# Patient Record
Sex: MALE | Race: WHITE | NOT HISPANIC OR LATINO | Employment: OTHER | URBAN - METROPOLITAN AREA
[De-identification: names, ages, dates, MRNs, and addresses within clinical notes are randomized per-mention and may not be internally consistent; named-entity substitution may affect disease eponyms.]

---

## 2017-01-07 DIAGNOSIS — K83.09 SCLEROSING CHOLANGITIS (H): Primary | ICD-10-CM

## 2017-01-07 DIAGNOSIS — K76.9 LIVER DISEASE: ICD-10-CM

## 2017-01-13 DIAGNOSIS — K76.9 LIVER DISEASE: ICD-10-CM

## 2017-01-13 DIAGNOSIS — K83.09 SCLEROSING CHOLANGITIS (H): ICD-10-CM

## 2017-01-13 LAB
ALBUMIN SERPL-MCNC: 2.9 G/DL (ref 3.4–5)
ALP SERPL-CCNC: 606 U/L (ref 40–150)
ALT SERPL W P-5'-P-CCNC: 82 U/L (ref 0–70)
AST SERPL W P-5'-P-CCNC: 182 U/L (ref 0–45)
BASOPHILS # BLD AUTO: 0 10E9/L (ref 0–0.2)
BASOPHILS NFR BLD AUTO: 0.5 %
BILIRUB SERPL-MCNC: 4.5 MG/DL (ref 0.2–1.3)
CREAT SERPL-MCNC: 0.81 MG/DL (ref 0.66–1.25)
DIFFERENTIAL METHOD BLD: ABNORMAL
EOSINOPHIL # BLD AUTO: 0.3 10E9/L (ref 0–0.7)
EOSINOPHIL NFR BLD AUTO: 6.9 %
ERYTHROCYTE [DISTWIDTH] IN BLOOD BY AUTOMATED COUNT: 17.6 % (ref 10–15)
GFR SERPL CREATININE-BSD FRML MDRD: NORMAL ML/MIN/1.7M2
HCT VFR BLD AUTO: 39.1 % (ref 40–53)
HGB BLD-MCNC: 13.7 G/DL (ref 13.3–17.7)
IMM GRANULOCYTES # BLD: 0 10E9/L (ref 0–0.4)
IMM GRANULOCYTES NFR BLD: 0.5 %
INR PPP: 1.15 (ref 0.86–1.14)
LYMPHOCYTES # BLD AUTO: 0.8 10E9/L (ref 0.8–5.3)
LYMPHOCYTES NFR BLD AUTO: 20.4 %
MCH RBC QN AUTO: 31.6 PG (ref 26.5–33)
MCHC RBC AUTO-ENTMCNC: 35 G/DL (ref 31.5–36.5)
MCV RBC AUTO: 90 FL (ref 78–100)
MONOCYTES # BLD AUTO: 0.3 10E9/L (ref 0–1.3)
MONOCYTES NFR BLD AUTO: 6.4 %
NEUTROPHILS # BLD AUTO: 2.6 10E9/L (ref 1.6–8.3)
NEUTROPHILS NFR BLD AUTO: 65.3 %
PLATELET # BLD AUTO: 68 10E9/L (ref 150–450)
POTASSIUM SERPL-SCNC: 4.3 MMOL/L (ref 3.4–5.3)
RBC # BLD AUTO: 4.33 10E12/L (ref 4.4–5.9)
SODIUM SERPL-SCNC: 143 MMOL/L (ref 133–144)
WBC # BLD AUTO: 3.9 10E9/L (ref 4–11)

## 2017-01-13 PROCEDURE — 84075 ASSAY ALKALINE PHOSPHATASE: CPT | Performed by: INTERNAL MEDICINE

## 2017-01-13 PROCEDURE — 84460 ALANINE AMINO (ALT) (SGPT): CPT | Performed by: INTERNAL MEDICINE

## 2017-01-13 PROCEDURE — 82565 ASSAY OF CREATININE: CPT | Performed by: INTERNAL MEDICINE

## 2017-01-13 PROCEDURE — 84295 ASSAY OF SERUM SODIUM: CPT | Performed by: INTERNAL MEDICINE

## 2017-01-13 PROCEDURE — 85610 PROTHROMBIN TIME: CPT | Performed by: INTERNAL MEDICINE

## 2017-01-13 PROCEDURE — 85025 COMPLETE CBC W/AUTO DIFF WBC: CPT | Performed by: INTERNAL MEDICINE

## 2017-01-13 PROCEDURE — 82040 ASSAY OF SERUM ALBUMIN: CPT | Performed by: INTERNAL MEDICINE

## 2017-01-13 PROCEDURE — 84450 TRANSFERASE (AST) (SGOT): CPT | Performed by: INTERNAL MEDICINE

## 2017-01-13 PROCEDURE — 84132 ASSAY OF SERUM POTASSIUM: CPT | Performed by: INTERNAL MEDICINE

## 2017-01-13 PROCEDURE — 36415 COLL VENOUS BLD VENIPUNCTURE: CPT | Performed by: INTERNAL MEDICINE

## 2017-01-13 PROCEDURE — 82247 BILIRUBIN TOTAL: CPT | Performed by: INTERNAL MEDICINE

## 2017-02-17 ENCOUNTER — MEDICAL CORRESPONDENCE (OUTPATIENT)
Dept: HEALTH INFORMATION MANAGEMENT | Facility: CLINIC | Age: 42
End: 2017-02-17

## 2017-02-27 DIAGNOSIS — K83.09 SCLEROSING CHOLANGITIS (H): ICD-10-CM

## 2017-02-27 DIAGNOSIS — K76.9 LIVER DISEASE: ICD-10-CM

## 2017-02-27 LAB
ALBUMIN SERPL-MCNC: 2.6 G/DL (ref 3.4–5)
ALP SERPL-CCNC: 551 U/L (ref 40–150)
ALT SERPL W P-5'-P-CCNC: 96 U/L (ref 0–70)
AST SERPL W P-5'-P-CCNC: 194 U/L (ref 0–45)
BASOPHILS # BLD AUTO: 0 10E9/L (ref 0–0.2)
BASOPHILS NFR BLD AUTO: 0.6 %
BILIRUB SERPL-MCNC: 8.5 MG/DL (ref 0.2–1.3)
CREAT SERPL-MCNC: 0.77 MG/DL (ref 0.66–1.25)
DIFFERENTIAL METHOD BLD: ABNORMAL
EOSINOPHIL # BLD AUTO: 0.2 10E9/L (ref 0–0.7)
EOSINOPHIL NFR BLD AUTO: 4 %
ERYTHROCYTE [DISTWIDTH] IN BLOOD BY AUTOMATED COUNT: 20.7 % (ref 10–15)
GFR SERPL CREATININE-BSD FRML MDRD: NORMAL ML/MIN/1.7M2
HCT VFR BLD AUTO: 39.3 % (ref 40–53)
HGB BLD-MCNC: 14 G/DL (ref 13.3–17.7)
IMM GRANULOCYTES # BLD: 0.1 10E9/L (ref 0–0.4)
IMM GRANULOCYTES NFR BLD: 0.9 %
INR PPP: 1.2 (ref 0.86–1.14)
LYMPHOCYTES # BLD AUTO: 1 10E9/L (ref 0.8–5.3)
LYMPHOCYTES NFR BLD AUTO: 19.7 %
MCH RBC QN AUTO: 32.1 PG (ref 26.5–33)
MCHC RBC AUTO-ENTMCNC: 35.6 G/DL (ref 31.5–36.5)
MCV RBC AUTO: 90 FL (ref 78–100)
MONOCYTES # BLD AUTO: 0.4 10E9/L (ref 0–1.3)
MONOCYTES NFR BLD AUTO: 7.6 %
NEUTROPHILS # BLD AUTO: 3.6 10E9/L (ref 1.6–8.3)
NEUTROPHILS NFR BLD AUTO: 67.2 %
PLATELET # BLD AUTO: 83 10E9/L (ref 150–450)
POTASSIUM SERPL-SCNC: 4.9 MMOL/L (ref 3.4–5.3)
RBC # BLD AUTO: 4.36 10E12/L (ref 4.4–5.9)
SODIUM SERPL-SCNC: 140 MMOL/L (ref 133–144)
WBC # BLD AUTO: 5.3 10E9/L (ref 4–11)

## 2017-02-27 PROCEDURE — 84075 ASSAY ALKALINE PHOSPHATASE: CPT | Performed by: INTERNAL MEDICINE

## 2017-02-27 PROCEDURE — 84450 TRANSFERASE (AST) (SGOT): CPT | Performed by: INTERNAL MEDICINE

## 2017-02-27 PROCEDURE — 82040 ASSAY OF SERUM ALBUMIN: CPT | Performed by: INTERNAL MEDICINE

## 2017-02-27 PROCEDURE — 85025 COMPLETE CBC W/AUTO DIFF WBC: CPT | Performed by: INTERNAL MEDICINE

## 2017-02-27 PROCEDURE — 36415 COLL VENOUS BLD VENIPUNCTURE: CPT | Performed by: INTERNAL MEDICINE

## 2017-02-27 PROCEDURE — 82565 ASSAY OF CREATININE: CPT | Performed by: INTERNAL MEDICINE

## 2017-02-27 PROCEDURE — 84132 ASSAY OF SERUM POTASSIUM: CPT | Performed by: INTERNAL MEDICINE

## 2017-02-27 PROCEDURE — 84460 ALANINE AMINO (ALT) (SGPT): CPT | Performed by: INTERNAL MEDICINE

## 2017-02-27 PROCEDURE — 82247 BILIRUBIN TOTAL: CPT | Performed by: INTERNAL MEDICINE

## 2017-02-27 PROCEDURE — 84295 ASSAY OF SERUM SODIUM: CPT | Performed by: INTERNAL MEDICINE

## 2017-02-27 PROCEDURE — 85610 PROTHROMBIN TIME: CPT | Performed by: INTERNAL MEDICINE

## 2017-04-25 DIAGNOSIS — K76.9 LIVER DISEASE: ICD-10-CM

## 2017-04-25 DIAGNOSIS — K83.09 SCLEROSING CHOLANGITIS (H): ICD-10-CM

## 2017-04-25 LAB
ALBUMIN SERPL-MCNC: 2.5 G/DL (ref 3.4–5)
ALP SERPL-CCNC: 476 U/L (ref 40–150)
ALT SERPL W P-5'-P-CCNC: 63 U/L (ref 0–70)
AST SERPL W P-5'-P-CCNC: 143 U/L (ref 0–45)
BASOPHILS # BLD AUTO: 0 10E9/L (ref 0–0.2)
BASOPHILS NFR BLD AUTO: 0.3 %
BILIRUB SERPL-MCNC: 4.1 MG/DL (ref 0.2–1.3)
CREAT SERPL-MCNC: 0.83 MG/DL (ref 0.66–1.25)
DIFFERENTIAL METHOD BLD: ABNORMAL
EOSINOPHIL # BLD AUTO: 0.2 10E9/L (ref 0–0.7)
EOSINOPHIL NFR BLD AUTO: 4.5 %
ERYTHROCYTE [DISTWIDTH] IN BLOOD BY AUTOMATED COUNT: 14.6 % (ref 10–15)
GFR SERPL CREATININE-BSD FRML MDRD: NORMAL ML/MIN/1.7M2
HCT VFR BLD AUTO: 39.1 % (ref 40–53)
HGB BLD-MCNC: 13.1 G/DL (ref 13.3–17.7)
IMM GRANULOCYTES # BLD: 0 10E9/L (ref 0–0.4)
IMM GRANULOCYTES NFR BLD: 0.9 %
INR PPP: 1.26 (ref 0.86–1.14)
LYMPHOCYTES # BLD AUTO: 0.7 10E9/L (ref 0.8–5.3)
LYMPHOCYTES NFR BLD AUTO: 21 %
MCH RBC QN AUTO: 34.5 PG (ref 26.5–33)
MCHC RBC AUTO-ENTMCNC: 33.5 G/DL (ref 31.5–36.5)
MCV RBC AUTO: 103 FL (ref 78–100)
MONOCYTES # BLD AUTO: 0.3 10E9/L (ref 0–1.3)
MONOCYTES NFR BLD AUTO: 8.1 %
NEUTROPHILS # BLD AUTO: 2.2 10E9/L (ref 1.6–8.3)
NEUTROPHILS NFR BLD AUTO: 65.2 %
PLATELET # BLD AUTO: 61 10E9/L (ref 150–450)
POTASSIUM SERPL-SCNC: 4.7 MMOL/L (ref 3.4–5.3)
RBC # BLD AUTO: 3.8 10E12/L (ref 4.4–5.9)
SODIUM SERPL-SCNC: 141 MMOL/L (ref 133–144)
WBC # BLD AUTO: 3.3 10E9/L (ref 4–11)

## 2017-04-25 PROCEDURE — 84460 ALANINE AMINO (ALT) (SGPT): CPT | Performed by: INTERNAL MEDICINE

## 2017-04-25 PROCEDURE — 84295 ASSAY OF SERUM SODIUM: CPT | Performed by: INTERNAL MEDICINE

## 2017-04-25 PROCEDURE — 82247 BILIRUBIN TOTAL: CPT | Performed by: INTERNAL MEDICINE

## 2017-04-25 PROCEDURE — 84075 ASSAY ALKALINE PHOSPHATASE: CPT | Performed by: INTERNAL MEDICINE

## 2017-04-25 PROCEDURE — 85025 COMPLETE CBC W/AUTO DIFF WBC: CPT | Performed by: INTERNAL MEDICINE

## 2017-04-25 PROCEDURE — 82565 ASSAY OF CREATININE: CPT | Performed by: INTERNAL MEDICINE

## 2017-04-25 PROCEDURE — 82040 ASSAY OF SERUM ALBUMIN: CPT | Performed by: INTERNAL MEDICINE

## 2017-04-25 PROCEDURE — 84450 TRANSFERASE (AST) (SGOT): CPT | Performed by: INTERNAL MEDICINE

## 2017-04-25 PROCEDURE — 84132 ASSAY OF SERUM POTASSIUM: CPT | Performed by: INTERNAL MEDICINE

## 2017-04-25 PROCEDURE — 85610 PROTHROMBIN TIME: CPT | Performed by: INTERNAL MEDICINE

## 2017-04-25 PROCEDURE — 36415 COLL VENOUS BLD VENIPUNCTURE: CPT | Performed by: INTERNAL MEDICINE

## 2017-05-15 ENCOUNTER — TRANSFERRED RECORDS (OUTPATIENT)
Dept: HEALTH INFORMATION MANAGEMENT | Facility: CLINIC | Age: 42
End: 2017-05-15

## 2017-06-20 DIAGNOSIS — K83.09 SCLEROSING CHOLANGITIS (H): ICD-10-CM

## 2017-06-20 DIAGNOSIS — K76.9 LIVER DISEASE: ICD-10-CM

## 2017-06-20 LAB
ALBUMIN SERPL-MCNC: 2.2 G/DL (ref 3.4–5)
ALP SERPL-CCNC: 360 U/L (ref 40–150)
ALT SERPL W P-5'-P-CCNC: 94 U/L (ref 0–70)
AST SERPL W P-5'-P-CCNC: 199 U/L (ref 0–45)
BASOPHILS # BLD AUTO: 0 10E9/L (ref 0–0.2)
BASOPHILS NFR BLD AUTO: 0.3 %
BILIRUB SERPL-MCNC: 14.7 MG/DL (ref 0.2–1.3)
CREAT SERPL-MCNC: 0.87 MG/DL (ref 0.66–1.25)
DIFFERENTIAL METHOD BLD: ABNORMAL
EOSINOPHIL # BLD AUTO: 0.2 10E9/L (ref 0–0.7)
EOSINOPHIL NFR BLD AUTO: 2.8 %
ERYTHROCYTE [DISTWIDTH] IN BLOOD BY AUTOMATED COUNT: 17.4 % (ref 10–15)
GFR SERPL CREATININE-BSD FRML MDRD: NORMAL ML/MIN/1.7M2
HCT VFR BLD AUTO: 40.4 % (ref 40–53)
HGB BLD-MCNC: 13.9 G/DL (ref 13.3–17.7)
IMM GRANULOCYTES # BLD: 0.1 10E9/L (ref 0–0.4)
IMM GRANULOCYTES NFR BLD: 1.3 %
INR PPP: 1.44 (ref 0.86–1.14)
LYMPHOCYTES # BLD AUTO: 1 10E9/L (ref 0.8–5.3)
LYMPHOCYTES NFR BLD AUTO: 15 %
MCH RBC QN AUTO: 32.7 PG (ref 26.5–33)
MCHC RBC AUTO-ENTMCNC: 34.4 G/DL (ref 31.5–36.5)
MCV RBC AUTO: 95 FL (ref 78–100)
MONOCYTES # BLD AUTO: 0.7 10E9/L (ref 0–1.3)
MONOCYTES NFR BLD AUTO: 9.4 %
NEUTROPHILS # BLD AUTO: 4.9 10E9/L (ref 1.6–8.3)
NEUTROPHILS NFR BLD AUTO: 71.2 %
PLATELET # BLD AUTO: 108 10E9/L (ref 150–450)
POTASSIUM SERPL-SCNC: 5 MMOL/L (ref 3.4–5.3)
RBC # BLD AUTO: 4.25 10E12/L (ref 4.4–5.9)
SODIUM SERPL-SCNC: 133 MMOL/L (ref 133–144)
WBC # BLD AUTO: 6.9 10E9/L (ref 4–11)

## 2017-06-20 PROCEDURE — 84295 ASSAY OF SERUM SODIUM: CPT | Performed by: INTERNAL MEDICINE

## 2017-06-20 PROCEDURE — 84132 ASSAY OF SERUM POTASSIUM: CPT | Performed by: INTERNAL MEDICINE

## 2017-06-20 PROCEDURE — 85610 PROTHROMBIN TIME: CPT | Performed by: INTERNAL MEDICINE

## 2017-06-20 PROCEDURE — 84450 TRANSFERASE (AST) (SGOT): CPT | Performed by: INTERNAL MEDICINE

## 2017-06-20 PROCEDURE — 82565 ASSAY OF CREATININE: CPT | Performed by: INTERNAL MEDICINE

## 2017-06-20 PROCEDURE — 85025 COMPLETE CBC W/AUTO DIFF WBC: CPT | Performed by: INTERNAL MEDICINE

## 2017-06-20 PROCEDURE — 84075 ASSAY ALKALINE PHOSPHATASE: CPT | Performed by: INTERNAL MEDICINE

## 2017-06-20 PROCEDURE — 84460 ALANINE AMINO (ALT) (SGPT): CPT | Performed by: INTERNAL MEDICINE

## 2017-06-20 PROCEDURE — 36415 COLL VENOUS BLD VENIPUNCTURE: CPT | Performed by: INTERNAL MEDICINE

## 2017-06-20 PROCEDURE — 82040 ASSAY OF SERUM ALBUMIN: CPT | Performed by: INTERNAL MEDICINE

## 2017-06-20 PROCEDURE — 82247 BILIRUBIN TOTAL: CPT | Performed by: INTERNAL MEDICINE

## 2017-06-26 ENCOUNTER — HOSPITAL ENCOUNTER (OUTPATIENT)
Facility: CLINIC | Age: 42
Setting detail: OBSERVATION
Discharge: HOME | End: 2017-06-27
Attending: FAMILY MEDICINE | Admitting: INTERNAL MEDICINE
Payer: MEDICARE

## 2017-06-26 DIAGNOSIS — A04.72 C. DIFFICILE ENTERITIS: Primary | ICD-10-CM

## 2017-06-26 DIAGNOSIS — K62.5 RECTAL BLEEDING: ICD-10-CM

## 2017-06-26 DIAGNOSIS — K76.9 LIVER DISEASE: ICD-10-CM

## 2017-06-26 DIAGNOSIS — K83.09 SCLEROSING CHOLANGITIS (H): ICD-10-CM

## 2017-06-26 PROBLEM — Z87.19 HISTORY OF DUODENAL ULCER: Status: ACTIVE | Noted: 2017-06-26

## 2017-06-26 PROBLEM — D62 ANEMIA DUE TO BLOOD LOSS, ACUTE: Status: ACTIVE | Noted: 2017-06-26

## 2017-06-26 PROBLEM — Z87.19 HISTORY OF ESOPHAGEAL VARICES: Status: ACTIVE | Noted: 2017-06-26

## 2017-06-26 PROBLEM — D69.6 THROMBOCYTOPENIA (H): Status: ACTIVE | Noted: 2017-06-26

## 2017-06-26 LAB
ABO + RH BLD: NORMAL
ABO + RH BLD: NORMAL
ALBUMIN SERPL-MCNC: 2.1 G/DL (ref 3.4–5)
ALBUMIN SERPL-MCNC: 2.4 G/DL (ref 3.4–5)
ALP SERPL-CCNC: 269 U/L (ref 40–150)
ALP SERPL-CCNC: 290 U/L (ref 40–150)
ALT SERPL W P-5'-P-CCNC: 56 U/L (ref 0–70)
ALT SERPL W P-5'-P-CCNC: 62 U/L (ref 0–70)
ANION GAP SERPL CALCULATED.3IONS-SCNC: 9 MMOL/L (ref 3–14)
ANISOCYTOSIS BLD QL SMEAR: ABNORMAL
AST SERPL W P-5'-P-CCNC: 121 U/L (ref 0–45)
AST SERPL W P-5'-P-CCNC: 145 U/L (ref 0–45)
BASOPHILS # BLD AUTO: 0 10E9/L (ref 0–0.2)
BASOPHILS # BLD AUTO: 0 10E9/L (ref 0–0.2)
BASOPHILS NFR BLD AUTO: 0 %
BASOPHILS NFR BLD AUTO: 0.5 %
BILIRUB SERPL-MCNC: 7.3 MG/DL (ref 0.2–1.3)
BILIRUB SERPL-MCNC: 9.6 MG/DL (ref 0.2–1.3)
BLD GP AB SCN SERPL QL: NORMAL
BLD PROD TYP BPU: NORMAL
BLD UNIT ID BPU: 0
BLOOD BANK CMNT PATIENT-IMP: NORMAL
BLOOD PRODUCT CODE: NORMAL
BPU ID: NORMAL
BUN SERPL-MCNC: 17 MG/DL (ref 7–30)
CALCIUM SERPL-MCNC: 7.5 MG/DL (ref 8.5–10.1)
CHLORIDE SERPL-SCNC: 107 MMOL/L (ref 94–109)
CO2 SERPL-SCNC: 20 MMOL/L (ref 20–32)
CREAT SERPL-MCNC: 0.76 MG/DL (ref 0.66–1.25)
CREAT SERPL-MCNC: 0.8 MG/DL (ref 0.66–1.25)
DIFFERENTIAL METHOD BLD: ABNORMAL
DIFFERENTIAL METHOD BLD: ABNORMAL
EOSINOPHIL # BLD AUTO: 0.1 10E9/L (ref 0–0.7)
EOSINOPHIL # BLD AUTO: 0.2 10E9/L (ref 0–0.7)
EOSINOPHIL NFR BLD AUTO: 1 %
EOSINOPHIL NFR BLD AUTO: 2.7 %
ERYTHROCYTE [DISTWIDTH] IN BLOOD BY AUTOMATED COUNT: 18.1 % (ref 10–15)
ERYTHROCYTE [DISTWIDTH] IN BLOOD BY AUTOMATED COUNT: 18.5 % (ref 10–15)
GFR SERPL CREATININE-BSD FRML MDRD: ABNORMAL ML/MIN/1.7M2
GFR SERPL CREATININE-BSD FRML MDRD: NORMAL ML/MIN/1.7M2
GLUCOSE SERPL-MCNC: 86 MG/DL (ref 70–99)
HCT VFR BLD AUTO: 19.3 % (ref 40–53)
HCT VFR BLD AUTO: 22.7 % (ref 40–53)
HGB BLD-MCNC: 6.4 G/DL (ref 13.3–17.7)
HGB BLD-MCNC: 7.7 G/DL (ref 13.3–17.7)
IMM GRANULOCYTES # BLD: 0.3 10E9/L (ref 0–0.4)
IMM GRANULOCYTES NFR BLD: 4.6 %
INR PPP: 1.68 (ref 0.86–1.14)
INR PPP: 1.92 (ref 0.86–1.14)
LYMPHOCYTES # BLD AUTO: 0.9 10E9/L (ref 0.8–5.3)
LYMPHOCYTES # BLD AUTO: 1.1 10E9/L (ref 0.8–5.3)
LYMPHOCYTES NFR BLD AUTO: 12 %
LYMPHOCYTES NFR BLD AUTO: 19.5 %
MCH RBC QN AUTO: 33.3 PG (ref 26.5–33)
MCH RBC QN AUTO: 33.9 PG (ref 26.5–33)
MCHC RBC AUTO-ENTMCNC: 33.2 G/DL (ref 31.5–36.5)
MCHC RBC AUTO-ENTMCNC: 33.9 G/DL (ref 31.5–36.5)
MCV RBC AUTO: 100 FL (ref 78–100)
MCV RBC AUTO: 101 FL (ref 78–100)
MONOCYTES # BLD AUTO: 0.5 10E9/L (ref 0–1.3)
MONOCYTES # BLD AUTO: 0.7 10E9/L (ref 0–1.3)
MONOCYTES NFR BLD AUTO: 12 %
MONOCYTES NFR BLD AUTO: 7 %
NEUTROPHILS # BLD AUTO: 3.3 10E9/L (ref 1.6–8.3)
NEUTROPHILS # BLD AUTO: 5.9 10E9/L (ref 1.6–8.3)
NEUTROPHILS NFR BLD AUTO: 60.7 %
NEUTROPHILS NFR BLD AUTO: 78 %
NUM BPU REQUESTED: 2
OTHER CELLS # BLD MANUAL: 0.2 10E9/L
OTHER CELLS NFR BLD MANUAL: 2 %
PLATELET # BLD AUTO: 101 10E9/L (ref 150–450)
PLATELET # BLD AUTO: 116 10E9/L (ref 150–450)
PLATELET # BLD EST: ABNORMAL 10*3/UL
POLYCHROMASIA BLD QL SMEAR: ABNORMAL
POTASSIUM SERPL-SCNC: 4.7 MMOL/L (ref 3.4–5.3)
POTASSIUM SERPL-SCNC: 5.1 MMOL/L (ref 3.4–5.3)
PROT SERPL-MCNC: 5 G/DL (ref 6.8–8.8)
RBC # BLD AUTO: 1.92 10E12/L (ref 4.4–5.9)
RBC # BLD AUTO: 2.27 10E12/L (ref 4.4–5.9)
SODIUM SERPL-SCNC: 136 MMOL/L (ref 133–144)
SODIUM SERPL-SCNC: 136 MMOL/L (ref 133–144)
SPECIMEN EXP DATE BLD: NORMAL
TARGETS BLD QL SMEAR: SLIGHT
TRANSFUSION STATUS PATIENT QL: NORMAL
TRANSFUSION STATUS PATIENT QL: NORMAL
WBC # BLD AUTO: 5.5 10E9/L (ref 4–11)
WBC # BLD AUTO: 7.5 10E9/L (ref 4–11)

## 2017-06-26 PROCEDURE — 85610 PROTHROMBIN TIME: CPT | Performed by: FAMILY MEDICINE

## 2017-06-26 PROCEDURE — 99285 EMERGENCY DEPT VISIT HI MDM: CPT | Mod: 25 | Performed by: FAMILY MEDICINE

## 2017-06-26 PROCEDURE — 99285 EMERGENCY DEPT VISIT HI MDM: CPT | Mod: 25

## 2017-06-26 PROCEDURE — 86901 BLOOD TYPING SEROLOGIC RH(D): CPT | Performed by: FAMILY MEDICINE

## 2017-06-26 PROCEDURE — P9016 RBC LEUKOCYTES REDUCED: HCPCS | Performed by: FAMILY MEDICINE

## 2017-06-26 PROCEDURE — 25000132 ZZH RX MED GY IP 250 OP 250 PS 637: Mod: GY | Performed by: FAMILY MEDICINE

## 2017-06-26 PROCEDURE — A9270 NON-COVERED ITEM OR SERVICE: HCPCS | Mod: GY | Performed by: FAMILY MEDICINE

## 2017-06-26 PROCEDURE — 85610 PROTHROMBIN TIME: CPT | Performed by: INTERNAL MEDICINE

## 2017-06-26 PROCEDURE — 36430 TRANSFUSION BLD/BLD COMPNT: CPT

## 2017-06-26 PROCEDURE — 82565 ASSAY OF CREATININE: CPT | Performed by: INTERNAL MEDICINE

## 2017-06-26 PROCEDURE — 84450 TRANSFERASE (AST) (SGOT): CPT | Performed by: INTERNAL MEDICINE

## 2017-06-26 PROCEDURE — 96360 HYDRATION IV INFUSION INIT: CPT

## 2017-06-26 PROCEDURE — 99219 ZZC INITIAL OBSERVATION CARE,LEVL II: CPT | Performed by: INTERNAL MEDICINE

## 2017-06-26 PROCEDURE — 86923 COMPATIBILITY TEST ELECTRIC: CPT | Performed by: FAMILY MEDICINE

## 2017-06-26 PROCEDURE — 82247 BILIRUBIN TOTAL: CPT | Performed by: INTERNAL MEDICINE

## 2017-06-26 PROCEDURE — 84075 ASSAY ALKALINE PHOSPHATASE: CPT | Performed by: INTERNAL MEDICINE

## 2017-06-26 PROCEDURE — 86850 RBC ANTIBODY SCREEN: CPT | Performed by: FAMILY MEDICINE

## 2017-06-26 PROCEDURE — 84295 ASSAY OF SERUM SODIUM: CPT | Performed by: INTERNAL MEDICINE

## 2017-06-26 PROCEDURE — 82040 ASSAY OF SERUM ALBUMIN: CPT | Performed by: INTERNAL MEDICINE

## 2017-06-26 PROCEDURE — 85025 COMPLETE CBC W/AUTO DIFF WBC: CPT | Performed by: INTERNAL MEDICINE

## 2017-06-26 PROCEDURE — 36415 COLL VENOUS BLD VENIPUNCTURE: CPT | Performed by: INTERNAL MEDICINE

## 2017-06-26 PROCEDURE — 85025 COMPLETE CBC W/AUTO DIFF WBC: CPT | Performed by: FAMILY MEDICINE

## 2017-06-26 PROCEDURE — 25000128 H RX IP 250 OP 636: Performed by: FAMILY MEDICINE

## 2017-06-26 PROCEDURE — 84132 ASSAY OF SERUM POTASSIUM: CPT | Performed by: INTERNAL MEDICINE

## 2017-06-26 PROCEDURE — 80053 COMPREHEN METABOLIC PANEL: CPT | Performed by: FAMILY MEDICINE

## 2017-06-26 PROCEDURE — 84460 ALANINE AMINO (ALT) (SGPT): CPT | Performed by: INTERNAL MEDICINE

## 2017-06-26 PROCEDURE — 86900 BLOOD TYPING SEROLOGIC ABO: CPT | Performed by: FAMILY MEDICINE

## 2017-06-26 RX ORDER — LIDOCAINE 40 MG/G
CREAM TOPICAL
Status: DISCONTINUED | OUTPATIENT
Start: 2017-06-26 | End: 2017-06-27

## 2017-06-26 RX ORDER — PHYTONADIONE 5 MG/1
5 TABLET ORAL ONCE
Status: COMPLETED | OUTPATIENT
Start: 2017-06-26 | End: 2017-06-26

## 2017-06-26 RX ADMIN — SODIUM CHLORIDE 250 ML: 9 INJECTION, SOLUTION INTRAVENOUS at 22:11

## 2017-06-26 RX ADMIN — PHYTONADIONE 5 MG: 5 TABLET ORAL at 22:40

## 2017-06-26 NOTE — IP AVS SNAPSHOT
[address] [facility]    [address] [facility]     [place] [address]    Phone:  [phone]                                       After Visit Summary   6/26/2017    Abhishek Downey    MRN: [id number]           After Visit Summary Signature Page     I have received my discharge instructions, and my questions have been answered. I have discussed any challenges I see with this plan with the nurse or doctor.    ..........................................................................................................................................  Patient/Patient Representative Signature      ..........................................................................................................................................  Patient Representative Print Name and Relationship to Patient    ..................................................               ................................................  Date                                            Time    ..........................................................................................................................................  Reviewed by Signature/Title    ...................................................              ..............................................  Date                                                            Time

## 2017-06-26 NOTE — IP AVS SNAPSHOT
MRN:[id number]                      After Visit Summary   6/26/2017    Abhishek Downey    MRN: [id number]           Thank you!     Thank you for choosing [facility] for your care. Our goal is always to provide you with excellent care. Hearing back from our patients is one way we can continue to improve our services. Please take a few minutes to complete the written survey that you may receive in the mail after you visit with us. Thank you!        Patient Information     Date Of Birth          1975        About your hospital stay     You were admitted on:  June 27, 2017 You last received care in the:  [address] [facility]    You were discharged on:  June 27, 2017       Who to Call     For medical emergencies, please call 911.  For non-urgent questions about your medical care, please call your primary care provider or clinic, [phone]          Attending Provider     Provider Specialty    Crystal Major MD Emergency Medicine    Ruslan Sosa MD Internal Medicine       Primary Care Provider Office Phone # Fax #    Abhishek Alexis [phone]      After Care Instructions     Activity       Your activity upon discharge: activity as tolerated            Diet       Follow this diet upon discharge: Regular                  Follow-up Appointments     Follow-up and recommended labs and tests        Follow up with GI in 3 days as scheduled  Recheck hemoglobin tomorrow                  Pending Results     Date and Time Order Name Status Description    6/27/2017 0040 Clostridium difficile toxin B PCR In process             Statement of Approval     Ordered          06/27/17 0609  I have reviewed and agree with all the recommendations and orders detailed in this document.  EFFECTIVE NOW     Approved and electronically signed by:  Dariusz Walters MD             Admission Information     Date & Time Provider Department Dept. Phone    6/26/2017 Ruslan Sosa,  MD [place] [facility] [phone]      Your Vitals Were     Blood Pressure Pulse Temperature Respirations Height Weight    124/56 98 98.3  F (36.8  C) (Oral) 16 1.829 m (6') 103.9 kg (229 lb)    Pulse Oximetry BMI (Body Mass Index)                99% 31.06 kg/m2          MyChart Information     [employer] gives you secure access to your electronic health record. If you see a primary care provider, you can also send messages to your care team and make appointments. If you have questions, please call your primary care clinic.  If you do not have a primary care provider, please call [phone] and they will assist you.        Care EveryWhere ID     This is your Care EveryWhere ID. This could be used by other organizations to access your [place] medical records  [id number]        Equal Access to Services     DAVID CURRY : Peter Woodruff, kim price, elizabet lino. So [facility] [phone].    ATENCIÓN: Si habla español, tiene a burton disposición servicios gratuitos de asistencia lingüística. Lilly al [phone].    We comply with applicable federal civil rights laws and [place] laws. We do not discriminate on the basis of race, color, national origin, age, disability sex, sexual orientation or gender identity.               Review of your medicines      CONTINUE these medicines which may have CHANGED, or have new prescriptions. If we are uncertain of the size of tablets/capsules you have at home, strength may be listed as something that might have changed.        Dose / Directions    metroNIDAZOLE 500 MG tablet   Commonly known as:  FLAGYL   This may have changed:  when to take this        Dose:  500 mg   Take 1 tablet (500 mg) by mouth 3 times daily   Refills:  0         CONTINUE these medicines which have NOT CHANGED        Dose / Directions    VANCOMYCIN HCL PO        Dose:  125 mg   Take 125 mg by mouth 4 times daily    Refills:  0            Where to get your medicines      Some of these will need a paper prescription and others can be bought over the counter. Ask your nurse if you have questions.     You don't need a prescription for these medications     metroNIDAZOLE 500 MG tablet                Protect others around you: Learn how to safely use, store and throw away your medicines at www.disposemymeds.org.             Medication List: This is a list of all your medications and when to take them. Check marks below indicate your daily home schedule. Keep this list as a reference.      Medications           Morning Afternoon Evening Bedtime As Needed    metroNIDAZOLE 500 MG tablet   Commonly known as:  FLAGYL   Take 1 tablet (500 mg) by mouth 3 times daily   Last time this was given:  500 mg on 6/27/2017  5:23 AM                                VANCOMYCIN HCL PO   Take 125 mg by mouth 4 times daily

## 2017-06-27 VITALS
SYSTOLIC BLOOD PRESSURE: 124 MMHG | OXYGEN SATURATION: 99 % | WEIGHT: 229 LBS | TEMPERATURE: 98.3 F | HEART RATE: 98 BPM | BODY MASS INDEX: 31.02 KG/M2 | HEIGHT: 72 IN | DIASTOLIC BLOOD PRESSURE: 56 MMHG | RESPIRATION RATE: 16 BRPM

## 2017-06-27 PROBLEM — A04.72 C. DIFFICILE ENTERITIS: Status: ACTIVE | Noted: 2017-06-27

## 2017-06-27 LAB
BLD PROD TYP BPU: NORMAL
BLD UNIT ID BPU: 0
BLOOD PRODUCT CODE: NORMAL
BPU ID: NORMAL
C DIFF TOX B STL QL: NORMAL
HGB BLD-MCNC: 7.8 G/DL (ref 13.3–17.7)
SPECIMEN SOURCE: NORMAL
TRANSFUSION STATUS PATIENT QL: NORMAL
TRANSFUSION STATUS PATIENT QL: NORMAL

## 2017-06-27 PROCEDURE — 85018 HEMOGLOBIN: CPT | Performed by: INTERNAL MEDICINE

## 2017-06-27 PROCEDURE — 99217 ZZC OBSERVATION CARE DISCHARGE: CPT | Performed by: INTERNAL MEDICINE

## 2017-06-27 PROCEDURE — A9270 NON-COVERED ITEM OR SERVICE: HCPCS | Mod: GY | Performed by: INTERNAL MEDICINE

## 2017-06-27 PROCEDURE — G0378 HOSPITAL OBSERVATION PER HR: HCPCS

## 2017-06-27 PROCEDURE — P9016 RBC LEUKOCYTES REDUCED: HCPCS | Performed by: FAMILY MEDICINE

## 2017-06-27 PROCEDURE — 25000132 ZZH RX MED GY IP 250 OP 250 PS 637: Mod: GY | Performed by: INTERNAL MEDICINE

## 2017-06-27 PROCEDURE — 87493 C DIFF AMPLIFIED PROBE: CPT | Performed by: INTERNAL MEDICINE

## 2017-06-27 PROCEDURE — 36415 COLL VENOUS BLD VENIPUNCTURE: CPT | Performed by: INTERNAL MEDICINE

## 2017-06-27 RX ORDER — METRONIDAZOLE 500 MG/1
500 TABLET ORAL 3 TIMES DAILY
Refills: 0
Start: 2017-06-27 | End: 2017-08-07

## 2017-06-27 RX ORDER — ONDANSETRON 2 MG/ML
4 INJECTION INTRAMUSCULAR; INTRAVENOUS EVERY 6 HOURS PRN
Status: DISCONTINUED | OUTPATIENT
Start: 2017-06-27 | End: 2017-06-27 | Stop reason: HOSPADM

## 2017-06-27 RX ORDER — ONDANSETRON 4 MG/1
4 TABLET, ORALLY DISINTEGRATING ORAL EVERY 6 HOURS PRN
Status: DISCONTINUED | OUTPATIENT
Start: 2017-06-27 | End: 2017-06-27 | Stop reason: HOSPADM

## 2017-06-27 RX ORDER — METRONIDAZOLE 500 MG/1
500 TABLET ORAL EVERY 8 HOURS SCHEDULED
Status: DISCONTINUED | OUTPATIENT
Start: 2017-06-27 | End: 2017-06-27 | Stop reason: HOSPADM

## 2017-06-27 RX ORDER — ACETAMINOPHEN 325 MG/1
650 TABLET ORAL EVERY 4 HOURS PRN
Status: DISCONTINUED | OUTPATIENT
Start: 2017-06-27 | End: 2017-06-27 | Stop reason: HOSPADM

## 2017-06-27 RX ORDER — NALOXONE HYDROCHLORIDE 0.4 MG/ML
.1-.4 INJECTION, SOLUTION INTRAMUSCULAR; INTRAVENOUS; SUBCUTANEOUS
Status: DISCONTINUED | OUTPATIENT
Start: 2017-06-27 | End: 2017-06-27 | Stop reason: HOSPADM

## 2017-06-27 RX ADMIN — METRONIDAZOLE 500 MG: 500 TABLET ORAL at 05:23

## 2017-06-27 NOTE — ED NOTES
ED Nursing criteria listed below was addressed during verbal handoff:     Abnormal vitals: Yes  Abnormal results: Yes  Med Reconciliation completed: Yes  Meds given in ED: Yes  Any Overdue Meds: N/A  Core Measures: Yes  Isolation: Yes  Special needs: Yes  Skin assessment: Yes    Observation Patient  Education provided: Yes

## 2017-06-27 NOTE — H&P
[facility]    History and Physical  Hospitalist       Date of Admission:  6/26/2017  Date of Service (when I saw the patient): 06/26/17    Assessment & Plan       Active Problems:    Anemia due to blood loss, acute    Assessment:  Felt secondary to his active c diff pouchitis.  Discharged from [place] two days ago and he had an EGD and flex sig.  The bleeding was coming from his pouch and no evidence of gastric or duodenal ulcers or bleeding from varices. Hgb earlier today was over 7 but now has dropped about one gram from earlier today in the context of recurrent bloody diarrhea.  Dr. Major discussed with the [place] liver transplant team who suggested giving blood and following his hgb and if his hgb stabilized he could be discharged home but if not he would need to return to [place].     Plan: register to observation, will give 2 units of PRBC's after discussing the risks and benefits, recheck hgb at 0500 or sooner should he have large volume of bleeding noted.      C. difficile pouchitis    Assessment: no fever or leucocytosis.  No tenderness on exam.  His colon as been removed.  He is tachycardic but likely from the anemia.    Plan: continue oral vanco and flagyl      Primary sclerosing cholangitis    Assessment: chronic and being followed by the liver transplant team at [place].  MELD score with today's lab is 21.  Has had varices, coagulopathy and ascites.  Diuretics recently stopped due to diarrhea    Plan: no acute interventions.      History of esophageal varices    Assessment: not on any beta blockers and recent EGD without upper GIB    Plan: no acute interventions      Thrombocytopenia (H)    Assessment: chronic and due to portal HTN and splenic sequestration    Plan: no acute intervention      History of duodenal ulcer    Assessment: noted past history but no longer on PPI    Plan: no intervention      H/O ulcerative colitis - s/p total colectomy    Assessment: noted past history     Plan: no intervention    DVT Prophylaxis: anticipate less than 24 hour stay  Code Status: Full Code    Disposition: Expected discharge in 1 days once hgb improving as anticipated and no large volume rectal bleeding noted.    Dariusz Walters MD    Primary Care Physician   Abhishek Alexis    Chief Complaint   Rectal bleeding    History is obtained from the patient    History of Present Illness   Abhishek Downey is a 41 year old male who presents with rectal bleeding.  Patient has had diarrhea for about 3 weeks.  He was seen at [place] 6 days ago and diagnosed with c diff pouchitis.  He was started on vanco and flagyl and his blood diarrhea stopped. He was discharged 2 days ago but on getting home had a small amount of blood. He then had 3 episodes yesterday and 3 more episodes today of bloody diarrhea.  His last episode was at about 1:30 this afternoon.  He has felt SOB with minimal activity and feels very weak. He has had mild nausea but no vomiting. He has not had fever.  He has mild crampy abdominal pain.    Past Medical History    I have reviewed this patient's medical history and updated it with pertinent information if needed.   Past Medical History:   Diagnosis Date     Cirrhosis of liver (H)     Due to PSC     DU (duodenal ulcer) 5/2015    treated nonsurgically      PSC (primary sclerosing cholangitis)      Ulcerative colitis        Past Surgical History   I have reviewed this patient's surgical history and updated it with pertinent information if needed.  Past Surgical History:   Procedure Laterality Date     COLECTOMY  12/27/07     FLEXIBLE SIGMOIDOSCOPY  10/4/10      ERCP W/W/O NONA OF SPEC-BRUSH/WASH  02/19/10      ERCP W/W/O NONA OF SPEC-BRUSH/WASH  03/05/10      UGI ENDOSCOPY W BANDING ESOPH/GASTRIC VARICES  since 2/2015    monthly at [facility]; last one in August      UPPER GI ENDOSCOPY  11/13/13    [facility]     UPPER GI ENDOSCOPY  2/16/15    [facility]       Prior to Admission  Medications   Prior to Admission Medications   Prescriptions Last Dose Informant Patient Reported? Taking?   MetroNIDazole (FLAGYL) 500 MG tablet 6/26/2017 at 1600  Yes Yes   Sig: Take 500 mg by mouth daily.   VANCOMYCIN HCL PO 6/26/2017 at 2000  Yes Yes   Sig: Take 125 mg by mouth 4 times daily      Facility-Administered Medications: None     Allergies   Allergies   Allergen Reactions     No Known Allergies        Social History   I have reviewed this patient's social history and updated it with pertinent information if needed. Abhishek Downey  reports that he has never smoked. He has never used smokeless tobacco. He reports that he does not drink alcohol or use illicit drugs.    Family History   I have reviewed this patient's family history and updated it with pertinent information if needed.   Family History   Problem Relation Age of Onset     Heart Failure Mother        Review of Systems   The 10 point Review of Systems is negative other than noted in the HPI or here. 15 pound weight loss over the past 6 weeks    Physical Exam   Temp: 98.6  F (37  C)   BP: 122/62 Pulse: 130   Resp: 16 SpO2: 100 % O2 Device: None (Room air)    Vital Signs with Ranges  Temp:  [98.6  F (37  C)] 98.6  F (37  C)  Pulse:  [130] 130  Resp:  [16] 16  BP: (108-124)/(50-82) 122/62  SpO2:  [99 %-100 %] 100 %  229 lbs 3.2 oz    Constitutional:   awake, alert, cooperative, no apparent distress, and appears stated age     Eyes:   Lids and lashes normal, pupils equal, round and reactive to light, extra ocular muscles intact, sclera clear, conjunctiva normal     ENT:   Normocephalic, without obvious abnormality, atraumatic, sinuses nontender on palpation, external ears without lesions, oral pharynx with moist mucous membranes, tonsils without erythema or exudates, gums normal and good dentition.     Neck:   Supple, symmetrical, trachea midline, no adenopathy, thyroid symmetric, not enlarged and no tenderness, skin normal     Hematologic /  Lymphatic:   no cervical lymphadenopathy and no supraclavicular lymphadenopathy     Back:   Symmetric, no curvature, spinous processes are non-tender on palpation, paraspinous muscles are non-tender on palpation, no costal vertebral tenderness     Lungs:   No increased work of breathing, good air exchange, clear to auscultation bilaterally, no crackles or wheezing     Cardiovascular:   Tachycardic but regular.  No audible murmur, rub or gallop     Abdomen:   Previous surgical scar evident,  normal bowel sounds, soft, non-distended, non-tender, no masses palpated, no hepatosplenomegally     Neurologic:   Awake, alert, oriented to name, place and time.  Cranial nerves II-XII are grossly intact.  Motor is 5 out of 5 bilaterally.    Sensory is intact.        Data   Data reviewed today:  I personally reviewed no images or EKG's today.    Recent Labs  Lab 06/26/17  2206 06/26/17  1056 06/20/17  1156   WBC 5.5 7.5 6.9   HGB 6.4* 7.7* 13.9   * 100 95   * 116* 108*   INR 1.92* 1.68* 1.44*    136 133   POTASSIUM 4.7 5.1 5.0   CHLORIDE 107  --   --    CO2 20  --   --    BUN 17  --   --    CR 0.80 0.76 0.87   ANIONGAP 9  --   --    BREN 7.5*  --   --    GLC 86  --   --    ALBUMIN 2.1* 2.4* 2.2*   PROTTOTAL 5.0*  --   --    BILITOTAL 7.3* 9.6* 14.7*   ALKPHOS 269* 290* 360*   ALT 56 62 94*   * 145* 199*       No results found for this or any previous visit (from the past 24 hour(s)).

## 2017-06-27 NOTE — ED NOTES
Pt presents with bloody stools, 3x daily for the last 3 days.  He reports that he was just discharged from [place] 3 days ago, admitted with cirrhosis and C diff.  He reports that his Hgb was 7.2 at discharge, had labs drawn in our clinic this am.

## 2017-06-27 NOTE — ED PROVIDER NOTES
[facility] ED Provider Note   CC:     Chief Complaint   Patient presents with     Rectal Bleeding     HPI:  Abhishek Downey is a 41 year old male with primary sclerosing cholangitis who presented to the emergency department with complaints of rectal bleeding. The patient states that he was recently seen at [facility] for this issue and was there from the 21st through the 24th of June. At this time he was diagnosed with C-diff, Cirrhosis, and pouchitis. He endorses that when he left the [place], the bleeding had stopped. He reports that the rectal bleeding began again three days ago and ranges from black to bright red. The patient states that he has six to seven bowel movements daily at at least half of them have present blood. He reports feeling weak and fatigued along with upper abdominal pain and back pain. He says that he often feels light-headed and like he is about to have a syncopal episode. He endorses having loss of appetite and significant weight fluctuation over the past couple of weeks. He denies vomiting, fever and chills.     Problem List:  Patient Active Problem List    Diagnosis     Mass of left upper extremity     Anemia     Upper GI bleed     Acute pancreatitis     Primary sclerosing cholangitis     Cirrhosis of liver (H)     S/P total colectomy     Esophageal varices (H)     Mild major depression (H)     CARDIOVASCULAR SCREENING; LDL GOAL LESS THAN 160     H/O ulcerative colitis       MEDS:   Previous Medications    METRONIDAZOLE (FLAGYL) 500 MG TABLET    Take 500 mg by mouth daily.    VANCOMYCIN HCL PO    Take 125 mg by mouth 4 times daily       ALLERGIES:    Allergies   Allergen Reactions     No Known Allergies        Past medical, surgical, family and social histories, triage and nursing notes were all reviewed.    Review of Systems   All other systems were reviewed and are negative    Physical Exam     Vitals were  reviewed  Patient Vitals for the past 8 hrs:   BP Temp Pulse Resp SpO2 Weight   06/26/17 2237 124/50 - - - 99 % -   06/26/17 2215 108/82 - - - 100 % -   06/26/17 2059 113/67 98.6  F (37  C) 130 16 100 % 104 kg (229 lb 3.2 oz)     GENERAL APPEARANCE: Patient is jaundiced, moderately ill  FACE: normal facies  EYES: Pupils are equal; scleral icterus  HENT: normal external exam  NECK: no adenopathy or asymmetry  RESP: normal respiratory effort; clear breath sounds bilaterally  CV: Rapid heart rate, regular rhythm; no significant murmurs, gallops or rubs  ABD: soft, protuberant no tenderness; no rebound or guarding; bowel sounds are normal  MS: no gross deformities noted; normal muscle tone.  EXT: No calf tenderness or pitting edema  SKIN: no worrisome rash  NEURO: no facial droop; no focal deficits, speech is normal        Available Lab/Imaging Results     Results for orders placed or performed during the hospital encounter of 06/26/17 (from the past 24 hour(s))   CBC with platelets differential   Result Value Ref Range    WBC 5.5 4.0 - 11.0 10e9/L    RBC Count 1.92 (L) 4.4 - 5.9 10e12/L    Hemoglobin 6.4 (LL) 13.3 - 17.7 g/dL    Hematocrit 19.3 (L) 40.0 - 53.0 %     (H) 78 - 100 fl    MCH 33.3 (H) 26.5 - 33.0 pg    MCHC 33.2 31.5 - 36.5 g/dL    RDW 18.5 (H) 10.0 - 15.0 %    Platelet Count 101 (L) 150 - 450 10e9/L    Diff Method Automated Method     % Neutrophils 60.7 %    % Lymphocytes 19.5 %    % Monocytes 12.0 %    % Eosinophils 2.7 %    % Basophils 0.5 %    % Immature Granulocytes 4.6 %    Absolute Neutrophil 3.3 1.6 - 8.3 10e9/L    Absolute Lymphocytes 1.1 0.8 - 5.3 10e9/L    Absolute Monocytes 0.7 0.0 - 1.3 10e9/L    Absolute Eosinophils 0.2 0.0 - 0.7 10e9/L    Absolute Basophils 0.0 0.0 - 0.2 10e9/L    Abs Immature Granulocytes 0.3 0 - 0.4 10e9/L   Comprehensive metabolic panel   Result Value Ref Range    Sodium 136 133 - 144 mmol/L    Potassium 4.7 3.4 - 5.3 mmol/L    Chloride 107 94 - 109 mmol/L     Carbon Dioxide 20 20 - 32 mmol/L    Anion Gap 9 3 - 14 mmol/L    Glucose 86 70 - 99 mg/dL    Urea Nitrogen 17 7 - 30 mg/dL    Creatinine 0.80 0.66 - 1.25 mg/dL    GFR Estimate >90  Non  GFR Calc   >60 mL/min/1.7m2    GFR Estimate If Black >90   GFR Calc   >60 mL/min/1.7m2    Calcium 7.5 (L) 8.5 - 10.1 mg/dL    Bilirubin Total 7.3 (H) 0.2 - 1.3 mg/dL    Albumin 2.1 (L) 3.4 - 5.0 g/dL    Protein Total 5.0 (L) 6.8 - 8.8 g/dL    Alkaline Phosphatase 269 (H) 40 - 150 U/L    ALT 56 0 - 70 U/L     (H) 0 - 45 U/L   INR   Result Value Ref Range    INR 1.92 (H) 0.86 - 1.14   ABO/Rh type and screen   Result Value Ref Range    ABO Pending     RH(D) Pending     Antibody Screen Pending     Test Valid Only At Pending     Specimen Expires Pending          Impression     Final diagnoses:   Rectal bleeding       ED Course & Medical Decision Making   Abhishek Downey is a 41 year old male with a history of ulcerative colitis, primary sclerosing cholangitis, with recent C. diff pouchitis and ileitis who presented to the emergency department with increased rectal bleeding.  Patient was hospitalized at the [facility] for 3-4 days from June 21 to the 24th.  Patient had upper and lower endoscopy, with biopsies, and eventually diagnosed with clostridium difficile infection.  He is currently on Flagyl and vancomycin.  He has been experiencing 7-8 episodes of rectal bleeding, with bright red blood today.  Patient presents to the ED with feelings of weakness, near syncope, and right sided abdominal pain.  The patient's blood work from earlier today revealed a hemoglobin of 7.7.  Patient was seen shortly after arrival, and the pressure is 113/67 with heart rate of 1:30, respiratory rate of 16, and oxygen saturation of 100%.  Temperature is 98.6.  Exam revealed protuberant abdomen with suspected ascites.  There is mild right upper quadrant tenderness.  Patient is extremely jaundiced, but this is not  atypical.  The patient's blood work reveals a hemoglobin now of 6.4, with 101 platelets, and normal differential.  Comprehensive metabolic panel reveals a normal electrolytes, glucose and kidney function.  Bilirubin is 7.3, alk phos is 269, ALT 56, AST of 121.  INR level is 1.92.  I discussed the patient with Dr. Adolfo Khan with the liver transplant service at the [facility].  Phone number [phone], and he reviewed the endoscopic findings of the recent hospitalization.  There is no evidence for varices, and he had friable tissue involving the distal ileum and pouch with no other atypical infections on biopsy.  He does have clostridium difficile pouchitis, and had rectal bleeding while in the hospital.  He was discharged from the hospital with a hemoglobin of 7.2.  He recommended that we replace saline and packed red blood cells, and see how he does overnight.  He did not think that he needed to be transferred to the [facility] at this time.  Patient preferred this plan, and I discussed the case with Dr. Walters, our hospitalist.            This document serves as a record of services personally performed by Crystal Major MD. It was created on their behalf by Tamara Delgado, a trained medical scribe. The creation of this record is based on the provider's personal observations and the statements of the patient. This document has been checked and approved by the attending provider.    This note was completed in part using Dragon voice recognition, and may contain word and grammatical errors.          Crystal Major MD  06/27/17 020

## 2017-06-27 NOTE — PROGRESS NOTES
S-(situation): Patient discharged to home via private vehichle with wife    B-(background): rectal bleeding    A-(assessment): patient had 4 stools one dark brown to black and other soft brown with black streaks. No red stools noted. Completed 2 units PRBC, tolerated well. HGB 7.8 this am.    R-(recommendations): Discharge instructions reviewed with patient. Listed belongings gathered and returned to patient. yes         Discharge Nursing Criteria:     Care Plan and Patient education resolved: N/A patient observation and goals met    New Medications- pt has been educated about purpose and side effects: Not Applicable    Vaccines  Pneumonia Vaccine verified at discharge: Yes  Influenza status verified at discharge: N/A      MISC  Prescriptions if needed, hard copies sent with patient  NA  Home and hospital aquired medications returned to patient: NA  Medication Bin checked and emptied on discharge Yes  Patient reports post-discharge pain management plan is effective: Yes

## 2017-06-27 NOTE — PROGRESS NOTES
S-(situation): Patient registered to Observation. Patient arrived to room 252 via stretcher from ED    B-(background): Admitted for rectal bleeding, history of ulcerative colitis, duodenal ulcer, cirrhosis of the liver, primary sclerosis cholangitis.     A-(assessment): Alert and oriented, ambulated to the bathroom independently, stool dark red, voided. LS clear, BS present, pain in abdomen mostly in the LLQ, 2/10 at this time, stated repositioning works best to relieve pain. 1st unit of blood infusing upon arrival, site patent. Patient in bed resting.     R-(recommendations): Orders and observation goals reviewed with patient    Nursing Observation criteria listed below was met:    Skin issues/needs documented: NO  Isolation needs addressed, if appropriate: Enteric  Fall Prevention: Education given and documented: Yes  Education Assessment documented:Yes  Education Documented (Pre-existing chronic infection such as, MRSA/VRE need education on admission): Yes  New medication patient education completed and documented (Possible Side Effects of Common Medications handout): Yes  Home medications if not able to send immediately home with family stored here: NA  Reminder note placed in discharge instructions: NA  Patient has discharge needs (If yes, please explain): No    Entered by Abeba Chambers RN

## 2017-06-27 NOTE — ED NOTES
Reports he was in the hospital and discharged home on Friday, states bleeding stopped for 24 hours before discharge from the hospital but has having bright red bleeding with diarrhea since being home. Reports pain in abd is increased and he is feeling weak when he stands up and feels like he is going to pass out.

## 2017-06-27 NOTE — PROGRESS NOTES
A&O, Afebrile, HR tachy has come down since arrival to floor, other VSS. Mild pain in abdomen, normal cramping pain, states tolerable. Up to bathroom overnight x4 stools no more bloody stools, dark brown stool last. 2 units of RPBC given overnight, Hg back at 7.8 this morning, Dr. Walters in to see patient and discharged patient. Discharge instructions completed with patient and spouse, may leave prior to 0700.

## 2017-06-27 NOTE — DISCHARGE SUMMARY
[facility]    Discharge Summary  Hospitalist    Date of Admission:  6/26/2017  Date of Discharge:  6/27/2017  Discharging Provider: Dariusz Walters MD  Date of Service (when I saw the patient): 06/27/17    Discharge Diagnoses   Active Problems:    Anemia due to blood loss, acute    C. difficile pouchitis    Primary sclerosing cholangitis    History of esophageal varices    Thrombocytopenia (H)    History of duodenal ulcer    C. difficile enteritis    H/O ulcerative colitis - s/p total colectomy       History of Present Illness   Copied from H&P:   Abhishek Downey is a 41 year old male who presents with rectal bleeding.  Patient has had diarrhea for about 3 weeks.  He was seen at [place] 6 days ago and diagnosed with c diff pouchitis.  He was started on vanco and flagyl and his blood diarrhea stopped. He was discharged 2 days ago but on getting home had a small amount of blood. He then had 3 episodes yesterday and 3 more episodes today of bloody diarrhea.  His last episode was at about 1:30 this afternoon.  He has felt SOB with minimal activity and feels very weak. He has had mild nausea but no vomiting. He has not had fever.  He has mild crampy abdominal pain.       Hospital Course   Abhishek Downey was admitted on 6/26/2017.  The following problems were addressed during his hospitalization:    Active Problems:    Anemia due to blood loss, acute    Assessment: given 2 units of PRBC's and hgb increased to 7.8.  He had no recurrent bloody stools while here and felt safe for discharge    Plan: discharge home, recheck hgb tomorrow and f/u with GI in 3 days as scheduled      C. difficile pouchitis    Assessment: no fever and only one episode of diarrhea that was non bloody while here    Plan: continue flagyl and vanco      Primary sclerosing cholangitis    Assessment: chronic    Plan: no acute interventions      History of esophageal varices    Assessment: recent EGD without  bleeding    Plan: no intervention      Thrombocytopenia (H)    Assessment: chronic and stable    Plan: routine outpatient recheck      History of duodenal ulcer    Assessment: recent EGD without ulcer    Plan: no intervention        H/O ulcerative colitis - s/p total colectomy    Assessment: noted past history    Plan: no intervention      Dariusz Walters MD    Significant Results and Procedures   No procedures performed during this admission    Pending Results   These results will be followed up by   Unresulted Labs Ordered in the Past 30 Days of this Admission     Date and Time Order Name Status Description    6/27/2017 0040 Clostridium difficile toxin B PCR In process           Code Status   Full Code       Primary Care Physician   Abhishek Alexis    Physical Exam   Temp: 98.3  F (36.8  C) Temp src: Oral BP: 124/56 Pulse: 98 Heart Rate: 111 Resp: 16 SpO2: 99 % O2 Device: None (Room air)    Vitals:    06/26/17 2059 06/27/17 0043   Weight: 104 kg (229 lb 3.2 oz) 103.9 kg (229 lb)     Vital Signs with Ranges  Temp:  [98.3  F (36.8  C)-99.5  F (37.5  C)] 98.3  F (36.8  C)  Pulse:  [] 98  Heart Rate:  [111-123] 111  Resp:  [16-18] 16  BP: (104-124)/(41-82) 124/56  SpO2:  [99 %-100 %] 99 %       Lungs:  CTA throughout  CV:  RRR without murmur  Abdomen: +BS, Soft, NT        Discharge Disposition   Discharged to home  Condition at discharge: Stable    Consultations This Hospital Stay   None    Time Spent on this Encounter   I, Dariusz Walters MD, personally saw the patient today and spent less than or equal to 30 minutes discharging this patient.    Discharge Orders     Follow-up and recommended labs and tests    Follow up with GI in 3 days as scheduled  Recheck hemoglobin tomorrow     Activity   Your activity upon discharge: activity as tolerated     Diet   Follow this diet upon discharge: Regular       Discharge Medications   Current Discharge Medication List      CONTINUE these medications which have  CHANGED    Details   metroNIDAZOLE (FLAGYL) 500 MG tablet Take 1 tablet (500 mg) by mouth 3 times daily  Refills: 0    Associated Diagnoses: C. difficile enteritis         CONTINUE these medications which have NOT CHANGED    Details   VANCOMYCIN HCL PO Take 125 mg by mouth 4 times daily           Allergies   Allergies   Allergen Reactions     No Known Allergies      Data   Most Recent 3 CBC's:  Recent Labs   Lab Test  06/27/17   0523  06/26/17 2206 06/26/17   1056  06/20/17   1156   WBC   --   5.5  7.5  6.9   HGB  7.8*  6.4*  7.7*  13.9   MCV   --   101*  100  95   PLT   --   101*  116*  108*      Most Recent 3 BMP's:  Recent Labs   Lab Test  06/26/17 2206 06/26/17   1056  06/20/17   1156  01/29/16 12/04/15   09/05/15   1002  08/19/15   0950   NA  136  136  133   < >   --    --    --   140  141   POTASSIUM  4.7  5.1  5.0   < >  4.0   --    < >  4.4  4.1   CHLORIDE  107   --    --    --    --    --    --   108  108   CO2  20   --    --    --    --    --    --   20  26   BUN  17   --    --    --    --    --    --   17  16   CR  0.80  0.76  0.87   < >  0.9  0.9   < >  0.71  0.70   ANIONGAP  9   --    --    --    --    --    --   12  7   BREN  7.5*   --    --    --    --    --    --   7.9*  8.4*   GLC  86   --    --    --   99  100*   < >  200*  118*    < > = values in this interval not displayed.     Most Recent 2 LFT's:  Recent Labs   Lab Test  06/26/17 2206 06/26/17   1056   AST  121*  145*   ALT  56  62   ALKPHOS  269*  290*   BILITOTAL  7.3*  9.6*     Most Recent INR's and Anticoagulation Dosing History:  Anticoagulation Dose History     Recent Dosing and Labs Latest Ref Rng & Units 11/10/2016 1/13/2017 2/27/2017 4/25/2017 6/20/2017 6/26/2017 6/26/2017    INR 0.86 - 1.14 1.17(H) 1.15(H) 1.20(H) 1.26(H) 1.44(H) 1.68(H) 1.92(H)        Most Recent 3 Troponin's:  Recent Labs   Lab Test  08/15/15   2124  02/19/10   1920   TROPI  <0.015  The 99th percentile for upper reference range is 0.045 ug/L.  Troponin  values in   the range of 0.045 - 0.120 ug/L may be associated with risks of adverse   clinical events.    <0.012     Most Recent Cholesterol Panel:  Recent Labs   Lab Test 12/04/15 06/13/14   CHOL  155  173   TRIG   --   133     Most Recent 6 Bacteria Isolates From Any Culture (See EPIC Reports for Culture Details):  Recent Labs   Lab Test  04/04/13   1302  06/08/12   1241   CULT  No Salmonella, Shigella, Campylobacter, E. coli O157, Aeromonas, or Plesiomonas  isolated.  No Salmonella, Shigella, Campylobacter, E. coli O157, Aeromonas, or Plesiomonas  isolated.     Most Recent TSH, T4 and A1c Labs:  Recent Labs   Lab Test  01/23/12   0806   A1C  5.7

## 2017-06-28 ENCOUNTER — TELEPHONE (OUTPATIENT)
Dept: FAMILY MEDICINE | Facility: CLINIC | Age: 42
End: 2017-06-28

## 2017-06-28 DIAGNOSIS — K76.9 LIVER DISEASE: ICD-10-CM

## 2017-06-28 DIAGNOSIS — K83.09 SCLEROSING CHOLANGITIS (H): ICD-10-CM

## 2017-06-28 LAB
ALBUMIN SERPL-MCNC: 2.5 G/DL (ref 3.4–5)
ALP SERPL-CCNC: 394 U/L (ref 40–150)
ALT SERPL W P-5'-P-CCNC: 65 U/L (ref 0–70)
AST SERPL W P-5'-P-CCNC: 152 U/L (ref 0–45)
BASOPHILS # BLD AUTO: 0 10E9/L (ref 0–0.2)
BASOPHILS NFR BLD AUTO: 0.6 %
BILIRUB SERPL-MCNC: 11.1 MG/DL (ref 0.2–1.3)
CREAT SERPL-MCNC: 0.91 MG/DL (ref 0.66–1.25)
DIFFERENTIAL METHOD BLD: ABNORMAL
EOSINOPHIL # BLD AUTO: 0.1 10E9/L (ref 0–0.7)
EOSINOPHIL NFR BLD AUTO: 2.8 %
ERYTHROCYTE [DISTWIDTH] IN BLOOD BY AUTOMATED COUNT: 18.5 % (ref 10–15)
GFR SERPL CREATININE-BSD FRML MDRD: NORMAL ML/MIN/1.7M2
HCT VFR BLD AUTO: 26.9 % (ref 40–53)
HGB BLD-MCNC: 8.9 G/DL (ref 13.3–17.7)
IMM GRANULOCYTES # BLD: 0.2 10E9/L (ref 0–0.4)
IMM GRANULOCYTES NFR BLD: 4.2 %
INR PPP: 1.5 (ref 0.86–1.14)
LYMPHOCYTES # BLD AUTO: 0.9 10E9/L (ref 0.8–5.3)
LYMPHOCYTES NFR BLD AUTO: 17.8 %
MCH RBC QN AUTO: 32.6 PG (ref 26.5–33)
MCHC RBC AUTO-ENTMCNC: 33.1 G/DL (ref 31.5–36.5)
MCV RBC AUTO: 99 FL (ref 78–100)
MONOCYTES # BLD AUTO: 0.5 10E9/L (ref 0–1.3)
MONOCYTES NFR BLD AUTO: 9.5 %
NEUTROPHILS # BLD AUTO: 3.3 10E9/L (ref 1.6–8.3)
NEUTROPHILS NFR BLD AUTO: 65.1 %
PLATELET # BLD AUTO: 106 10E9/L (ref 150–450)
POTASSIUM SERPL-SCNC: 4 MMOL/L (ref 3.4–5.3)
RBC # BLD AUTO: 2.73 10E12/L (ref 4.4–5.9)
SODIUM SERPL-SCNC: 138 MMOL/L (ref 133–144)
WBC # BLD AUTO: 5.1 10E9/L (ref 4–11)

## 2017-06-28 PROCEDURE — 84450 TRANSFERASE (AST) (SGOT): CPT | Performed by: INTERNAL MEDICINE

## 2017-06-28 PROCEDURE — 82565 ASSAY OF CREATININE: CPT | Performed by: INTERNAL MEDICINE

## 2017-06-28 PROCEDURE — 84132 ASSAY OF SERUM POTASSIUM: CPT | Performed by: INTERNAL MEDICINE

## 2017-06-28 PROCEDURE — 85610 PROTHROMBIN TIME: CPT | Performed by: INTERNAL MEDICINE

## 2017-06-28 PROCEDURE — 36415 COLL VENOUS BLD VENIPUNCTURE: CPT | Performed by: INTERNAL MEDICINE

## 2017-06-28 PROCEDURE — 84460 ALANINE AMINO (ALT) (SGPT): CPT | Performed by: INTERNAL MEDICINE

## 2017-06-28 PROCEDURE — 84075 ASSAY ALKALINE PHOSPHATASE: CPT | Performed by: INTERNAL MEDICINE

## 2017-06-28 PROCEDURE — 84295 ASSAY OF SERUM SODIUM: CPT | Performed by: INTERNAL MEDICINE

## 2017-06-28 PROCEDURE — 85025 COMPLETE CBC W/AUTO DIFF WBC: CPT | Performed by: INTERNAL MEDICINE

## 2017-06-28 PROCEDURE — 82247 BILIRUBIN TOTAL: CPT | Performed by: INTERNAL MEDICINE

## 2017-06-28 PROCEDURE — 82040 ASSAY OF SERUM ALBUMIN: CPT | Performed by: INTERNAL MEDICINE

## 2017-06-28 NOTE — TELEPHONE ENCOUNTER
Follow-up and recommended labs and tests    Follow up with GI in 3 days as scheduled  Recheck hemoglobin tomorrow      Activity   Your activity upon discharge: activity as tolerated      Diet   Follow this diet upon discharge: Regular     ED / Discharge Outreach Protocol    Patient Contact    Attempt # 1    Was call answered?  No.  Left message on voicemail with information to call me back.  Ruth Young RN

## 2017-06-28 NOTE — TELEPHONE ENCOUNTER
"Hospital/TCU/ED for chronic condition Discharge Protocol    \"Hi, my name is Connie Alexis, a registered nurse, and I am calling from [facility].  I am calling to follow up and see how things are going for you after your recent emergency visit/hospital/TCU stay.\"    Tell me how you are doing now that you are home?\" I am ok. I am a little tired.      Discharge Instructions    \"Let's review your discharge instructions.  What is/are the follow-up recommendations?  Pt. Response: I have an appt with GI.    \"Has an appointment with your primary care provider been scheduled?\"   No (schedule appointment)- refused. He states he does not need the appt.    \"When you see the provider, I would recommend that you bring your medications with you.\"    Medications    \"Tell me what changed about your medicines when you discharged?\"    Changes to chronic meds?    2 or more - Epic MTM referral needed    \"What questions do you have about your medications?\"    None     New diagnoses of heart failure, COPD, diabetes, or MI?    No              Medication reconciliation completed? Yes  Was MTM referral placed (*Make sure to put transitions as reason for referral)?   No    Call Summary    \"What questions or concerns do you have about your recent visit and your follow-up care?\"     none    \"If you have questions or things don't continue to improve, we encourage you contact us through the main clinic number (give number).  Even if the clinic is not open, triage nurses are available 24/7 to help you.     We would like you to know that our clinic has extended hours (provide information).  We also have urgent care (provide details on closest location and hours/contact info)\"      \"Thank you for your time and take care!\"             "

## 2017-06-28 NOTE — TELEPHONE ENCOUNTER
Inpatient Visit Date: 06/27/17  Diagnosis / Reason for Visit: Rectal Bleeding, C. Difficile Enteritis

## 2017-07-03 DIAGNOSIS — K83.09 SCLEROSING CHOLANGITIS (H): ICD-10-CM

## 2017-07-03 DIAGNOSIS — K76.9 LIVER DISEASE: ICD-10-CM

## 2017-07-03 LAB
ALBUMIN SERPL-MCNC: 2.7 G/DL (ref 3.4–5)
ALP SERPL-CCNC: 459 U/L (ref 40–150)
ALT SERPL W P-5'-P-CCNC: 57 U/L (ref 0–70)
AST SERPL W P-5'-P-CCNC: 136 U/L (ref 0–45)
BASOPHILS # BLD AUTO: 0 10E9/L (ref 0–0.2)
BASOPHILS NFR BLD AUTO: 0.7 %
BILIRUB SERPL-MCNC: 9.3 MG/DL (ref 0.2–1.3)
CREAT SERPL-MCNC: 0.94 MG/DL (ref 0.66–1.25)
DIFFERENTIAL METHOD BLD: ABNORMAL
EOSINOPHIL # BLD AUTO: 0.1 10E9/L (ref 0–0.7)
EOSINOPHIL NFR BLD AUTO: 2.7 %
ERYTHROCYTE [DISTWIDTH] IN BLOOD BY AUTOMATED COUNT: 18.2 % (ref 10–15)
GFR SERPL CREATININE-BSD FRML MDRD: 90 ML/MIN/1.7M2
HCT VFR BLD AUTO: 26.5 % (ref 40–53)
HGB BLD-MCNC: 8.6 G/DL (ref 13.3–17.7)
IMM GRANULOCYTES # BLD: 0 10E9/L (ref 0–0.4)
IMM GRANULOCYTES NFR BLD: 0.7 %
INR PPP: 2.01 (ref 0.86–1.14)
LYMPHOCYTES # BLD AUTO: 0.6 10E9/L (ref 0.8–5.3)
LYMPHOCYTES NFR BLD AUTO: 20.6 %
MCH RBC QN AUTO: 32.2 PG (ref 26.5–33)
MCHC RBC AUTO-ENTMCNC: 32.5 G/DL (ref 31.5–36.5)
MCV RBC AUTO: 99 FL (ref 78–100)
MONOCYTES # BLD AUTO: 0.3 10E9/L (ref 0–1.3)
MONOCYTES NFR BLD AUTO: 8.4 %
NEUTROPHILS # BLD AUTO: 2 10E9/L (ref 1.6–8.3)
NEUTROPHILS NFR BLD AUTO: 66.9 %
PLATELET # BLD AUTO: 106 10E9/L (ref 150–450)
POTASSIUM SERPL-SCNC: 4.3 MMOL/L (ref 3.4–5.3)
RBC # BLD AUTO: 2.67 10E12/L (ref 4.4–5.9)
SODIUM SERPL-SCNC: 140 MMOL/L (ref 133–144)
WBC # BLD AUTO: 3 10E9/L (ref 4–11)

## 2017-07-03 PROCEDURE — 82040 ASSAY OF SERUM ALBUMIN: CPT | Performed by: INTERNAL MEDICINE

## 2017-07-03 PROCEDURE — 84460 ALANINE AMINO (ALT) (SGPT): CPT | Performed by: INTERNAL MEDICINE

## 2017-07-03 PROCEDURE — 84450 TRANSFERASE (AST) (SGOT): CPT | Performed by: INTERNAL MEDICINE

## 2017-07-03 PROCEDURE — 82565 ASSAY OF CREATININE: CPT | Performed by: INTERNAL MEDICINE

## 2017-07-03 PROCEDURE — 84075 ASSAY ALKALINE PHOSPHATASE: CPT | Performed by: INTERNAL MEDICINE

## 2017-07-03 PROCEDURE — 84132 ASSAY OF SERUM POTASSIUM: CPT | Performed by: INTERNAL MEDICINE

## 2017-07-03 PROCEDURE — 82247 BILIRUBIN TOTAL: CPT | Performed by: INTERNAL MEDICINE

## 2017-07-03 PROCEDURE — 85025 COMPLETE CBC W/AUTO DIFF WBC: CPT | Performed by: INTERNAL MEDICINE

## 2017-07-03 PROCEDURE — 84295 ASSAY OF SERUM SODIUM: CPT | Performed by: INTERNAL MEDICINE

## 2017-07-03 PROCEDURE — 85610 PROTHROMBIN TIME: CPT | Performed by: INTERNAL MEDICINE

## 2017-07-03 PROCEDURE — 36415 COLL VENOUS BLD VENIPUNCTURE: CPT | Performed by: INTERNAL MEDICINE

## 2017-07-28 ENCOUNTER — TELEPHONE (OUTPATIENT)
Dept: FAMILY MEDICINE | Facility: CLINIC | Age: 42
End: 2017-07-28

## 2017-07-28 DIAGNOSIS — Z94.4 LIVER REPLACED BY TRANSPLANT (H): Primary | ICD-10-CM

## 2017-07-28 DIAGNOSIS — Z79.899 ON LONG TERM DRUG THERAPY: ICD-10-CM

## 2017-07-28 NOTE — TELEPHONE ENCOUNTER
Patient can be worked in on Monday 8/7 at 10:30am for hospital follow up. Thank you,    Madisyn Whitfield CMA

## 2017-07-28 NOTE — TELEPHONE ENCOUNTER
Reason for Call:  Same Day Appointment, Requested Provider:  Radha Hidalgo M.D.    PCP: Abhishek Alexis    Reason for visit: pt is scheduled 10/2 to see [facility]. [facility] would like pt seen sooner. Pt will be home Aug 3rd.    Duration of symptoms:     Have you been treated for this in the past? No    Additional comments:     Can we leave a detailed message on this number?     Phone number patient can be reached at:     Best Time:     Call taken on 7/28/2017 at 11:37 AM by Marylu Jaeger

## 2017-07-31 NOTE — TELEPHONE ENCOUNTER
See message below, patient can be worked in with [facility] on 8/7 at 10:30am. Please again contact patient to schedule. Thank you,    Madisyn Whitfield, CMA

## 2017-07-31 NOTE — TELEPHONE ENCOUNTER
Left message for patient to call back and speak with any .  Thank you,  Yun Matthews  Patient Representative

## 2017-07-31 NOTE — TELEPHONE ENCOUNTER
Pts wife Caroline calls back and message below is relayed.  Caroline states this message was to be sent to Dr Hidalgo as pt does not want to be see Dr Alexis anymore.

## 2017-08-01 NOTE — TELEPHONE ENCOUNTER
Patient scheduled for office visit with Dr. MARTINEZ on 8-7-17 @ 10:30      Thank you,   Apoorva Vivas   for [facility]

## 2017-08-04 ENCOUNTER — MEDICAL CORRESPONDENCE (OUTPATIENT)
Dept: HEALTH INFORMATION MANAGEMENT | Facility: CLINIC | Age: 42
End: 2017-08-04

## 2017-08-07 ENCOUNTER — OFFICE VISIT (OUTPATIENT)
Dept: FAMILY MEDICINE | Facility: CLINIC | Age: 42
End: 2017-08-07
Payer: COMMERCIAL

## 2017-08-07 VITALS
HEART RATE: 117 BPM | DIASTOLIC BLOOD PRESSURE: 84 MMHG | SYSTOLIC BLOOD PRESSURE: 130 MMHG | OXYGEN SATURATION: 100 % | WEIGHT: 204 LBS | TEMPERATURE: 98.1 F | BODY MASS INDEX: 27.67 KG/M2

## 2017-08-07 DIAGNOSIS — D62 ANEMIA DUE TO BLOOD LOSS, ACUTE: ICD-10-CM

## 2017-08-07 DIAGNOSIS — Z79.899 ON LONG TERM DRUG THERAPY: ICD-10-CM

## 2017-08-07 DIAGNOSIS — Z79.4 TYPE 2 DIABETES MELLITUS WITH COMPLICATION, WITH LONG-TERM CURRENT USE OF INSULIN (H): ICD-10-CM

## 2017-08-07 DIAGNOSIS — D69.6 THROMBOCYTOPENIA (H): ICD-10-CM

## 2017-08-07 DIAGNOSIS — E11.8 TYPE 2 DIABETES MELLITUS WITH COMPLICATION, WITH LONG-TERM CURRENT USE OF INSULIN (H): ICD-10-CM

## 2017-08-07 DIAGNOSIS — Z87.19 H/O ULCERATIVE COLITIS: ICD-10-CM

## 2017-08-07 DIAGNOSIS — N28.9 ACUTE KIDNEY INSUFFICIENCY: ICD-10-CM

## 2017-08-07 DIAGNOSIS — K83.01 PRIMARY SCLEROSING CHOLANGITIS (H): ICD-10-CM

## 2017-08-07 DIAGNOSIS — Z94.4 S/P LIVER TRANSPLANT (H): Primary | ICD-10-CM

## 2017-08-07 DIAGNOSIS — Z94.4 LIVER REPLACED BY TRANSPLANT (H): ICD-10-CM

## 2017-08-07 LAB
ALP SERPL-CCNC: 256 U/L (ref 40–150)
ALT SERPL W P-5'-P-CCNC: 22 U/L (ref 0–70)
AST SERPL W P-5'-P-CCNC: 8 U/L (ref 0–45)
BILIRUB SERPL-MCNC: 0.8 MG/DL (ref 0.2–1.3)
CREAT SERPL-MCNC: 1.88 MG/DL (ref 0.66–1.25)
GFR SERPL CREATININE-BSD FRML MDRD: 40 ML/MIN/1.7M2
GLUCOSE SERPL-MCNC: 110 MG/DL (ref 70–99)
HCT VFR BLD AUTO: 29 % (ref 40–53)
HGB BLD-MCNC: 8.9 G/DL (ref 13.3–17.7)
PLATELET # BLD AUTO: 178 10E9/L (ref 150–450)
POTASSIUM SERPL-SCNC: 4.7 MMOL/L (ref 3.4–5.3)
SODIUM SERPL-SCNC: 144 MMOL/L (ref 133–144)
WBC # BLD AUTO: 2.6 10E9/L (ref 4–11)

## 2017-08-07 PROCEDURE — 84075 ASSAY ALKALINE PHOSPHATASE: CPT

## 2017-08-07 PROCEDURE — 84295 ASSAY OF SERUM SODIUM: CPT

## 2017-08-07 PROCEDURE — 82947 ASSAY GLUCOSE BLOOD QUANT: CPT

## 2017-08-07 PROCEDURE — 84460 ALANINE AMINO (ALT) (SGPT): CPT

## 2017-08-07 PROCEDURE — 85027 COMPLETE CBC AUTOMATED: CPT

## 2017-08-07 PROCEDURE — 82247 BILIRUBIN TOTAL: CPT

## 2017-08-07 PROCEDURE — 82565 ASSAY OF CREATININE: CPT

## 2017-08-07 PROCEDURE — 99213 OFFICE O/P EST LOW 20 MIN: CPT | Performed by: FAMILY MEDICINE

## 2017-08-07 PROCEDURE — 84450 TRANSFERASE (AST) (SGOT): CPT

## 2017-08-07 PROCEDURE — 36415 COLL VENOUS BLD VENIPUNCTURE: CPT

## 2017-08-07 PROCEDURE — 84132 ASSAY OF SERUM POTASSIUM: CPT

## 2017-08-07 RX ORDER — TACROLIMUS 1 MG/1
CAPSULE ORAL
COMMUNITY
Start: 2017-07-14 | End: 2017-08-07

## 2017-08-07 RX ORDER — HUMAN INSULIN 100 [IU]/ML
15 INJECTION, SUSPENSION SUBCUTANEOUS EVERY MORNING
COMMUNITY
Start: 2017-08-07 | End: 2017-11-15

## 2017-08-07 RX ORDER — TRAMADOL HYDROCHLORIDE 50 MG/1
50-100 TABLET ORAL EVERY 6 HOURS PRN
COMMUNITY
Start: 2017-08-07 | End: 2018-09-22

## 2017-08-07 RX ORDER — PREDNISONE 5 MG/1
TABLET ORAL
COMMUNITY
Start: 2017-08-04 | End: 2018-02-07

## 2017-08-07 RX ORDER — PANTOPRAZOLE SODIUM 40 MG/1
TABLET, DELAYED RELEASE ORAL
COMMUNITY
Start: 2017-08-04 | End: 2017-08-07

## 2017-08-07 RX ORDER — VALGANCICLOVIR 450 MG/1
TABLET, FILM COATED ORAL
COMMUNITY
Start: 2017-07-11 | End: 2017-08-07

## 2017-08-07 RX ORDER — MYCOPHENOLATE MOFETIL 500 MG/1
1000 TABLET ORAL 2 TIMES DAILY
COMMUNITY
Start: 2017-07-11 | End: 2018-02-07

## 2017-08-07 RX ORDER — HUMAN INSULIN 100 [IU]/ML
INJECTION, SUSPENSION SUBCUTANEOUS
COMMUNITY
Start: 2017-07-14 | End: 2017-08-07

## 2017-08-07 RX ORDER — SULFAMETHOXAZOLE AND TRIMETHOPRIM 400; 80 MG/1; MG/1
1 TABLET ORAL 2 TIMES DAILY
COMMUNITY
End: 2017-08-07

## 2017-08-07 RX ORDER — SULFAMETHOXAZOLE AND TRIMETHOPRIM 400; 80 MG/1; MG/1
1 TABLET ORAL DAILY
COMMUNITY
Start: 2017-08-07 | End: 2018-02-07

## 2017-08-07 RX ORDER — TACROLIMUS 1 MG/1
3 CAPSULE ORAL 2 TIMES DAILY
COMMUNITY
Start: 2017-08-07 | End: 2022-03-17

## 2017-08-07 RX ORDER — PANTOPRAZOLE SODIUM 40 MG/1
40 TABLET, DELAYED RELEASE ORAL DAILY
COMMUNITY
Start: 2017-08-07 | End: 2018-09-22

## 2017-08-07 RX ORDER — VALGANCICLOVIR 450 MG/1
900 TABLET, FILM COATED ORAL DAILY
COMMUNITY
Start: 2017-08-07 | End: 2018-02-07

## 2017-08-07 ASSESSMENT — PAIN SCALES - GENERAL: PAINLEVEL: MILD PAIN (2)

## 2017-08-07 NOTE — MR AVS SNAPSHOT
After Visit Summary   8/7/2017    Abhishek Downey    MRN: [id number]           Patient Information     Date Of Birth          1975        Visit Information        Provider Department      8/7/2017 10:30 AM Radha Hidalgo MD [facility]        Today's Diagnoses     S/P liver transplant (H)    -  1    Primary sclerosing cholangitis        H/O ulcerative colitis - s/p total colectomy        Anemia due to blood loss, acute        Thrombocytopenia (H)        Type 2 diabetes mellitus with complication, with long-term current use of insulin (H)           Follow-ups after your visit        Your next 10 appointments already scheduled     Aug 14, 2017  8:30 AM CDT   LAB with NL LAB PMC   [facility] ([address] [address] [phone]           Patient must bring picture ID. Patient should be prepared to give a urine specimen  Please do not eat 10-12 hours before your appointment if you are coming in fasting for labs on lipids, cholesterol, or glucose (sugar). Pregnant women should follow their Care Team instructions. Water with medications is okay. Do not drink coffee or other fluids. If you have concerns about taking  your medications, please ask at office or if scheduling via Diavibe, send a message by clicking on Secure Messaging, Message Your Care Team.            Aug 21, 2017  8:30 AM CDT   LAB with NL LAB PMC   [facility] ([address] [address]   [phone]           Patient must bring picture ID. Patient should be prepared to give a urine specimen  Please do not eat 10-12 hours before your appointment if you are coming in fasting for labs on lipids, cholesterol, or glucose (sugar). Pregnant women should follow their Care Team instructions. Water with medications is okay. Do not drink coffee or other fluids. If you have  concerns about taking  your medications, please ask at office or if scheduling via Mercury Puzzle, send a message by clicking on Secure Messaging, Message Your Care Team.            Aug 28, 2017  8:30 AM CDT   LAB with NL LAB PMC   [address] [address]   [phone]           Patient must bring picture ID. Patient should be prepared to give a urine specimen  Please do not eat 10-12 hours before your appointment if you are coming in fasting for labs on lipids, cholesterol, or glucose (sugar). Pregnant women should follow their Care Team instructions. Water with medications is okay. Do not drink coffee or other fluids. If you have concerns about taking  your medications, please ask at office or if scheduling via Mercury Puzzle, send a message by clicking on Secure Messaging, Message Your Care Team.            Sep 04, 2017  8:30 AM CDT   LAB with NL LAB PMC   [facility] ([address] [address]   [phone]           Patient must bring picture ID. Patient should be prepared to give a urine specimen  Please do not eat 10-12 hours before your appointment if you are coming in fasting for labs on lipids, cholesterol, or glucose (sugar). Pregnant women should follow their Care Team instructions. Water with medications is okay. Do not drink coffee or other fluids. If you have concerns about taking  your medications, please ask at office or if scheduling via Mercury Puzzle, send a message by clicking on Secure Messaging, Message Your Care Team.            Sep 11, 2017  8:30 AM CDT   LAB with NL LAB PMC   [address] [address]   [phone]           Patient must bring picture ID. Patient should be prepared to give a urine specimen  Please do not eat 10-12 hours before your appointment if you are coming in fasting  for labs on lipids, cholesterol, or glucose (sugar). Pregnant women should follow their Care Team instructions. Water with medications is okay. Do not drink coffee or other fluids. If you have concerns about taking  your medications, please ask at office or if scheduling via Sumpto, send a message by clicking on Secure Messaging, Message Your Care Team.            Sep 18, 2017  8:30 AM CDT   LAB with NL LAB PMC   [address] [address] [phone]           Patient must bring picture ID. Patient should be prepared to give a urine specimen  Please do not eat 10-12 hours before your appointment if you are coming in fasting for labs on lipids, cholesterol, or glucose (sugar). Pregnant women should follow their Care Team instructions. Water with medications is okay. Do not drink coffee or other fluids. If you have concerns about taking  your medications, please ask at office or if scheduling via Sumpto, send a message by clicking on Secure Messaging, Message Your Care Team.            Sep 25, 2017  8:30 AM CDT   LAB with NL LAB PMC   [facility] ([address] [id number] [phone]           Patient must bring picture ID. Patient should be prepared to give a urine specimen  Please do not eat 10-12 hours before your appointment if you are coming in fasting for labs on lipids, cholesterol, or glucose (sugar). Pregnant women should follow their Care Team instructions. Water with medications is okay. Do not drink coffee or other fluids. If you have concerns about taking  your medications, please ask at office or if scheduling via Sumpto, send a message by clicking on Secure Messaging, Message Your Care Team.            Oct 02, 2017  8:30 AM CDT   LAB with NL LAB PMC   [address] [address]    [phone]           Patient must bring picture ID. Patient should be prepared to give a urine specimen  Please do not eat 10-12 hours before your appointment if you are coming in fasting for labs on lipids, cholesterol, or glucose (sugar). Pregnant women should follow their Care Team instructions. Water with medications is okay. Do not drink coffee or other fluids. If you have concerns about taking  your medications, please ask at office or if scheduling via Axigen Messaging, send a message by clicking on Secure Messaging, Message Your Care Team.            Oct 09, 2017  8:30 AM CDT   LAB with NL LAB PMC   [facility] ([address] [address] [phone]           Patient must bring picture ID. Patient should be prepared to give a urine specimen  Please do not eat 10-12 hours before your appointment if you are coming in fasting for labs on lipids, cholesterol, or glucose (sugar). Pregnant women should follow their Care Team instructions. Water with medications is okay. Do not drink coffee or other fluids. If you have concerns about taking  your medications, please ask at office or if scheduling via Axigen Messaging, send a message by clicking on Secure Messaging, Message Your Care Team.            Oct 16, 2017  8:30 AM CDT   LAB with NL LAB PMC   [facility] ([address] [address] [phone]           Patient must bring picture ID. Patient should be prepared to give a urine specimen  Please do not eat 10-12 hours before your appointment if you are coming in fasting for labs on lipids, cholesterol, or glucose (sugar). Pregnant women should follow their Care Team instructions. Water with medications is okay. Do not drink coffee or other fluids. If you have concerns about taking  your medications, please ask at office or if scheduling via Axigen Messaging, send a message by clicking on Secure  Messaging, Message Your Care Team.              Who to contact     If you have questions or need follow up information about today's clinic visit or your schedule please contact [facility] directly at [phone].  Normal or non-critical lab and imaging results will be communicated to you by MyChart, letter or phone within 4 business days after the clinic has received the results. If you do not hear from us within 7 days, please contact the clinic through MyChart or phone. If you have a critical or abnormal lab result, we will notify you by phone as soon as possible.  Submit refill requests through [facility] or call your pharmacy and they will forward the refill request to us. Please allow 3 business days for your refill to be completed.          Additional Information About Your Visit        [employer] Information     [facility] gives you secure access to your electronic health record. If you see a primary care provider, you can also send messages to your care team and make appointments. If you have questions, please call your primary care clinic.  If you do not have a primary care provider, please call [phone] and they will assist you.        Care EveryWhere ID     This is your Care EveryWhere ID. This could be used by other organizations to access your [place] medical records  [id number]        Your Vitals Were     Pulse Temperature Pulse Oximetry BMI (Body Mass Index)          117 98.1  F (36.7  C) (Temporal) 100% 27.67 kg/m2         Blood Pressure from Last 3 Encounters:   08/07/17 130/84   06/27/17 124/56   03/30/16 140/60    Weight from Last 3 Encounters:   08/07/17 204 lb (92.5 kg)   06/27/17 229 lb (103.9 kg)   04/20/16 230 lb (104.3 kg)              We Performed the Following     DEPRESSION ACTION PLAN (DAP)          Today's Medication Changes          These changes are accurate as of: 8/7/17 11:12 AM.  If you have any questions, ask your nurse or doctor.               These medicines have  changed or have updated prescriptions.        Dose/Directions    BACTRIM 400-80 MG per tablet   This may have changed:  when to take this   Used for:  S/P liver transplant (H)   Generic drug:  sulfamethoxazole-trimethoprim   Changed by:  Radha Hidalgo MD        Dose:  1 tablet   Take 1 tablet by mouth daily   Refills:  0       NovoLIN N VIAL 100 UNIT/ML injection   This may have changed:    - how much to take  - how to take this  - when to take this   Generic drug:  insulin NPH   Changed by:  Radha Hidalgo MD        Dose:  15 Units   Inject 15 Units Subcutaneous every morning   Refills:  0       pantoprazole 40 MG EC tablet   Commonly known as:  PROTONIX   This may have changed:    - how much to take  - how to take this  - when to take this   Used for:  S/P liver transplant (H)   Changed by:  Radha Hidalgo MD        Dose:  40 mg   Take 1 tablet (40 mg) by mouth daily   Refills:  0       tacrolimus 1 MG capsule   Commonly known as:  PROGRAF - GENERIC EQUIVALENT   This may have changed:    - how much to take  - how to take this  - when to take this   Used for:  S/P liver transplant (H)   Changed by:  Radha Hidalgo MD        Dose:  4 mg   Take 4 capsules (4 mg) by mouth 2 times daily   Refills:  0       valGANciclovir 450 MG tablet   Commonly known as:  VALCYTE   This may have changed:    - how much to take  - how to take this  - when to take this   Used for:  S/P liver transplant (H)   Changed by:  Radha Hidalgo MD        Dose:  900 mg   Take 2 tablets (900 mg) by mouth daily   Refills:  0                Primary Care Provider Office Phone # Fax #    Abhishek Alexis [phone]       [facility] [address] [place] [address]        Equal Access to Services     DAVID CURRY AH: Peter Woodruff, waabyda armandoqstacey, qaybta kaaltanvi floyd, elizabet brar. So  [facility] [phone].    ATENCIÓN: Si tom zelaya, tiene a burton disposición servicios gratuitos de asistencia lingüística. Lilly bourne [phone].    We comply with applicable federal civil rights laws and [place] laws. We do not discriminate on the basis of race, color, national origin, age, disability sex, sexual orientation or gender identity.            Thank you!     Thank you for choosing [facility]  for your care. Our goal is always to provide you with excellent care. Hearing back from our patients is one way we can continue to improve our services. Please take a few minutes to complete the written survey that you may receive in the mail after your visit with us. Thank you!             Your Updated Medication List - Protect others around you: Learn how to safely use, store and throw away your medicines at www.disposemymeds.org.          This list is accurate as of: 8/7/17 11:12 AM.  Always use your most recent med list.                   Brand Name Dispense Instructions for use Diagnosis    aspirin 81 MG tablet      Take by mouth daily        BACTRIM 400-80 MG per tablet   Generic drug:  sulfamethoxazole-trimethoprim      Take 1 tablet by mouth daily    S/P liver transplant (H)       mycophenolate 500 MG tablet    CELLCEPT - GENERIC EQUIVALENT     Take 1,000 mg by mouth 2 times daily        NovoLIN N VIAL 100 UNIT/ML injection   Generic drug:  insulin NPH      Inject 15 Units Subcutaneous every morning        pantoprazole 40 MG EC tablet    PROTONIX     Take 1 tablet (40 mg) by mouth daily    S/P liver transplant (H)       predniSONE 5 MG tablet    DELTASONE          tacrolimus 1 MG capsule    PROGRAF - GENERIC EQUIVALENT     Take 4 capsules (4 mg) by mouth 2 times daily    S/P liver transplant (H)       traMADol 50 MG tablet    ULTRAM     Take 1-2 tablets ( mg) by mouth every 6 hours as needed for pain    S/P liver transplant (H)       valGANciclovir 450 MG tablet    VALCYTE     Take 2  tablets (900 mg) by mouth daily    S/P liver transplant (H)

## 2017-08-07 NOTE — NURSING NOTE
Chief Complaint   Patient presents with     Establish Care     Surgical Followup     post liver transplant at [facility]       Initial /84  Pulse 117  Temp 98.1  F (36.7  C) (Temporal)  Wt 204 lb (92.5 kg)  SpO2 100%  BMI 27.67 kg/m2 Estimated body mass index is 27.67 kg/(m^2) as calculated from the following:    Height as of 6/27/17: 6' (1.829 m).    Weight as of this encounter: 204 lb (92.5 kg).  Medication Reconciliation: complete   Madisyn Whitfield, CMA

## 2017-08-07 NOTE — PROGRESS NOTES
"  SUBJECTIVE:                                                    Abhishek Downey is a 41 year old male who presents to clinic today to establish care and for the following health issues:    (Z94.4) S/P liver transplant (H)  Comment: Patient had a liver transplant at [facility] on 7/8/2017 to treat primary sclerosing cholangitis. The procedure was performed by Dr. Parks of [facility].  He was discharged from [facility] on 8/1. Since then, he reports the area of the incision has been sore and is still healing, but he is feeling well overall. Patient does have Tramadol available, but hasn't been using it. He says that [place] recommended he establish care with a PCP and follow up soon after discharge. He reports that his labs have been WNL, except for his Alkaline Phosphatase which was elevated. He had labs drawn this morning. Patient has been taking Cellcept, Prograf, Bactrim, and a Prednisone taper as instructed. He is currently on 7.5 mg of Prednisone, and is decreasing by 0.5 every 5 days. Patient is also on Valcyte for 6 months. He has been taking Tylenol with Valcyte, as it gives him headaches. [facility] approved him to take Tylenol, <2000 mg qd. Patient mentions that his appetite has been decreased lately. He is scheduled to follow up with [facility] in 4 months. He denies any hematochezia, melena, hematuria, hematemesis, hemoptysis, frequent/ easy bruising, dysuria, or other associated symptoms.     Patient last had labs collected on 7/30 prior to discharge:  HGB: 8.9  Leukocytes: 3.5  Platelets: 215  Potassium:5.4  Creatinine: 1.5  Alk Phos: 432  AST: 36  ALT: 37  Total Bilirubin 1.0    (K83.0) Primary sclerosing cholangitis  Comment: See \"(Z94.4) S/P liver transplant (H)\" comment above.     (Z87.19) H/O ulcerative colitis - s/p total colectomy  Comment: Patient has a history of ulcerative colitis s/p colon resection.    (D62) Anemia due to blood loss, acute  Comment: Patient has a history of Anemia due " to blood loss. He received transfusion following his transplant. His HGB at his last recheck was 8.9. His HGB was rechecked today.     (D69.6) Thrombocytopenia (H)  Comment: Patient's platelets were at 215 at his last labs on 7/30. CBC rechecked today.     (E11.8,  Z79.4) Type 2 diabetes mellitus with complication, with long-term current use of insulin (H)  Comment: Patient has a history of Type II Diabetes treated with Novolin 15 units qd. He says that he developed this after his liver transplant, likely due to prednisone use. Patient is hoping that when he discontinues Prednisone, he will be able to get off insulin as well. Patient's Blood Glucose was 110 today. He denies any concerns at this time.     (N28.9) Acute kidney insufficiency  Comment: Patient's Creatinine was 1.5 on 7/30. Creatinine rechecked today.     Problem list and histories reviewed & adjusted, as indicated.  Additional history: as documented    Patient Active Problem List   Diagnosis     H/O ulcerative colitis - s/p total colectomy     CARDIOVASCULAR SCREENING; LDL GOAL LESS THAN 160     Mild major depression (H)     Anemia     Upper GI bleed     Acute pancreatitis     Primary sclerosing cholangitis     Cirrhosis of liver (H)     S/P total colectomy     Esophageal varices (H)     Mass of left upper extremity     Anemia due to blood loss, acute     C. difficile pouchitis     History of esophageal varices     Thrombocytopenia (H)     History of duodenal ulcer     C. difficile enteritis     Past Surgical History:   Procedure Laterality Date     COLECTOMY  12/27/07     FLEXIBLE SIGMOIDOSCOPY  10/4/10      ERCP W/W/O NONA OF SPEC-BRUSH/WASH  02/19/10      ERCP W/W/O NONA OF SPEC-BRUSH/WASH  03/05/10      UGI ENDOSCOPY W BANDING ESOPH/GASTRIC VARICES  since 2/2015    monthly at [facility]; last one in August      UPPER GI ENDOSCOPY  11/13/13    [facility]     UPPER GI ENDOSCOPY  2/16/15    [facility]       Social History   Substance Use  Topics     Smoking status: Never Smoker     Smokeless tobacco: Never Used     Alcohol use No      Comment: 2-3x per year     Family History   Problem Relation Age of Onset     Heart Failure Mother          Current Outpatient Prescriptions   Medication Sig Dispense Refill     mycophenolate (CELLCEPT - GENERIC EQUIVALENT) 500 MG tablet Take 1,000 mg by mouth 2 times daily       predniSONE (DELTASONE) 5 MG tablet        aspirin 81 MG tablet Take by mouth daily       sulfamethoxazole-trimethoprim (BACTRIM) 400-80 MG per tablet Take 1 tablet by mouth daily       tacrolimus (PROGRAF - GENERIC EQUIVALENT) 1 MG capsule Take 4 capsules (4 mg) by mouth 2 times daily       valGANciclovir (VALCYTE) 450 MG tablet Take 2 tablets (900 mg) by mouth daily       pantoprazole (PROTONIX) 40 MG EC tablet Take 1 tablet (40 mg) by mouth daily       NOVOLIN N VIAL 100 UNIT/ML susp Inject 15 Units Subcutaneous every morning       traMADol (ULTRAM) 50 MG tablet Take 1-2 tablets ( mg) by mouth every 6 hours as needed for pain       [DISCONTINUED] valGANciclovir (VALCYTE) 450 MG tablet        [DISCONTINUED] NOVOLIN N VIAL 100 UNIT/ML susp        Allergies   Allergen Reactions     No Known Allergies          Reviewed and updated as needed this visit by clinical staffTobacco  Allergies  Meds  Med Hx  Surg Hx  Fam Hx  Soc Hx      Reviewed and updated as needed this visit by Provider         ROS:  All other ROS reviewed and are negative or non-contributory except as stated in HPI.    OBJECTIVE:     /84  Pulse 117  Temp 98.1  F (36.7  C) (Temporal)  Wt 204 lb (92.5 kg)  SpO2 100%  BMI 27.67 kg/m2  Body mass index is 27.67 kg/(m^2).   Vitals noted.  Patient alert, oriented, and in no acute distress.  Oral:  Oropharynx nl without erythema, exudate, mass or other lesions. No evidence for thrush.   Neck:  Supple without lymphadenopathy, JVD or masses.  CV:  Mild tachycardia,  Regular rhythm. Without murmur.  Respiratory:  Lungs  "clear to auscultation bilaterally.  Abdomen:  Soft, nontender, nondistended. He has a Y-shaped abdominal incision. This incision has a slight opening at the superior end of the vertical arm, and slight opening at the center where the 3 incisions join, and slight opening along the left \"Y\" arm of the incision. No active bleeding or drainage. No surrounding erythema.  No inguinal lymphadenopathy.   Extremities: No edema.   Skin: normal without rashes or lesions.  Good color and turgor.    Diagnostic Test Results:  Orders Placed This Encounter   Procedures     DEPRESSION ACTION PLAN (DAP)       ASSESSMENT:       ICD-10-CM    1. S/P liver transplant (H) Z94.4 sulfamethoxazole-trimethoprim (BACTRIM) 400-80 MG per tablet     tacrolimus (PROGRAF - GENERIC EQUIVALENT) 1 MG capsule     valGANciclovir (VALCYTE) 450 MG tablet     pantoprazole (PROTONIX) 40 MG EC tablet     traMADol (ULTRAM) 50 MG tablet   2. Primary sclerosing cholangitis K83.0    3. H/O ulcerative colitis - s/p total colectomy Z87.19    4. Anemia due to blood loss, acute D62    5. Thrombocytopenia (H) D69.6    6. Type 2 diabetes mellitus with complication, with long-term current use of insulin (H) E11.8     Z79.4    7. Acute kidney insufficiency N28.9       PLAN:     (Z94.4) S/P liver transplant (H)    Plan: Patient reports that he is feeling well. Encouraged him to continue taking his medications as instructed. He can use the Tramadol he has available prn for pain. I will attempt to get patient's records from [facility]. I will have to discuss with them regarding further treatment based on their recommendations from the transplant clinic. I will be happy to do his follow up labs, any prescribing as needed, and office visits with their help in medication adjustments as needed.     sulfamethoxazole-trimethoprim (BACTRIM) 400-80 MG per tablet,     tacrolimus (PROGRAF - GENERIC EQUIVALENT) 1 MG capsule,     valGANciclovir (VALCYTE) 450 MG tablet, " "    pantoprazole (PROTONIX) 40 MG EC tablet,     traMADol (ULTRAM) 50 MG tablet    (K83.0) Primary sclerosing cholangitis  Plan: See \"(Z94.4) S/P liver transplant (H)\" plan as above.     (Z87.19) H/O ulcerative colitis - s/p total colectomy  Plan: Will continue to monitor at future visits.     (D62) Anemia due to blood loss, acute  Plan: Patient's HGB was rechecked today and unchanged from 7/30.     (D69.6) Thrombocytopenia (H)  Plan: Patient's platelets have decreased to 178 but are still WNL.     (E11.8,  Z79.4) Type 2 diabetes mellitus with complication, with long-term current use of insulin (H)  Plan: Patient's blood glucose was 110 today. Encouraged him to continue using Novolin as instructed and monitoring his BG.     (N28.9) Acute kidney insufficiency  Plan: Patient's creatinine has increased from 1.5 to 1.88. I am concerned about this. Encouraged the patient to continue working on staying hydrated. I will contact his surgeon, Dr. Parks, to discuss this.     This document serves as a record of services personally performed by Radha Hidalgo MD. It was created on their behalf by Jennifer Ho, a trained medical scribe. The creation of this record is based on the provider's personal observations and the statements of the patient. This document has been checked and approved by the attending provider.    Radha Hidalgo MD  [facility]  "

## 2017-08-14 DIAGNOSIS — K83.09 SCLEROSING CHOLANGITIS (H): ICD-10-CM

## 2017-08-14 DIAGNOSIS — K76.9 LIVER DISEASE: ICD-10-CM

## 2017-08-14 LAB
ALBUMIN SERPL-MCNC: 4 G/DL (ref 3.4–5)
ALP SERPL-CCNC: 173 U/L (ref 40–150)
ALT SERPL W P-5'-P-CCNC: 19 U/L (ref 0–70)
ANISOCYTOSIS BLD QL SMEAR: SLIGHT
AST SERPL W P-5'-P-CCNC: 8 U/L (ref 0–45)
BASOPHILS # BLD AUTO: 0 10E9/L (ref 0–0.2)
BASOPHILS NFR BLD AUTO: 0 %
BILIRUB SERPL-MCNC: 0.7 MG/DL (ref 0.2–1.3)
CREAT SERPL-MCNC: 3.44 MG/DL (ref 0.66–1.25)
DIFFERENTIAL METHOD BLD: ABNORMAL
EOSINOPHIL # BLD AUTO: 0.1 10E9/L (ref 0–0.7)
EOSINOPHIL NFR BLD AUTO: 4 %
ERYTHROCYTE [DISTWIDTH] IN BLOOD BY AUTOMATED COUNT: 15 % (ref 10–15)
GFR SERPL CREATININE-BSD FRML MDRD: 20 ML/MIN/1.7M2
GLUCOSE SERPL-MCNC: 93 MG/DL (ref 70–99)
HCT VFR BLD AUTO: 32.3 % (ref 40–53)
HGB BLD-MCNC: 10 G/DL (ref 13.3–17.7)
INR PPP: 1 (ref 0.86–1.14)
LYMPHOCYTES # BLD AUTO: 0.5 10E9/L (ref 0.8–5.3)
LYMPHOCYTES NFR BLD AUTO: 16 %
MCH RBC QN AUTO: 27.4 PG (ref 26.5–33)
MCHC RBC AUTO-ENTMCNC: 31 G/DL (ref 31.5–36.5)
MCV RBC AUTO: 89 FL (ref 78–100)
METAMYELOCYTES # BLD: 0.1 10E9/L
METAMYELOCYTES NFR BLD MANUAL: 3 %
MONOCYTES # BLD AUTO: 0.1 10E9/L (ref 0–1.3)
MONOCYTES NFR BLD AUTO: 4 %
NEUTROPHILS # BLD AUTO: 2.5 10E9/L (ref 1.6–8.3)
NEUTROPHILS NFR BLD AUTO: 73 %
PLATELET # BLD AUTO: 184 10E9/L (ref 150–450)
PLATELET # BLD EST: NORMAL 10*3/UL
POIKILOCYTOSIS BLD QL SMEAR: SLIGHT
POLYCHROMASIA BLD QL SMEAR: ABNORMAL
POTASSIUM SERPL-SCNC: 5 MMOL/L (ref 3.4–5.3)
RBC # BLD AUTO: 3.65 10E12/L (ref 4.4–5.9)
ROULEAUX BLD QL SMEAR: ABNORMAL
SODIUM SERPL-SCNC: 139 MMOL/L (ref 133–144)
WBC # BLD AUTO: 3.4 10E9/L (ref 4–11)

## 2017-08-14 PROCEDURE — 82247 BILIRUBIN TOTAL: CPT | Performed by: INTERNAL MEDICINE

## 2017-08-14 PROCEDURE — 85025 COMPLETE CBC W/AUTO DIFF WBC: CPT | Performed by: INTERNAL MEDICINE

## 2017-08-14 PROCEDURE — 84075 ASSAY ALKALINE PHOSPHATASE: CPT | Performed by: INTERNAL MEDICINE

## 2017-08-14 PROCEDURE — 84460 ALANINE AMINO (ALT) (SGPT): CPT | Performed by: INTERNAL MEDICINE

## 2017-08-14 PROCEDURE — 36415 COLL VENOUS BLD VENIPUNCTURE: CPT | Performed by: INTERNAL MEDICINE

## 2017-08-14 PROCEDURE — 85610 PROTHROMBIN TIME: CPT | Performed by: INTERNAL MEDICINE

## 2017-08-14 PROCEDURE — 84450 TRANSFERASE (AST) (SGOT): CPT | Performed by: INTERNAL MEDICINE

## 2017-08-14 PROCEDURE — 82040 ASSAY OF SERUM ALBUMIN: CPT | Performed by: INTERNAL MEDICINE

## 2017-08-14 PROCEDURE — 82565 ASSAY OF CREATININE: CPT | Performed by: INTERNAL MEDICINE

## 2017-08-14 PROCEDURE — 84132 ASSAY OF SERUM POTASSIUM: CPT | Performed by: INTERNAL MEDICINE

## 2017-08-14 PROCEDURE — 82947 ASSAY GLUCOSE BLOOD QUANT: CPT | Performed by: INTERNAL MEDICINE

## 2017-08-14 PROCEDURE — 84295 ASSAY OF SERUM SODIUM: CPT | Performed by: INTERNAL MEDICINE

## 2017-08-14 ASSESSMENT — PATIENT HEALTH QUESTIONNAIRE - PHQ9: SUM OF ALL RESPONSES TO PHQ QUESTIONS 1-9: 3

## 2017-08-18 DIAGNOSIS — K83.09 SCLEROSING CHOLANGITIS (H): ICD-10-CM

## 2017-08-18 DIAGNOSIS — K76.9 LIVER DISEASE: ICD-10-CM

## 2017-08-18 LAB
CREAT SERPL-MCNC: 2.49 MG/DL (ref 0.66–1.25)
GFR SERPL CREATININE-BSD FRML MDRD: 29 ML/MIN/1.7M2

## 2017-08-18 PROCEDURE — 82565 ASSAY OF CREATININE: CPT | Performed by: INTERNAL MEDICINE

## 2017-08-18 PROCEDURE — 36415 COLL VENOUS BLD VENIPUNCTURE: CPT | Performed by: INTERNAL MEDICINE

## 2017-08-21 DIAGNOSIS — Z94.4 LIVER REPLACED BY TRANSPLANT (H): Primary | ICD-10-CM

## 2017-08-21 LAB
ALP SERPL-CCNC: 118 U/L (ref 40–150)
ALT SERPL W P-5'-P-CCNC: 18 U/L (ref 0–70)
ANION GAP SERPL CALCULATED.3IONS-SCNC: 11 MMOL/L (ref 3–14)
AST SERPL W P-5'-P-CCNC: 10 U/L (ref 0–45)
BILIRUB SERPL-MCNC: 0.5 MG/DL (ref 0.2–1.3)
BUN SERPL-MCNC: 39 MG/DL (ref 7–30)
CALCIUM SERPL-MCNC: 9.1 MG/DL (ref 8.5–10.1)
CHLORIDE SERPL-SCNC: 110 MMOL/L (ref 94–109)
CO2 SERPL-SCNC: 21 MMOL/L (ref 20–32)
CREAT SERPL-MCNC: 2.18 MG/DL (ref 0.66–1.25)
GFR SERPL CREATININE-BSD FRML MDRD: 34 ML/MIN/1.7M2
GLUCOSE SERPL-MCNC: 95 MG/DL (ref 70–99)
HCT VFR BLD AUTO: 30.6 % (ref 40–53)
HGB BLD-MCNC: 9.2 G/DL (ref 13.3–17.7)
PLATELET # BLD AUTO: 138 10E9/L (ref 150–450)
POTASSIUM SERPL-SCNC: 4.7 MMOL/L (ref 3.4–5.3)
SODIUM SERPL-SCNC: 142 MMOL/L (ref 133–144)
WBC # BLD AUTO: 2.4 10E9/L (ref 4–11)

## 2017-08-21 PROCEDURE — 84450 TRANSFERASE (AST) (SGOT): CPT | Performed by: INTERNAL MEDICINE

## 2017-08-21 PROCEDURE — 36415 COLL VENOUS BLD VENIPUNCTURE: CPT | Performed by: INTERNAL MEDICINE

## 2017-08-21 PROCEDURE — 82247 BILIRUBIN TOTAL: CPT | Performed by: INTERNAL MEDICINE

## 2017-08-21 PROCEDURE — 80048 BASIC METABOLIC PNL TOTAL CA: CPT | Performed by: INTERNAL MEDICINE

## 2017-08-21 PROCEDURE — 84075 ASSAY ALKALINE PHOSPHATASE: CPT | Performed by: INTERNAL MEDICINE

## 2017-08-21 PROCEDURE — 84460 ALANINE AMINO (ALT) (SGPT): CPT | Performed by: INTERNAL MEDICINE

## 2017-08-21 PROCEDURE — 85027 COMPLETE CBC AUTOMATED: CPT | Performed by: INTERNAL MEDICINE

## 2017-08-28 DIAGNOSIS — Z94.4 LIVER REPLACED BY TRANSPLANT (H): ICD-10-CM

## 2017-08-28 LAB
ALP SERPL-CCNC: 78 U/L (ref 40–150)
ALT SERPL W P-5'-P-CCNC: 18 U/L (ref 0–70)
AST SERPL W P-5'-P-CCNC: 11 U/L (ref 0–45)
BILIRUB SERPL-MCNC: 0.5 MG/DL (ref 0.2–1.3)
CREAT SERPL-MCNC: 1.35 MG/DL (ref 0.66–1.25)
GFR SERPL CREATININE-BSD FRML MDRD: 59 ML/MIN/1.7M2
GLUCOSE SERPL-MCNC: 95 MG/DL (ref 70–99)
HCT VFR BLD AUTO: 29.5 % (ref 40–53)
HGB BLD-MCNC: 8.9 G/DL (ref 13.3–17.7)
PLATELET # BLD AUTO: 104 10E9/L (ref 150–450)
POTASSIUM SERPL-SCNC: 4.7 MMOL/L (ref 3.4–5.3)
SODIUM SERPL-SCNC: 144 MMOL/L (ref 133–144)
TACROLIMUS BLD-MCNC: 5 UG/L (ref 5–15)
TME LAST DOSE: NORMAL H
WBC # BLD AUTO: 2.5 10E9/L (ref 4–11)

## 2017-08-28 PROCEDURE — 84460 ALANINE AMINO (ALT) (SGPT): CPT | Performed by: INTERNAL MEDICINE

## 2017-08-28 PROCEDURE — 82947 ASSAY GLUCOSE BLOOD QUANT: CPT | Mod: GA | Performed by: INTERNAL MEDICINE

## 2017-08-28 PROCEDURE — 84295 ASSAY OF SERUM SODIUM: CPT | Performed by: INTERNAL MEDICINE

## 2017-08-28 PROCEDURE — 84075 ASSAY ALKALINE PHOSPHATASE: CPT | Performed by: INTERNAL MEDICINE

## 2017-08-28 PROCEDURE — 82565 ASSAY OF CREATININE: CPT | Performed by: INTERNAL MEDICINE

## 2017-08-28 PROCEDURE — 84132 ASSAY OF SERUM POTASSIUM: CPT | Performed by: INTERNAL MEDICINE

## 2017-08-28 PROCEDURE — 36415 COLL VENOUS BLD VENIPUNCTURE: CPT | Performed by: INTERNAL MEDICINE

## 2017-08-28 PROCEDURE — 85027 COMPLETE CBC AUTOMATED: CPT | Performed by: INTERNAL MEDICINE

## 2017-08-28 PROCEDURE — 84450 TRANSFERASE (AST) (SGOT): CPT | Performed by: INTERNAL MEDICINE

## 2017-08-28 PROCEDURE — 80197 ASSAY OF TACROLIMUS: CPT | Performed by: NURSE PRACTITIONER

## 2017-08-28 PROCEDURE — 82247 BILIRUBIN TOTAL: CPT | Performed by: INTERNAL MEDICINE

## 2017-09-04 DIAGNOSIS — Z94.4 LIVER REPLACED BY TRANSPLANT (H): ICD-10-CM

## 2017-09-04 LAB
ALP SERPL-CCNC: 76 U/L (ref 40–150)
ALT SERPL W P-5'-P-CCNC: 21 U/L (ref 0–70)
AST SERPL W P-5'-P-CCNC: 12 U/L (ref 0–45)
BILIRUB SERPL-MCNC: 0.5 MG/DL (ref 0.2–1.3)
CREAT SERPL-MCNC: 1.35 MG/DL (ref 0.66–1.25)
GFR SERPL CREATININE-BSD FRML MDRD: 59 ML/MIN/1.7M2
GLUCOSE SERPL-MCNC: 92 MG/DL (ref 70–99)
HCT VFR BLD AUTO: 31.5 % (ref 40–53)
HGB BLD-MCNC: 9.4 G/DL (ref 13.3–17.7)
PLATELET # BLD AUTO: 118 10E9/L (ref 150–450)
POTASSIUM SERPL-SCNC: 4.5 MMOL/L (ref 3.4–5.3)
SODIUM SERPL-SCNC: 146 MMOL/L (ref 133–144)
WBC # BLD AUTO: 2.4 10E9/L (ref 4–11)

## 2017-09-04 PROCEDURE — 84132 ASSAY OF SERUM POTASSIUM: CPT | Performed by: INTERNAL MEDICINE

## 2017-09-04 PROCEDURE — 84460 ALANINE AMINO (ALT) (SGPT): CPT | Performed by: INTERNAL MEDICINE

## 2017-09-04 PROCEDURE — 84075 ASSAY ALKALINE PHOSPHATASE: CPT | Performed by: INTERNAL MEDICINE

## 2017-09-04 PROCEDURE — 82565 ASSAY OF CREATININE: CPT | Performed by: INTERNAL MEDICINE

## 2017-09-04 PROCEDURE — 82947 ASSAY GLUCOSE BLOOD QUANT: CPT | Mod: GA | Performed by: INTERNAL MEDICINE

## 2017-09-04 PROCEDURE — 36415 COLL VENOUS BLD VENIPUNCTURE: CPT | Performed by: INTERNAL MEDICINE

## 2017-09-04 PROCEDURE — 84295 ASSAY OF SERUM SODIUM: CPT | Performed by: INTERNAL MEDICINE

## 2017-09-04 PROCEDURE — 82247 BILIRUBIN TOTAL: CPT | Performed by: INTERNAL MEDICINE

## 2017-09-04 PROCEDURE — 84450 TRANSFERASE (AST) (SGOT): CPT | Performed by: INTERNAL MEDICINE

## 2017-09-04 PROCEDURE — 85027 COMPLETE CBC AUTOMATED: CPT | Performed by: INTERNAL MEDICINE

## 2017-09-11 ENCOUNTER — TELEPHONE (OUTPATIENT)
Dept: FAMILY MEDICINE | Facility: CLINIC | Age: 42
End: 2017-09-11

## 2017-09-11 DIAGNOSIS — Z94.4 LIVER REPLACED BY TRANSPLANT (H): ICD-10-CM

## 2017-09-11 LAB
ALP SERPL-CCNC: 98 U/L (ref 40–150)
ALT SERPL W P-5'-P-CCNC: 30 U/L (ref 0–70)
AST SERPL W P-5'-P-CCNC: 14 U/L (ref 0–45)
BILIRUB SERPL-MCNC: 0.5 MG/DL (ref 0.2–1.3)
CREAT SERPL-MCNC: 1.18 MG/DL (ref 0.66–1.25)
GFR SERPL CREATININE-BSD FRML MDRD: 68 ML/MIN/1.7M2
GLUCOSE SERPL-MCNC: 90 MG/DL (ref 70–99)
HCT VFR BLD AUTO: 31.3 % (ref 40–53)
HGB BLD-MCNC: 9.6 G/DL (ref 13.3–17.7)
PLATELET # BLD AUTO: 113 10E9/L (ref 150–450)
POTASSIUM SERPL-SCNC: 4.6 MMOL/L (ref 3.4–5.3)
SODIUM SERPL-SCNC: 144 MMOL/L (ref 133–144)
WBC # BLD AUTO: 2.5 10E9/L (ref 4–11)

## 2017-09-11 PROCEDURE — 84075 ASSAY ALKALINE PHOSPHATASE: CPT | Performed by: INTERNAL MEDICINE

## 2017-09-11 PROCEDURE — 85027 COMPLETE CBC AUTOMATED: CPT | Performed by: INTERNAL MEDICINE

## 2017-09-11 PROCEDURE — 82565 ASSAY OF CREATININE: CPT | Performed by: INTERNAL MEDICINE

## 2017-09-11 PROCEDURE — 82947 ASSAY GLUCOSE BLOOD QUANT: CPT | Mod: GA | Performed by: INTERNAL MEDICINE

## 2017-09-11 PROCEDURE — 84132 ASSAY OF SERUM POTASSIUM: CPT | Performed by: INTERNAL MEDICINE

## 2017-09-11 PROCEDURE — 36415 COLL VENOUS BLD VENIPUNCTURE: CPT | Performed by: INTERNAL MEDICINE

## 2017-09-11 PROCEDURE — 82247 BILIRUBIN TOTAL: CPT | Performed by: INTERNAL MEDICINE

## 2017-09-11 PROCEDURE — 84295 ASSAY OF SERUM SODIUM: CPT | Performed by: INTERNAL MEDICINE

## 2017-09-11 PROCEDURE — 84450 TRANSFERASE (AST) (SGOT): CPT | Performed by: INTERNAL MEDICINE

## 2017-09-11 PROCEDURE — 84460 ALANINE AMINO (ALT) (SGPT): CPT | Performed by: INTERNAL MEDICINE

## 2017-09-11 NOTE — TELEPHONE ENCOUNTER
Panel Management Review      Patient has the following on his problem list:     Depression / Dysthymia review  PHQ-9 SCORE 5/30/2014 3/30/2016 8/14/2017   Total Score 11 - -   Total Score - 6 3      Patient is due for:  None    Diabetes    ASA: Passed    Last A1C  Lab Results   Component Value Date    A1C 5.7 01/23/2012     A1C tested: FAILED    Last LDL:    Lab Results   Component Value Date    CHOL 155 12/04/2015     No results found for: HDL  No results found for: LDL  Lab Results   Component Value Date    TRIG 133 06/13/2014     No results found for: CHOLHDLRATIO  No results found for: NHDL    Is the patient on a Statin? NO             Is the patient on Aspirin? YES    Medications     Salicylates    aspirin 81 MG tablet          Last three blood pressure readings:  BP Readings from Last 3 Encounters:   08/07/17 130/84   06/27/17 124/56   03/30/16 140/60       Date of last diabetes office visit: 8/7/2017     Tobacco History:     History   Smoking Status     Never Smoker   Smokeless Tobacco     Never Used               Composite cancer screening  Chart review shows that this patient is due/due soon for the following None  Summary:    Patient is due/failing the following:   TSH, Microalbumin, lipid, foot and eye exam and A1C    Action needed:   Patient needs office visit for diabetes follow up and fasting labs.    Type of outreach:    None, routed to provider for review.    Questions for provider review:    Patient was just seen on 8/7/2018 and diabetes was discussed and reviewed. Patient has not had an A1c since 2012 and it was normal. Does patient need another A1c or a diabetes follow up?                                                                                                                                    Madisyn Whitfield CMA       Chart routed to Provider .

## 2017-09-18 DIAGNOSIS — Z94.4 LIVER REPLACED BY TRANSPLANT (H): ICD-10-CM

## 2017-09-18 LAB
ALP SERPL-CCNC: 87 U/L (ref 40–150)
ALT SERPL W P-5'-P-CCNC: 21 U/L (ref 0–70)
AST SERPL W P-5'-P-CCNC: 14 U/L (ref 0–45)
BILIRUB SERPL-MCNC: 0.5 MG/DL (ref 0.2–1.3)
CREAT SERPL-MCNC: 1.25 MG/DL (ref 0.66–1.25)
GFR SERPL CREATININE-BSD FRML MDRD: 64 ML/MIN/1.7M2
GLUCOSE SERPL-MCNC: 92 MG/DL (ref 70–99)
HCT VFR BLD AUTO: 34.9 % (ref 40–53)
HGB BLD-MCNC: 10.4 G/DL (ref 13.3–17.7)
PLATELET # BLD AUTO: 127 10E9/L (ref 150–450)
POTASSIUM SERPL-SCNC: 5 MMOL/L (ref 3.4–5.3)
SODIUM SERPL-SCNC: 144 MMOL/L (ref 133–144)
WBC # BLD AUTO: 2.9 10E9/L (ref 4–11)

## 2017-09-18 PROCEDURE — 84295 ASSAY OF SERUM SODIUM: CPT | Performed by: INTERNAL MEDICINE

## 2017-09-18 PROCEDURE — 82247 BILIRUBIN TOTAL: CPT | Performed by: INTERNAL MEDICINE

## 2017-09-18 PROCEDURE — 85027 COMPLETE CBC AUTOMATED: CPT | Performed by: INTERNAL MEDICINE

## 2017-09-18 PROCEDURE — 84460 ALANINE AMINO (ALT) (SGPT): CPT | Performed by: INTERNAL MEDICINE

## 2017-09-18 PROCEDURE — 84132 ASSAY OF SERUM POTASSIUM: CPT | Performed by: INTERNAL MEDICINE

## 2017-09-18 PROCEDURE — 36415 COLL VENOUS BLD VENIPUNCTURE: CPT | Performed by: INTERNAL MEDICINE

## 2017-09-18 PROCEDURE — 84075 ASSAY ALKALINE PHOSPHATASE: CPT | Performed by: INTERNAL MEDICINE

## 2017-09-18 PROCEDURE — 82565 ASSAY OF CREATININE: CPT | Performed by: INTERNAL MEDICINE

## 2017-09-18 PROCEDURE — 82947 ASSAY GLUCOSE BLOOD QUANT: CPT | Mod: GA | Performed by: INTERNAL MEDICINE

## 2017-09-18 PROCEDURE — 84450 TRANSFERASE (AST) (SGOT): CPT | Performed by: INTERNAL MEDICINE

## 2017-09-25 DIAGNOSIS — Z94.4 LIVER REPLACED BY TRANSPLANT (H): ICD-10-CM

## 2017-09-25 LAB
ALP SERPL-CCNC: 79 U/L (ref 40–150)
ALT SERPL W P-5'-P-CCNC: 18 U/L (ref 0–70)
AST SERPL W P-5'-P-CCNC: 8 U/L (ref 0–45)
BILIRUB SERPL-MCNC: 0.4 MG/DL (ref 0.2–1.3)
CREAT SERPL-MCNC: 1.1 MG/DL (ref 0.66–1.25)
GFR SERPL CREATININE-BSD FRML MDRD: 74 ML/MIN/1.7M2
GLUCOSE SERPL-MCNC: 101 MG/DL (ref 70–99)
HCT VFR BLD AUTO: 33.2 % (ref 40–53)
HGB BLD-MCNC: 10.2 G/DL (ref 13.3–17.7)
PLATELET # BLD AUTO: 108 10E9/L (ref 150–450)
POTASSIUM SERPL-SCNC: 5 MMOL/L (ref 3.4–5.3)
SODIUM SERPL-SCNC: 145 MMOL/L (ref 133–144)
WBC # BLD AUTO: 2.8 10E9/L (ref 4–11)

## 2017-09-25 PROCEDURE — 85027 COMPLETE CBC AUTOMATED: CPT | Performed by: INTERNAL MEDICINE

## 2017-09-25 PROCEDURE — 84295 ASSAY OF SERUM SODIUM: CPT | Performed by: INTERNAL MEDICINE

## 2017-09-25 PROCEDURE — 82565 ASSAY OF CREATININE: CPT | Performed by: INTERNAL MEDICINE

## 2017-09-25 PROCEDURE — 82947 ASSAY GLUCOSE BLOOD QUANT: CPT | Mod: GA | Performed by: INTERNAL MEDICINE

## 2017-09-25 PROCEDURE — 36415 COLL VENOUS BLD VENIPUNCTURE: CPT | Performed by: INTERNAL MEDICINE

## 2017-09-25 PROCEDURE — 84460 ALANINE AMINO (ALT) (SGPT): CPT | Performed by: INTERNAL MEDICINE

## 2017-09-25 PROCEDURE — 82247 BILIRUBIN TOTAL: CPT | Performed by: INTERNAL MEDICINE

## 2017-09-25 PROCEDURE — 84450 TRANSFERASE (AST) (SGOT): CPT | Performed by: INTERNAL MEDICINE

## 2017-09-25 PROCEDURE — 84075 ASSAY ALKALINE PHOSPHATASE: CPT | Performed by: INTERNAL MEDICINE

## 2017-09-25 PROCEDURE — 84132 ASSAY OF SERUM POTASSIUM: CPT | Performed by: INTERNAL MEDICINE

## 2017-10-02 DIAGNOSIS — Z94.4 LIVER REPLACED BY TRANSPLANT (H): ICD-10-CM

## 2017-10-02 LAB
ALP SERPL-CCNC: 72 U/L (ref 40–150)
ALT SERPL W P-5'-P-CCNC: 18 U/L (ref 0–70)
AST SERPL W P-5'-P-CCNC: 9 U/L (ref 0–45)
BILIRUB SERPL-MCNC: 0.5 MG/DL (ref 0.2–1.3)
CREAT SERPL-MCNC: 1.29 MG/DL (ref 0.66–1.25)
GFR SERPL CREATININE-BSD FRML MDRD: 62 ML/MIN/1.7M2
GLUCOSE SERPL-MCNC: 91 MG/DL (ref 70–99)
HCT VFR BLD AUTO: 33.1 % (ref 40–53)
HGB BLD-MCNC: 9.9 G/DL (ref 13.3–17.7)
PLATELET # BLD AUTO: 120 10E9/L (ref 150–450)
POTASSIUM SERPL-SCNC: 5 MMOL/L (ref 3.4–5.3)
SODIUM SERPL-SCNC: 145 MMOL/L (ref 133–144)
WBC # BLD AUTO: 2.8 10E9/L (ref 4–11)

## 2017-10-02 PROCEDURE — 36415 COLL VENOUS BLD VENIPUNCTURE: CPT | Performed by: INTERNAL MEDICINE

## 2017-10-02 PROCEDURE — 82565 ASSAY OF CREATININE: CPT | Performed by: INTERNAL MEDICINE

## 2017-10-02 PROCEDURE — 82247 BILIRUBIN TOTAL: CPT | Performed by: INTERNAL MEDICINE

## 2017-10-02 PROCEDURE — 85027 COMPLETE CBC AUTOMATED: CPT | Performed by: INTERNAL MEDICINE

## 2017-10-02 PROCEDURE — 82947 ASSAY GLUCOSE BLOOD QUANT: CPT | Mod: GA | Performed by: INTERNAL MEDICINE

## 2017-10-02 PROCEDURE — 84450 TRANSFERASE (AST) (SGOT): CPT | Performed by: INTERNAL MEDICINE

## 2017-10-02 PROCEDURE — 84295 ASSAY OF SERUM SODIUM: CPT | Performed by: INTERNAL MEDICINE

## 2017-10-02 PROCEDURE — 84460 ALANINE AMINO (ALT) (SGPT): CPT | Performed by: INTERNAL MEDICINE

## 2017-10-02 PROCEDURE — 84075 ASSAY ALKALINE PHOSPHATASE: CPT | Performed by: INTERNAL MEDICINE

## 2017-10-02 PROCEDURE — 84132 ASSAY OF SERUM POTASSIUM: CPT | Performed by: INTERNAL MEDICINE

## 2017-10-09 DIAGNOSIS — Z94.4 LIVER REPLACED BY TRANSPLANT (H): ICD-10-CM

## 2017-10-09 LAB
ALP SERPL-CCNC: 70 U/L (ref 40–150)
ALT SERPL W P-5'-P-CCNC: 18 U/L (ref 0–70)
AST SERPL W P-5'-P-CCNC: 11 U/L (ref 0–45)
BILIRUB SERPL-MCNC: 0.5 MG/DL (ref 0.2–1.3)
CREAT SERPL-MCNC: 1.23 MG/DL (ref 0.66–1.25)
GFR SERPL CREATININE-BSD FRML MDRD: 65 ML/MIN/1.7M2
GLUCOSE SERPL-MCNC: 98 MG/DL (ref 70–99)
HCT VFR BLD AUTO: 33 % (ref 40–53)
HGB BLD-MCNC: 9.6 G/DL (ref 13.3–17.7)
PLATELET # BLD AUTO: 98 10E9/L (ref 150–450)
POTASSIUM SERPL-SCNC: 4.7 MMOL/L (ref 3.4–5.3)
SODIUM SERPL-SCNC: 145 MMOL/L (ref 133–144)
WBC # BLD AUTO: 2.4 10E9/L (ref 4–11)

## 2017-10-09 PROCEDURE — 84295 ASSAY OF SERUM SODIUM: CPT | Performed by: INTERNAL MEDICINE

## 2017-10-09 PROCEDURE — 82247 BILIRUBIN TOTAL: CPT | Performed by: INTERNAL MEDICINE

## 2017-10-09 PROCEDURE — 84450 TRANSFERASE (AST) (SGOT): CPT | Performed by: INTERNAL MEDICINE

## 2017-10-09 PROCEDURE — 82565 ASSAY OF CREATININE: CPT | Performed by: INTERNAL MEDICINE

## 2017-10-09 PROCEDURE — 84132 ASSAY OF SERUM POTASSIUM: CPT | Performed by: INTERNAL MEDICINE

## 2017-10-09 PROCEDURE — 36415 COLL VENOUS BLD VENIPUNCTURE: CPT | Performed by: INTERNAL MEDICINE

## 2017-10-09 PROCEDURE — 84075 ASSAY ALKALINE PHOSPHATASE: CPT | Performed by: INTERNAL MEDICINE

## 2017-10-09 PROCEDURE — 84460 ALANINE AMINO (ALT) (SGPT): CPT | Performed by: INTERNAL MEDICINE

## 2017-10-09 PROCEDURE — 85027 COMPLETE CBC AUTOMATED: CPT | Performed by: INTERNAL MEDICINE

## 2017-10-09 PROCEDURE — 82947 ASSAY GLUCOSE BLOOD QUANT: CPT | Mod: GA | Performed by: INTERNAL MEDICINE

## 2017-10-16 DIAGNOSIS — Z94.4 LIVER REPLACED BY TRANSPLANT (H): ICD-10-CM

## 2017-10-16 LAB
ALP SERPL-CCNC: 72 U/L (ref 40–150)
ALT SERPL W P-5'-P-CCNC: 22 U/L (ref 0–70)
AST SERPL W P-5'-P-CCNC: 15 U/L (ref 0–45)
BILIRUB SERPL-MCNC: 0.5 MG/DL (ref 0.2–1.3)
CREAT SERPL-MCNC: 1.37 MG/DL (ref 0.66–1.25)
GFR SERPL CREATININE-BSD FRML MDRD: 58 ML/MIN/1.7M2
GLUCOSE SERPL-MCNC: 95 MG/DL (ref 70–99)
HCT VFR BLD AUTO: 34.7 % (ref 40–53)
HGB BLD-MCNC: 10 G/DL (ref 13.3–17.7)
PLATELET # BLD AUTO: 108 10E9/L (ref 150–450)
POTASSIUM SERPL-SCNC: 5.2 MMOL/L (ref 3.4–5.3)
SODIUM SERPL-SCNC: 142 MMOL/L (ref 133–144)
WBC # BLD AUTO: 2.3 10E9/L (ref 4–11)

## 2017-10-16 PROCEDURE — 84295 ASSAY OF SERUM SODIUM: CPT | Performed by: INTERNAL MEDICINE

## 2017-10-16 PROCEDURE — 84132 ASSAY OF SERUM POTASSIUM: CPT | Performed by: INTERNAL MEDICINE

## 2017-10-16 PROCEDURE — 84460 ALANINE AMINO (ALT) (SGPT): CPT | Performed by: INTERNAL MEDICINE

## 2017-10-16 PROCEDURE — 82947 ASSAY GLUCOSE BLOOD QUANT: CPT | Mod: GA | Performed by: INTERNAL MEDICINE

## 2017-10-16 PROCEDURE — 85027 COMPLETE CBC AUTOMATED: CPT | Performed by: INTERNAL MEDICINE

## 2017-10-16 PROCEDURE — 84075 ASSAY ALKALINE PHOSPHATASE: CPT | Performed by: INTERNAL MEDICINE

## 2017-10-16 PROCEDURE — 82565 ASSAY OF CREATININE: CPT | Performed by: INTERNAL MEDICINE

## 2017-10-16 PROCEDURE — 36415 COLL VENOUS BLD VENIPUNCTURE: CPT | Performed by: INTERNAL MEDICINE

## 2017-10-16 PROCEDURE — 82247 BILIRUBIN TOTAL: CPT | Performed by: INTERNAL MEDICINE

## 2017-10-16 PROCEDURE — 84450 TRANSFERASE (AST) (SGOT): CPT | Performed by: INTERNAL MEDICINE

## 2017-10-19 PROBLEM — E11.8 TYPE 2 DIABETES MELLITUS WITH COMPLICATION, WITH LONG-TERM CURRENT USE OF INSULIN (H): Status: ACTIVE | Noted: 2017-10-19

## 2017-10-19 PROBLEM — Z79.4 TYPE 2 DIABETES MELLITUS WITH COMPLICATION, WITH LONG-TERM CURRENT USE OF INSULIN (H): Status: ACTIVE | Noted: 2017-10-19

## 2017-10-19 NOTE — TELEPHONE ENCOUNTER
Please call patient and schedule a DM follow up with Dr. Hidalgo at her next avail is ok.  If patient is following someone else please put that information in the message.   Thank you   Aleshia HUTSON

## 2017-10-20 NOTE — TELEPHONE ENCOUNTER
Patient states he will call back after his Lock appt.  Thank you,  Yun Matthews  Patient Representative

## 2017-10-23 DIAGNOSIS — Z94.4 LIVER REPLACED BY TRANSPLANT (H): ICD-10-CM

## 2017-10-23 LAB
ALP SERPL-CCNC: 74 U/L (ref 40–150)
ALT SERPL W P-5'-P-CCNC: 20 U/L (ref 0–70)
AST SERPL W P-5'-P-CCNC: 10 U/L (ref 0–45)
BILIRUB SERPL-MCNC: 0.5 MG/DL (ref 0.2–1.3)
CREAT SERPL-MCNC: 1.23 MG/DL (ref 0.66–1.25)
GFR SERPL CREATININE-BSD FRML MDRD: 65 ML/MIN/1.7M2
GLUCOSE SERPL-MCNC: 99 MG/DL (ref 70–99)
HCT VFR BLD AUTO: 33.4 % (ref 40–53)
HGB BLD-MCNC: 9.8 G/DL (ref 13.3–17.7)
PLATELET # BLD AUTO: 94 10E9/L (ref 150–450)
POTASSIUM SERPL-SCNC: 4.6 MMOL/L (ref 3.4–5.3)
SODIUM SERPL-SCNC: 142 MMOL/L (ref 133–144)
WBC # BLD AUTO: 2 10E9/L (ref 4–11)

## 2017-10-23 PROCEDURE — 84450 TRANSFERASE (AST) (SGOT): CPT | Performed by: INTERNAL MEDICINE

## 2017-10-23 PROCEDURE — 84295 ASSAY OF SERUM SODIUM: CPT | Performed by: INTERNAL MEDICINE

## 2017-10-23 PROCEDURE — 84132 ASSAY OF SERUM POTASSIUM: CPT | Performed by: INTERNAL MEDICINE

## 2017-10-23 PROCEDURE — 82947 ASSAY GLUCOSE BLOOD QUANT: CPT | Mod: GA | Performed by: INTERNAL MEDICINE

## 2017-10-23 PROCEDURE — 85027 COMPLETE CBC AUTOMATED: CPT | Performed by: INTERNAL MEDICINE

## 2017-10-23 PROCEDURE — 84075 ASSAY ALKALINE PHOSPHATASE: CPT | Performed by: INTERNAL MEDICINE

## 2017-10-23 PROCEDURE — 36415 COLL VENOUS BLD VENIPUNCTURE: CPT | Performed by: INTERNAL MEDICINE

## 2017-10-23 PROCEDURE — 82247 BILIRUBIN TOTAL: CPT | Performed by: INTERNAL MEDICINE

## 2017-10-23 PROCEDURE — 84460 ALANINE AMINO (ALT) (SGPT): CPT | Performed by: INTERNAL MEDICINE

## 2017-10-23 PROCEDURE — 82565 ASSAY OF CREATININE: CPT | Performed by: INTERNAL MEDICINE

## 2017-10-30 DIAGNOSIS — Z94.4 LIVER REPLACED BY TRANSPLANT (H): ICD-10-CM

## 2017-10-30 LAB
ALP SERPL-CCNC: 74 U/L (ref 40–150)
ALT SERPL W P-5'-P-CCNC: 21 U/L (ref 0–70)
AST SERPL W P-5'-P-CCNC: 9 U/L (ref 0–45)
BILIRUB SERPL-MCNC: 0.5 MG/DL (ref 0.2–1.3)
CREAT SERPL-MCNC: 1.2 MG/DL (ref 0.66–1.25)
GFR SERPL CREATININE-BSD FRML MDRD: 67 ML/MIN/1.7M2
GLUCOSE SERPL-MCNC: 102 MG/DL (ref 70–99)
HCT VFR BLD AUTO: 32.7 % (ref 40–53)
HGB BLD-MCNC: 9.6 G/DL (ref 13.3–17.7)
PLATELET # BLD AUTO: 112 10E9/L (ref 150–450)
POTASSIUM SERPL-SCNC: 5.1 MMOL/L (ref 3.4–5.3)
SODIUM SERPL-SCNC: 142 MMOL/L (ref 133–144)
WBC # BLD AUTO: 2.2 10E9/L (ref 4–11)

## 2017-10-30 PROCEDURE — 84460 ALANINE AMINO (ALT) (SGPT): CPT | Performed by: INTERNAL MEDICINE

## 2017-10-30 PROCEDURE — 36415 COLL VENOUS BLD VENIPUNCTURE: CPT | Performed by: INTERNAL MEDICINE

## 2017-10-30 PROCEDURE — 82565 ASSAY OF CREATININE: CPT | Performed by: INTERNAL MEDICINE

## 2017-10-30 PROCEDURE — 82947 ASSAY GLUCOSE BLOOD QUANT: CPT | Mod: GA | Performed by: INTERNAL MEDICINE

## 2017-10-30 PROCEDURE — 84450 TRANSFERASE (AST) (SGOT): CPT | Performed by: INTERNAL MEDICINE

## 2017-10-30 PROCEDURE — 84075 ASSAY ALKALINE PHOSPHATASE: CPT | Performed by: INTERNAL MEDICINE

## 2017-10-30 PROCEDURE — 85027 COMPLETE CBC AUTOMATED: CPT | Performed by: INTERNAL MEDICINE

## 2017-10-30 PROCEDURE — 84295 ASSAY OF SERUM SODIUM: CPT | Performed by: INTERNAL MEDICINE

## 2017-10-30 PROCEDURE — 82247 BILIRUBIN TOTAL: CPT | Performed by: INTERNAL MEDICINE

## 2017-10-30 PROCEDURE — 84132 ASSAY OF SERUM POTASSIUM: CPT | Performed by: INTERNAL MEDICINE

## 2017-11-06 DIAGNOSIS — Z94.4 LIVER REPLACED BY TRANSPLANT (H): ICD-10-CM

## 2017-11-06 LAB
ALP SERPL-CCNC: 70 U/L (ref 40–150)
ALT SERPL W P-5'-P-CCNC: 18 U/L (ref 0–70)
AST SERPL W P-5'-P-CCNC: 8 U/L (ref 0–45)
BILIRUB SERPL-MCNC: 0.5 MG/DL (ref 0.2–1.3)
CREAT SERPL-MCNC: 1.13 MG/DL (ref 0.66–1.25)
GFR SERPL CREATININE-BSD FRML MDRD: 72 ML/MIN/1.7M2
GLUCOSE SERPL-MCNC: 106 MG/DL (ref 70–99)
HCT VFR BLD AUTO: 32.2 % (ref 40–53)
HGB BLD-MCNC: 9.3 G/DL (ref 13.3–17.7)
PLATELET # BLD AUTO: 104 10E9/L (ref 150–450)
POTASSIUM SERPL-SCNC: 4.7 MMOL/L (ref 3.4–5.3)
SODIUM SERPL-SCNC: 140 MMOL/L (ref 133–144)
WBC # BLD AUTO: 2 10E9/L (ref 4–11)

## 2017-11-06 PROCEDURE — 82947 ASSAY GLUCOSE BLOOD QUANT: CPT | Mod: GA | Performed by: INTERNAL MEDICINE

## 2017-11-06 PROCEDURE — 36415 COLL VENOUS BLD VENIPUNCTURE: CPT | Performed by: INTERNAL MEDICINE

## 2017-11-06 PROCEDURE — 85027 COMPLETE CBC AUTOMATED: CPT | Performed by: INTERNAL MEDICINE

## 2017-11-06 PROCEDURE — 84295 ASSAY OF SERUM SODIUM: CPT | Performed by: INTERNAL MEDICINE

## 2017-11-06 PROCEDURE — 82247 BILIRUBIN TOTAL: CPT | Performed by: INTERNAL MEDICINE

## 2017-11-06 PROCEDURE — 84450 TRANSFERASE (AST) (SGOT): CPT | Performed by: INTERNAL MEDICINE

## 2017-11-06 PROCEDURE — 82565 ASSAY OF CREATININE: CPT | Performed by: INTERNAL MEDICINE

## 2017-11-06 PROCEDURE — 84460 ALANINE AMINO (ALT) (SGPT): CPT | Performed by: INTERNAL MEDICINE

## 2017-11-06 PROCEDURE — 84132 ASSAY OF SERUM POTASSIUM: CPT | Performed by: INTERNAL MEDICINE

## 2017-11-06 PROCEDURE — 84075 ASSAY ALKALINE PHOSPHATASE: CPT | Performed by: INTERNAL MEDICINE

## 2017-11-15 DIAGNOSIS — Z79.4 TYPE 2 DIABETES MELLITUS WITH COMPLICATION, WITH LONG-TERM CURRENT USE OF INSULIN (H): Primary | ICD-10-CM

## 2017-11-15 DIAGNOSIS — E11.8 TYPE 2 DIABETES MELLITUS WITH COMPLICATION, WITH LONG-TERM CURRENT USE OF INSULIN (H): Primary | ICD-10-CM

## 2017-11-15 RX ORDER — HUMAN INSULIN 100 [IU]/ML
15 INJECTION, SUSPENSION SUBCUTANEOUS EVERY MORNING
Qty: 6 ML | Refills: 5 | Status: SHIPPED | OUTPATIENT
Start: 2017-11-15 | End: 2018-02-07

## 2017-12-04 DIAGNOSIS — Z94.4 LIVER REPLACED BY TRANSPLANT (H): ICD-10-CM

## 2017-12-04 LAB
ALP SERPL-CCNC: 81 U/L (ref 40–150)
ALT SERPL W P-5'-P-CCNC: 21 U/L (ref 0–70)
AST SERPL W P-5'-P-CCNC: 11 U/L (ref 0–45)
BILIRUB SERPL-MCNC: 0.5 MG/DL (ref 0.2–1.3)
CREAT SERPL-MCNC: 1.24 MG/DL (ref 0.66–1.25)
GFR SERPL CREATININE-BSD FRML MDRD: 65 ML/MIN/1.7M2
GLUCOSE SERPL-MCNC: 110 MG/DL (ref 70–99)
HCT VFR BLD AUTO: 34 % (ref 40–53)
HGB BLD-MCNC: 9.7 G/DL (ref 13.3–17.7)
PLATELET # BLD AUTO: 111 10E9/L (ref 150–450)
POTASSIUM SERPL-SCNC: 5.1 MMOL/L (ref 3.4–5.3)
SODIUM SERPL-SCNC: 140 MMOL/L (ref 133–144)
WBC # BLD AUTO: 2.4 10E9/L (ref 4–11)

## 2017-12-04 PROCEDURE — 36415 COLL VENOUS BLD VENIPUNCTURE: CPT | Performed by: INTERNAL MEDICINE

## 2017-12-04 PROCEDURE — 82947 ASSAY GLUCOSE BLOOD QUANT: CPT | Mod: GA | Performed by: INTERNAL MEDICINE

## 2017-12-04 PROCEDURE — 82247 BILIRUBIN TOTAL: CPT | Performed by: INTERNAL MEDICINE

## 2017-12-04 PROCEDURE — 85027 COMPLETE CBC AUTOMATED: CPT | Performed by: INTERNAL MEDICINE

## 2017-12-04 PROCEDURE — 82565 ASSAY OF CREATININE: CPT | Performed by: INTERNAL MEDICINE

## 2017-12-04 PROCEDURE — 84075 ASSAY ALKALINE PHOSPHATASE: CPT | Performed by: INTERNAL MEDICINE

## 2017-12-04 PROCEDURE — 84132 ASSAY OF SERUM POTASSIUM: CPT | Performed by: INTERNAL MEDICINE

## 2017-12-04 PROCEDURE — 84295 ASSAY OF SERUM SODIUM: CPT | Performed by: INTERNAL MEDICINE

## 2017-12-04 PROCEDURE — 84450 TRANSFERASE (AST) (SGOT): CPT | Performed by: INTERNAL MEDICINE

## 2017-12-04 PROCEDURE — 84460 ALANINE AMINO (ALT) (SGPT): CPT | Performed by: INTERNAL MEDICINE

## 2017-12-06 ENCOUNTER — TELEPHONE (OUTPATIENT)
Dept: FAMILY MEDICINE | Facility: CLINIC | Age: 42
End: 2017-12-06

## 2017-12-06 NOTE — TELEPHONE ENCOUNTER
Panel Management Review      Patient has the following on his problem list:     Depression / Dysthymia review    Measure:  Needs PHQ-9 score of 4 or less during index window.  Administer PHQ-9 and if score is 5 or more, send encounter to provider for next steps.    5   7 month window range: 6-11    PHQ-9 SCORE 5/30/2014 3/30/2016 8/14/2017   Total Score 11 - -   Total Score - 6 3       If PHQ-9 recheck is 5 or more, route to provider for next steps.    Patient is due for:  PHQ9    Diabetes    ASA: Passed    Last A1C  Lab Results   Component Value Date    A1C 5.7 01/23/2012     A1C tested: FAILED    Last LDL:    Lab Results   Component Value Date    CHOL 155 12/04/2015     No results found for: HDL  No results found for: LDL  Lab Results   Component Value Date    TRIG 133 06/13/2014     No results found for: CHOLHDLRATIO  No results found for: NHDL    Is the patient on a Statin? NO             Is the patient on Aspirin? YES    Medications     Salicylates    aspirin 81 MG tablet          Last three blood pressure readings:  BP Readings from Last 3 Encounters:   08/07/17 130/84   06/27/17 124/56   03/30/16 140/60       Date of last diabetes office visit: 8/7/2017     Tobacco History:     History   Smoking Status     Never Smoker   Smokeless Tobacco     Never Used               Composite cancer screening  Chart review shows that this patient is due/due soon for the following None  Summary:    Patient is due/failing the following:   A1C and LDL    Action needed:   Patient needs fasting lab only appointment    Type of outreach:    Sent [employer] message.    Questions for provider review:    None                                                                                                                                    Madisyn Whitfield CMA       Chart routed to Care Team .

## 2017-12-19 DIAGNOSIS — Z94.4 LIVER REPLACED BY TRANSPLANT (H): ICD-10-CM

## 2017-12-19 LAB
ALP SERPL-CCNC: 88 U/L (ref 40–150)
ALT SERPL W P-5'-P-CCNC: 25 U/L (ref 0–70)
AST SERPL W P-5'-P-CCNC: 13 U/L (ref 0–45)
BILIRUB SERPL-MCNC: 0.5 MG/DL (ref 0.2–1.3)
CREAT SERPL-MCNC: 1.23 MG/DL (ref 0.66–1.25)
GFR SERPL CREATININE-BSD FRML MDRD: 65 ML/MIN/1.7M2
GLUCOSE SERPL-MCNC: 109 MG/DL (ref 70–99)
HCT VFR BLD AUTO: 33.3 % (ref 40–53)
HGB BLD-MCNC: 9.6 G/DL (ref 13.3–17.7)
PLATELET # BLD AUTO: 117 10E9/L (ref 150–450)
POTASSIUM SERPL-SCNC: 4.8 MMOL/L (ref 3.4–5.3)
SODIUM SERPL-SCNC: 140 MMOL/L (ref 133–144)
WBC # BLD AUTO: 2.6 10E9/L (ref 4–11)

## 2017-12-19 PROCEDURE — 84075 ASSAY ALKALINE PHOSPHATASE: CPT | Performed by: INTERNAL MEDICINE

## 2017-12-19 PROCEDURE — 84132 ASSAY OF SERUM POTASSIUM: CPT | Performed by: INTERNAL MEDICINE

## 2017-12-19 PROCEDURE — 82565 ASSAY OF CREATININE: CPT | Performed by: INTERNAL MEDICINE

## 2017-12-19 PROCEDURE — 84295 ASSAY OF SERUM SODIUM: CPT | Performed by: INTERNAL MEDICINE

## 2017-12-19 PROCEDURE — 82947 ASSAY GLUCOSE BLOOD QUANT: CPT | Mod: GA | Performed by: INTERNAL MEDICINE

## 2017-12-19 PROCEDURE — 84460 ALANINE AMINO (ALT) (SGPT): CPT | Performed by: INTERNAL MEDICINE

## 2017-12-19 PROCEDURE — 84450 TRANSFERASE (AST) (SGOT): CPT | Performed by: INTERNAL MEDICINE

## 2017-12-19 PROCEDURE — 85027 COMPLETE CBC AUTOMATED: CPT | Performed by: INTERNAL MEDICINE

## 2017-12-19 PROCEDURE — 36415 COLL VENOUS BLD VENIPUNCTURE: CPT | Performed by: INTERNAL MEDICINE

## 2017-12-19 PROCEDURE — 82247 BILIRUBIN TOTAL: CPT | Performed by: INTERNAL MEDICINE

## 2018-01-02 DIAGNOSIS — Z94.4 LIVER REPLACED BY TRANSPLANT (H): ICD-10-CM

## 2018-01-02 LAB
ALP SERPL-CCNC: 89 U/L (ref 40–150)
ALT SERPL W P-5'-P-CCNC: 24 U/L (ref 0–70)
AST SERPL W P-5'-P-CCNC: 16 U/L (ref 0–45)
BILIRUB SERPL-MCNC: 0.5 MG/DL (ref 0.2–1.3)
CREAT SERPL-MCNC: 1.18 MG/DL (ref 0.66–1.25)
GFR SERPL CREATININE-BSD FRML MDRD: 68 ML/MIN/1.7M2
GLUCOSE SERPL-MCNC: 124 MG/DL (ref 70–99)
HCT VFR BLD AUTO: 34 % (ref 40–53)
HGB BLD-MCNC: 9.9 G/DL (ref 13.3–17.7)
PLATELET # BLD AUTO: 52 10E9/L (ref 150–450)
POTASSIUM SERPL-SCNC: 4.5 MMOL/L (ref 3.4–5.3)
SODIUM SERPL-SCNC: 141 MMOL/L (ref 133–144)
WBC # BLD AUTO: 2.5 10E9/L (ref 4–11)

## 2018-01-02 PROCEDURE — 84460 ALANINE AMINO (ALT) (SGPT): CPT | Performed by: INTERNAL MEDICINE

## 2018-01-02 PROCEDURE — 84075 ASSAY ALKALINE PHOSPHATASE: CPT | Performed by: INTERNAL MEDICINE

## 2018-01-02 PROCEDURE — 84295 ASSAY OF SERUM SODIUM: CPT | Performed by: INTERNAL MEDICINE

## 2018-01-02 PROCEDURE — 82247 BILIRUBIN TOTAL: CPT | Performed by: INTERNAL MEDICINE

## 2018-01-02 PROCEDURE — 85027 COMPLETE CBC AUTOMATED: CPT | Performed by: INTERNAL MEDICINE

## 2018-01-02 PROCEDURE — 84450 TRANSFERASE (AST) (SGOT): CPT | Performed by: INTERNAL MEDICINE

## 2018-01-02 PROCEDURE — 36415 COLL VENOUS BLD VENIPUNCTURE: CPT | Performed by: INTERNAL MEDICINE

## 2018-01-02 PROCEDURE — 82947 ASSAY GLUCOSE BLOOD QUANT: CPT | Mod: GA | Performed by: INTERNAL MEDICINE

## 2018-01-02 PROCEDURE — 82565 ASSAY OF CREATININE: CPT | Performed by: INTERNAL MEDICINE

## 2018-01-02 PROCEDURE — 84132 ASSAY OF SERUM POTASSIUM: CPT | Performed by: INTERNAL MEDICINE

## 2018-01-29 DIAGNOSIS — Z94.4 LIVER REPLACED BY TRANSPLANT (H): ICD-10-CM

## 2018-01-29 LAB
ALP SERPL-CCNC: 464 U/L (ref 40–150)
ALT SERPL W P-5'-P-CCNC: 380 U/L (ref 0–70)
AST SERPL W P-5'-P-CCNC: 208 U/L (ref 0–45)
BILIRUB SERPL-MCNC: 5.3 MG/DL (ref 0.2–1.3)
CREAT SERPL-MCNC: 1.78 MG/DL (ref 0.66–1.25)
GFR SERPL CREATININE-BSD FRML MDRD: 42 ML/MIN/1.7M2
GLUCOSE SERPL-MCNC: 142 MG/DL (ref 70–99)
HCT VFR BLD AUTO: 36.2 % (ref 40–53)
HGB BLD-MCNC: 10.8 G/DL (ref 13.3–17.7)
PLATELET # BLD AUTO: 103 10E9/L (ref 150–450)
POTASSIUM SERPL-SCNC: 4.7 MMOL/L (ref 3.4–5.3)
SODIUM SERPL-SCNC: 138 MMOL/L (ref 133–144)
WBC # BLD AUTO: 2.6 10E9/L (ref 4–11)

## 2018-01-29 PROCEDURE — 85027 COMPLETE CBC AUTOMATED: CPT | Performed by: INTERNAL MEDICINE

## 2018-01-29 PROCEDURE — 84450 TRANSFERASE (AST) (SGOT): CPT | Performed by: INTERNAL MEDICINE

## 2018-01-29 PROCEDURE — 84132 ASSAY OF SERUM POTASSIUM: CPT | Performed by: INTERNAL MEDICINE

## 2018-01-29 PROCEDURE — 82565 ASSAY OF CREATININE: CPT | Performed by: INTERNAL MEDICINE

## 2018-01-29 PROCEDURE — 82947 ASSAY GLUCOSE BLOOD QUANT: CPT | Mod: GA | Performed by: INTERNAL MEDICINE

## 2018-01-29 PROCEDURE — 84295 ASSAY OF SERUM SODIUM: CPT | Performed by: INTERNAL MEDICINE

## 2018-01-29 PROCEDURE — 82247 BILIRUBIN TOTAL: CPT | Performed by: INTERNAL MEDICINE

## 2018-01-29 PROCEDURE — 84075 ASSAY ALKALINE PHOSPHATASE: CPT | Performed by: INTERNAL MEDICINE

## 2018-01-29 PROCEDURE — 36415 COLL VENOUS BLD VENIPUNCTURE: CPT | Performed by: INTERNAL MEDICINE

## 2018-01-29 PROCEDURE — 84460 ALANINE AMINO (ALT) (SGPT): CPT | Performed by: INTERNAL MEDICINE

## 2018-01-30 ENCOUNTER — TRANSFERRED RECORDS (OUTPATIENT)
Dept: HEALTH INFORMATION MANAGEMENT | Facility: CLINIC | Age: 43
End: 2018-01-30

## 2018-02-07 ENCOUNTER — HOSPITAL ENCOUNTER (EMERGENCY)
Facility: CLINIC | Age: 43
Discharge: ANOTHER HOSPITAL | End: 2018-02-07
Attending: FAMILY MEDICINE | Admitting: FAMILY MEDICINE
Payer: MEDICARE

## 2018-02-07 ENCOUNTER — INFUSION THERAPY VISIT (OUTPATIENT)
Dept: INFUSION THERAPY | Facility: CLINIC | Age: 43
End: 2018-02-07
Attending: FAMILY MEDICINE
Payer: MEDICARE

## 2018-02-07 ENCOUNTER — HOSPITAL ENCOUNTER (EMERGENCY)
Facility: CLINIC | Age: 43
Discharge: ANOTHER HOSPITAL | End: 2018-02-08
Attending: EMERGENCY MEDICINE | Admitting: EMERGENCY MEDICINE
Payer: MEDICARE

## 2018-02-07 VITALS
SYSTOLIC BLOOD PRESSURE: 135 MMHG | RESPIRATION RATE: 17 BRPM | WEIGHT: 254 LBS | OXYGEN SATURATION: 95 % | TEMPERATURE: 97.9 F | HEART RATE: 91 BPM | BODY MASS INDEX: 34.45 KG/M2 | DIASTOLIC BLOOD PRESSURE: 90 MMHG

## 2018-02-07 VITALS
BODY MASS INDEX: 34.88 KG/M2 | HEART RATE: 81 BPM | HEIGHT: 72 IN | RESPIRATION RATE: 20 BRPM | OXYGEN SATURATION: 97 % | TEMPERATURE: 97.2 F | WEIGHT: 257.5 LBS | SYSTOLIC BLOOD PRESSURE: 149 MMHG | DIASTOLIC BLOOD PRESSURE: 74 MMHG

## 2018-02-07 DIAGNOSIS — R73.9 HYPERGLYCEMIA: ICD-10-CM

## 2018-02-07 DIAGNOSIS — K83.01 PRIMARY SCLEROSING CHOLANGITIS (H): ICD-10-CM

## 2018-02-07 DIAGNOSIS — E87.5 HYPERKALEMIA: ICD-10-CM

## 2018-02-07 DIAGNOSIS — T86.41 LIVER TRANSPLANT REJECTION (H): ICD-10-CM

## 2018-02-07 DIAGNOSIS — B25.9 CYTOMEGALOVIRUS INFECTION, UNSPECIFIED CYTOMEGALOVIRAL INFECTION TYPE (H): Primary | ICD-10-CM

## 2018-02-07 DIAGNOSIS — B25.9 CYTOMEGALOVIRUS INFECTION, UNSPECIFIED CYTOMEGALOVIRAL INFECTION TYPE (H): ICD-10-CM

## 2018-02-07 LAB
ALBUMIN SERPL-MCNC: 3 G/DL (ref 3.4–5)
ALBUMIN UR-MCNC: NEGATIVE MG/DL
ALP SERPL-CCNC: 481 U/L (ref 40–150)
ALP SERPL-CCNC: 495 U/L (ref 40–150)
ALP SERPL-CCNC: NORMAL U/L (ref 40–150)
ALT SERPL W P-5'-P-CCNC: 90 U/L (ref 0–70)
ALT SERPL W P-5'-P-CCNC: 94 U/L (ref 0–70)
ALT SERPL W P-5'-P-CCNC: NORMAL U/L (ref 0–70)
AMMONIA PLAS-SCNC: 14 UMOL/L (ref 10–50)
ANION GAP SERPL CALCULATED.3IONS-SCNC: 14 MMOL/L (ref 3–14)
ANION GAP SERPL CALCULATED.3IONS-SCNC: 6 MMOL/L (ref 3–14)
APPEARANCE UR: CLEAR
APTT PPP: 30 SEC (ref 22–37)
AST SERPL W P-5'-P-CCNC: 28 U/L (ref 0–45)
AST SERPL W P-5'-P-CCNC: 35 U/L (ref 0–45)
AST SERPL W P-5'-P-CCNC: NORMAL U/L (ref 0–45)
BASOPHILS # BLD AUTO: 0 10E9/L (ref 0–0.2)
BASOPHILS # BLD AUTO: 0 10E9/L (ref 0–0.2)
BASOPHILS NFR BLD AUTO: 0.2 %
BASOPHILS NFR BLD AUTO: 0.2 %
BILIRUB SERPL-MCNC: 3.1 MG/DL (ref 0.2–1.3)
BILIRUB SERPL-MCNC: 3.2 MG/DL (ref 0.2–1.3)
BILIRUB SERPL-MCNC: NORMAL MG/DL (ref 0.2–1.3)
BILIRUB UR QL STRIP: NEGATIVE
BUN SERPL-MCNC: 46 MG/DL (ref 7–30)
BUN SERPL-MCNC: 51 MG/DL (ref 7–30)
CALCIUM SERPL-MCNC: 8.5 MG/DL (ref 8.5–10.1)
CALCIUM SERPL-MCNC: 8.7 MG/DL (ref 8.5–10.1)
CHLORIDE SERPL-SCNC: 104 MMOL/L (ref 94–109)
CHLORIDE SERPL-SCNC: 91 MMOL/L (ref 94–109)
CO2 SERPL-SCNC: 19 MMOL/L (ref 20–32)
CO2 SERPL-SCNC: 25 MMOL/L (ref 20–32)
COLOR UR AUTO: YELLOW
CREAT SERPL-MCNC: 1.32 MG/DL (ref 0.66–1.25)
CREAT SERPL-MCNC: 1.35 MG/DL (ref 0.66–1.25)
CREAT SERPL-MCNC: 1.43 MG/DL (ref 0.66–1.25)
CREAT SERPL-MCNC: NORMAL MG/DL (ref 0.66–1.25)
DIFFERENTIAL METHOD BLD: ABNORMAL
DIFFERENTIAL METHOD BLD: ABNORMAL
EOSINOPHIL # BLD AUTO: 0 10E9/L (ref 0–0.7)
EOSINOPHIL # BLD AUTO: 0 10E9/L (ref 0–0.7)
EOSINOPHIL NFR BLD AUTO: 0 %
EOSINOPHIL NFR BLD AUTO: 0 %
ERYTHROCYTE [DISTWIDTH] IN BLOOD BY AUTOMATED COUNT: 16.9 % (ref 10–15)
ERYTHROCYTE [DISTWIDTH] IN BLOOD BY AUTOMATED COUNT: 18.2 % (ref 10–15)
GFR SERPL CREATININE-BSD FRML MDRD: 55 ML/MIN/1.7M2
GFR SERPL CREATININE-BSD FRML MDRD: 58 ML/MIN/1.7M2
GFR SERPL CREATININE-BSD FRML MDRD: 60 ML/MIN/1.7M2
GFR SERPL CREATININE-BSD FRML MDRD: NORMAL ML/MIN/1.7M2
GLUCOSE BLDC GLUCOMTR-MCNC: 470 MG/DL (ref 70–99)
GLUCOSE BLDC GLUCOMTR-MCNC: 485 MG/DL (ref 70–99)
GLUCOSE BLDC GLUCOMTR-MCNC: >600 MG/DL (ref 70–99)
GLUCOSE BLDC GLUCOMTR-MCNC: >600 MG/DL (ref 70–99)
GLUCOSE SERPL-MCNC: 1098 MG/DL (ref 70–99)
GLUCOSE SERPL-MCNC: 483 MG/DL (ref 70–99)
GLUCOSE SERPL-MCNC: 822 MG/DL (ref 70–99)
GLUCOSE SERPL-MCNC: 946 MG/DL (ref 70–99)
GLUCOSE SERPL-MCNC: NORMAL MG/DL (ref 70–99)
GLUCOSE UR STRIP-MCNC: >499 MG/DL
HBA1C MFR BLD: 7.1 % (ref 4.3–6)
HCT VFR BLD AUTO: 28.3 % (ref 40–53)
HCT VFR BLD AUTO: 30.5 % (ref 40–53)
HCT VFR BLD AUTO: 31 % (ref 40–53)
HGB BLD-MCNC: 8.3 G/DL (ref 13.3–17.7)
HGB BLD-MCNC: 8.6 G/DL (ref 13.3–17.7)
HGB BLD-MCNC: 8.9 G/DL (ref 13.3–17.7)
HGB UR QL STRIP: NEGATIVE
IMM GRANULOCYTES # BLD: 0 10E9/L (ref 0–0.4)
IMM GRANULOCYTES # BLD: 0 10E9/L (ref 0–0.4)
IMM GRANULOCYTES NFR BLD: 0.7 %
IMM GRANULOCYTES NFR BLD: 0.7 %
INR PPP: 0.79 (ref 0.86–1.14)
KETONES BLD-SCNC: 0.1 MMOL/L (ref 0–0.6)
KETONES UR STRIP-MCNC: NEGATIVE MG/DL
LACTATE BLD-SCNC: 2.2 MMOL/L (ref 0.7–2)
LACTATE SERPL-SCNC: 2.1 MMOL/L (ref 0.4–2)
LEUKOCYTE ESTERASE UR QL STRIP: NEGATIVE
LIPASE SERPL-CCNC: 231 U/L (ref 73–393)
LYMPHOCYTES # BLD AUTO: 0.8 10E9/L (ref 0.8–5.3)
LYMPHOCYTES # BLD AUTO: 1.1 10E9/L (ref 0.8–5.3)
LYMPHOCYTES NFR BLD AUTO: 19.6 %
LYMPHOCYTES NFR BLD AUTO: 23.7 %
MCH RBC QN AUTO: 20.3 PG (ref 26.5–33)
MCH RBC QN AUTO: 20.4 PG (ref 26.5–33)
MCHC RBC AUTO-ENTMCNC: 29.2 G/DL (ref 31.5–36.5)
MCHC RBC AUTO-ENTMCNC: 29.3 G/DL (ref 31.5–36.5)
MCV RBC AUTO: 69 FL (ref 78–100)
MCV RBC AUTO: 70 FL (ref 78–100)
MONOCYTES # BLD AUTO: 0.3 10E9/L (ref 0–1.3)
MONOCYTES # BLD AUTO: 0.3 10E9/L (ref 0–1.3)
MONOCYTES NFR BLD AUTO: 7.4 %
MONOCYTES NFR BLD AUTO: 7.8 %
NEUTROPHILS # BLD AUTO: 2.9 10E9/L (ref 1.6–8.3)
NEUTROPHILS # BLD AUTO: 3 10E9/L (ref 1.6–8.3)
NEUTROPHILS NFR BLD AUTO: 68 %
NEUTROPHILS NFR BLD AUTO: 71.7 %
NITRATE UR QL: NEGATIVE
NRBC # BLD AUTO: 0 10*3/UL
NRBC BLD AUTO-RTO: 0 /100
NT-PROBNP SERPL-MCNC: 305 PG/ML (ref 0–450)
PH UR STRIP: 7 PH (ref 5–7)
PLATELET # BLD AUTO: 124 10E9/L (ref 150–450)
PLATELET # BLD AUTO: 133 10E9/L (ref 150–450)
PLATELET # BLD AUTO: 136 10E9/L (ref 150–450)
POTASSIUM SERPL-SCNC: 4.7 MMOL/L (ref 3.4–5.3)
POTASSIUM SERPL-SCNC: 5.6 MMOL/L (ref 3.4–5.3)
POTASSIUM SERPL-SCNC: 6.1 MMOL/L (ref 3.4–5.3)
POTASSIUM SERPL-SCNC: NORMAL MMOL/L (ref 3.4–5.3)
PROT SERPL-MCNC: 6.9 G/DL (ref 6.8–8.8)
RBC # BLD AUTO: 4.09 10E12/L (ref 4.4–5.9)
RBC # BLD AUTO: 4.37 10E12/L (ref 4.4–5.9)
RBC #/AREA URNS AUTO: 0 /HPF (ref 0–2)
SODIUM SERPL-SCNC: 123 MMOL/L (ref 133–144)
SODIUM SERPL-SCNC: 124 MMOL/L (ref 133–144)
SODIUM SERPL-SCNC: 135 MMOL/L (ref 133–144)
SODIUM SERPL-SCNC: NORMAL MMOL/L (ref 133–144)
SOURCE: ABNORMAL
SP GR UR STRIP: 1.02 (ref 1–1.03)
UROBILINOGEN UR STRIP-MCNC: 0 MG/DL (ref 0–2)
WBC # BLD AUTO: 3.6 10E9/L (ref 4–11)
WBC # BLD AUTO: 4.1 10E9/L (ref 4–11)
WBC # BLD AUTO: 4.5 10E9/L (ref 4–11)
WBC #/AREA URNS AUTO: <1 /HPF (ref 0–2)

## 2018-02-07 PROCEDURE — 00000146 ZZHCL STATISTIC GLUCOSE BY METER IP

## 2018-02-07 PROCEDURE — 83605 ASSAY OF LACTIC ACID: CPT | Performed by: FAMILY MEDICINE

## 2018-02-07 PROCEDURE — 96361 HYDRATE IV INFUSION ADD-ON: CPT | Performed by: FAMILY MEDICINE

## 2018-02-07 PROCEDURE — 84460 ALANINE AMINO (ALT) (SGPT): CPT

## 2018-02-07 PROCEDURE — 85730 THROMBOPLASTIN TIME PARTIAL: CPT | Performed by: FAMILY MEDICINE

## 2018-02-07 PROCEDURE — 84075 ASSAY ALKALINE PHOSPHATASE: CPT | Mod: 91

## 2018-02-07 PROCEDURE — 85025 COMPLETE CBC W/AUTO DIFF WBC: CPT | Performed by: EMERGENCY MEDICINE

## 2018-02-07 PROCEDURE — 80053 COMPREHEN METABOLIC PANEL: CPT | Performed by: FAMILY MEDICINE

## 2018-02-07 PROCEDURE — 82947 ASSAY GLUCOSE BLOOD QUANT: CPT | Performed by: FAMILY MEDICINE

## 2018-02-07 PROCEDURE — 93005 ELECTROCARDIOGRAM TRACING: CPT

## 2018-02-07 PROCEDURE — 25000131 ZZH RX MED GY IP 250 OP 636 PS 637: Mod: GY | Performed by: FAMILY MEDICINE

## 2018-02-07 PROCEDURE — 93010 ELECTROCARDIOGRAM REPORT: CPT | Mod: Z6 | Performed by: FAMILY MEDICINE

## 2018-02-07 PROCEDURE — 82247 BILIRUBIN TOTAL: CPT

## 2018-02-07 PROCEDURE — 83605 ASSAY OF LACTIC ACID: CPT | Performed by: EMERGENCY MEDICINE

## 2018-02-07 PROCEDURE — 99285 EMERGENCY DEPT VISIT HI MDM: CPT | Mod: 25 | Performed by: FAMILY MEDICINE

## 2018-02-07 PROCEDURE — 82565 ASSAY OF CREATININE: CPT | Mod: 91

## 2018-02-07 PROCEDURE — 96365 THER/PROPH/DIAG IV INF INIT: CPT

## 2018-02-07 PROCEDURE — 84295 ASSAY OF SERUM SODIUM: CPT

## 2018-02-07 PROCEDURE — 81001 URINALYSIS AUTO W/SCOPE: CPT | Performed by: FAMILY MEDICINE

## 2018-02-07 PROCEDURE — 82140 ASSAY OF AMMONIA: CPT | Performed by: FAMILY MEDICINE

## 2018-02-07 PROCEDURE — 82947 ASSAY GLUCOSE BLOOD QUANT: CPT

## 2018-02-07 PROCEDURE — 25000128 H RX IP 250 OP 636: Performed by: EMERGENCY MEDICINE

## 2018-02-07 PROCEDURE — 83036 HEMOGLOBIN GLYCOSYLATED A1C: CPT | Performed by: FAMILY MEDICINE

## 2018-02-07 PROCEDURE — 93005 ELECTROCARDIOGRAM TRACING: CPT | Performed by: FAMILY MEDICINE

## 2018-02-07 PROCEDURE — 85025 COMPLETE CBC W/AUTO DIFF WBC: CPT | Performed by: FAMILY MEDICINE

## 2018-02-07 PROCEDURE — 85027 COMPLETE CBC AUTOMATED: CPT

## 2018-02-07 PROCEDURE — 85610 PROTHROMBIN TIME: CPT | Performed by: FAMILY MEDICINE

## 2018-02-07 PROCEDURE — 83880 ASSAY OF NATRIURETIC PEPTIDE: CPT | Performed by: FAMILY MEDICINE

## 2018-02-07 PROCEDURE — 82010 KETONE BODYS QUAN: CPT | Performed by: FAMILY MEDICINE

## 2018-02-07 PROCEDURE — 96365 THER/PROPH/DIAG IV INF INIT: CPT | Performed by: FAMILY MEDICINE

## 2018-02-07 PROCEDURE — 96366 THER/PROPH/DIAG IV INF ADDON: CPT | Performed by: FAMILY MEDICINE

## 2018-02-07 PROCEDURE — 36416 COLLJ CAPILLARY BLOOD SPEC: CPT | Performed by: FAMILY MEDICINE

## 2018-02-07 PROCEDURE — 85025 COMPLETE CBC W/AUTO DIFF WBC: CPT | Mod: 91

## 2018-02-07 PROCEDURE — 99285 EMERGENCY DEPT VISIT HI MDM: CPT | Mod: 25

## 2018-02-07 PROCEDURE — 84450 TRANSFERASE (AST) (SGOT): CPT | Mod: 91

## 2018-02-07 PROCEDURE — 99291 CRITICAL CARE FIRST HOUR: CPT | Mod: 25 | Performed by: FAMILY MEDICINE

## 2018-02-07 PROCEDURE — 25000128 H RX IP 250 OP 636: Performed by: FAMILY MEDICINE

## 2018-02-07 PROCEDURE — 80048 BASIC METABOLIC PNL TOTAL CA: CPT | Performed by: EMERGENCY MEDICINE

## 2018-02-07 PROCEDURE — 84132 ASSAY OF SERUM POTASSIUM: CPT

## 2018-02-07 PROCEDURE — 83690 ASSAY OF LIPASE: CPT | Performed by: FAMILY MEDICINE

## 2018-02-07 RX ORDER — DEXTROSE MONOHYDRATE 25 G/50ML
25-50 INJECTION, SOLUTION INTRAVENOUS
Status: DISCONTINUED | OUTPATIENT
Start: 2018-02-07 | End: 2018-02-07 | Stop reason: HOSPADM

## 2018-02-07 RX ORDER — SODIUM POLYSTYRENE SULFONATE 15 G/60ML
15 SUSPENSION ORAL; RECTAL ONCE
Status: DISCONTINUED | OUTPATIENT
Start: 2018-02-07 | End: 2018-02-07 | Stop reason: HOSPADM

## 2018-02-07 RX ORDER — DEXTROSE MONOHYDRATE 25 G/50ML
25 INJECTION, SOLUTION INTRAVENOUS ONCE
Status: DISCONTINUED | OUTPATIENT
Start: 2018-02-07 | End: 2018-02-08 | Stop reason: HOSPADM

## 2018-02-07 RX ORDER — DEXTROSE MONOHYDRATE 100 MG/ML
INJECTION, SOLUTION INTRAVENOUS CONTINUOUS
Status: DISCONTINUED | OUTPATIENT
Start: 2018-02-07 | End: 2018-02-08 | Stop reason: HOSPADM

## 2018-02-07 RX ORDER — SODIUM POLYSTYRENE SULFONATE 15 G/60ML
30 SUSPENSION ORAL; RECTAL ONCE
Status: DISCONTINUED | OUTPATIENT
Start: 2018-02-07 | End: 2018-02-08 | Stop reason: HOSPADM

## 2018-02-07 RX ORDER — SODIUM CHLORIDE 9 MG/ML
1000 INJECTION, SOLUTION INTRAVENOUS CONTINUOUS
Status: DISCONTINUED | OUTPATIENT
Start: 2018-02-07 | End: 2018-02-07 | Stop reason: HOSPADM

## 2018-02-07 RX ADMIN — GANCICLOVIR SODIUM 300 MG: 500 INJECTION, POWDER, LYOPHILIZED, FOR SOLUTION INTRAVENOUS at 15:07

## 2018-02-07 RX ADMIN — CALCIUM GLUCONATE 2 G: 98 INJECTION, SOLUTION INTRAVENOUS at 23:09

## 2018-02-07 RX ADMIN — SODIUM CHLORIDE 1000 ML: 9 INJECTION, SOLUTION INTRAVENOUS at 17:38

## 2018-02-07 RX ADMIN — SODIUM CHLORIDE 1000 ML: 9 INJECTION, SOLUTION INTRAVENOUS at 19:27

## 2018-02-07 RX ADMIN — SODIUM CHLORIDE 5.5 UNITS/HR: 9 INJECTION, SOLUTION INTRAVENOUS at 18:47

## 2018-02-07 ASSESSMENT — ENCOUNTER SYMPTOMS
LIGHT-HEADEDNESS: 0
SHORTNESS OF BREATH: 0

## 2018-02-07 NOTE — PROGRESS NOTES
Pt here for Ganciclovir, tolerated well.  Labs drawn prior to initiation. BG 1098 and K+ = 6.1, Na = 123, Hgb 8.6, see lab flow sheet. Labs were redrawn and verified to be accurate. Lab results called to [facility] and reviewed with RN of liver transplant clinic who was in agreement to send pt to ED. Pt escorted to ED, accompanied by wife and daughter.

## 2018-02-07 NOTE — MR AVS SNAPSHOT
After Visit Summary   2/7/2018    Abhishek Downey    MRN: [id number]           Patient Information     Date Of Birth          1975        Visit Information        Provider Department      2/7/2018 2:30 PM NL INFUSION CHAIR 2 [facility]        Today's Diagnoses     Cytomegalovirus infection, unspecified cytomegaloviral infection type (H)    -  1    Primary sclerosing cholangitis          Care Instructions    Pt to return on 2/8 for Ganciclovir and Solumedrol, pending ED visit outcome. Copies of  upcoming appointments given prior to discharge.           Follow-ups after your visit        Follow-up notes from your care team     Return in 1 day (on 2/8/2018).      Your next 10 appointments already scheduled     Feb 08, 2018  1:00 PM CST   Level 3 with NL INFUSION CHAIR 1   [facility] ([facility])    [address] [place] [id number] [phone]            Feb 09, 2018  1:00 PM CST   Level 2 with NL INFUSION CHAIR 2   [facility] ([facility])    [address] [place] [id number] [phone]              Who to contact     If you have questions or need follow up information about today's clinic visit or your schedule please contact [facility] directly at [phone].  Normal or non-critical lab and imaging results will be communicated to you by MyChart, letter or phone within 4 business days after the clinic has received the results. If you do not hear from us within 7 days, please contact the clinic through MyChart or phone. If you have a critical or abnormal lab result, we will notify you by phone as soon as possible.  Submit refill requests through [employer] or call your pharmacy and they will forward the refill request to us. Please allow 3 business days for your refill to be completed.          Additional Information About Your Visit         [employer] Information     [employer] gives you secure access to your electronic health record. If you see a primary care provider, you can also send messages to your care team and make appointments. If you have questions, please call your primary care clinic.  If you do not have a primary care provider, please call [phone] and they will assist you.        Care EveryWhere ID     This is your Care EveryWhere ID. This could be used by other organizations to access your [place] medical records  [id number]        Your Vitals Were     Pulse Temperature Respirations Height Pulse Oximetry BMI (Body Mass Index)    81 97.2  F (36.2  C) (Temporal) 20 1.829 m (6') 97% 34.92 kg/m2       Blood Pressure from Last 3 Encounters:   02/07/18 143/81   02/07/18 149/74   08/07/17 130/84    Weight from Last 3 Encounters:   02/07/18 115.2 kg (254 lb)   02/07/18 116.8 kg (257 lb 8 oz)   08/07/17 92.5 kg (204 lb)              We Performed the Following     Alkaline phosphatase     Alkaline phosphatase     ALT     ALT     AST     AST     Bilirubin  total     Bilirubin  total     Creatinine     Creatinine     Glucose     Glucose     Hematocrit     Hemoglobin     Platelet count     Potassium     Potassium     Sodium     Sodium     WBC count        Primary Care Provider Office Phone # Fax #    Abhishek Alexis [phone]       [facility] [address] [place] [address]        Equal Access to Services     DAVID CURRY : Hadii mariann ku hadasho Soomaali, waaxda luqadaha, qaybta kaalmada adeegyada, elizabet brar. So [facility] [phone].    ATENCIÓN: Si habla español, tiene a burton disposición servicios gratuitos de asistencia lingüística. Llame al [phone].    We comply with applicable federal civil rights laws and [place] laws. We do not discriminate on the basis of race, color, national origin, age, disability, sex, sexual orientation, or gender identity.            Thank  you!     Thank you for choosing [facility]  for your care. Our goal is always to provide you with excellent care. Hearing back from our patients is one way we can continue to improve our services. Please take a few minutes to complete the written survey that you may receive in the mail after your visit with us. Thank you!             Your Updated Medication List - Protect others around you: Learn how to safely use, store and throw away your medicines at www.disposemymeds.org.          This list is accurate as of 2/7/18  5:12 PM.  Always use your most recent med list.                   Brand Name Dispense Instructions for use Diagnosis    aspirin 81 MG tablet      Take by mouth daily        pantoprazole 40 MG EC tablet    PROTONIX     Take 1 tablet (40 mg) by mouth daily    S/P liver transplant (H)       tacrolimus 1 MG capsule    GENERIC EQUIVALENT     Take 4 capsules (4 mg) by mouth 2 times daily    S/P liver transplant (H)       traMADol 50 MG tablet    ULTRAM     Take 1-2 tablets ( mg) by mouth every 6 hours as needed for pain    S/P liver transplant (H)

## 2018-02-07 NOTE — PATIENT INSTRUCTIONS
Pt to return on 2/8 for Ganciclovir and Solumedrol, pending ED visit outcome. Copies of  upcoming appointments given prior to discharge.

## 2018-02-07 NOTE — ED NOTES
Pt here from clinic with hx of liver transplant x 7/2017 w/rejection concerns x yesterday and CMV.  He has not been feeling well x 1/19.  States he did not feel well after steroids last night.      He was at one time a diabetic until he had his transplant, but was given large dose of solumedrol yesterday and was found to have elevated blood sugars today and elevated Potassium/low sodium and does not feel well.  He is here with his wife.     Pt has a PICC line.

## 2018-02-08 VITALS
WEIGHT: 254 LBS | DIASTOLIC BLOOD PRESSURE: 60 MMHG | HEIGHT: 72 IN | TEMPERATURE: 97.7 F | OXYGEN SATURATION: 96 % | SYSTOLIC BLOOD PRESSURE: 127 MMHG | BODY MASS INDEX: 34.4 KG/M2 | RESPIRATION RATE: 18 BRPM

## 2018-02-08 LAB — INTERPRETATION ECG - MUSE: NORMAL

## 2018-02-08 NOTE — ED NOTES
"A&Ox4. ABC's intact. Pt was en route to [facility] from [facility] on insulin gtt for elevated K+ and elevated glucose levels.  Pt receives all transplant care at [facility].    EMS stopped at [facility] d/t \"peaked T wave and prolonged QT waves changes, though asymptomatic\".  "

## 2018-02-08 NOTE — ED PROVIDER NOTES
History     Chief Complaint   Patient presents with     Abnormal Labs     HPI  Abhishek Downey is a 42 year old male who presented after being referred from the infusion center because of abnormal labs.  Patient is currently being seen up in the infusion center because of a liver transplant initially labs and now is being treated for anti-rejection and CMV infection.  Patient is on an antiviral and then was given a steroid 2 days ago.  The steroid because the patient's blood sugar to go high, and when he was checked today he was noted to have a blood sugar over 1000.  The also notices potassium was over 6 still is not the patient down to the ER for further evaluation and stabilization.  Patient states that he has been feeling off for the past 2 weeks but in the last couple of days he felt numb and had pain all over.  He did feel somewhat lightheaded and dizzy with standing.  Patient denies any vomiting but has had some nausea.  Patient denies any different diarrhea, ever since his transplant he has had 10-15 stools daily.  Patient denies any new fevers or chills.  Denies any URI-like symptoms.  Patient denies any chest pain or palpitations.    Problem List:    Patient Active Problem List    Diagnosis Date Noted     CMV (cytomegalovirus infection) (H) 02/07/2018     Priority: Medium     Type 2 diabetes mellitus with complication, with long-term current use of insulin (H) 08/07/2017     Priority: Medium     C. difficile enteritis 06/27/2017     Priority: Medium     Anemia due to blood loss, acute 06/26/2017     Priority: Medium     C. difficile pouchitis 06/26/2017     Priority: Medium     History of esophageal varices 06/26/2017     Priority: Medium     Thrombocytopenia (H) 06/26/2017     Priority: Medium     History of duodenal ulcer 06/26/2017     Priority: Medium     Mass of left upper extremity 03/30/2016     Priority: Medium     Anemia 09/05/2015     Priority: Medium     Upper GI bleed 09/05/2015      Priority: Medium     Acute pancreatitis 09/05/2015     Priority: Medium     Primary sclerosing cholangitis 09/05/2015     Priority: Medium     Cirrhosis of liver (H) 09/05/2015     Priority: Medium     S/P total colectomy 09/05/2015     Priority: Medium     Esophageal varices (H) 09/05/2015     Priority: Medium     Mild major depression (H) 03/18/2011     Priority: Medium     CARDIOVASCULAR SCREENING; LDL GOAL LESS THAN 160 10/31/2010     Priority: Medium     H/O ulcerative colitis - s/p total colectomy 10/31/2008     Priority: Medium        Past Medical History:    Past Medical History:   Diagnosis Date     Cirrhosis of liver (H)      DU (duodenal ulcer) 5/2015     PSC (primary sclerosing cholangitis)      Ulcerative colitis        Past Surgical History:    Past Surgical History:   Procedure Laterality Date     COLECTOMY  12/27/07     FLEXIBLE SIGMOIDOSCOPY  10/4/10      ERCP W/W/O NONA OF SPEC-BRUSH/WASH  02/19/10      ERCP W/W/O NONA OF SPEC-BRUSH/WASH  03/05/10      UGI ENDOSCOPY W BANDING ESOPH/GASTRIC VARICES  since 2/2015    monthly at [facility]; last one in August      UPPER GI ENDOSCOPY  11/13/13    [facility]     UPPER GI ENDOSCOPY  2/16/15    [facility]       Family History:    Family History   Problem Relation Age of Onset     Heart Failure Mother        Social History:  Marital Status:   [2]  Social History   Substance Use Topics     Smoking status: Never Smoker     Smokeless tobacco: Never Used     Alcohol use No      Comment: 2-3x per year        Medications:      aspirin 81 MG tablet   tacrolimus (PROGRAF - GENERIC EQUIVALENT) 1 MG capsule   pantoprazole (PROTONIX) 40 MG EC tablet   traMADol (ULTRAM) 50 MG tablet         Review of Systems   All other systems reviewed and are negative.      Physical Exam   BP: 143/81  Pulse: 99  Temp: 97.9  F (36.6  C)  Resp: 18  Weight: 115.2 kg (254 lb)  SpO2: 97 %      Physical Exam   Constitutional: He appears well-developed and  well-nourished. No distress.   HENT:   Head: Normocephalic and atraumatic.   Mouth/Throat: Oropharynx is clear and moist. No oropharyngeal exudate.   Eyes: Conjunctivae are normal.   Neck: Normal range of motion. Neck supple.   Cardiovascular: Regular rhythm and normal heart sounds.  Tachycardia present.    No murmur heard.  Pulmonary/Chest: Effort normal and breath sounds normal. No respiratory distress. He has no wheezes. He has no rales.   Abdominal: Soft. Bowel sounds are normal. He exhibits no distension. There is no tenderness. There is no rebound.   Skin: He is not diaphoretic.   Nursing note and vitals reviewed.      ED Course     ED Course     Procedures               EKG Interpretation:      Interpreted by Gerber Kingsley  Time reviewed: now  Symptoms at time of EKG: None   Rhythm: normal sinus   Rate: Normal  Axis: Normal  Ectopy: none  Conduction: normal  ST Segments/ T Waves: T wave peaking Global  Q Waves: none  Comparison to prior: No old EKG available    Clinical Impression: no acute changes and hyperkalemia                Critical Care time:  was 45 minutes for this patient excluding procedures.  Lactate is greater than 2 due to hyperkalemia and hypovolemia, at this time there is no sign of severe sepsis or septic shock.       Results for orders placed or performed during the hospital encounter of 02/07/18   CBC with platelets differential   Result Value Ref Range    WBC 4.1 4.0 - 11.0 10e9/L    RBC Count 4.37 (L) 4.4 - 5.9 10e12/L    Hemoglobin 8.9 (L) 13.3 - 17.7 g/dL    Hematocrit 30.5 (L) 40.0 - 53.0 %    MCV 70 (L) 78 - 100 fl    MCH 20.4 (L) 26.5 - 33.0 pg    MCHC 29.2 (L) 31.5 - 36.5 g/dL    RDW 18.2 (H) 10.0 - 15.0 %    Platelet Count 136 (L) 150 - 450 10e9/L    Diff Method Automated Method     % Neutrophils 71.7 %    % Lymphocytes 19.6 %    % Monocytes 7.8 %    % Eosinophils 0.0 %    % Basophils 0.2 %    % Immature Granulocytes 0.7 %    Absolute Neutrophil 2.9 1.6 - 8.3 10e9/L     Absolute Lymphocytes 0.8 0.8 - 5.3 10e9/L    Absolute Monocytes 0.3 0.0 - 1.3 10e9/L    Absolute Eosinophils 0.0 0.0 - 0.7 10e9/L    Absolute Basophils 0.0 0.0 - 0.2 10e9/L    Abs Immature Granulocytes 0.0 0 - 0.4 10e9/L   INR   Result Value Ref Range    INR 0.79 (L) 0.86 - 1.14   Partial thromboplastin time   Result Value Ref Range    PTT 30 22 - 37 sec   Comprehensive metabolic panel   Result Value Ref Range    Sodium 124 (L) 133 - 144 mmol/L    Potassium 5.6 (H) 3.4 - 5.3 mmol/L    Chloride 91 (L) 94 - 109 mmol/L    Carbon Dioxide 19 (L) 20 - 32 mmol/L    Anion Gap 14 3 - 14 mmol/L    Glucose 946 (HH) 70 - 99 mg/dL    Urea Nitrogen 51 (H) 7 - 30 mg/dL    Creatinine 1.43 (H) 0.66 - 1.25 mg/dL    GFR Estimate 55 (L) >60 mL/min/1.7m2    GFR Estimate If Black 66 >60 mL/min/1.7m2    Calcium 8.7 8.5 - 10.1 mg/dL    Bilirubin Total 3.1 (H) 0.2 - 1.3 mg/dL    Albumin 3.0 (L) 3.4 - 5.0 g/dL    Protein Total 6.9 6.8 - 8.8 g/dL    Alkaline Phosphatase 481 (H) 40 - 150 U/L    ALT 90 (H) 0 - 70 U/L    AST 28 0 - 45 U/L   Lactic acid whole blood   Result Value Ref Range    Lactic Acid 2.2 (H) 0.7 - 2.0 mmol/L   Ammonia   Result Value Ref Range    Ammonia 14 10 - 50 umol/L   Nt probnp inpatient (BNP)   Result Value Ref Range    N-Terminal Pro BNP Inpatient 305 0 - 450 pg/mL   Ketone Beta-Hydroxybutyrate Quantitative   Result Value Ref Range    Ketone Quantitative 0.1 0.0 - 0.6 mmol/L   Lipase   Result Value Ref Range    Lipase 231 73 - 393 U/L   Hemoglobin A1c   Result Value Ref Range    Hemoglobin A1C 7.1 (H) 4.3 - 6.0 %   Glucose by meter   Result Value Ref Range    Glucose >600 (HH) 70 - 99 mg/dL     Medications   0.9% sodium chloride BOLUS (1,000 mLs Intravenous New Bag 2/7/18 2448)     Followed by   0.9% sodium chloride infusion (not administered)   sodium polystyrene (KAYEXALATE) suspension 15 g (15 g Oral Not Given 2/7/18 2768)   dextrose 5% and 0.45% NaCl infusion (not administered)   dextrose 50 % injection 25-50 mL  (not administered)   insulin 1 unit/1 mL in NS (NovoLIN, HumuLIN Regular) drip -ED DKA algorithm (5.5 Units/hr Intravenous New Bag 2/7/18 9898)     Labs are reviewed and patient does have an elevated glucose level and potassium level is still elevated but not as high as earlier.  Potassium level is 5.6.  I discussed the case with the transplant team at the [facility] and spoke to Dr. Ferguson, he would recommend transfer down to [place] as he thinks a lot of this is a complication from his liver transplant.  He agreed with the fluids that we have given and agrees with starting the patient on insulin drip.  He recommends starting the treatment here in the least after an hour if he remains stable then could transfer via ground.  Patient was given aggressive IV fluids because I am also with the patient could be volume contracted.  All questions were answered and at this point it is safe to transfer the patient down to the [facility].  Patient will be going by ground ambulance.    Assessments & Plan (with Medical Decision Making)  Hyperkalemia, hyperglycemia, liver transplant rejection     I have reviewed the nursing notes.    I have reviewed the findings, diagnosis, plan and need for follow up with the patient.        2/7/2018   [facility] EMERGENCY DEPARTMENT     Gerber Kingsley MD  02/07/18 0444

## 2018-02-08 NOTE — ED NOTES
Bed: ED30  Expected date: 2/7/18  Expected time: 9:52 PM  Means of arrival: Ambulance  Comments:  NF

## 2018-02-08 NOTE — ED PROVIDER NOTES
"  History     Chief Complaint:  Abnormal Labs    HPI   Abhishek Downey is a 42 year old male, with history of liver transplant amongst other past medical history as noted below and currently taking the below medications, who presents via EMS to the emergency department for evaluation of abnormal labs. EMS reports that patient has had no complications pertaining his liver transplant, up until a few weeks ago on 1/19/18. Patient began to experience flu like symptoms and assumed it was the flu, but while at infusion therapy at [facility] in [place], blood work was obtained and it was noted that he had elevated glucose and potassium levels. The patient was being transferred en route to [facility] on an insulin infusion, however patient ran out of insulin. EMS called [place] who reported that as long as his cardiac monitoring remained stable, he could be safely transferred without it. However, EMS noted the patient had \"peaked T wave and prolonged QT wave changes\" and presented to the emergency department for evaluation. Here, patient is asymptomatic and denies any shortness of breath, chest pain or lightheadedness.     Allergies:  No Known Drug Allergies      Medications:    Aspirin  Tacrolimus  Protonix  Tramadol    Past Medical History:    Cytomegalovirus infection  Type 2 diabetes  Thrombocytopenia  Major mild depression  Cirrhosis of liver  Duodenal ulcer  PSC primary sclerosing cholangitis  Ulcerative colitis    Past Surgical History:    Colectomy  Flexible sigmoidoscopy  ERCP w/w/o lizbet of spec - brush/wash x2  UGI endoscopy w banding esoph/gastric varices  Upper GI endoscopy x2    Family History:    Heart failure    Social History:  The patient was accompanied to the ED by EMS.  Smoking Status: No  Smokeless Tobacco: No  Alcohol Use: Yes   Marital Status:  [2]     Review of Systems   Respiratory: Negative for shortness of breath.    Cardiovascular: Negative for chest pain.   Neurological: " Negative for light-headedness.   All other systems reviewed and are negative.    Physical Exam   Vitals:  Patient Vitals for the past 24 hrs:   BP Temp Temp src Heart Rate Resp SpO2 Height Weight   02/08/18 0000 127/60 - - - - 96 % - -   02/07/18 2330 125/80 - - - - 97 % - -   02/07/18 2315 126/73 - - - - 98 % - -   02/07/18 2300 117/64 - - - - 98 % - -   02/07/18 2245 124/71 - - - - 98 % - -   02/07/18 2230 127/76 - - - - 98 % - -   02/07/18 2209 155/77 97.7  F (36.5  C) Oral 100 18 98 % 1.829 m (6') 115.2 kg (254 lb)      Physical Exam  General: The patient is alert, in no respiratory distress.    HENT: Mucous membranes moist.    Cardiovascular: Regular rate and rhythm. Good pulses in all four extremities. Normal capillary refill and skin turgor.     Respiratory: Lungs are clear. No nasal flaring. No retractions. No wheezing, no crackles.    Gastrointestinal: Abdomen soft. No guarding, no rebound. No palpable hernias. No abdominal tenderness.     Musculoskeletal: No gross deformity.     Skin: No rashes or petechiae. Scleral icterus and jaundice.     Neurologic: The patient is alert and oriented x3. GCS 15. No testable cranial nerve deficit. Follows commands with clear and appropriate speech. Gives appropriate answers. Good strength in all extremities. No gross neurologic deficit. Gross sensation intact. Pupils are round and reactive. No meningismus.     Lymphatic: No cervical adenopathy. No lower extremity swelling.    Psychiatric: The patient is non-tearful.   Emergency Department Course     ECG:  ECG taken at 2214, ECG read at 2217  Normal sinus rhythm  Normal ECG  Rate 100 bpm. WV interval 132. QRS duration 88. QT/QTc 358/461. P-R-T axes 65 71 54.     Laboratory:  Laboratory findings were communicated with the patient who voiced understanding of the findings.  CBC: HGB 8.3 (L),  (L) o/w WNL (WBC 4.5)  Glucose by Meter (Collected 2225): 485 (H)  Glucose by Meter (Collected 2312): 470 (H)  Lactic Acid: 2.1  (H)  BMP: Glucose 483 (H), Bun 46 (H), Creatinine 1.32 (H), GFR Estimate 60 (L) o/w WNL     Interventions:  2309 Calcium Gluconate 2 g in  mL Infusion 2 g IV     Emergency Department Course:  Nursing notes and vitals reviewed.  EKG obtained in the ED, see results above.    IV was inserted and blood was drawn for laboratory testing, results above.     10:06 PM: I performed an exam of the patient as documented above. History was obtained from EMS and patient.   11:55 PM: I spoke with Dr. Hall from [facility] regarding patient's presentation, findings, and plan of care. They report that they would still like patient to be transferred.  11:58 PM: I updated patient about plan of care and patient will be transported to [place].     I personally reviewed the laboratory results with the Patient and answered all related questions prior to transfer.    Impression & Plan      Medical Decision Making:  The patient received a liver transplant and it sounds like he's having some symptoms of rejection. Originally he thought it was influenza, but also has a CMB infection. He has some jaundice scleral icterus and had quite elevated blood sugar in the thousands as well as elevated potassium. I did view the EKGs and there was PT waves, but has since improved. The ambulance was going from the [place] [facility] where he was seen to [place], where his transplant was performed. They were diverted here because they ran out insulin. I have checked labs, in which his potassium has now been corrected. EKG was reassuring. There is only some slight prolongation of the QT, but it is not to the level that I would expect some problems. There is still some mild hyperglycemia. I did discuss the case with the transplant person at [place], who did recommend still transferring the patient there for treatment for his high blood sugar as well as any further complications and he was transferred in good condition.     Diagnosis:    ICD-10-CM    1.  Hyperglycemia R73.9    2. Liver transplant rejection (H) T86.41    3. Cytomegalovirus infection, unspecified cytomegaloviral infection type (H) B25.9         Disposition:   Transfer to [place].     Scribe Disclosure:  I, Francisca Castro, am serving as a scribe at 10:06 PM on 2/7/2018 to document services personally performed by Sam Hunter MD, based on my observations and the provider's statements to me.  2/7/2018   [facility] EMERGENCY DEPARTMENT       Sam Hunter MD  02/08/18 0120

## 2018-02-10 ENCOUNTER — HOSPITAL ENCOUNTER (EMERGENCY)
Facility: CLINIC | Age: 43
Discharge: HOME | End: 2018-02-10
Admitting: FAMILY MEDICINE
Payer: MEDICARE

## 2018-02-10 VITALS
RESPIRATION RATE: 18 BRPM | WEIGHT: 250 LBS | SYSTOLIC BLOOD PRESSURE: 132 MMHG | BODY MASS INDEX: 33.91 KG/M2 | OXYGEN SATURATION: 98 % | HEART RATE: 101 BPM | TEMPERATURE: 97.3 F | DIASTOLIC BLOOD PRESSURE: 85 MMHG

## 2018-02-10 DIAGNOSIS — K83.01 PRIMARY SCLEROSING CHOLANGITIS (H): ICD-10-CM

## 2018-02-10 DIAGNOSIS — B25.9 CYTOMEGALOVIRUS INFECTION, UNSPECIFIED CYTOMEGALOVIRAL INFECTION TYPE (H): ICD-10-CM

## 2018-02-10 PROCEDURE — 40000268 ZZH STATISTIC NO CHARGES

## 2018-02-10 PROCEDURE — 25000128 H RX IP 250 OP 636: Performed by: FAMILY MEDICINE

## 2018-02-10 PROCEDURE — 96365 THER/PROPH/DIAG IV INF INIT: CPT

## 2018-02-10 RX ADMIN — SODIUM CHLORIDE 1000 MG: 9 INJECTION, SOLUTION INTRAVENOUS at 14:10

## 2018-02-10 NOTE — ED NOTES
Pt here for IV steroids.  Looks much improved from 3 days ago.  Denies any complaints and states his blood sugars are in the 100 range.

## 2018-02-13 ENCOUNTER — INFUSION THERAPY VISIT (OUTPATIENT)
Dept: INFUSION THERAPY | Facility: CLINIC | Age: 43
End: 2018-02-13
Payer: MEDICARE

## 2018-02-13 VITALS
DIASTOLIC BLOOD PRESSURE: 99 MMHG | OXYGEN SATURATION: 100 % | SYSTOLIC BLOOD PRESSURE: 129 MMHG | TEMPERATURE: 98 F | HEART RATE: 101 BPM | RESPIRATION RATE: 18 BRPM

## 2018-02-13 DIAGNOSIS — B25.9 CYTOMEGALOVIRUS INFECTION, UNSPECIFIED CYTOMEGALOVIRAL INFECTION TYPE (H): Primary | ICD-10-CM

## 2018-02-13 DIAGNOSIS — K83.01 PRIMARY SCLEROSING CHOLANGITIS (H): ICD-10-CM

## 2018-02-13 PROCEDURE — 96365 THER/PROPH/DIAG IV INF INIT: CPT

## 2018-02-13 PROCEDURE — 96367 TX/PROPH/DG ADDL SEQ IV INF: CPT

## 2018-02-13 PROCEDURE — 25000128 H RX IP 250 OP 636: Performed by: FAMILY MEDICINE

## 2018-02-13 RX ADMIN — SODIUM CHLORIDE 1000 MG: 9 INJECTION, SOLUTION INTRAVENOUS at 16:13

## 2018-02-13 RX ADMIN — GANCICLOVIR SODIUM 435 MG: 500 INJECTION, POWDER, LYOPHILIZED, FOR SOLUTION INTRAVENOUS at 17:21

## 2018-02-13 ASSESSMENT — PAIN SCALES - GENERAL: PAINLEVEL: NO PAIN (0)

## 2018-02-13 NOTE — PROGRESS NOTES
Patient here today for Ganciclovir and Solu-medrol. PICC line dressing changed, blood return noted pre and post infusion. Patient tolerated infusions well, denies symptoms of reaction. Patient discharged to home in stable condition.

## 2018-02-13 NOTE — MR AVS SNAPSHOT
After Visit Summary   2/13/2018    Abhishek Downey    MRN: [id number]           Patient Information     Date Of Birth          1975        Visit Information        Provider Department      2/13/2018 3:30 PM NL INFUSION CHAIR 2 [facility]fusion [facility]        Today's Diagnoses     Cytomegalovirus infection, unspecified cytomegaloviral infection type (H)    -  1    Primary sclerosing cholangitis          Care Instructions    Pt to return on 02/14/18 for Gancyclovir. Copies of medication list and upcoming appointments given prior to discharge.             Follow-ups after your visit        Follow-up notes from your care team     Return in about 1 day (around 2/14/2018).      Your next 10 appointments already scheduled     Feb 14, 2018 11:30 AM CST   Level 2 with NL INFUSION CHAIR 1   [facility] Infusion Services ([facility])    [address] [place] [address] Dr [place] [address]   [phone]            Feb 15, 2018  4:00 PM CST   Level 3 with NL INFUSION CHAIR 3   [facility] Infusion Services ([facility])    [address] [place] [address]   [phone]            Feb 16, 2018 11:30 AM CST   Level 2 with NL INFUSION CHAIR 1   [facility]fusion [facility] ([facility])    [address] [place] [address] Dr Reyna [place] [address]   [phone]              Who to contact     If you have questions or need follow up information about today's clinic visit or your schedule please contact [facility] INFUSION SERVICES directly at [phone].  Normal or non-critical lab and imaging results will be communicated to you by MyChart, letter or phone within 4 business days after the clinic has received the results. If you do not hear from us within 7 days, please contact the clinic through MyChart or phone. If you have a critical or abnormal lab result, we will notify you by phone as soon as possible.  Submit refill requests  through [employer] or call your pharmacy and they will forward the refill request to us. Please allow 3 business days for your refill to be completed.          Additional Information About Your Visit        RPOhart Information     [employer] gives you secure access to your electronic health record. If you see a primary care provider, you can also send messages to your care team and make appointments. If you have questions, please call your primary care clinic.  If you do not have a primary care provider, please call [phone] and they will assist you.        Care EveryWhere ID     This is your Care EveryWhere ID. This could be used by other organizations to access your [place] medical records  [id number]        Your Vitals Were     Pulse Temperature Respirations Pulse Oximetry          101 98  F (36.7  C) (Temporal) 18 100%         Blood Pressure from Last 3 Encounters:   02/13/18 (!) 129/99   02/10/18 132/85   02/08/18 127/60    Weight from Last 3 Encounters:   02/10/18 113.4 kg (250 lb)   02/07/18 115.2 kg (254 lb)   02/07/18 115.2 kg (254 lb)              Today, you had the following     No orders found for display       Primary Care Provider Office Phone # Fax #    Radha Sheila Hidalgo [phone]       [address] [place] [address]        Equal Access to Services     [facility]: Hadii aad ku hadasho Soomaali, waaxda luqadaha, qaybta kaalmada adeegyada, waxay reginaldoin hayvinny gray . So [facility] [phone].    ATENCIÓN: Si habla español, tiene a burton disposición servicios gratuitos de asistencia lingüística. Llame al [phone].    We comply with applicable federal civil rights laws and [place] laws. We do not discriminate on the basis of race, color, national origin, age, disability, sex, sexual orientation, or gender identity.            Thank you!     Thank you for choosing [facility] SERVICES  for your care. Our goal is always to provide you  with excellent care. Hearing back from our patients is one way we can continue to improve our services. Please take a few minutes to complete the written survey that you may receive in the mail after your visit with us. Thank you!             Your Updated Medication List - Protect others around you: Learn how to safely use, store and throw away your medicines at www.disposemymeds.org.          This list is accurate as of 2/13/18  6:44 PM.  Always use your most recent med list.                   Brand Name Dispense Instructions for use Diagnosis    aspirin 81 MG tablet      Take by mouth daily        pantoprazole 40 MG EC tablet    PROTONIX     Take 1 tablet (40 mg) by mouth daily    S/P liver transplant (H)       tacrolimus 1 MG capsule    GENERIC EQUIVALENT     Take 4 capsules (4 mg) by mouth 2 times daily    S/P liver transplant (H)       traMADol 50 MG tablet    ULTRAM     Take 1-2 tablets ( mg) by mouth every 6 hours as needed for pain    S/P liver transplant (H)

## 2018-02-13 NOTE — PATIENT INSTRUCTIONS
Pt to return on 02/14/18 for Gancyclovir. Copies of medication list and upcoming appointments given prior to discharge.

## 2018-02-14 ENCOUNTER — INFUSION THERAPY VISIT (OUTPATIENT)
Dept: INFUSION THERAPY | Facility: CLINIC | Age: 43
End: 2018-02-14
Attending: FAMILY MEDICINE
Payer: MEDICARE

## 2018-02-14 VITALS
SYSTOLIC BLOOD PRESSURE: 137 MMHG | DIASTOLIC BLOOD PRESSURE: 74 MMHG | RESPIRATION RATE: 16 BRPM | OXYGEN SATURATION: 99 % | HEART RATE: 91 BPM | TEMPERATURE: 98 F

## 2018-02-14 DIAGNOSIS — B25.9 CYTOMEGALOVIRUS INFECTION, UNSPECIFIED CYTOMEGALOVIRAL INFECTION TYPE (H): Primary | ICD-10-CM

## 2018-02-14 DIAGNOSIS — K83.01 PRIMARY SCLEROSING CHOLANGITIS (H): ICD-10-CM

## 2018-02-14 DIAGNOSIS — Z94.4 LIVER REPLACED BY TRANSPLANT (H): ICD-10-CM

## 2018-02-14 LAB
ALP SERPL-CCNC: 224 U/L (ref 40–150)
ALT SERPL W P-5'-P-CCNC: 53 U/L (ref 0–70)
AST SERPL W P-5'-P-CCNC: 18 U/L (ref 0–45)
BILIRUB SERPL-MCNC: 1.7 MG/DL (ref 0.2–1.3)
CREAT SERPL-MCNC: 1.29 MG/DL (ref 0.66–1.25)
GFR SERPL CREATININE-BSD FRML MDRD: 62 ML/MIN/1.7M2
GLUCOSE SERPL-MCNC: 487 MG/DL (ref 70–99)
HCT VFR BLD AUTO: 31.4 % (ref 40–53)
HGB BLD-MCNC: 9.1 G/DL (ref 13.3–17.7)
PLATELET # BLD AUTO: 172 10E9/L (ref 150–450)
POTASSIUM SERPL-SCNC: 4.2 MMOL/L (ref 3.4–5.3)
SODIUM SERPL-SCNC: 135 MMOL/L (ref 133–144)
WBC # BLD AUTO: 3.9 10E9/L (ref 4–11)

## 2018-02-14 PROCEDURE — 85027 COMPLETE CBC AUTOMATED: CPT | Performed by: INTERNAL MEDICINE

## 2018-02-14 PROCEDURE — 82247 BILIRUBIN TOTAL: CPT | Performed by: INTERNAL MEDICINE

## 2018-02-14 PROCEDURE — 84132 ASSAY OF SERUM POTASSIUM: CPT | Performed by: INTERNAL MEDICINE

## 2018-02-14 PROCEDURE — 82565 ASSAY OF CREATININE: CPT | Performed by: INTERNAL MEDICINE

## 2018-02-14 PROCEDURE — 84295 ASSAY OF SERUM SODIUM: CPT | Performed by: INTERNAL MEDICINE

## 2018-02-14 PROCEDURE — 25000128 H RX IP 250 OP 636: Performed by: FAMILY MEDICINE

## 2018-02-14 PROCEDURE — 84460 ALANINE AMINO (ALT) (SGPT): CPT | Performed by: INTERNAL MEDICINE

## 2018-02-14 PROCEDURE — 96365 THER/PROPH/DIAG IV INF INIT: CPT

## 2018-02-14 PROCEDURE — 84450 TRANSFERASE (AST) (SGOT): CPT | Performed by: INTERNAL MEDICINE

## 2018-02-14 PROCEDURE — 84075 ASSAY ALKALINE PHOSPHATASE: CPT | Performed by: INTERNAL MEDICINE

## 2018-02-14 PROCEDURE — 82947 ASSAY GLUCOSE BLOOD QUANT: CPT | Mod: GA | Performed by: INTERNAL MEDICINE

## 2018-02-14 RX ADMIN — GANCICLOVIR SODIUM 435 MG: 500 INJECTION, POWDER, LYOPHILIZED, FOR SOLUTION INTRAVENOUS at 12:01

## 2018-02-14 NOTE — PROGRESS NOTES
Labs drawn prior to infusion.  Clarified with lab that they would indeed fax results to  in [place]. Pt discharged in stable condition.

## 2018-02-14 NOTE — PROGRESS NOTES
Patient here today for Ganciclovir/labs today. PICC patent with blood return noted. Patient tolerated infusion well. Discharged to home in stable condition.

## 2018-02-14 NOTE — MR AVS SNAPSHOT
After Visit Summary   2/14/2018    Abhishek Downey    MRN: [id number]           Patient Information     Date Of Birth          1975        Visit Information        Provider Department      2/14/2018 11:30 AM NL INFUSION CHAIR 1 [facility] Infusion Services        Today's Diagnoses     Cytomegalovirus infection, unspecified cytomegaloviral infection type (H)    -  1    Primary sclerosing cholangitis        Liver replaced by transplant (H)          Care Instructions    Pt to return on 02/15/18 for Ganciclovir/Solumedrol. Copies of medication list and upcoming appointments given prior to discharge.             Follow-ups after your visit        Follow-up notes from your care team     Return in about 1 day (around 2/15/2018).      Your next 10 appointments already scheduled     Feb 15, 2018  4:00 PM CST   Level 3 with NL INFUSION CHAIR 3   [facility] Infusion Services ([facility])    [address] [place] [id number] [phone]            Feb 16, 2018 11:30 AM CST   Level 2 with NL INFUSION CHAIR 4   [facility] Infusion Services ([facility])    [address] [place] [address] [phone]              Who to contact     If you have questions or need follow up information about today's clinic visit or your schedule please contact [facility] INFUSION SERVICES directly at [phone].  Normal or non-critical lab and imaging results will be communicated to you by MyChart, letter or phone within 4 business days after the clinic has received the results. If you do not hear from us within 7 days, please contact the clinic through MyChart or phone. If you have a critical or abnormal lab result, we will notify you by phone as soon as possible.  Submit refill requests through [employer] or call your pharmacy and they will forward the refill request to us. Please allow 3 business days for your refill to be completed.           Additional Information About Your Visit        MyChart Information     [employer] gives you secure access to your electronic health record. If you see a primary care provider, you can also send messages to your care team and make appointments. If you have questions, please call your primary care clinic.  If you do not have a primary care provider, please call [phone] and they will assist you.        Care EveryWhere ID     This is your Care EveryWhere ID. This could be used by other organizations to access your [place] medical records  [id number]        Your Vitals Were     Pulse Temperature Respirations Pulse Oximetry          91 98  F (36.7  C) (Temporal) 16 99%         Blood Pressure from Last 3 Encounters:   02/14/18 137/74   02/13/18 (!) 129/99   02/10/18 132/85    Weight from Last 3 Encounters:   02/10/18 113.4 kg (250 lb)   02/07/18 115.2 kg (254 lb)   02/07/18 115.2 kg (254 lb)              We Performed the Following     Alkaline phosphatase     ALT     AST     Bilirubin  total     Creatinine     Glucose     Hematocrit     Hemoglobin     Platelet count     Potassium     Sodium     WBC count        Primary Care Provider Office Phone # Fax #    Radha Carsonvalente Hidalgo [phone]       [address] [place] [address]        Equal Access to Services     DAVID CURRY : Hadii aad ku hadasho Soomaali, waaxda luqadaha, qaybta kaalmada adeegyada, waxay idiin hayaan adeeg khalverto laradha brar. So [facility] [phone].    ATENCIÓN: Si habla español, tiene a burton disposición servicios gratuitos de asistencia lingüística. Llame al [phone].    We comply with applicable federal civil rights laws and [place] laws. We do not discriminate on the basis of race, color, national origin, age, disability, sex, sexual orientation, or gender identity.            Thank you!     Thank you for choosing [facility] SERVICES  for your care. Our goal is always to provide you with excellent  care. Hearing back from our patients is one way we can continue to improve our services. Please take a few minutes to complete the written survey that you may receive in the mail after your visit with us. Thank you!             Your Updated Medication List - Protect others around you: Learn how to safely use, store and throw away your medicines at www.disposemymeds.org.          This list is accurate as of 2/14/18  4:13 PM.  Always use your most recent med list.                   Brand Name Dispense Instructions for use Diagnosis    aspirin 81 MG tablet      Take by mouth daily        pantoprazole 40 MG EC tablet    PROTONIX     Take 1 tablet (40 mg) by mouth daily    S/P liver transplant (H)       tacrolimus 1 MG capsule    GENERIC EQUIVALENT     Take 4 capsules (4 mg) by mouth 2 times daily    S/P liver transplant (H)       traMADol 50 MG tablet    ULTRAM     Take 1-2 tablets ( mg) by mouth every 6 hours as needed for pain    S/P liver transplant (H)

## 2018-02-14 NOTE — PATIENT INSTRUCTIONS
Pt to return on 02/15/18 for Ganciclovir/Solumedrol. Copies of medication list and upcoming appointments given prior to discharge.

## 2018-02-15 ENCOUNTER — INFUSION THERAPY VISIT (OUTPATIENT)
Dept: INFUSION THERAPY | Facility: CLINIC | Age: 43
End: 2018-02-15
Attending: FAMILY MEDICINE
Payer: MEDICARE

## 2018-02-15 VITALS
HEART RATE: 96 BPM | TEMPERATURE: 97.6 F | SYSTOLIC BLOOD PRESSURE: 146 MMHG | OXYGEN SATURATION: 100 % | RESPIRATION RATE: 16 BRPM | DIASTOLIC BLOOD PRESSURE: 82 MMHG

## 2018-02-15 DIAGNOSIS — K83.01 PRIMARY SCLEROSING CHOLANGITIS (H): ICD-10-CM

## 2018-02-15 DIAGNOSIS — B25.9 CYTOMEGALOVIRUS INFECTION, UNSPECIFIED CYTOMEGALOVIRAL INFECTION TYPE (H): Primary | ICD-10-CM

## 2018-02-15 PROCEDURE — 96365 THER/PROPH/DIAG IV INF INIT: CPT

## 2018-02-15 PROCEDURE — 96367 TX/PROPH/DG ADDL SEQ IV INF: CPT

## 2018-02-15 PROCEDURE — 25000128 H RX IP 250 OP 636: Performed by: FAMILY MEDICINE

## 2018-02-15 RX ADMIN — GANCICLOVIR SODIUM 435 MG: 500 INJECTION, POWDER, LYOPHILIZED, FOR SOLUTION INTRAVENOUS at 13:07

## 2018-02-15 RX ADMIN — SODIUM CHLORIDE 1000 MG: 9 INJECTION, SOLUTION INTRAVENOUS at 11:59

## 2018-02-15 ASSESSMENT — PAIN SCALES - GENERAL: PAINLEVEL: NO PAIN (0)

## 2018-02-15 NOTE — MR AVS SNAPSHOT
After Visit Summary   2/15/2018    Abhishek Downey    MRN: [id number]           Patient Information     Date Of Birth          1975        Visit Information        Provider Department      2/15/2018 11:30 AM NL INFUSION CHAIR 3 [facility] Infusion Services        Today's Diagnoses     Cytomegalovirus infection, unspecified cytomegaloviral infection type (H)    -  1    Primary sclerosing cholangitis          Care Instructions    Pt to return on 02/16/18 for Ganciclovir. Copies of medication list and upcoming appointments given prior to discharge.           Follow-ups after your visit        Your next 10 appointments already scheduled     Feb 16, 2018 11:30 AM CST   Level 2 with NL INFUSION CHAIR 4   [facility] Infusion Services ([facility])    [address] [place] [address] [phone]              Who to contact     If you have questions or need follow up information about today's clinic visit or your schedule please contact [facility] INFUSION SERVICES directly at [phone].  Normal or non-critical lab and imaging results will be communicated to you by [employer], letter or phone within 4 business days after the clinic has received the results. If you do not hear from us within 7 days, please contact the clinic through [employer] or phone. If you have a critical or abnormal lab result, we will notify you by phone as soon as possible.  Submit refill requests through [employer] or call your pharmacy and they will forward the refill request to us. Please allow 3 business days for your refill to be completed.          Additional Information About Your Visit        MyChart Information     [employer] gives you secure access to your electronic health record. If you see a primary care provider, you can also send messages to your care team and make appointments. If you have questions, please call your primary care clinic.  If you do not have a primary care  provider, please call [phone] and they will assist you.        Care EveryWhere ID     This is your Care EveryWhere ID. This could be used by other organizations to access your [place] medical records  [id number]        Your Vitals Were     Pulse Temperature Respirations Pulse Oximetry          96 97.6  F (36.4  C) (Temporal) 16 100%         Blood Pressure from Last 3 Encounters:   02/15/18 146/82   02/14/18 137/74   02/13/18 (!) 129/99    Weight from Last 3 Encounters:   02/10/18 113.4 kg (250 lb)   02/07/18 115.2 kg (254 lb)   02/07/18 115.2 kg (254 lb)              Today, you had the following     No orders found for display       Primary Care Provider Office Phone # Fax #    Radha Sheila Hidalgo [phone] [address] [place] [address]        Equal Access to Services     MACARIO CURRY : Hadii mariann jarviso Sodeon, waaxda luqadaha, qaybta kaalmada adeegyada, elizabet gray . So [facility] [phone].    ATENCIÓN: Si habla español, tiene a burton disposición servicios gratuitos de asistencia lingüística. Llame al [phone].    We comply with applicable federal civil rights laws and [place] laws. We do not discriminate on the basis of race, color, national origin, age, disability, sex, sexual orientation, or gender identity.            Thank you!     Thank you for choosing [facility] INFUSION SERVICES  for your care. Our goal is always to provide you with excellent care. Hearing back from our patients is one way we can continue to improve our services. Please take a few minutes to complete the written survey that you may receive in the mail after your visit with us. Thank you!             Your Updated Medication List - Protect others around you: Learn how to safely use, store and throw away your medicines at www.disposemymeds.org.          This list is accurate as of 2/15/18  3:32 PM.  Always use your most recent med list.                    Brand Name Dispense Instructions for use Diagnosis    aspirin 81 MG tablet      Take by mouth daily        pantoprazole 40 MG EC tablet    PROTONIX     Take 1 tablet (40 mg) by mouth daily    S/P liver transplant (H)       tacrolimus 1 MG capsule    GENERIC EQUIVALENT     Take 4 capsules (4 mg) by mouth 2 times daily    S/P liver transplant (H)       traMADol 50 MG tablet    ULTRAM     Take 1-2 tablets ( mg) by mouth every 6 hours as needed for pain    S/P liver transplant (H)

## 2018-02-15 NOTE — PATIENT INSTRUCTIONS
Pt to return on 02/16/18 for Ganciclovir. Copies of medication list and upcoming appointments given prior to discharge.

## 2018-02-15 NOTE — PROGRESS NOTES
Patient here today for Ganciclovir and Solu-Medrol. PICC line patent with blood return noted. Patient tolerated infusions well, no signs of reaction. Patient discharged to home in stable condition.

## 2018-02-16 ENCOUNTER — INFUSION THERAPY VISIT (OUTPATIENT)
Dept: INFUSION THERAPY | Facility: CLINIC | Age: 43
End: 2018-02-16
Attending: FAMILY MEDICINE
Payer: MEDICARE

## 2018-02-16 VITALS
RESPIRATION RATE: 16 BRPM | TEMPERATURE: 98.4 F | SYSTOLIC BLOOD PRESSURE: 139 MMHG | HEART RATE: 91 BPM | DIASTOLIC BLOOD PRESSURE: 62 MMHG | OXYGEN SATURATION: 98 %

## 2018-02-16 DIAGNOSIS — K83.01 PRIMARY SCLEROSING CHOLANGITIS (H): ICD-10-CM

## 2018-02-16 DIAGNOSIS — B25.9 CYTOMEGALOVIRUS INFECTION, UNSPECIFIED CYTOMEGALOVIRAL INFECTION TYPE (H): Primary | ICD-10-CM

## 2018-02-16 DIAGNOSIS — Z94.4 LIVER REPLACED BY TRANSPLANT (H): ICD-10-CM

## 2018-02-16 LAB
ALP SERPL-CCNC: 194 U/L (ref 40–150)
ALT SERPL W P-5'-P-CCNC: 40 U/L (ref 0–70)
AST SERPL W P-5'-P-CCNC: 10 U/L (ref 0–45)
BILIRUB SERPL-MCNC: 1.7 MG/DL (ref 0.2–1.3)
CREAT SERPL-MCNC: 1.17 MG/DL (ref 0.66–1.25)
GFR SERPL CREATININE-BSD FRML MDRD: 69 ML/MIN/1.7M2
GLUCOSE SERPL-MCNC: 329 MG/DL (ref 70–99)
HCT VFR BLD AUTO: 33.1 % (ref 40–53)
HGB BLD-MCNC: 9.6 G/DL (ref 13.3–17.7)
PLATELET # BLD AUTO: 153 10E9/L (ref 150–450)
POTASSIUM SERPL-SCNC: 4.1 MMOL/L (ref 3.4–5.3)
SODIUM SERPL-SCNC: 138 MMOL/L (ref 133–144)
WBC # BLD AUTO: 5.8 10E9/L (ref 4–11)

## 2018-02-16 PROCEDURE — 84450 TRANSFERASE (AST) (SGOT): CPT | Performed by: INTERNAL MEDICINE

## 2018-02-16 PROCEDURE — 84295 ASSAY OF SERUM SODIUM: CPT | Performed by: INTERNAL MEDICINE

## 2018-02-16 PROCEDURE — 82247 BILIRUBIN TOTAL: CPT | Performed by: INTERNAL MEDICINE

## 2018-02-16 PROCEDURE — 84075 ASSAY ALKALINE PHOSPHATASE: CPT | Performed by: INTERNAL MEDICINE

## 2018-02-16 PROCEDURE — 82947 ASSAY GLUCOSE BLOOD QUANT: CPT | Mod: GA | Performed by: INTERNAL MEDICINE

## 2018-02-16 PROCEDURE — 25000128 H RX IP 250 OP 636: Performed by: FAMILY MEDICINE

## 2018-02-16 PROCEDURE — 84132 ASSAY OF SERUM POTASSIUM: CPT | Performed by: INTERNAL MEDICINE

## 2018-02-16 PROCEDURE — 82565 ASSAY OF CREATININE: CPT | Performed by: INTERNAL MEDICINE

## 2018-02-16 PROCEDURE — 85027 COMPLETE CBC AUTOMATED: CPT | Performed by: INTERNAL MEDICINE

## 2018-02-16 PROCEDURE — 96365 THER/PROPH/DIAG IV INF INIT: CPT

## 2018-02-16 PROCEDURE — 84460 ALANINE AMINO (ALT) (SGPT): CPT | Performed by: INTERNAL MEDICINE

## 2018-02-16 RX ADMIN — GANCICLOVIR SODIUM 435 MG: 500 INJECTION, POWDER, LYOPHILIZED, FOR SOLUTION INTRAVENOUS at 11:56

## 2018-02-16 ASSESSMENT — PAIN SCALES - GENERAL: PAINLEVEL: NO PAIN (0)

## 2018-02-16 NOTE — MR AVS SNAPSHOT
After Visit Summary   2/16/2018    Abhishek Downey    MRN: [id number]           Patient Information     Date Of Birth          1975        Visit Information        Provider Department      2/16/2018 11:30 AM NL INFUSION CHAIR 4 [facility] Infusion [facility]        Today's Diagnoses     Cytomegalovirus infection, unspecified cytomegaloviral infection type (H)    -  1    Primary sclerosing cholangitis        Liver replaced by transplant (H)          Care Instructions    Pt to return on 02/17/18 and 02/18/18 for infusions in the ER. Faxed info to them regarding appts. Copies of medication list and upcoming appointments given prior to discharge.             Follow-ups after your visit        Follow-up notes from your care team     Return in about 1 day (around 2/17/2018).      Who to contact     If you have questions or need follow up information about today's clinic visit or your schedule please contact [facility] INFUSION SERVICES directly at [phone].  Normal or non-critical lab and imaging results will be communicated to you by [employer], letter or phone within 4 business days after the clinic has received the results. If you do not hear from us within 7 days, please contact the clinic through [employer] or phone. If you have a critical or abnormal lab result, we will notify you by phone as soon as possible.  Submit refill requests through [employer] or call your pharmacy and they will forward the refill request to us. Please allow 3 business days for your refill to be completed.          Additional Information About Your Visit        MyChart Information     [employer] gives you secure access to your electronic health record. If you see a primary care provider, you can also send messages to your care team and make appointments. If you have questions, please call your primary care clinic.  If you do not have a primary care provider, please call [phone] and they will assist you.         Care EveryWhere ID     This is your Care EveryWhere ID. This could be used by other organizations to access your [place] medical records  [id number]        Your Vitals Were     Pulse Temperature Respirations Pulse Oximetry          91 98.4  F (36.9  C) (Temporal) 16 98%         Blood Pressure from Last 3 Encounters:   02/16/18 139/62   02/15/18 146/82   02/14/18 137/74    Weight from Last 3 Encounters:   02/10/18 113.4 kg (250 lb)   02/07/18 115.2 kg (254 lb)   02/07/18 115.2 kg (254 lb)              We Performed the Following     Alkaline phosphatase     ALT     AST     Bilirubin  total     Creatinine     Glucose     Hematocrit     Hemoglobin     Platelet count     Potassium     Sodium     WBC count        Primary Care Provider Office Phone # Fax #    Radha Sheila Hidalgo [phone] [address] [place] [address]        Equal Access to Services     [facility]: Hadii aad ku hadasho Soomaali, waaxda luqadaha, qaybta kaalmada adeegyada, waxay idiin hayaan ademarge gray . So [facility] [phone].    ATENCIÓN: Si habla español, tiene a burton disposición servicios gratuitos de asistencia lingüística. Llame al [phone].    We comply with applicable federal civil rights laws and [place] laws. We do not discriminate on the basis of race, color, national origin, age, disability, sex, sexual orientation, or gender identity.            Thank you!     Thank you for choosing [facility] INFUSION SERVICES  for your care. Our goal is always to provide you with excellent care. Hearing back from our patients is one way we can continue to improve our services. Please take a few minutes to complete the written survey that you may receive in the mail after your visit with us. Thank you!             Your Updated Medication List - Protect others around you: Learn how to safely use, store and throw away your medicines at www.disposemymeds.org.          This list is accurate as  of 2/16/18  2:03 PM.  Always use your most recent med list.                   Brand Name Dispense Instructions for use Diagnosis    aspirin 81 MG tablet      Take by mouth daily        insulin aspart 100 UNIT/ML injection    NovoLOG PEN     Inject Subcutaneous 3 times daily (with meals)        pantoprazole 40 MG EC tablet    PROTONIX     Take 1 tablet (40 mg) by mouth daily    S/P liver transplant (H)       tacrolimus 1 MG capsule    GENERIC EQUIVALENT     Take 4 capsules (4 mg) by mouth 2 times daily    S/P liver transplant (H)       traMADol 50 MG tablet    ULTRAM     Take 1-2 tablets ( mg) by mouth every 6 hours as needed for pain    S/P liver transplant (H)

## 2018-02-16 NOTE — PATIENT INSTRUCTIONS
Pt to return on 02/17/18 and 02/18/18 for infusions in the ER. Faxed info to them regarding appts. Copies of medication list and upcoming appointments given prior to discharge.

## 2018-02-16 NOTE — PROGRESS NOTES
Patient here today for labs and Ganciclovir. Blood return noted on PICC, tolerated infusion well. Denies symptoms of reaction. Info faxed to ED for treatments 02/17 and 02/18. Patient discharged home in stable condition.

## 2018-02-17 ENCOUNTER — HOSPITAL ENCOUNTER (EMERGENCY)
Facility: CLINIC | Age: 43
Discharge: HOME | End: 2018-02-17
Admitting: FAMILY MEDICINE
Payer: MEDICARE

## 2018-02-17 VITALS
HEART RATE: 90 BPM | OXYGEN SATURATION: 100 % | TEMPERATURE: 97.8 F | BODY MASS INDEX: 32.51 KG/M2 | WEIGHT: 240 LBS | RESPIRATION RATE: 12 BRPM | SYSTOLIC BLOOD PRESSURE: 135 MMHG | DIASTOLIC BLOOD PRESSURE: 89 MMHG | HEIGHT: 72 IN

## 2018-02-17 DIAGNOSIS — K83.01 PRIMARY SCLEROSING CHOLANGITIS (H): ICD-10-CM

## 2018-02-17 DIAGNOSIS — B25.9 CYTOMEGALOVIRUS INFECTION, UNSPECIFIED CYTOMEGALOVIRAL INFECTION TYPE (H): ICD-10-CM

## 2018-02-17 PROCEDURE — 96365 THER/PROPH/DIAG IV INF INIT: CPT

## 2018-02-17 PROCEDURE — 96367 TX/PROPH/DG ADDL SEQ IV INF: CPT

## 2018-02-17 PROCEDURE — 96366 THER/PROPH/DIAG IV INF ADDON: CPT

## 2018-02-17 PROCEDURE — 25000128 H RX IP 250 OP 636: Performed by: FAMILY MEDICINE

## 2018-02-17 PROCEDURE — 40000268 ZZH STATISTIC NO CHARGES

## 2018-02-17 RX ADMIN — SODIUM CHLORIDE 1000 MG: 9 INJECTION, SOLUTION INTRAVENOUS at 12:15

## 2018-02-17 RX ADMIN — GANCICLOVIR SODIUM 435 MG: 500 INJECTION, POWDER, LYOPHILIZED, FOR SOLUTION INTRAVENOUS at 13:24

## 2018-02-18 ENCOUNTER — HOSPITAL ENCOUNTER (EMERGENCY)
Facility: CLINIC | Age: 43
Discharge: HOME | End: 2018-02-18
Admitting: EMERGENCY MEDICINE
Payer: MEDICARE

## 2018-02-18 VITALS
WEIGHT: 249.3 LBS | HEART RATE: 100 BPM | OXYGEN SATURATION: 100 % | RESPIRATION RATE: 12 BRPM | BODY MASS INDEX: 33.77 KG/M2 | SYSTOLIC BLOOD PRESSURE: 162 MMHG | DIASTOLIC BLOOD PRESSURE: 79 MMHG | TEMPERATURE: 98.4 F | HEIGHT: 72 IN

## 2018-02-18 DIAGNOSIS — K83.01 PRIMARY SCLEROSING CHOLANGITIS (H): ICD-10-CM

## 2018-02-18 DIAGNOSIS — B25.9 CYTOMEGALOVIRUS INFECTION, UNSPECIFIED CYTOMEGALOVIRAL INFECTION TYPE (H): ICD-10-CM

## 2018-02-18 PROCEDURE — 25000128 H RX IP 250 OP 636: Performed by: FAMILY MEDICINE

## 2018-02-18 PROCEDURE — 40000268 ZZH STATISTIC NO CHARGES: Performed by: EMERGENCY MEDICINE

## 2018-02-18 PROCEDURE — 96374 THER/PROPH/DIAG INJ IV PUSH: CPT | Performed by: EMERGENCY MEDICINE

## 2018-02-18 RX ADMIN — GANCICLOVIR SODIUM 435 MG: 500 INJECTION, POWDER, LYOPHILIZED, FOR SOLUTION INTRAVENOUS at 12:07

## 2018-02-20 ENCOUNTER — INFUSION THERAPY VISIT (OUTPATIENT)
Dept: INFUSION THERAPY | Facility: CLINIC | Age: 43
End: 2018-02-20
Payer: MEDICARE

## 2018-02-20 VITALS
OXYGEN SATURATION: 99 % | SYSTOLIC BLOOD PRESSURE: 134 MMHG | RESPIRATION RATE: 16 BRPM | HEART RATE: 91 BPM | TEMPERATURE: 98.4 F | DIASTOLIC BLOOD PRESSURE: 73 MMHG

## 2018-02-20 DIAGNOSIS — B25.9 CYTOMEGALOVIRUS INFECTION, UNSPECIFIED CYTOMEGALOVIRAL INFECTION TYPE (H): Primary | ICD-10-CM

## 2018-02-20 DIAGNOSIS — K83.01 PRIMARY SCLEROSING CHOLANGITIS (H): ICD-10-CM

## 2018-02-20 PROCEDURE — 25000128 H RX IP 250 OP 636: Performed by: FAMILY MEDICINE

## 2018-02-20 PROCEDURE — 96365 THER/PROPH/DIAG IV INF INIT: CPT

## 2018-02-20 RX ADMIN — GANCICLOVIR SODIUM 435 MG: 500 INJECTION, POWDER, LYOPHILIZED, FOR SOLUTION INTRAVENOUS at 15:11

## 2018-02-20 ASSESSMENT — PAIN SCALES - GENERAL: PAINLEVEL: NO PAIN (0)

## 2018-02-20 NOTE — MR AVS SNAPSHOT
After Visit Summary   2/20/2018    Abhishek Downey    MRN: [id number]           Patient Information     Date Of Birth          1975        Visit Information        Provider Department      2/20/2018 3:00 PM NL INFUSION CHAIR 1 [facility] Infusion [facility]        Today's Diagnoses     Cytomegalovirus infection, unspecified cytomegaloviral infection type (H)    -  1    Primary sclerosing cholangitis          Care Instructions    Pt to return on 2/21 for labs/ Ganciclovir. Copies of medication list and upcoming appointments given prior to discharge.           Follow-ups after your visit        Follow-up notes from your care team     Return in 1 day (on 2/21/2018).      Your next 10 appointments already scheduled     Feb 21, 2018 11:00 AM CST   Level 2 with NL INFUSION CHAIR 1   [facility] Infusion Services ([facility])    [address] [place] [address]   [phone]            Feb 22, 2018  9:30 AM CST   Level 2 with NL INFUSION CHAIR 1   [facility] ([facility])    [address] [place] [address]   [phone]            Feb 23, 2018  9:30 AM CST   Level 2 with NL INFUSION CHAIR 1   [facility] ([facility])    [address] [place] [address]   [phone]              Who to contact     If you have questions or need follow up information about today's clinic visit or your schedule please contact [facility] INFUSION SERVICES directly at [phone].  Normal or non-critical lab and imaging results will be communicated to you by MyChart, letter or phone within 4 business days after the clinic has received the results. If you do not hear from us within 7 days, please contact the clinic through MyChart or phone. If you have a critical or abnormal lab result, we will notify you by phone as soon as possible.  Submit refill requests through  [employer] or call your pharmacy and they will forward the refill request to us. Please allow 3 business days for your refill to be completed.          Additional Information About Your Visit        MyChart Information     [employer] gives you secure access to your electronic health record. If you see a primary care provider, you can also send messages to your care team and make appointments. If you have questions, please call your primary care clinic.  If you do not have a primary care provider, please call [phone] and they will assist you.        Care EveryWhere ID     This is your Care EveryWhere ID. This could be used by other organizations to access your [place] medical records  [id number]        Your Vitals Were     Pulse Temperature Respirations Pulse Oximetry          91 98.4  F (36.9  C) (Temporal) 16 99%         Blood Pressure from Last 3 Encounters:   02/20/18 134/73   02/18/18 162/79   02/17/18 135/89    Weight from Last 3 Encounters:   02/18/18 113.1 kg (249 lb 4.8 oz)   02/17/18 108.9 kg (240 lb)   02/10/18 113.4 kg (250 lb)              Today, you had the following     No orders found for display       Primary Care Provider Office Phone # Fax #    Radha Sheila Hidalgo [phone]        [address] [place] [address]        Equal Access to Services     [facility] : Hadii aad ku hadasho Soomaali, waaxda luqadaha, qaybta kaalmada adeegyada, waxmary tavarez hayvinny brar. So [facility] [phone].    ATENCIÓN: Si habla español, tiene a burton disposición servicios gratuitos de asistencia lingüística. Llame al [phone].    We comply with applicable federal civil rights laws and [place] laws. We do not discriminate on the basis of race, color, national origin, age, disability, sex, sexual orientation, or gender identity.            Thank you!     Thank you for choosing [facility] SERVICES  for your care. Our goal is always to provide you with  excellent care. Hearing back from our patients is one way we can continue to improve our services. Please take a few minutes to complete the written survey that you may receive in the mail after your visit with us. Thank you!             Your Updated Medication List - Protect others around you: Learn how to safely use, store and throw away your medicines at www.disposemymeds.org.          This list is accurate as of 2/20/18  4:25 PM.  Always use your most recent med list.                   Brand Name Dispense Instructions for use Diagnosis    aspirin 81 MG tablet      Take by mouth daily        insulin aspart 100 UNIT/ML injection    NovoLOG PEN     Inject Subcutaneous 3 times daily (with meals)        pantoprazole 40 MG EC tablet    PROTONIX     Take 1 tablet (40 mg) by mouth daily    S/P liver transplant (H)       tacrolimus 1 MG capsule    GENERIC EQUIVALENT     Take 4 capsules (4 mg) by mouth 2 times daily    S/P liver transplant (H)       traMADol 50 MG tablet    ULTRAM     Take 1-2 tablets ( mg) by mouth every 6 hours as needed for pain    S/P liver transplant (H)

## 2018-02-20 NOTE — PATIENT INSTRUCTIONS
Pt to return on 2/21 for labs/ Ganciclovir. Copies of medication list and upcoming appointments given prior to discharge.

## 2018-02-21 ENCOUNTER — INFUSION THERAPY VISIT (OUTPATIENT)
Dept: INFUSION THERAPY | Facility: CLINIC | Age: 43
End: 2018-02-21
Attending: FAMILY MEDICINE
Payer: MEDICARE

## 2018-02-21 VITALS
HEART RATE: 93 BPM | SYSTOLIC BLOOD PRESSURE: 153 MMHG | RESPIRATION RATE: 18 BRPM | TEMPERATURE: 97.8 F | DIASTOLIC BLOOD PRESSURE: 56 MMHG | OXYGEN SATURATION: 100 %

## 2018-02-21 DIAGNOSIS — K83.01 PRIMARY SCLEROSING CHOLANGITIS (H): ICD-10-CM

## 2018-02-21 DIAGNOSIS — B25.9 CYTOMEGALOVIRUS INFECTION, UNSPECIFIED CYTOMEGALOVIRAL INFECTION TYPE (H): Primary | ICD-10-CM

## 2018-02-21 PROCEDURE — 25000128 H RX IP 250 OP 636: Performed by: FAMILY MEDICINE

## 2018-02-21 PROCEDURE — 96365 THER/PROPH/DIAG IV INF INIT: CPT

## 2018-02-21 RX ADMIN — GANCICLOVIR SODIUM 435 MG: 500 INJECTION, POWDER, LYOPHILIZED, FOR SOLUTION INTRAVENOUS at 11:19

## 2018-02-21 ASSESSMENT — PAIN SCALES - GENERAL: PAINLEVEL: NO PAIN (0)

## 2018-02-21 NOTE — MR AVS SNAPSHOT
After Visit Summary   2/21/2018    Abhishek Downey    MRN: [id number]           Patient Information     Date Of Birth          1975        Visit Information        Provider Department      2/21/2018 11:00 AM NL INFUSION CHAIR 1 [facility] Infusion Services        Today's Diagnoses     Cytomegalovirus infection, unspecified cytomegaloviral infection type (H)    -  1    Primary sclerosing cholangitis          Care Instructions    Pt to return on 02/22/18 for Ganciclovir. Copies of medication list and upcoming appointments given prior to discharge.             Follow-ups after your visit        Follow-up notes from your care team     Return in 1 day (on 2/22/2018).      Your next 10 appointments already scheduled     Feb 22, 2018  9:30 AM CST   Level 2 with NL INFUSION CHAIR 1   [facility] Infusion [facility] ([facility])    [address] [place] [id number] [phone]            Feb 23, 2018  9:30 AM CST   Level 2 with NL INFUSION CHAIR 1   [facility] Infusion Services [facility])    [address] [place] [address] [phone]              Who to contact     If you have questions or need follow up information about today's clinic visit or your schedule please contact [facility] INFUSION SERVICES directly at [phone].  Normal or non-critical lab and imaging results will be communicated to you by MyChart, letter or phone within 4 business days after the clinic has received the results. If you do not hear from us within 7 days, please contact the clinic through MyChart or phone. If you have a critical or abnormal lab result, we will notify you by phone as soon as possible.  Submit refill requests through [employer] or call your pharmacy and they will forward the refill request to us. Please allow 3 business days for your refill to be completed.          Additional Information About Your Visit        MyChart  Information     [employer] gives you secure access to your electronic health record. If you see a primary care provider, you can also send messages to your care team and make appointments. If you have questions, please call your primary care clinic.  If you do not have a primary care provider, please call [phone] and they will assist you.        Care EveryWhere ID     This is your Care EveryWhere ID. This could be used by other organizations to access your [place] medical records  [id number]        Your Vitals Were     Pulse Temperature Respirations Pulse Oximetry          93 97.8  F (36.6  C) (Temporal) 18 100%         Blood Pressure from Last 3 Encounters:   02/21/18 153/56   02/20/18 134/73   02/18/18 162/79    Weight from Last 3 Encounters:   02/18/18 113.1 kg (249 lb 4.8 oz)   02/17/18 108.9 kg (240 lb)   02/10/18 113.4 kg (250 lb)              Today, you had the following     No orders found for display       Primary Care Provider Office Phone # Fax #    Radha Sheila Hidalgo [phone] [address] [place] [address]        Equal Access to Services     [facility] : Hadii aad ku hadasho Soomaali, waaxda luqadaha, qaybta kaalmada adeegyada, elizabet brar. So [facility] [phone].    ATENCIÓN: Si habla español, tiene a burton disposición servicios gratuitos de asistencia lingüística. [facility] [phone].    We comply with applicable federal civil rights laws and [place] laws. We do not discriminate on the basis of race, color, national origin, age, disability, sex, sexual orientation, or gender identity.            Thank you!     Thank you for choosing [facility] SERVICES  for your care. Our goal is always to provide you with excellent care. Hearing back from our patients is one way we can continue to improve our services. Please take a few minutes to complete the written survey that you may receive in the mail after your visit  with us. Thank you!             Your Updated Medication List - Protect others around you: Learn how to safely use, store and throw away your medicines at www.disposemymeds.org.          This list is accurate as of 2/21/18 12:33 PM.  Always use your most recent med list.                   Brand Name Dispense Instructions for use Diagnosis    aspirin 81 MG tablet      Take by mouth daily        insulin aspart 100 UNIT/ML injection    NovoLOG PEN     Inject Subcutaneous 3 times daily (with meals)        pantoprazole 40 MG EC tablet    PROTONIX     Take 1 tablet (40 mg) by mouth daily    S/P liver transplant (H)       tacrolimus 1 MG capsule    GENERIC EQUIVALENT     Take 4 capsules (4 mg) by mouth 2 times daily    S/P liver transplant (H)       traMADol 50 MG tablet    ULTRAM     Take 1-2 tablets ( mg) by mouth every 6 hours as needed for pain    S/P liver transplant (H)

## 2018-02-21 NOTE — PROGRESS NOTES
Patient her today for Ganciclovir infusion. Blood return noted pre and post infusion, PICC patent. BP elevated when patient first arrived but normalized during infusion. Patient tolerated infusion well, discharged to home in stable condition.

## 2018-02-21 NOTE — PATIENT INSTRUCTIONS
Pt to return on 02/22/18 for Ganciclovir. Copies of medication list and upcoming appointments given prior to discharge.

## 2018-02-22 ENCOUNTER — INFUSION THERAPY VISIT (OUTPATIENT)
Dept: INFUSION THERAPY | Facility: CLINIC | Age: 43
End: 2018-02-22
Attending: FAMILY MEDICINE
Payer: MEDICARE

## 2018-02-22 ENCOUNTER — HOSPITAL ENCOUNTER (EMERGENCY)
Facility: CLINIC | Age: 43
Discharge: HOME | End: 2018-02-23
Admitting: FAMILY MEDICINE
Payer: MEDICARE

## 2018-02-22 VITALS
OXYGEN SATURATION: 100 % | TEMPERATURE: 98.3 F | SYSTOLIC BLOOD PRESSURE: 154 MMHG | HEART RATE: 89 BPM | RESPIRATION RATE: 16 BRPM | DIASTOLIC BLOOD PRESSURE: 81 MMHG

## 2018-02-22 DIAGNOSIS — B25.9 CYTOMEGALOVIRUS INFECTION, UNSPECIFIED CYTOMEGALOVIRAL INFECTION TYPE (H): Primary | ICD-10-CM

## 2018-02-22 DIAGNOSIS — K83.01 PRIMARY SCLEROSING CHOLANGITIS (H): ICD-10-CM

## 2018-02-22 DIAGNOSIS — B25.9 CYTOMEGALOVIRUS INFECTION, UNSPECIFIED CYTOMEGALOVIRAL INFECTION TYPE (H): ICD-10-CM

## 2018-02-22 PROCEDURE — 96365 THER/PROPH/DIAG IV INF INIT: CPT

## 2018-02-22 PROCEDURE — 25000128 H RX IP 250 OP 636: Performed by: FAMILY MEDICINE

## 2018-02-22 PROCEDURE — 96366 THER/PROPH/DIAG IV INF ADDON: CPT

## 2018-02-22 PROCEDURE — 40000268 ZZH STATISTIC NO CHARGES

## 2018-02-22 RX ADMIN — GANCICLOVIR SODIUM 435 MG: 500 INJECTION, POWDER, LYOPHILIZED, FOR SOLUTION INTRAVENOUS at 10:11

## 2018-02-22 RX ADMIN — GANCICLOVIR SODIUM 435 MG: 500 INJECTION, POWDER, LYOPHILIZED, FOR SOLUTION INTRAVENOUS at 22:32

## 2018-02-22 ASSESSMENT — PAIN SCALES - GENERAL: PAINLEVEL: NO PAIN (0)

## 2018-02-22 NOTE — MR AVS SNAPSHOT
After Visit Summary   2/22/2018    Abhishek Downey    MRN: [id number]           Patient Information     Date Of Birth          1975        Visit Information        Provider Department      2/22/2018 9:30 AM NL INFUSION CHAIR 1 [facility] Infusion [facility]        Today's Diagnoses     Cytomegalovirus infection, unspecified cytomegaloviral infection type (H)    -  1    Primary sclerosing cholangitis          Care Instructions    Pt to return to ED tonight for Ganciclovir, every 12 hour dosing.  Pt aware of plan.           Follow-ups after your visit        Follow-up notes from your care team     Return in 1 day (on 2/23/2018).      Your next 10 appointments already scheduled     Feb 23, 2018  9:30 AM CST   Level 2 with NL INFUSION CHAIR 1   [facility] Infusion [facility] ([facility])    [address] [place] [address]   [phone]            Feb 26, 2018  8:30 AM CST   Level 2 with NL INFUSION CHAIR 2   [facility] In[facility] ([facility])    [address] [place] [address]   [phone]            Feb 27, 2018  8:30 AM CST   Level 2 with NL INFUSION CHAIR 3   [facility] ([facility])    [address] [place] [address]   [phone]            Feb 28, 2018  8:30 AM CST   Level 2 with NL INFUSION CHAIR 2   [facility] ([facility])    [address] [place] [address]   [phone]            Mar 01, 2018  8:30 AM CST   Level 2 with NL INFUSION CHAIR 2   [facility] ([facility])    [address] [place] [address]   [phone]            Mar 02, 2018  8:30 AM CST   Level 2 with NL INFUSION CHAIR 2   [facility] ([facility])    [address] [place] [address]   [phone]              Who to contact     If you  have questions or need follow up information about today's clinic visit or your schedule please contact [facility] INFUSION SERVICES directly at [phone].  Normal or non-critical lab and imaging results will be communicated to you by MyChart, letter or phone within 4 business days after the clinic has received the results. If you do not hear from us within 7 days, please contact the clinic through [employer] or phone. If you have a critical or abnormal lab result, we will notify you by phone as soon as possible.  Submit refill requests through [employer] or call your pharmacy and they will forward the refill request to us. Please allow 3 business days for your refill to be completed.          Additional Information About Your Visit        [employer] Information     [employer] gives you secure access to your electronic health record. If you see a primary care provider, you can also send messages to your care team and make appointments. If you have questions, please call your primary care clinic.  If you do not have a primary care provider, please call [phone] and they will assist you.        Care EveryWhere ID     This is your Care EveryWhere ID. This could be used by other organizations to access your [place] medical records  [id number]        Your Vitals Were     Pulse Temperature Respirations Pulse Oximetry          89 98.3  F (36.8  C) (Temporal) 16 100%         Blood Pressure from Last 3 Encounters:   02/22/18 154/81   02/21/18 153/56   02/20/18 134/73    Weight from Last 3 Encounters:   02/18/18 113.1 kg (249 lb 4.8 oz)   02/17/18 108.9 kg (240 lb)   02/10/18 113.4 kg (250 lb)              Today, you had the following     No orders found for display       Primary Care Provider Office Phone # Fax #    Radha Hidalgo [phone]       [address] [place] [address]        Equal Access to Services     DAVID CURRY : Peter Woodruff, kim price, brian  elizabet chung thaddeus gray ah. So [facility] [phone].    ATENCIÓN: Si tom zelaya, tiene a burton disposición servicios gratuitos de asistencia lingüística. Lilly al [phone].    We comply with applicable federal civil rights laws and [place] laws. We do not discriminate on the basis of race, color, national origin, age, disability, sex, sexual orientation, or gender identity.            Thank you!     Thank you for choosing [facility] INFUSION SERVICES  for your care. Our goal is always to provide you with excellent care. Hearing back from our patients is one way we can continue to improve our services. Please take a few minutes to complete the written survey that you may receive in the mail after your visit with us. Thank you!             Your Updated Medication List - Protect others around you: Learn how to safely use, store and throw away your medicines at www.disposemymeds.org.          This list is accurate as of 2/22/18  4:06 PM.  Always use your most recent med list.                   Brand Name Dispense Instructions for use Diagnosis    aspirin 81 MG tablet      Take by mouth daily        insulin aspart 100 UNIT/ML injection    NovoLOG PEN     Inject Subcutaneous 3 times daily (with meals)        pantoprazole 40 MG EC tablet    PROTONIX     Take 1 tablet (40 mg) by mouth daily    S/P liver transplant (H)       tacrolimus 1 MG capsule    GENERIC EQUIVALENT     Take 4 capsules (4 mg) by mouth 2 times daily    S/P liver transplant (H)       traMADol 50 MG tablet    ULTRAM     Take 1-2 tablets ( mg) by mouth every 6 hours as needed for pain    S/P liver transplant (H)

## 2018-02-23 ENCOUNTER — INFUSION THERAPY VISIT (OUTPATIENT)
Dept: INFUSION THERAPY | Facility: CLINIC | Age: 43
End: 2018-02-23
Attending: FAMILY MEDICINE
Payer: MEDICARE

## 2018-02-23 VITALS
HEART RATE: 88 BPM | TEMPERATURE: 98.6 F | RESPIRATION RATE: 16 BRPM | SYSTOLIC BLOOD PRESSURE: 151 MMHG | DIASTOLIC BLOOD PRESSURE: 80 MMHG

## 2018-02-23 VITALS
DIASTOLIC BLOOD PRESSURE: 74 MMHG | TEMPERATURE: 97.8 F | RESPIRATION RATE: 16 BRPM | OXYGEN SATURATION: 99 % | SYSTOLIC BLOOD PRESSURE: 142 MMHG | HEART RATE: 85 BPM

## 2018-02-23 VITALS
HEART RATE: 83 BPM | SYSTOLIC BLOOD PRESSURE: 133 MMHG | RESPIRATION RATE: 16 BRPM | DIASTOLIC BLOOD PRESSURE: 87 MMHG | OXYGEN SATURATION: 100 % | TEMPERATURE: 96.9 F

## 2018-02-23 DIAGNOSIS — K83.01 PRIMARY SCLEROSING CHOLANGITIS (H): ICD-10-CM

## 2018-02-23 DIAGNOSIS — B25.9 CYTOMEGALOVIRUS INFECTION, UNSPECIFIED CYTOMEGALOVIRAL INFECTION TYPE (H): Primary | ICD-10-CM

## 2018-02-23 PROCEDURE — 96365 THER/PROPH/DIAG IV INF INIT: CPT | Mod: XE

## 2018-02-23 PROCEDURE — 96413 CHEMO IV INFUSION 1 HR: CPT

## 2018-02-23 PROCEDURE — 25000128 H RX IP 250 OP 636: Performed by: FAMILY MEDICINE

## 2018-02-23 PROCEDURE — 96365 THER/PROPH/DIAG IV INF INIT: CPT

## 2018-02-23 RX ADMIN — GANCICLOVIR SODIUM 435 MG: 500 INJECTION, POWDER, LYOPHILIZED, FOR SOLUTION INTRAVENOUS at 10:34

## 2018-02-23 RX ADMIN — GANCICLOVIR SODIUM 435 MG: 500 INJECTION, POWDER, LYOPHILIZED, FOR SOLUTION INTRAVENOUS at 18:09

## 2018-02-23 ASSESSMENT — PAIN SCALES - GENERAL: PAINLEVEL: NO PAIN (0)

## 2018-02-23 NOTE — MR AVS SNAPSHOT
After Visit Summary   2/23/2018    Abhishek Downey    MRN: [id number]           Patient Information     Date Of Birth          1975        Visit Information        Provider Department      2/23/2018 9:30 AM NL INFUSION CHAIR 1 [facility]        Today's Diagnoses     Cytomegalovirus infection, unspecified cytomegaloviral infection type (H)    -  1    Primary sclerosing cholangitis          Care Instructions    Pt to return on 3/23 @ 6 pm for  Every 12 hour Ganciclovir.           Follow-ups after your visit        Your next 10 appointments already scheduled     Feb 26, 2018  8:30 AM CST   Level 2 with NL INFUSION CHAIR 2   [facility] ([facility])    [address] [place] [address]   [phone]            Feb 27, 2018  8:30 AM CST   Level 2 with NL INFUSION CHAIR 3   [facility] ([facility])    [address] [place] [address] Dr [place] [address]   [phone]            Feb 28, 2018  8:30 AM CST   Level 2 with NL INFUSION CHAIR 2   [facility] ([facility])    [address] [place] [address] Dr [place] [address]   [phone]            Mar 01, 2018  8:30 AM CST   Level 2 with NL INFUSION CHAIR 2   [facility] ([facility])    [address] [place] [address]   [phone]            Mar 02, 2018  8:30 AM CST   Level 2 with NL INFUSION CHAIR 2   [facility] ([facility])    [address] [place] [address]   [phone]              Who to contact     If you have questions or need follow up information about today's clinic visit or your schedule please contact [facility] directly at [phone].  Normal or non-critical lab and imaging results will be communicated to you by MyChart, letter or phone within 4 business days after  the clinic has received the results. If you do not hear from us within 7 days, please contact the clinic through [employer] or phone. If you have a critical or abnormal lab result, we will notify you by phone as soon as possible.  Submit refill requests through [employer] or call your pharmacy and they will forward the refill request to us. Please allow 3 business days for your refill to be completed.          Additional Information About Your Visit        Brains[employer] pirate Information     [employer] gives you secure access to your electronic health record. If you see a primary care provider, you can also send messages to your care team and make appointments. If you have questions, please call your primary care clinic.  If you do not have a primary care provider, please call [phone] and they will assist you.        Care EveryWhere ID     This is your Care EveryWhere ID. This could be used by other organizations to access your [place] medical records  [id number]        Your Vitals Were     Pulse Temperature Respirations Pulse Oximetry          83 96.9  F (36.1  C) (Temporal) 16 100%         Blood Pressure from Last 3 Encounters:   02/23/18 133/87   02/23/18 151/80   02/22/18 154/81    Weight from Last 3 Encounters:   02/18/18 113.1 kg (249 lb 4.8 oz)   02/17/18 108.9 kg (240 lb)   02/10/18 113.4 kg (250 lb)              Today, you had the following     No orders found for display       Primary Care Provider Office Phone # Fax #    Radha Sheila Hidalgo [phone]       [address] [place] [address]        Equal Access to Services     MACARIO CURRY : Hadii aad ku hadasho Soomaali, waaxda luqadaha, qaybta kaalmada everett, elizabet brar. So [facility] [phone].    ATENCIÓN: Si habla español, tiene a burton disposición servicios gratuitos de asistencia lingüística. Llame al [phone].    We comply with applicable federal civil rights laws and [place] laws. We do not  discriminate on the basis of race, color, national origin, age, disability, sex, sexual orientation, or gender identity.            Thank you!     Thank you for choosing [facility]  for your care. Our goal is always to provide you with excellent care. Hearing back from our patients is one way we can continue to improve our services. Please take a few minutes to complete the written survey that you may receive in the mail after your visit with us. Thank you!             Your Updated Medication List - Protect others around you: Learn how to safely use, store and throw away your medicines at www.disposemymeds.org.          This list is accurate as of 2/23/18  3:10 PM.  Always use your most recent med list.                   Brand Name Dispense Instructions for use Diagnosis    aspirin 81 MG tablet      Take by mouth daily        insulin aspart 100 UNIT/ML injection    NovoLOG PEN     Inject Subcutaneous 3 times daily (with meals)        pantoprazole 40 MG EC tablet    PROTONIX     Take 1 tablet (40 mg) by mouth daily    S/P liver transplant (H)       tacrolimus 1 MG capsule    GENERIC EQUIVALENT     Take 4 capsules (4 mg) by mouth 2 times daily    S/P liver transplant (H)       traMADol 50 MG tablet    ULTRAM     Take 1-2 tablets ( mg) by mouth every 6 hours as needed for pain    S/P liver transplant (H)

## 2018-02-23 NOTE — MR AVS SNAPSHOT
After Visit Summary   2/23/2018    Abhishek Downey    MRN: [id number]           Patient Information     Date Of Birth          1975        Visit Information        Provider Department      2/23/2018 5:45 PM NL INFUSION CHAIR 1 [facility]        Today's Diagnoses     Cytomegalovirus infection, unspecified cytomegaloviral infection type (H)    -  1    Primary sclerosing cholangitis           Follow-ups after your visit        Follow-up notes from your care team     Return in about 1 day (around 2/24/2018).      Your next 10 appointments already scheduled     Feb 26, 2018  8:30 AM CST   Level 2 with NL INFUSION CHAIR 2   [facility] ([facility])    [address] [place] [address]   [phone]            Feb 27, 2018  8:30 AM CST   Level 2 with NL INFUSION CHAIR 3   [facility] ([facility])    [address] [place] [address]   [phone]            Feb 28, 2018  8:30 AM CST   Level 2 with NL INFUSION CHAIR 2   [facility] ([facility])    [address] [place] [address]   [phone]            Mar 01, 2018  8:30 AM CST   Level 2 with NL INFUSION CHAIR 2   [facility] ([facility])    [address] [place] [address]   [phone]            Mar 02, 2018  8:30 AM CST   Level 2 with NL INFUSION CHAIR 2   [facility] ([facility])    [address] [place] [address]   [phone]              Who to contact     If you have questions or need follow up information about today's clinic visit or your schedule please contact [facility] INFUSION SERVICES directly at [phone].  Normal or non-critical lab and imaging results will be communicated to you by MyChart, letter or phone within 4 business days after the  clinic has received the results. If you do not hear from us within 7 days, please contact the clinic through [employer] or phone. If you have a critical or abnormal lab result, we will notify you by phone as soon as possible.  Submit refill requests through [employer] or call your pharmacy and they will forward the refill request to us. Please allow 3 business days for your refill to be completed.          Additional Information About Your Visit        [employer] Information     [employer] gives you secure access to your electronic health record. If you see a primary care provider, you can also send messages to your care team and make appointments. If you have questions, please call your primary care clinic.  If you do not have a primary care provider, please call [phone] and they will assist you.        Care EveryWhere ID     This is your Care EveryWhere ID. This could be used by other organizations to access your [place] medical records  [id number]        Your Vitals Were     Pulse Temperature Respirations Pulse Oximetry          85 97.8  F (36.6  C) (Temporal) 16 99%         Blood Pressure from Last 3 Encounters:   02/23/18 142/74   02/23/18 133/87   02/23/18 151/80    Weight from Last 3 Encounters:   02/18/18 113.1 kg (249 lb 4.8 oz)   02/17/18 108.9 kg (240 lb)   02/10/18 113.4 kg (250 lb)              Today, you had the following     No orders found for display       Primary Care Provider Office Phone # Fax #    Radha Sheila Hidalgo [phone]        [address] [place] [address]        Equal Access to Services     MACARIO CURRY AH: Hadii aad ku hadasho Soomaali, waaxda luqadaha, qaybta kaalmada adeegyada, elizabet brar. So [facility] [phone].    ATENCIÓN: Si habla español, tiene a burton disposición servicios gratuitos de asistencia lingüística. Llame al [phone].    We comply with applicable federal civil rights laws and [place] laws. We do not  discriminate on the basis of race, color, national origin, age, disability, sex, sexual orientation, or gender identity.            Thank you!     Thank you for choosing [facility]  for your care. Our goal is always to provide you with excellent care. Hearing back from our patients is one way we can continue to improve our services. Please take a few minutes to complete the written survey that you may receive in the mail after your visit with us. Thank you!             Your Updated Medication List - Protect others around you: Learn how to safely use, store and throw away your medicines at www.disposemymeds.org.          This list is accurate as of 2/23/18  7:14 PM.  Always use your most recent med list.                   Brand Name Dispense Instructions for use Diagnosis    aspirin 81 MG tablet      Take by mouth daily        insulin aspart 100 UNIT/ML injection    NovoLOG PEN     Inject Subcutaneous 3 times daily (with meals)        pantoprazole 40 MG EC tablet    PROTONIX     Take 1 tablet (40 mg) by mouth daily    S/P liver transplant (H)       tacrolimus 1 MG capsule    GENERIC EQUIVALENT     Take 4 capsules (4 mg) by mouth 2 times daily    S/P liver transplant (H)       traMADol 50 MG tablet    ULTRAM     Take 1-2 tablets ( mg) by mouth every 6 hours as needed for pain    S/P liver transplant (H)

## 2018-02-24 ENCOUNTER — HOSPITAL ENCOUNTER (EMERGENCY)
Facility: CLINIC | Age: 43
Discharge: HOME | End: 2018-02-24
Admitting: FAMILY MEDICINE
Payer: MEDICARE

## 2018-02-24 ENCOUNTER — HOSPITAL ENCOUNTER (OUTPATIENT)
Dept: LAB | Facility: CLINIC | Age: 43
End: 2018-02-24
Attending: FAMILY MEDICINE
Payer: MEDICARE

## 2018-02-24 VITALS
DIASTOLIC BLOOD PRESSURE: 95 MMHG | SYSTOLIC BLOOD PRESSURE: 143 MMHG | HEART RATE: 80 BPM | OXYGEN SATURATION: 98 % | TEMPERATURE: 96.4 F | RESPIRATION RATE: 20 BRPM

## 2018-02-24 VITALS
HEART RATE: 88 BPM | DIASTOLIC BLOOD PRESSURE: 82 MMHG | SYSTOLIC BLOOD PRESSURE: 152 MMHG | OXYGEN SATURATION: 99 % | RESPIRATION RATE: 18 BRPM | TEMPERATURE: 98.1 F

## 2018-02-24 DIAGNOSIS — B25.9 CYTOMEGALOVIRUS INFECTION, UNSPECIFIED CYTOMEGALOVIRAL INFECTION TYPE (H): ICD-10-CM

## 2018-02-24 DIAGNOSIS — K83.01 PRIMARY SCLEROSING CHOLANGITIS (H): ICD-10-CM

## 2018-02-24 PROCEDURE — 96365 THER/PROPH/DIAG IV INF INIT: CPT | Performed by: FAMILY MEDICINE

## 2018-02-24 PROCEDURE — 25000128 H RX IP 250 OP 636: Performed by: FAMILY MEDICINE

## 2018-02-24 PROCEDURE — 40000268 ZZH STATISTIC NO CHARGES: Performed by: FAMILY MEDICINE

## 2018-02-24 PROCEDURE — 96365 THER/PROPH/DIAG IV INF INIT: CPT | Mod: XE | Performed by: FAMILY MEDICINE

## 2018-02-24 RX ADMIN — GANCICLOVIR SODIUM 435 MG: 500 INJECTION, POWDER, LYOPHILIZED, FOR SOLUTION INTRAVENOUS at 08:25

## 2018-02-24 RX ADMIN — GANCICLOVIR SODIUM 435 MG: 500 INJECTION, POWDER, LYOPHILIZED, FOR SOLUTION INTRAVENOUS at 18:35

## 2018-02-24 NOTE — PROGRESS NOTES
Patient tolerated infusion without any problems, blood return noted post infusion in PICC line, flushed with saline per protocol. Patient left in ambulatory condition, will return this evening for next infusion.

## 2018-02-24 NOTE — PROGRESS NOTES
Patient here for Ganciclovir infusion, no new medications or allergies noted, no changes from yesterday's assessment.  PICC line with blood return.

## 2018-02-24 NOTE — PROGRESS NOTES
Patient here for ganciclovir infusion through PICC line, blood return noted pre and post infusion, flushed PICC with NS after infusion per protocol, discharged to home ambulatory and in stable condition.

## 2018-02-25 ENCOUNTER — INFUSION THERAPY VISIT (OUTPATIENT)
Dept: INFUSION THERAPY | Facility: CLINIC | Age: 43
End: 2018-02-25
Attending: FAMILY MEDICINE
Payer: MEDICARE

## 2018-02-25 ENCOUNTER — HOSPITAL ENCOUNTER (EMERGENCY)
Facility: CLINIC | Age: 43
Discharge: HOME | End: 2018-02-25
Admitting: FAMILY MEDICINE
Payer: MEDICARE

## 2018-02-25 VITALS
OXYGEN SATURATION: 98 % | HEART RATE: 85 BPM | SYSTOLIC BLOOD PRESSURE: 153 MMHG | DIASTOLIC BLOOD PRESSURE: 88 MMHG | RESPIRATION RATE: 18 BRPM | WEIGHT: 253.6 LBS | BODY MASS INDEX: 34.39 KG/M2 | TEMPERATURE: 98.6 F

## 2018-02-25 VITALS
OXYGEN SATURATION: 98 % | TEMPERATURE: 97.9 F | HEIGHT: 72 IN | BODY MASS INDEX: 34.35 KG/M2 | HEART RATE: 94 BPM | DIASTOLIC BLOOD PRESSURE: 79 MMHG | WEIGHT: 253.6 LBS | RESPIRATION RATE: 20 BRPM | SYSTOLIC BLOOD PRESSURE: 154 MMHG

## 2018-02-25 VITALS
HEART RATE: 79 BPM | TEMPERATURE: 97.8 F | DIASTOLIC BLOOD PRESSURE: 75 MMHG | SYSTOLIC BLOOD PRESSURE: 133 MMHG | OXYGEN SATURATION: 99 % | RESPIRATION RATE: 18 BRPM

## 2018-02-25 DIAGNOSIS — K83.01 PRIMARY SCLEROSING CHOLANGITIS (H): ICD-10-CM

## 2018-02-25 DIAGNOSIS — B25.9 CYTOMEGALOVIRUS INFECTION, UNSPECIFIED CYTOMEGALOVIRAL INFECTION TYPE (H): ICD-10-CM

## 2018-02-25 PROCEDURE — 96365 THER/PROPH/DIAG IV INF INIT: CPT | Performed by: FAMILY MEDICINE

## 2018-02-25 PROCEDURE — 40000268 ZZH STATISTIC NO CHARGES: Performed by: FAMILY MEDICINE

## 2018-02-25 PROCEDURE — 25000128 H RX IP 250 OP 636: Performed by: FAMILY MEDICINE

## 2018-02-25 PROCEDURE — 96365 THER/PROPH/DIAG IV INF INIT: CPT | Mod: XE | Performed by: FAMILY MEDICINE

## 2018-02-25 RX ADMIN — GANCICLOVIR SODIUM 435 MG: 500 INJECTION, POWDER, LYOPHILIZED, FOR SOLUTION INTRAVENOUS at 18:30

## 2018-02-25 RX ADMIN — GANCICLOVIR SODIUM 435 MG: 500 INJECTION, POWDER, LYOPHILIZED, FOR SOLUTION INTRAVENOUS at 08:35

## 2018-02-25 NOTE — PROGRESS NOTES
Patient here for Gancyclovir infusion, PICC line noted to have good blood return pre and post infusion, flushed PICC with normal saline per protocol post infusion. Tolerated medication without any issues, discharged to home in ambulatory condition.  Will return for next infusion this evening around 1800.

## 2018-02-25 NOTE — PROGRESS NOTES
Patient tolerated infusion without problems, PICC line with blood return post infusion, flushed with normal saline per protocol, discharged to home in ambulatory condition .  Will return tomorrow morning about 0730 for next infusion.

## 2018-02-25 NOTE — PROGRESS NOTES
Patient here for Gancyclovir infusion, Picc line noted with blood return, no changes assessment wise since this morning, no new meds, no changes to allergies.

## 2018-02-26 ENCOUNTER — INFUSION THERAPY VISIT (OUTPATIENT)
Dept: INFUSION THERAPY | Facility: CLINIC | Age: 43
End: 2018-02-26
Attending: FAMILY MEDICINE
Payer: MEDICARE

## 2018-02-26 VITALS
RESPIRATION RATE: 16 BRPM | SYSTOLIC BLOOD PRESSURE: 146 MMHG | HEART RATE: 82 BPM | DIASTOLIC BLOOD PRESSURE: 64 MMHG | TEMPERATURE: 97.5 F | OXYGEN SATURATION: 99 %

## 2018-02-26 VITALS
TEMPERATURE: 98 F | RESPIRATION RATE: 18 BRPM | BODY MASS INDEX: 33.99 KG/M2 | DIASTOLIC BLOOD PRESSURE: 81 MMHG | SYSTOLIC BLOOD PRESSURE: 133 MMHG | OXYGEN SATURATION: 99 % | HEART RATE: 80 BPM | WEIGHT: 250.6 LBS

## 2018-02-26 DIAGNOSIS — Z94.4 LIVER REPLACED BY TRANSPLANT (H): ICD-10-CM

## 2018-02-26 DIAGNOSIS — K83.01 PRIMARY SCLEROSING CHOLANGITIS (H): ICD-10-CM

## 2018-02-26 DIAGNOSIS — B25.9 CYTOMEGALOVIRUS INFECTION, UNSPECIFIED CYTOMEGALOVIRAL INFECTION TYPE (H): Primary | ICD-10-CM

## 2018-02-26 LAB
ALP SERPL-CCNC: 147 U/L (ref 40–150)
ALT SERPL W P-5'-P-CCNC: 32 U/L (ref 0–70)
AST SERPL W P-5'-P-CCNC: 19 U/L (ref 0–45)
BILIRUB SERPL-MCNC: 0.9 MG/DL (ref 0.2–1.3)
CREAT SERPL-MCNC: 1.15 MG/DL (ref 0.66–1.25)
GFR SERPL CREATININE-BSD FRML MDRD: 70 ML/MIN/1.7M2
GLUCOSE SERPL-MCNC: 177 MG/DL (ref 70–99)
HCT VFR BLD AUTO: 33.3 % (ref 40–53)
HGB BLD-MCNC: 9.6 G/DL (ref 13.3–17.7)
PLATELET # BLD AUTO: 93 10E9/L (ref 150–450)
POTASSIUM SERPL-SCNC: 4.6 MMOL/L (ref 3.4–5.3)
SODIUM SERPL-SCNC: 140 MMOL/L (ref 133–144)
WBC # BLD AUTO: 1.7 10E9/L (ref 4–11)

## 2018-02-26 PROCEDURE — 96365 THER/PROPH/DIAG IV INF INIT: CPT | Mod: XE

## 2018-02-26 PROCEDURE — 84450 TRANSFERASE (AST) (SGOT): CPT | Performed by: INTERNAL MEDICINE

## 2018-02-26 PROCEDURE — 25000128 H RX IP 250 OP 636: Performed by: FAMILY MEDICINE

## 2018-02-26 PROCEDURE — 82247 BILIRUBIN TOTAL: CPT | Performed by: INTERNAL MEDICINE

## 2018-02-26 PROCEDURE — 82947 ASSAY GLUCOSE BLOOD QUANT: CPT | Mod: GA | Performed by: INTERNAL MEDICINE

## 2018-02-26 PROCEDURE — 84132 ASSAY OF SERUM POTASSIUM: CPT | Performed by: INTERNAL MEDICINE

## 2018-02-26 PROCEDURE — 84075 ASSAY ALKALINE PHOSPHATASE: CPT | Performed by: INTERNAL MEDICINE

## 2018-02-26 PROCEDURE — 85027 COMPLETE CBC AUTOMATED: CPT | Performed by: INTERNAL MEDICINE

## 2018-02-26 PROCEDURE — 82565 ASSAY OF CREATININE: CPT | Performed by: INTERNAL MEDICINE

## 2018-02-26 PROCEDURE — 96365 THER/PROPH/DIAG IV INF INIT: CPT

## 2018-02-26 PROCEDURE — 84460 ALANINE AMINO (ALT) (SGPT): CPT | Performed by: INTERNAL MEDICINE

## 2018-02-26 PROCEDURE — 84295 ASSAY OF SERUM SODIUM: CPT | Performed by: INTERNAL MEDICINE

## 2018-02-26 RX ADMIN — GANCICLOVIR SODIUM 435 MG: 500 INJECTION, POWDER, LYOPHILIZED, FOR SOLUTION INTRAVENOUS at 19:21

## 2018-02-26 RX ADMIN — GANCICLOVIR SODIUM 435 MG: 500 INJECTION, POWDER, LYOPHILIZED, FOR SOLUTION INTRAVENOUS at 09:17

## 2018-02-26 ASSESSMENT — PAIN SCALES - GENERAL
PAINLEVEL: NO PAIN (0)
PAINLEVEL: NO PAIN (0)

## 2018-02-26 NOTE — MR AVS SNAPSHOT
After Visit Summary   2/26/2018    Abhishek Downey    MRN: [id number]           Patient Information     Date Of Birth          1975        Visit Information        Provider Department      2/26/2018 8:30 AM NL INFUSION CHAIR 2 [facility]        Today's Diagnoses     Cytomegalovirus infection, unspecified cytomegaloviral infection type (H)    -  1    Primary sclerosing cholangitis        Liver replaced by transplant (H)          Care Instructions    Pt to return on 2/26/18 for infusion at 1830. Copies of medication list and upcoming appointments given prior to discharge.           Follow-ups after your visit        Your next 10 appointments already scheduled     Feb 27, 2018  8:30 AM CST   Level 2 with NL INFUSION CHAIR 3   [facility] ([facility])    [address] [place] [address]   [phone]            Feb 28, 2018  8:30 AM CST   Level 2 with NL INFUSION CHAIR 2   [facility] ([facility])    [address] [place] [address]   [phone]            Mar 01, 2018  8:30 AM CST   Level 2 with NL INFUSION CHAIR 2   [facility] ([facility])    [address] [place] [address]   [phone]            Mar 02, 2018  8:30 AM CST   Level 2 with NL INFUSION CHAIR 2   [facility] ([facility])    [address] [place] [address]   [phone]              Who to contact     If you have questions or need follow up information about today's clinic visit or your schedule please contact [facility] INF[facility]ION SERVICES directly at [phone].  Normal or non-critical lab and imaging results will be communicated to you by MyChart, letter or phone within 4 business days after the clinic has received the results. If you do not hear from us within 7 days, please contact the  clinic through [employer] or phone. If you have a critical or abnormal lab result, we will notify you by phone as soon as possible.  Submit refill requests through [employer] or call your pharmacy and they will forward the refill request to us. Please allow 3 business days for your refill to be completed.          Additional Information About Your Visit        [employer] Information     [employer] gives you secure access to your electronic health record. If you see a primary care provider, you can also send messages to your care team and make appointments. If you have questions, please call your primary care clinic.  If you do not have a primary care provider, please call [phone] and they will assist you.        Care EveryWhere ID     This is your Care EveryWhere ID. This could be used by other organizations to access your [place] medical records  [id number]        Your Vitals Were     Pulse Temperature Respirations Pulse Oximetry BMI (Body Mass Index)       80 98  F (36.7  C) (Temporal) 18 99% 33.99 kg/m2        Blood Pressure from Last 3 Encounters:   02/26/18 133/81   02/25/18 153/88   02/25/18 154/79    Weight from Last 3 Encounters:   02/26/18 113.7 kg (250 lb 9.6 oz)   02/25/18 115 kg (253 lb 9.6 oz)   02/25/18 115 kg (253 lb 9.6 oz)              We Performed the Following     Alkaline phosphatase     ALT     AST     Bilirubin  total     Creatinine     Glucose     Hematocrit     Hemoglobin     Platelet count     Potassium     Sodium     WBC count        Primary Care Provider Office Phone # Fax #    Radha Sheilavalente Hidalgo [phone]       [address] [place] [address]        Equal Access to Services     [facility]: Hadii aad ku hadasho Soomaali, waaxda luqadaha, qaybta kaalmada adeegchandrika, elizabet gray . So [facility] [phone].    ATENCIÓN: Si habla español, tiene a burton disposición servicios gratuitos de asistencia lingüística. Llame al [phone].    We  comply with applicable federal civil rights laws and [place] laws. We do not discriminate on the basis of race, color, national origin, age, disability, sex, sexual orientation, or gender identity.            Thank you!     Thank you for choosing [facility]  for your care. Our goal is always to provide you with excellent care. Hearing back from our patients is one way we can continue to improve our services. Please take a few minutes to complete the written survey that you may receive in the mail after your visit with us. Thank you!             Your Updated Medication List - Protect others around you: Learn how to safely use, store and throw away your medicines at www.disposemymeds.org.          This list is accurate as of 2/26/18 11:32 AM.  Always use your most recent med list.                   Brand Name Dispense Instructions for use Diagnosis    aspirin 81 MG tablet      Take by mouth daily        insulin aspart 100 UNIT/ML injection    NovoLOG PEN     Inject Subcutaneous 3 times daily (with meals)        pantoprazole 40 MG EC tablet    PROTONIX     Take 1 tablet (40 mg) by mouth daily    S/P liver transplant (H)       tacrolimus 1 MG capsule    GENERIC EQUIVALENT     Take 4 capsules (4 mg) by mouth 2 times daily    S/P liver transplant (H)       traMADol 50 MG tablet    ULTRAM     Take 1-2 tablets ( mg) by mouth every 6 hours as needed for pain    S/P liver transplant (H)

## 2018-02-26 NOTE — PROGRESS NOTES
Patient here for gancyclovir infusion through PICC line, blood return noted pre and post infusion, tolerated infusion without any adverse effects. Flushed PICC line post infusion with normal saline per protocol. Discharge to home in stable condition, ambulatory. No new meds, no new allergies. Will return tomorrow morning for next infusion at 0830.

## 2018-02-26 NOTE — PATIENT INSTRUCTIONS
Pt to return on 2/26/18 for infusion at 1830. Copies of medication list and upcoming appointments given prior to discharge.

## 2018-02-26 NOTE — PROGRESS NOTES
Patient here today for Ganciclovir infusion. Labs drawn, PICC line dressing changed without incident. Tolerated infusion well. Positive blood return pre/post. Discharged in stable condition.

## 2018-02-26 NOTE — MR AVS SNAPSHOT
After Visit Summary   2/26/2018    Abhishek Downey    MRN: [id number]           Patient Information     Date Of Birth          1975        Visit Information        Provider Department      2/26/2018 5:00 PM NL INFUSION CHAIR 3 [facility]        Today's Diagnoses     Cytomegalovirus infection, unspecified cytomegaloviral infection type (H)    -  1    Primary sclerosing cholangitis          Care Instructions    Return in am for Ganciclovir.           Follow-ups after your visit        Follow-up notes from your care team     Return in 1 day (on 2/27/2018).      Your next 10 appointments already scheduled     Feb 27, 2018  8:30 AM CST   Level 2 with NL INFUSION CHAIR 3   [facility] ([facility])    [address] [place] [address] Dr [place] [address]   [phone]            Feb 28, 2018  8:30 AM CST   Level 2 with NL INFUSION CHAIR 2   [facility] ([facility])    [address] [place] [address]   [phone]            Mar 01, 2018  8:30 AM CST   Level 2 with NL INFUSION CHAIR 2   [facility] ([facility])    [address] [place] [address] [address] [place] [address]   [phone]            Mar 02, 2018  8:30 AM CST   Level 2 with NL INFUSION CHAIR 2   [facility] ([facility])    [address] [place] [address] Dr [place] [address]   [phone]              Who to contact     If you have questions or need follow up information about today's clinic visit or your schedule please contact [facility] INFUSION SERVICES directly at [phone].  Normal or non-critical lab and imaging results will be communicated to you by MyChart, letter or phone within 4 business days after the clinic has received the results. If you do not hear from us within 7 days, please contact the clinic through MyChart or phone. If you have a critical or  abnormal lab result, we will notify you by phone as soon as possible.  Submit refill requests through [employer] Information or call your pharmacy and they will forward the refill request to us. Please allow 3 business days for your refill to be completed.          Additional Information About Your Visit        [employer] Information     [employer] Information gives you secure access to your electronic health record. If you see a primary care provider, you can also send messages to your care team and make appointments. If you have questions, please call your primary care clinic.  If you do not have a primary care provider, please call [phone] and they will assist you.        Care EveryWhere ID     This is your Care EveryWhere ID. This could be used by other organizations to access your [place] medical records  [id number]        Your Vitals Were     Pulse Temperature Respirations Pulse Oximetry          82 97.5  F (36.4  C) (Temporal) 16 99%         Blood Pressure from Last 3 Encounters:   02/26/18 146/64   02/26/18 133/81   02/25/18 153/88    Weight from Last 3 Encounters:   02/26/18 113.7 kg (250 lb 9.6 oz)   02/25/18 115 kg (253 lb 9.6 oz)   02/25/18 115 kg (253 lb 9.6 oz)              Today, you had the following     No orders found for display       Primary Care Provider Office Phone # Fax #    Radha Sheila Hidalgo [phone] [address] [place] [address]        Equal Access to Services     [facility]: Hadii aad ku hadasho Soomaali, waaxda luqadaha, qaybta kaalmada adeegyada, elizabet brar. So [facility] [phone].    ATENCIÓN: Si habla español, tiene a burton disposición servicios gratuitos de asistencia lingüística. Llame al [phone].    We comply with applicable federal civil rights laws and [place] laws. We do not discriminate on the basis of race, color, national origin, age, disability, sex, sexual orientation, or gender identity.            Thank you!     Thank  you for choosing [facility] INFUSION SERVICES  for your care. Our goal is always to provide you with excellent care. Hearing back from our patients is one way we can continue to improve our services. Please take a few minutes to complete the written survey that you may receive in the mail after your visit with us. Thank you!             Your Updated Medication List - Protect others around you: Learn how to safely use, store and throw away your medicines at www.disposemymeds.org.          This list is accurate as of 2/26/18  8:41 PM.  Always use your most recent med list.                   Brand Name Dispense Instructions for use Diagnosis    aspirin 81 MG tablet      Take by mouth daily        insulin aspart 100 UNIT/ML injection    NovoLOG PEN     Inject Subcutaneous 3 times daily (with meals)        pantoprazole 40 MG EC tablet    PROTONIX     Take 1 tablet (40 mg) by mouth daily    S/P liver transplant (H)       tacrolimus 1 MG capsule    GENERIC EQUIVALENT     Take 4 capsules (4 mg) by mouth 2 times daily    S/P liver transplant (H)       traMADol 50 MG tablet    ULTRAM     Take 1-2 tablets ( mg) by mouth every 6 hours as needed for pain    S/P liver transplant (H)

## 2018-02-27 ENCOUNTER — INFUSION THERAPY VISIT (OUTPATIENT)
Dept: INFUSION THERAPY | Facility: CLINIC | Age: 43
End: 2018-02-27
Attending: FAMILY MEDICINE
Payer: MEDICARE

## 2018-02-27 VITALS
SYSTOLIC BLOOD PRESSURE: 148 MMHG | OXYGEN SATURATION: 99 % | DIASTOLIC BLOOD PRESSURE: 92 MMHG | TEMPERATURE: 97.6 F | HEART RATE: 83 BPM | RESPIRATION RATE: 16 BRPM

## 2018-02-27 VITALS
BODY MASS INDEX: 34.13 KG/M2 | HEIGHT: 72 IN | RESPIRATION RATE: 16 BRPM | DIASTOLIC BLOOD PRESSURE: 80 MMHG | SYSTOLIC BLOOD PRESSURE: 142 MMHG | TEMPERATURE: 98.1 F | WEIGHT: 252 LBS | OXYGEN SATURATION: 98 % | HEART RATE: 81 BPM

## 2018-02-27 DIAGNOSIS — B25.9 CYTOMEGALOVIRUS INFECTION, UNSPECIFIED CYTOMEGALOVIRAL INFECTION TYPE (H): Primary | ICD-10-CM

## 2018-02-27 DIAGNOSIS — K83.01 PRIMARY SCLEROSING CHOLANGITIS (H): ICD-10-CM

## 2018-02-27 PROCEDURE — 96365 THER/PROPH/DIAG IV INF INIT: CPT

## 2018-02-27 PROCEDURE — 25000128 H RX IP 250 OP 636: Performed by: FAMILY MEDICINE

## 2018-02-27 RX ADMIN — GANCICLOVIR SODIUM 435 MG: 500 INJECTION, POWDER, LYOPHILIZED, FOR SOLUTION INTRAVENOUS at 18:51

## 2018-02-27 RX ADMIN — SODIUM CHLORIDE 250 ML: 9 INJECTION, SOLUTION INTRAVENOUS at 08:57

## 2018-02-27 RX ADMIN — GANCICLOVIR SODIUM 435 MG: 500 INJECTION, POWDER, LYOPHILIZED, FOR SOLUTION INTRAVENOUS at 09:04

## 2018-02-27 ASSESSMENT — PAIN SCALES - GENERAL
PAINLEVEL: NO PAIN (0)
PAINLEVEL: NO PAIN (0)

## 2018-02-27 NOTE — MR AVS SNAPSHOT
After Visit Summary   2/27/2018    Abhishek Downey    MRN: [id number]           Patient Information     Date Of Birth          1975        Visit Information        Provider Department      2/27/2018 8:30 AM NL INFUSION CHAIR 3 [facility]        Today's Diagnoses     Cytomegalovirus infection, unspecified cytomegaloviral infection type (H)    -  1    Primary sclerosing cholangitis          Care Instructions    Pt to return on 02/27/18 at 8 pm for Ganciclovir. Faxed info to the ED. Copies of medication list and upcoming appointments given prior to discharge.           Follow-ups after your visit        Your next 10 appointments already scheduled     Feb 28, 2018  8:30 AM CST   Level 2 with NL INFUSION CHAIR 2   [facility] ([facility])    [address] [place] [address]   [phone]            Mar 01, 2018  8:30 AM CST   Level 2 with NL INFUSION CHAIR 2   [facility] ([facility])    [address] [place] [address]   [phone]            Mar 02, 2018  8:30 AM CST   Level 2 with NL INFUSION CHAIR 2   [facility] ([facility])    [address] [place] [address]   [phone]              Who to contact     If you have questions or need follow up information about today's clinic visit or your schedule please contact [facility] INF[facility]ION [facility] directly at [phone].  Normal or non-critical lab and imaging results will be communicated to you by MyChart, letter or phone within 4 business days after the clinic has received the results. If you do not hear from us within 7 days, please contact the clinic through MyChart or phone. If you have a critical or abnormal lab result, we will notify you by phone as soon as possible.  Submit refill requests through Orecon or call your pharmacy and they will forward the  refill request to us. Please allow 3 business days for your refill to be completed.          Additional Information About Your Visit        MyChart Information     OSIsofthart gives you secure access to your electronic health record. If you see a primary care provider, you can also send messages to your care team and make appointments. If you have questions, please call your primary care clinic.  If you do not have a primary care provider, please call [phone] and they will assist you.        Care EveryWhere ID     This is your Care EveryWhere ID. This could be used by other organizations to access your [place] medical records  [id number]        Your Vitals Were     Pulse Temperature Respirations Pulse Oximetry          83 97.6  F (36.4  C) (Temporal) 16 99%         Blood Pressure from Last 3 Encounters:   02/27/18 (!) 148/92   02/26/18 146/64   02/26/18 133/81    Weight from Last 3 Encounters:   02/26/18 113.7 kg (250 lb 9.6 oz)   02/25/18 115 kg (253 lb 9.6 oz)   02/25/18 115 kg (253 lb 9.6 oz)              Today, you had the following     No orders found for display       Primary Care Provider Office Phone # Fax #    Radha Sheila Hidalgo [phone]       [address] [place] [address]        Equal Access to Services     DAVID CURRY : Hadii aad ku hadasho Soomaali, waaxda luqadaha, qaybta kaalmada adeegyada, waxay idiin hayaan willow gray . So [facility] [phone].    ATENCIÓN: Si habla español, tiene a burton disposición servicios gratuitos de asistencia lingüística. Llame al [phone].    We comply with applicable federal civil rights laws and [place] laws. We do not discriminate on the basis of race, color, national origin, age, disability, sex, sexual orientation, or gender identity.            Thank you!     Thank you for choosing [facility] SERVICES  for your care. Our goal is always to provide you with excellent care. Hearing back from our  patients is one way we can continue to improve our services. Please take a few minutes to complete the written survey that you may receive in the mail after your visit with us. Thank you!             Your Updated Medication List - Protect others around you: Learn how to safely use, store and throw away your medicines at www.disposemymeds.org.          This list is accurate as of 2/27/18 10:24 AM.  Always use your most recent med list.                   Brand Name Dispense Instructions for use Diagnosis    aspirin 81 MG tablet      Take by mouth daily        insulin aspart 100 UNIT/ML injection    NovoLOG PEN     Inject Subcutaneous 3 times daily (with meals)        pantoprazole 40 MG EC tablet    PROTONIX     Take 1 tablet (40 mg) by mouth daily    S/P liver transplant (H)       tacrolimus 1 MG capsule    GENERIC EQUIVALENT     Take 4 capsules (4 mg) by mouth 2 times daily    S/P liver transplant (H)       traMADol 50 MG tablet    ULTRAM     Take 1-2 tablets ( mg) by mouth every 6 hours as needed for pain    S/P liver transplant (H)

## 2018-02-27 NOTE — PATIENT INSTRUCTIONS
Pt to return on 02/27/18 at 8 pm for Ganciclovir. Faxed info to the ED. Copies of medication list and upcoming appointments given prior to discharge.

## 2018-02-27 NOTE — PROGRESS NOTES
Patient here today for Ganciclovir infusion. PICC line patent, blood return noted pre and post infusion. Denies symptoms of reaction, tolerated infusion well. Patient discharged to home in stable condition.

## 2018-02-27 NOTE — MR AVS SNAPSHOT
After Visit Summary   2/27/2018    Abhishek Downey    MRN: [id number]           Patient Information     Date Of Birth          1975        Visit Information        Provider Department      2/27/2018 5:00 PM NL INFUSION CHAIR 1 [facility]        Today's Diagnoses     Cytomegalovirus infection, unspecified cytomegaloviral infection type (H)    -  1    Primary sclerosing cholangitis          Care Instructions    Pt to return on 2-28-18 for Ganciclovir. Copies of medication list and upcoming appointments given prior to discharge.           Follow-ups after your visit        Follow-up notes from your care team     Return in about 1 day (around 2/28/2018) for Ganciclovir.      Your next 10 appointments already scheduled     Feb 28, 2018  8:30 AM CST   Level 2 with NL INFUSION CHAIR 2   [facility] ([facility])    [address] [place] [address] [place] [address]   [phone]            Mar 01, 2018  8:30 AM CST   Level 2 with NL INFUSION CHAIR 2   [facility] ([facility])    [address] [place] [address] [place] [address]   [phone]            Mar 02, 2018  8:30 AM CST   Level 2 with NL INFUSION CHAIR 2   [facility] ([facility])    [address] [place] [address] [place] [address]   [phone]              Who to contact     If you have questions or need follow up information about today's clinic visit or your schedule please contact [facility] INFUSION SERVICES directly at [phone].  Normal or non-critical lab and imaging results will be communicated to you by MyChart, letter or phone within 4 business days after the clinic has received the results. If you do not hear from us within 7 days, please contact the clinic through MyChart or phone. If you have a critical or abnormal lab result, we will notify you by phone as soon as possible.  Submit refill  requests through Delivered or call your pharmacy and they will forward the refill request to us. Please allow 3 business days for your refill to be completed.          Additional Information About Your Visit        Price Ignite Systemshart Information     Delivered gives you secure access to your electronic health record. If you see a primary care provider, you can also send messages to your care team and make appointments. If you have questions, please call your primary care clinic.  If you do not have a primary care provider, please call [phone] and they will assist you.        Care EveryWhere ID     This is your Care EveryWhere ID. This could be used by other organizations to access your [place] medical records  [id number]        Your Vitals Were     Pulse Temperature Respirations Height Pulse Oximetry BMI (Body Mass Index)    81 98.1  F (36.7  C) (Temporal) 16 1.829 m (6') 98% 34.18 kg/m2       Blood Pressure from Last 3 Encounters:   02/27/18 142/80   02/27/18 (!) 148/92   02/26/18 146/64    Weight from Last 3 Encounters:   02/27/18 114.3 kg (252 lb)   02/26/18 113.7 kg (250 lb 9.6 oz)   02/25/18 115 kg (253 lb 9.6 oz)              Today, you had the following     No orders found for display       Primary Care Provider Office Phone # Fax #    Radha Sheila Hidalgo [phone]       [address] [place] [address]        Equal Access to Services     [facility]: Hadii mariann jarviso Sodeon, waaxda luqadaha, qaybta kaalmada everett, elizabet brar. So [facility] [phone].    ATENCIÓN: Si habla español, tiene a burton disposición servicios gratuitos de asistencia lingüística. Llame al [phone].    We comply with applicable federal civil rights laws and [place] laws. We do not discriminate on the basis of race, color, national origin, age, disability, sex, sexual orientation, or gender identity.            Thank you!     Thank you for choosing [facility]  INFUSION SERVICES  for your care. Our goal is always to provide you with excellent care. Hearing back from our patients is one way we can continue to improve our services. Please take a few minutes to complete the written survey that you may receive in the mail after your visit with us. Thank you!             Your Updated Medication List - Protect others around you: Learn how to safely use, store and throw away your medicines at www.disposemymeds.org.          This list is accurate as of 2/27/18  8:20 PM.  Always use your most recent med list.                   Brand Name Dispense Instructions for use Diagnosis    aspirin 81 MG tablet      Take by mouth daily        insulin aspart 100 UNIT/ML injection    NovoLOG PEN     Inject Subcutaneous 3 times daily (with meals)        pantoprazole 40 MG EC tablet    PROTONIX     Take 1 tablet (40 mg) by mouth daily    S/P liver transplant (H)       tacrolimus 1 MG capsule    GENERIC EQUIVALENT     Take 4 capsules (4 mg) by mouth 2 times daily    S/P liver transplant (H)       traMADol 50 MG tablet    ULTRAM     Take 1-2 tablets ( mg) by mouth every 6 hours as needed for pain    S/P liver transplant (H)

## 2018-02-28 ENCOUNTER — HOSPITAL ENCOUNTER (EMERGENCY)
Facility: CLINIC | Age: 43
Discharge: HOME | End: 2018-02-28
Admitting: FAMILY MEDICINE
Payer: MEDICARE

## 2018-02-28 ENCOUNTER — INFUSION THERAPY VISIT (OUTPATIENT)
Dept: INFUSION THERAPY | Facility: CLINIC | Age: 43
End: 2018-02-28
Attending: FAMILY MEDICINE
Payer: MEDICARE

## 2018-02-28 VITALS
SYSTOLIC BLOOD PRESSURE: 149 MMHG | OXYGEN SATURATION: 100 % | BODY MASS INDEX: 34.13 KG/M2 | HEIGHT: 72 IN | WEIGHT: 252 LBS | RESPIRATION RATE: 12 BRPM | TEMPERATURE: 97.5 F | HEART RATE: 97 BPM | DIASTOLIC BLOOD PRESSURE: 68 MMHG

## 2018-02-28 VITALS
TEMPERATURE: 97.4 F | SYSTOLIC BLOOD PRESSURE: 141 MMHG | DIASTOLIC BLOOD PRESSURE: 63 MMHG | HEART RATE: 83 BPM | RESPIRATION RATE: 18 BRPM | OXYGEN SATURATION: 97 %

## 2018-02-28 DIAGNOSIS — K83.01 PRIMARY SCLEROSING CHOLANGITIS (H): ICD-10-CM

## 2018-02-28 DIAGNOSIS — B25.9 CYTOMEGALOVIRUS INFECTION, UNSPECIFIED CYTOMEGALOVIRAL INFECTION TYPE (H): Primary | ICD-10-CM

## 2018-02-28 DIAGNOSIS — B25.9 CYTOMEGALOVIRUS INFECTION, UNSPECIFIED CYTOMEGALOVIRAL INFECTION TYPE (H): ICD-10-CM

## 2018-02-28 PROCEDURE — 25000128 H RX IP 250 OP 636: Performed by: FAMILY MEDICINE

## 2018-02-28 PROCEDURE — 40000268 ZZH STATISTIC NO CHARGES

## 2018-02-28 PROCEDURE — 96365 THER/PROPH/DIAG IV INF INIT: CPT

## 2018-02-28 RX ADMIN — SODIUM CHLORIDE 250 ML: 9 INJECTION, SOLUTION INTRAVENOUS at 20:08

## 2018-02-28 RX ADMIN — GANCICLOVIR SODIUM 435 MG: 500 INJECTION, POWDER, LYOPHILIZED, FOR SOLUTION INTRAVENOUS at 20:09

## 2018-02-28 RX ADMIN — GANCICLOVIR SODIUM 435 MG: 500 INJECTION, POWDER, LYOPHILIZED, FOR SOLUTION INTRAVENOUS at 08:39

## 2018-02-28 RX ADMIN — SODIUM CHLORIDE 250 ML: 9 INJECTION, SOLUTION INTRAVENOUS at 08:38

## 2018-02-28 ASSESSMENT — PAIN SCALES - GENERAL: PAINLEVEL: NO PAIN (0)

## 2018-02-28 NOTE — MR AVS SNAPSHOT
After Visit Summary   2/28/2018    Abhishek Downey    MRN: [id number]           Patient Information     Date Of Birth          1975        Visit Information        Provider Department      2/28/2018 8:30 AM NL INFUSION CHAIR 2 [facility]        Today's Diagnoses     Cytomegalovirus infection, unspecified cytomegaloviral infection type (H)    -  1    Primary sclerosing cholangitis          Care Instructions    Pt to return tonight for Ganciclovir. Copies of medication list and upcoming appointments given prior to discharge.             Follow-ups after your visit        Your next 10 appointments already scheduled     Mar 01, 2018  8:30 AM CST   Level 2 with NL INFUSION CHAIR 2   [facility] ([facility])    [address] [place] [id number] [phone]            Mar 02, 2018  8:30 AM CST   Level 2 with NL INFUSION CHAIR 2   [facility] ([facility])    [address] [place] [id number] [phone]              Who to contact     If you have questions or need follow up information about today's clinic visit or your schedule please contact [facility] INFUSION SERVICES directly at [phone].  Normal or non-critical lab and imaging results will be communicated to you by MyChart, letter or phone within 4 business days after the clinic has received the results. If you do not hear from us within 7 days, please contact the clinic through MyChart or phone. If you have a critical or abnormal lab result, we will notify you by phone as soon as possible.  Submit refill requests through Drywave or call your pharmacy and they will forward the refill request to us. Please allow 3 business days for your refill to be completed.          Additional Information About Your Visit        MyChart Information     Drywave gives you secure access to your electronic health record. If  you see a primary care provider, you can also send messages to your care team and make appointments. If you have questions, please call your primary care clinic.  If you do not have a primary care provider, please call [phone] and they will assist you.        Care EveryWhere ID     This is your Care EveryWhere ID. This could be used by other organizations to access your [place] medical records  [id number]        Your Vitals Were     Pulse Temperature Respirations Pulse Oximetry          83 97.4  F (36.3  C) (Temporal) 18 97%         Blood Pressure from Last 3 Encounters:   02/28/18 141/63   02/27/18 142/80   02/27/18 (!) 148/92    Weight from Last 3 Encounters:   02/27/18 114.3 kg (252 lb)   02/26/18 113.7 kg (250 lb 9.6 oz)   02/25/18 115 kg (253 lb 9.6 oz)              Today, you had the following     No orders found for display       Primary Care Provider Office Phone # Fax #    Radha Sheila Hidalgo [phone]       [address]   [facility] [address]        Equal Access to Services     [facility]: Hadii aad ku hadasho Soomaali, waaxda luqadaha, qaybta kaalmada adeegyada, elizabet gray . So [facility] [phone].    ATENCIÓN: Si habla español, tiene a burton disposición servicios gratuitos de asistencia lingüística. LlProMed[facility] [phone].    We comply with applicable federal civil rights laws and [place] laws. We do not discriminate on the basis of race, color, national origin, age, disability, sex, sexual orientation, or gender identity.            Thank you!     Thank you for choosing [facility] SERVICES  for your care. Our goal is always to provide you with excellent care. Hearing back from our patients is one way we can continue to improve our services. Please take a few minutes to complete the written survey that you may receive in the mail after your visit with us. Thank you!             Your Updated Medication List - Protect others  around you: Learn how to safely use, store and throw away your medicines at www.disposemymeds.org.          This list is accurate as of 2/28/18  4:37 PM.  Always use your most recent med list.                   Brand Name Dispense Instructions for use Diagnosis    aspirin 81 MG tablet      Take by mouth daily        insulin aspart 100 UNIT/ML injection    NovoLOG PEN     Inject Subcutaneous 3 times daily (with meals)        pantoprazole 40 MG EC tablet    PROTONIX     Take 1 tablet (40 mg) by mouth daily    S/P liver transplant (H)       tacrolimus 1 MG capsule    GENERIC EQUIVALENT     Take 4 capsules (4 mg) by mouth 2 times daily    S/P liver transplant (H)       traMADol 50 MG tablet    ULTRAM     Take 1-2 tablets ( mg) by mouth every 6 hours as needed for pain    S/P liver transplant (H)

## 2018-02-28 NOTE — PATIENT INSTRUCTIONS
Pt to return on 2-28-18 for Ganciclovir. Copies of medication list and upcoming appointments given prior to discharge.

## 2018-02-28 NOTE — PROGRESS NOTES
Patient here today for Ganciclovir infusion. Patient states is feeling better from the beginning. Blood return noted pre and post infusion, tolerated infusion well. Discharged to home in stable condition.

## 2018-02-28 NOTE — PROGRESS NOTES
Here for Ganciclovir. PICC site WNL with good blood return prior to infusion. Tolerated infusion well. Flushed PICC well with saline. Will return for another infusion tomorrow morning.

## 2018-02-28 NOTE — PATIENT INSTRUCTIONS
Pt to return tonight for Ganciclovir. Copies of medication list and upcoming appointments given prior to discharge.

## 2018-03-01 ENCOUNTER — INFUSION THERAPY VISIT (OUTPATIENT)
Dept: INFUSION THERAPY | Facility: CLINIC | Age: 43
End: 2018-03-01
Attending: FAMILY MEDICINE
Payer: MEDICARE

## 2018-03-01 ENCOUNTER — HOSPITAL ENCOUNTER (EMERGENCY)
Facility: CLINIC | Age: 43
Discharge: HOME | End: 2018-03-01
Admitting: FAMILY MEDICINE
Payer: MEDICARE

## 2018-03-01 VITALS
SYSTOLIC BLOOD PRESSURE: 144 MMHG | WEIGHT: 252 LBS | OXYGEN SATURATION: 99 % | RESPIRATION RATE: 12 BRPM | HEIGHT: 72 IN | TEMPERATURE: 98.2 F | DIASTOLIC BLOOD PRESSURE: 90 MMHG | HEART RATE: 78 BPM | BODY MASS INDEX: 34.13 KG/M2

## 2018-03-01 VITALS
DIASTOLIC BLOOD PRESSURE: 80 MMHG | HEART RATE: 79 BPM | RESPIRATION RATE: 16 BRPM | TEMPERATURE: 97.8 F | SYSTOLIC BLOOD PRESSURE: 150 MMHG | OXYGEN SATURATION: 99 %

## 2018-03-01 DIAGNOSIS — B25.9 CYTOMEGALOVIRUS INFECTION, UNSPECIFIED CYTOMEGALOVIRAL INFECTION TYPE (H): Primary | ICD-10-CM

## 2018-03-01 DIAGNOSIS — B25.9 CYTOMEGALOVIRUS INFECTION, UNSPECIFIED CYTOMEGALOVIRAL INFECTION TYPE (H): ICD-10-CM

## 2018-03-01 DIAGNOSIS — K83.01 PRIMARY SCLEROSING CHOLANGITIS (H): ICD-10-CM

## 2018-03-01 PROCEDURE — 96366 THER/PROPH/DIAG IV INF ADDON: CPT

## 2018-03-01 PROCEDURE — 25000128 H RX IP 250 OP 636: Performed by: FAMILY MEDICINE

## 2018-03-01 PROCEDURE — 96365 THER/PROPH/DIAG IV INF INIT: CPT | Mod: XE

## 2018-03-01 PROCEDURE — 40000268 ZZH STATISTIC NO CHARGES

## 2018-03-01 PROCEDURE — 96365 THER/PROPH/DIAG IV INF INIT: CPT

## 2018-03-01 RX ADMIN — SODIUM CHLORIDE 250 ML: 9 INJECTION, SOLUTION INTRAVENOUS at 08:24

## 2018-03-01 RX ADMIN — GANCICLOVIR SODIUM 435 MG: 500 INJECTION, POWDER, LYOPHILIZED, FOR SOLUTION INTRAVENOUS at 20:03

## 2018-03-01 RX ADMIN — GANCICLOVIR SODIUM 435 MG: 500 INJECTION, POWDER, LYOPHILIZED, FOR SOLUTION INTRAVENOUS at 08:52

## 2018-03-01 ASSESSMENT — PAIN SCALES - GENERAL: PAINLEVEL: NO PAIN (0)

## 2018-03-01 NOTE — PROGRESS NOTES
Patient here today for Ganciclovir infusion. PICC line patent with blood return noted pre and post infusion. Denies reaction symptoms. Patient discharged to home in stable condition.

## 2018-03-01 NOTE — PATIENT INSTRUCTIONS
Pt to return tonight in the ED for Ganciclovir. Copies of medication list and upcoming appointments given prior to discharge.

## 2018-03-01 NOTE — MR AVS SNAPSHOT
After Visit Summary   3/1/2018    Abhishek Downey    MRN: [id number]           Patient Information     Date Of Birth          1975        Visit Information        Provider Department      3/1/2018 8:30 AM NL INFUSION CHAIR 2 [facility]        Today's Diagnoses     Cytomegalovirus infection, unspecified cytomegaloviral infection type (H)    -  1    Primary sclerosing cholangitis          Care Instructions    Pt to return tonight in the ED for Ganciclovir. Copies of medication list and upcoming appointments given prior to discharge.             Follow-ups after your visit        Your next 10 appointments already scheduled     Mar 02, 2018  8:30 AM CST   Level 2 with NL INFUSION CHAIR 2   [facility] ([facility])    [address] [place] [address] [phone]              Who to contact     If you have questions or need follow up information about today's clinic visit or your schedule please contact [facility] INFUSION SERVICES directly at [phone].  Normal or non-critical lab and imaging results will be communicated to you by Skribithart, letter or phone within 4 business days after the clinic has received the results. If you do not hear from us within 7 days, please contact the clinic through Access Media 3t or phone. If you have a critical or abnormal lab result, we will notify you by phone as soon as possible.  Submit refill requests through ODEC or call your pharmacy and they will forward the refill request to us. Please allow 3 business days for your refill to be completed.          Additional Information About Your Visit        MyChart Information     ODEC gives you secure access to your electronic health record. If you see a primary care provider, you can also send messages to your care team and make appointments. If you have questions, please call your primary care clinic.  If you do not have a primary  care provider, please call [phone] and they will assist you.        Care EveryWhere ID     This is your Care EveryWhere ID. This could be used by other organizations to access your [place] medical records  [id number]        Your Vitals Were     Pulse Temperature Respirations Pulse Oximetry          79 97.8  F (36.6  C) (Temporal) 16 99%         Blood Pressure from Last 3 Encounters:   03/01/18 150/80   02/28/18 149/68   02/28/18 141/63    Weight from Last 3 Encounters:   02/28/18 114.3 kg (252 lb)   02/27/18 114.3 kg (252 lb)   02/26/18 113.7 kg (250 lb 9.6 oz)              Today, you had the following     No orders found for display       Primary Care Provider Office Phone # Fax #    Radha Sheila Hidalgo [phone] [address] [place] [address]        Equal Access to Services     DAVID CURRY AH: Hadii aad ku hadasho Soomaali, waaxda luqadaha, qaybta kaalmada adeegyada, waxay reginaldoin hayvinny gray . So [facility] [phone].    ATENCIÓN: Si habla español, tiene a burton disposición servicios gratuitos de asistencia lingüística. Llame al [phone].    We comply with applicable federal civil rights laws and [place] laws. We do not discriminate on the basis of race, color, national origin, age, disability, sex, sexual orientation, or gender identity.            Thank you!     Thank you for choosing [facility] INFUSION SERVICES  for your care. Our goal is always to provide you with excellent care. Hearing back from our patients is one way we can continue to improve our services. Please take a few minutes to complete the written survey that you may receive in the mail after your visit with us. Thank you!             Your Updated Medication List - Protect others around you: Learn how to safely use, store and throw away your medicines at www.disposemymeds.org.          This list is accurate as of 3/1/18 11:31 AM.  Always use your most recent med list.                    Brand Name Dispense Instructions for use Diagnosis    aspirin 81 MG tablet      Take by mouth daily        insulin aspart 100 UNIT/ML injection    NovoLOG PEN     Inject Subcutaneous 3 times daily (with meals)        pantoprazole 40 MG EC tablet    PROTONIX     Take 1 tablet (40 mg) by mouth daily    S/P liver transplant (H)       tacrolimus 1 MG capsule    GENERIC EQUIVALENT     Take 4 capsules (4 mg) by mouth 2 times daily    S/P liver transplant (H)       traMADol 50 MG tablet    ULTRAM     Take 1-2 tablets ( mg) by mouth every 6 hours as needed for pain    S/P liver transplant (H)

## 2018-03-02 ENCOUNTER — HOSPITAL ENCOUNTER (EMERGENCY)
Facility: CLINIC | Age: 43
Discharge: HOME | End: 2018-03-02
Admitting: NURSE PRACTITIONER
Payer: MEDICARE

## 2018-03-02 ENCOUNTER — INFUSION THERAPY VISIT (OUTPATIENT)
Dept: INFUSION THERAPY | Facility: CLINIC | Age: 43
End: 2018-03-02
Attending: FAMILY MEDICINE
Payer: MEDICARE

## 2018-03-02 VITALS
DIASTOLIC BLOOD PRESSURE: 114 MMHG | OXYGEN SATURATION: 100 % | HEIGHT: 72 IN | HEART RATE: 90 BPM | SYSTOLIC BLOOD PRESSURE: 149 MMHG | BODY MASS INDEX: 34.18 KG/M2 | RESPIRATION RATE: 12 BRPM | TEMPERATURE: 97.4 F

## 2018-03-02 VITALS
HEART RATE: 77 BPM | SYSTOLIC BLOOD PRESSURE: 129 MMHG | OXYGEN SATURATION: 99 % | DIASTOLIC BLOOD PRESSURE: 102 MMHG | TEMPERATURE: 97.4 F | RESPIRATION RATE: 16 BRPM

## 2018-03-02 DIAGNOSIS — B25.9 CYTOMEGALOVIRUS INFECTION, UNSPECIFIED CYTOMEGALOVIRAL INFECTION TYPE (H): ICD-10-CM

## 2018-03-02 DIAGNOSIS — B25.9 CYTOMEGALOVIRUS INFECTION, UNSPECIFIED CYTOMEGALOVIRAL INFECTION TYPE (H): Primary | ICD-10-CM

## 2018-03-02 DIAGNOSIS — K83.01 PRIMARY SCLEROSING CHOLANGITIS (H): ICD-10-CM

## 2018-03-02 PROCEDURE — 25000128 H RX IP 250 OP 636: Performed by: FAMILY MEDICINE

## 2018-03-02 PROCEDURE — 96365 THER/PROPH/DIAG IV INF INIT: CPT | Performed by: NURSE PRACTITIONER

## 2018-03-02 PROCEDURE — 40000268 ZZH STATISTIC NO CHARGES: Performed by: NURSE PRACTITIONER

## 2018-03-02 PROCEDURE — 96365 THER/PROPH/DIAG IV INF INIT: CPT

## 2018-03-02 RX ADMIN — GANCICLOVIR SODIUM 435 MG: 500 INJECTION, POWDER, LYOPHILIZED, FOR SOLUTION INTRAVENOUS at 08:48

## 2018-03-02 RX ADMIN — SODIUM CHLORIDE 250 ML: 9 INJECTION, SOLUTION INTRAVENOUS at 19:51

## 2018-03-02 RX ADMIN — SODIUM CHLORIDE 250 ML: 9 INJECTION, SOLUTION INTRAVENOUS at 08:16

## 2018-03-02 RX ADMIN — GANCICLOVIR SODIUM 435 MG: 500 INJECTION, POWDER, LYOPHILIZED, FOR SOLUTION INTRAVENOUS at 19:50

## 2018-03-02 ASSESSMENT — PAIN SCALES - GENERAL: PAINLEVEL: NO PAIN (0)

## 2018-03-02 NOTE — PATIENT INSTRUCTIONS
Pt to return tonight in ED for Ganciclovir. Copies of medication list and upcoming appointments given prior to discharge.

## 2018-03-02 NOTE — MR AVS SNAPSHOT
After Visit Summary   3/2/2018    Abhishek Downey    MRN: [id number]           Patient Information     Date Of Birth          1975        Visit Information        Provider Department      3/2/2018 8:30 AM NL INFUSION CHAIR 2 [facility]        Today's Diagnoses     Cytomegalovirus infection, unspecified cytomegaloviral infection type (H)    -  1    Primary sclerosing cholangitis          Care Instructions    Pt to return tonight in ED for Ganciclovir. Copies of medication list and upcoming appointments given prior to discharge.             Follow-ups after your visit        Your next 10 appointments already scheduled     Mar 05, 2018  8:30 AM CST   Level 2 with NL INFUSION CHAIR 1   [facility] ([facility])    [address] [place] [address]   [phone]            Mar 06, 2018  8:30 AM CST   Level 2 with NL INFUSION CHAIR 3   [facility])    [address] [place] [address]   [phone]            Mar 07, 2018  8:30 AM CST   Level 2 with NL INFUSION CHAIR 5   [facility] ([facility])    [address] [place] [address]   [phone]            Mar 08, 2018  8:30 AM CST   Level 2 with NL INFUSION CHAIR 4   [facility] ([facility])    [address] [place] [address]   [phone]            Mar 09, 2018  8:30 AM CST   Level 2 with NL INFUSION CHAIR 2   [facility] ([facility])    [address] [place] [address]   [phone]            Mar 12, 2018  8:30 AM CDT   Level 2 with NL INFUSION CHAIR 1   [facility] ([facility])    [address] [place] [address]   [phone]            Mar 13, 2018  8:30 AM CDT   Level 2 with NL INFUSION CHAIR 1    [facility] ([facility])    [address] [place] [id number] [phone]              Who to contact     If you have questions or need follow up information about today's clinic visit or your schedule please contact [facility] INFUSION SERVICES directly at [phone].  Normal or non-critical lab and imaging results will be communicated to you by MyChart, letter or phone within 4 business days after the clinic has received the results. If you do not hear from us within 7 days, please contact the clinic through [employer] or phone. If you have a critical or abnormal lab result, we will notify you by phone as soon as possible.  Submit refill requests through [employer] or call your pharmacy and they will forward the refill request to us. Please allow 3 business days for your refill to be completed.          Additional Information About Your Visit        [employer] Information     [employer] gives you secure access to your electronic health record. If you see a primary care provider, you can also send messages to your care team and make appointments. If you have questions, please call your primary care clinic.  If you do not have a primary care provider, please call [phone] and they will assist you.        Care EveryWhere ID     This is your Care EveryWhere ID. This could be used by other organizations to access your [place] medical records  [id number]        Your Vitals Were     Pulse Temperature Respirations Pulse Oximetry          77 97.4  F (36.3  C) (Temporal) 16 99%         Blood Pressure from Last 3 Encounters:   03/02/18 (!) 129/102   03/01/18 144/90   03/01/18 150/80    Weight from Last 3 Encounters:   03/01/18 114.3 kg (252 lb)   02/28/18 114.3 kg (252 lb)   02/27/18 114.3 kg (252 lb)              Today, you had the following     No orders found for display       Primary Care Provider Office Phone # Fax #    Radha Hidalgo MD  [phone]       [address] [place] [address]        Equal Access to Services     DAVID CURRY : Hadii aad ku hadcamillarocio Ranjan, waabyda luqdajaha, rondata kaalmada josé miguelcharlineakash, elizabet reginaldoin hayaachepe avldezmarge madimajorshabbir brar. So [facility] [phone].    ATENCIÓN: Si habla español, tiene a burton disposición servicios gratuitos de asistencia lingüística. Llame al [phone].    We comply with applicable federal civil rights laws and [place] laws. We do not discriminate on the basis of race, color, national origin, age, disability, sex, sexual orientation, or gender identity.            Thank you!     Thank you for choosing [facility] INFUSION SERVICES  for your care. Our goal is always to provide you with excellent care. Hearing back from our patients is one way we can continue to improve our services. Please take a few minutes to complete the written survey that you may receive in the mail after your visit with us. Thank you!             Your Updated Medication List - Protect others around you: Learn how to safely use, store and throw away your medicines at www.disposemymeds.org.          This list is accurate as of 3/2/18 10:52 AM.  Always use your most recent med list.                   Brand Name Dispense Instructions for use Diagnosis    aspirin 81 MG tablet      Take by mouth daily        insulin aspart 100 UNIT/ML injection    NovoLOG PEN     Inject Subcutaneous 3 times daily (with meals)        pantoprazole 40 MG EC tablet    PROTONIX     Take 1 tablet (40 mg) by mouth daily    S/P liver transplant (H)       tacrolimus 1 MG capsule    GENERIC EQUIVALENT     Take 4 capsules (4 mg) by mouth 2 times daily    S/P liver transplant (H)       traMADol 50 MG tablet    ULTRAM     Take 1-2 tablets ( mg) by mouth every 6 hours as needed for pain    S/P liver transplant (H)

## 2018-03-02 NOTE — PROGRESS NOTES
Patient here today for Ganciclovir infusion. PICC line patent with blood return noted pre and post infusion. Patient tolerated infusion well, discharged to home in stable condition.

## 2018-03-03 ENCOUNTER — HOSPITAL ENCOUNTER (EMERGENCY)
Facility: CLINIC | Age: 43
Discharge: HOME | End: 2018-03-03
Admitting: FAMILY MEDICINE
Payer: MEDICARE

## 2018-03-03 ENCOUNTER — HOSPITAL ENCOUNTER (EMERGENCY)
Facility: CLINIC | Age: 43
Discharge: HOME | End: 2018-03-03
Admitting: NURSE PRACTITIONER
Payer: MEDICARE

## 2018-03-03 VITALS
WEIGHT: 251.99 LBS | HEART RATE: 84 BPM | SYSTOLIC BLOOD PRESSURE: 148 MMHG | OXYGEN SATURATION: 99 % | DIASTOLIC BLOOD PRESSURE: 88 MMHG | TEMPERATURE: 98.6 F | RESPIRATION RATE: 14 BRPM | BODY MASS INDEX: 34.18 KG/M2

## 2018-03-03 VITALS
OXYGEN SATURATION: 99 % | SYSTOLIC BLOOD PRESSURE: 149 MMHG | HEART RATE: 86 BPM | TEMPERATURE: 97.1 F | DIASTOLIC BLOOD PRESSURE: 95 MMHG | RESPIRATION RATE: 20 BRPM

## 2018-03-03 DIAGNOSIS — K83.01 PRIMARY SCLEROSING CHOLANGITIS (H): ICD-10-CM

## 2018-03-03 DIAGNOSIS — B25.9 CYTOMEGALOVIRUS INFECTION, UNSPECIFIED CYTOMEGALOVIRAL INFECTION TYPE (H): ICD-10-CM

## 2018-03-03 PROCEDURE — 25000128 H RX IP 250 OP 636: Performed by: FAMILY MEDICINE

## 2018-03-03 PROCEDURE — 96365 THER/PROPH/DIAG IV INF INIT: CPT

## 2018-03-03 PROCEDURE — 96366 THER/PROPH/DIAG IV INF ADDON: CPT | Performed by: NURSE PRACTITIONER

## 2018-03-03 PROCEDURE — 96365 THER/PROPH/DIAG IV INF INIT: CPT | Performed by: NURSE PRACTITIONER

## 2018-03-03 PROCEDURE — 96366 THER/PROPH/DIAG IV INF ADDON: CPT

## 2018-03-03 PROCEDURE — 40000268 ZZH STATISTIC NO CHARGES

## 2018-03-03 PROCEDURE — 40000268 ZZH STATISTIC NO CHARGES: Performed by: NURSE PRACTITIONER

## 2018-03-03 RX ADMIN — SODIUM CHLORIDE 250 ML: 9 INJECTION, SOLUTION INTRAVENOUS at 09:03

## 2018-03-03 RX ADMIN — GANCICLOVIR SODIUM 435 MG: 500 INJECTION, POWDER, LYOPHILIZED, FOR SOLUTION INTRAVENOUS at 09:02

## 2018-03-03 RX ADMIN — GANCICLOVIR SODIUM 435 MG: 500 INJECTION, POWDER, LYOPHILIZED, FOR SOLUTION INTRAVENOUS at 19:52

## 2018-03-03 RX ADMIN — SODIUM CHLORIDE 250 ML: 9 INJECTION, SOLUTION INTRAVENOUS at 19:52

## 2018-03-04 ENCOUNTER — HOSPITAL ENCOUNTER (EMERGENCY)
Facility: CLINIC | Age: 43
Discharge: HOME | End: 2018-03-04
Admitting: PHYSICIAN ASSISTANT
Payer: MEDICARE

## 2018-03-04 ENCOUNTER — HOSPITAL ENCOUNTER (EMERGENCY)
Facility: CLINIC | Age: 43
Discharge: HOME | End: 2018-03-04
Admitting: EMERGENCY MEDICINE
Payer: MEDICARE

## 2018-03-04 VITALS
SYSTOLIC BLOOD PRESSURE: 173 MMHG | RESPIRATION RATE: 20 BRPM | TEMPERATURE: 97.3 F | OXYGEN SATURATION: 99 % | DIASTOLIC BLOOD PRESSURE: 92 MMHG | HEART RATE: 82 BPM

## 2018-03-04 VITALS
TEMPERATURE: 98.4 F | HEART RATE: 89 BPM | OXYGEN SATURATION: 99 % | SYSTOLIC BLOOD PRESSURE: 144 MMHG | DIASTOLIC BLOOD PRESSURE: 75 MMHG

## 2018-03-04 DIAGNOSIS — K83.01 PRIMARY SCLEROSING CHOLANGITIS (H): ICD-10-CM

## 2018-03-04 DIAGNOSIS — B25.9 CYTOMEGALOVIRUS INFECTION, UNSPECIFIED CYTOMEGALOVIRAL INFECTION TYPE (H): ICD-10-CM

## 2018-03-04 PROCEDURE — 25000128 H RX IP 250 OP 636: Performed by: FAMILY MEDICINE

## 2018-03-04 PROCEDURE — 96366 THER/PROPH/DIAG IV INF ADDON: CPT | Performed by: EMERGENCY MEDICINE

## 2018-03-04 PROCEDURE — 40000268 ZZH STATISTIC NO CHARGES: Performed by: PHYSICIAN ASSISTANT

## 2018-03-04 PROCEDURE — 96365 THER/PROPH/DIAG IV INF INIT: CPT | Performed by: EMERGENCY MEDICINE

## 2018-03-04 PROCEDURE — 96365 THER/PROPH/DIAG IV INF INIT: CPT | Performed by: PHYSICIAN ASSISTANT

## 2018-03-04 PROCEDURE — 40000268 ZZH STATISTIC NO CHARGES: Performed by: EMERGENCY MEDICINE

## 2018-03-04 RX ADMIN — GANCICLOVIR SODIUM 435 MG: 500 INJECTION, POWDER, LYOPHILIZED, FOR SOLUTION INTRAVENOUS at 09:05

## 2018-03-04 RX ADMIN — GANCICLOVIR SODIUM 435 MG: 500 INJECTION, POWDER, LYOPHILIZED, FOR SOLUTION INTRAVENOUS at 20:02

## 2018-03-04 RX ADMIN — SODIUM CHLORIDE 250 ML: 9 INJECTION, SOLUTION INTRAVENOUS at 21:03

## 2018-03-04 RX ADMIN — SODIUM CHLORIDE 250 ML: 9 INJECTION, SOLUTION INTRAVENOUS at 09:05

## 2018-03-04 NOTE — ED AVS SNAPSHOT
[facility] Emergency Department    [address] [place] [address]    Phone:  [phone]    Fax:  [phone]                                       Abhishek Downey   MRN: [id number]    Department:  [facility]   Date of Visit:  3/4/2018           After Visit Summary Signature Page     I have received my discharge instructions, and my questions have been answered. I have discussed any challenges I see with this plan with the nurse or doctor.    ..........................................................................................................................................  Patient/Patient Representative Signature      ..........................................................................................................................................  Patient Representative Print Name and Relationship to Patient    ..................................................               ................................................  Date                                            Time    ..........................................................................................................................................  Reviewed by Signature/Title    ...................................................              ..............................................  Date                                                            Time

## 2018-03-04 NOTE — ED AVS SNAPSHOT
[facility] Emergency Department    [address] [place] [address]    Phone:  [phone]    Fax:  [phone]                                       Abhishek Downey   MRN: [id number]    Department:  [facility] Emergency Department   Date of Visit:  3/4/2018           Patient Information     Date Of Birth          1975        Your diagnoses for this visit were:     Cytomegalovirus infection, unspecified cytomegaloviral infection type (H)     Primary sclerosing cholangitis       Future Appointments        Provider Department Dept Phone Center    3/5/2018 8:30 AM ion Chair 1 Chair 1 [facility] [phone] [facility]    3/6/2018 8:30 AM ion Chair 3  Chair 1 [facility] [phone] [facility]    3/7/2018 8:30 AM 5 Infustion Chair 1 [facility] [phone] [facility]    3/8/2018 8:30 AM ion 4fustion Chair 1 [facility] [phone] [facility]    3/9/2018 8:30 AM Iion Chair 2  air 1 [facility] [phone] [facility]    3/12/2018 8:30 AM ion Chair 1 Chair 1 [facility] [phone] [facility]    3/13/2018 8:30 AM ion Chair 1 Chair 1 [facility] [phone] [facility]      24 Hour Appointment Hotline       To make an appointment at any [facility], call [phone] ([phone]). If you don't have a family doctor or clinic, we will help you find one. [place] clinics are conveniently located to serve the needs of you and your family.             Review of your medicines      Our records show that you are taking the medicines listed below. If these are incorrect, please call your family doctor or clinic.        Dose / Directions Last dose taken    aspirin 81 MG tablet        Take by mouth daily   Refills:  0        insulin aspart 100 UNIT/ML injection   Commonly known as:  NovoLOG PEN    Indication:  Type 2 Diabetes, per sliding scale per Lock        Inject Subcutaneous 3 times daily (with meals)   Refills:  0        pantoprazole 40 MG EC tablet   Commonly known as:  PROTONIX   Dose:  40 mg        Take 1 tablet (40 mg) by mouth daily   Refills:  0        tacrolimus 1 MG capsule   Commonly known as:  GENERIC EQUIVALENT   Dose:  4 mg        Take 4 capsules (4 mg) by mouth 2 times daily   Refills:  0        traMADol 50 MG tablet   Commonly known as:  ULTRAM   Dose:   mg        Take 1-2 tablets ( mg) by mouth every 6 hours as needed for pain   Refills:  0                Orders Needing Specimen Collection     None      Pending Results     No orders found from 3/2/2018 to 3/5/2018.            Pending Culture Results     No orders found from 3/2/2018 to 3/5/2018.            Pending Results Instructions     If you had any lab results that were not finalized at the time of your Discharge, you can call the ED Lab Result RN at [phone]. You will be contacted by this team for any positive Lab results or changes in treatment. The nurses are available 7 days a week from 10A to 6:30P.  You can leave a message 24 hours per day and they will return your call.        Thank you for choosing [facility]       Thank you for choosing [facility] for your care. Our goal is always to provide you with excellent care. Hearing back from our patients is one way we can continue to improve our services. Please take a few minutes to complete the written survey that you may receive in the mail after you visit with us. Thank you!        [employer] Information     [employer] gives you secure access to your electronic health record. If you see a primary care provider, you can also send messages to your care team and make appointments. If you have questions, please call your primary care clinic.  If you do not have a primary care provider, please call [phone] and they will assist you.        Care EveryWhere ID     This is  your Care EveryWhere ID. This could be used by other organizations to access your [place] medical records  [id number]        Equal Access to Services     DAVID CURRY : Peter Woodruff, kim price, brian floyd, elizabet brar. So [facility] [phone].    ATENCIÓN: Si habla español, tiene a burton disposición servicios gratuitos de asistencia lingüística. Llame al [phone].    We comply with applicable federal civil rights laws and [place] laws. We do not discriminate on the basis of race, color, national origin, age, disability, sex, sexual orientation, or gender identity.            After Visit Summary       This is your record. Keep this with you and show to your community pharmacist(s) and doctor(s) at your next visit.

## 2018-03-05 ENCOUNTER — INFUSION THERAPY VISIT (OUTPATIENT)
Dept: INFUSION THERAPY | Facility: CLINIC | Age: 43
End: 2018-03-05
Attending: FAMILY MEDICINE
Payer: MEDICARE

## 2018-03-05 ENCOUNTER — HOSPITAL ENCOUNTER (EMERGENCY)
Facility: CLINIC | Age: 43
Discharge: HOME | End: 2018-03-05
Admitting: FAMILY MEDICINE
Payer: MEDICARE

## 2018-03-05 VITALS
OXYGEN SATURATION: 99 % | TEMPERATURE: 97.8 F | DIASTOLIC BLOOD PRESSURE: 66 MMHG | SYSTOLIC BLOOD PRESSURE: 145 MMHG | RESPIRATION RATE: 18 BRPM | HEART RATE: 79 BPM

## 2018-03-05 VITALS
HEART RATE: 81 BPM | TEMPERATURE: 97.7 F | SYSTOLIC BLOOD PRESSURE: 157 MMHG | DIASTOLIC BLOOD PRESSURE: 92 MMHG | OXYGEN SATURATION: 100 % | RESPIRATION RATE: 16 BRPM

## 2018-03-05 DIAGNOSIS — B25.9 CYTOMEGALOVIRUS INFECTION, UNSPECIFIED CYTOMEGALOVIRAL INFECTION TYPE (H): ICD-10-CM

## 2018-03-05 DIAGNOSIS — K83.01 PRIMARY SCLEROSING CHOLANGITIS (H): ICD-10-CM

## 2018-03-05 DIAGNOSIS — B25.9 CYTOMEGALOVIRUS INFECTION, UNSPECIFIED CYTOMEGALOVIRAL INFECTION TYPE (H): Primary | ICD-10-CM

## 2018-03-05 PROCEDURE — 25000128 H RX IP 250 OP 636: Performed by: FAMILY MEDICINE

## 2018-03-05 PROCEDURE — 96365 THER/PROPH/DIAG IV INF INIT: CPT

## 2018-03-05 PROCEDURE — 40000268 ZZH STATISTIC NO CHARGES

## 2018-03-05 PROCEDURE — 36592 COLLECT BLOOD FROM PICC: CPT

## 2018-03-05 RX ADMIN — GANCICLOVIR SODIUM 435 MG: 500 INJECTION, POWDER, LYOPHILIZED, FOR SOLUTION INTRAVENOUS at 08:49

## 2018-03-05 RX ADMIN — SODIUM CHLORIDE 250 ML: 9 INJECTION, SOLUTION INTRAVENOUS at 19:45

## 2018-03-05 RX ADMIN — GANCICLOVIR SODIUM 435 MG: 500 INJECTION, POWDER, LYOPHILIZED, FOR SOLUTION INTRAVENOUS at 19:40

## 2018-03-05 RX ADMIN — SODIUM CHLORIDE 250 ML: 9 INJECTION, SOLUTION INTRAVENOUS at 08:48

## 2018-03-05 ASSESSMENT — PAIN SCALES - GENERAL: PAINLEVEL: NO PAIN (0)

## 2018-03-05 NOTE — ED AVS SNAPSHOT
[facility] Emergency Department    [address] [place] [address]    Phone:  [phone]    Fax:  [phone]                                       Abhishek Downey   MRN: [id number]    Department:  [facility] Emergency Department   Date of Visit:  3/5/2018           After Visit Summary Signature Page     I have received my discharge instructions, and my questions have been answered. I have discussed any challenges I see with this plan with the nurse or doctor.    ..........................................................................................................................................  Patient/Patient Representative Signature      ..........................................................................................................................................  Patient Representative Print Name and Relationship to Patient    ..................................................               ................................................  Date                                            Time    ..........................................................................................................................................  Reviewed by Signature/Title    ...................................................              ..............................................  Date                                                            Time

## 2018-03-05 NOTE — ED AVS SNAPSHOT
[facility] Emergency Department    [address] [place] [address]    Phone:  [phone]    Fax:  [phone]                                       Abhishek Downey   MRN: [id number]    Department:  [facility] Emergency Department   Date of Visit:  3/5/2018           Patient Information     Date Of Birth          1975        Your diagnoses for this visit were:     Cytomegalovirus infection, unspecified cytomegaloviral infection type (H)     Primary sclerosing cholangitis       Future Appointments        Provider Department Dept Phone Center    3/6/2018 8:30 AM ion Chair 3  Chair 1 [facility] [phone] [facility]    3/7/2018 8:30 AM 5 Infustion Chair 1 [facility] [phone] [facility]    3/8/2018 8:30 AM ion 4fustion Chair 1 [facility] [phone] [facility]    3/9/2018 8:30 AM Iion Chair 2  air 1 [facility] [phone] [facility]    3/12/2018 8:30 AM ion Chair 1 Chair 1 [facility] [phone] [facility]    3/13/2018 8:30 AM ion Chair 1 Chair 1 [facility] [phone] [facility]      24 Hour Appointment Hotline       To make an appointment at any [facility] call [phone] ([phone]). If you don't have a family doctor or clinic, we will help you find one. [facility] clinics are conveniently located to serve the needs of you and your family.             Review of your medicines      Our records show that you are taking the medicines listed below. If these are incorrect, please call your family doctor or clinic.        Dose / Directions Last dose taken    aspirin 81 MG tablet        Take by mouth daily   Refills:  0        insulin aspart 100 UNIT/ML injection   Commonly known as:  NovoLOG PEN   Indication:  Type 2 Diabetes, per sliding scale per Lock        Inject Subcutaneous 3 times daily  (with meals)   Refills:  0        pantoprazole 40 MG EC tablet   Commonly known as:  PROTONIX   Dose:  40 mg        Take 1 tablet (40 mg) by mouth daily   Refills:  0        tacrolimus 1 MG capsule   Commonly known as:  GENERIC EQUIVALENT   Dose:  4 mg        Take 4 capsules (4 mg) by mouth 2 times daily   Refills:  0        traMADol 50 MG tablet   Commonly known as:  ULTRAM   Dose:   mg        Take 1-2 tablets ( mg) by mouth every 6 hours as needed for pain   Refills:  0                Orders Needing Specimen Collection     None      Pending Results     No orders found from 3/3/2018 to 3/6/2018.            Pending Culture Results     No orders found from 3/3/2018 to 3/6/2018.            Pending Results Instructions     If you had any lab results that were not finalized at the time of your Discharge, you can call the ED Lab Result RN at [phone]. You will be contacted by this team for any positive Lab results or changes in treatment. The nurses are available 7 days a week from 10A to 6:30P.  You can leave a message 24 hours per day and they will return your call.        Thank you for choosing [facility]       Thank you for choosing [facility] for your care. Our goal is always to provide you with excellent care. Hearing back from our patients is one way we can continue to improve our services. Please take a few minutes to complete the written survey that you may receive in the mail after you visit with us. Thank you!        [employer] Information     [employer] gives you secure access to your electronic health record. If you see a primary care provider, you can also send messages to your care team and make appointments. If you have questions, please call your primary care clinic.  If you do not have a primary care provider, please call [phone] and they will assist you.        Care EveryWhere ID     This is your Care EveryWhere ID. This could be used by other organizations to access your [facility]  records  [id number]        Equal Access to Services     DAVID CURRY : Peter Woodruff, kim price, elizabet lino. So [facility] [phone].    ATENCIÓN: Si habla español, tiene a burton disposición servicios gratuitos de asistencia lingüística. Llame al [phone].    We comply with applicable federal civil rights laws and [place] laws. We do not discriminate on the basis of race, color, national origin, age, disability, sex, sexual orientation, or gender identity.            After Visit Summary       This is your record. Keep this with you and show to your community pharmacist(s) and doctor(s) at your next visit.

## 2018-03-05 NOTE — MR AVS SNAPSHOT
After Visit Summary   3/5/2018    Abhishek Downey    MRN: [id number]           Patient Information     Date Of Birth          1975        Visit Information        Provider Department      3/5/2018 8:30 AM NL INFUSION CHAIR 1 [facility]        Today's Diagnoses     Cytomegalovirus infection, unspecified cytomegaloviral infection type (H)    -  1    Primary sclerosing cholangitis          Care Instructions    Pt to return tonight to ED for Ganciclovir. Copies of medication list and upcoming appointments given prior to discharge.             Follow-ups after your visit        Your next 10 appointments already scheduled     Mar 06, 2018  8:30 AM CST   Level 2 with NL INFUSION CHAIR 3   [facility] ([facility])    [address] [place] [address]   [phone]            Mar 07, 2018  8:30 AM CST   Level 2 with NL INFUSION CHAIR 5   [facility] ([facility])    [address] [place] [address]   [phone]            Mar 08, 2018  8:30 AM CST   Level 2 with NL INFUSION CHAIR 4   [facility] ([facility])    [address] [place] [address]   [phone]            Mar 09, 2018  8:30 AM CST   Level 2 with NL INFUSION CHAIR 2   [facility] ([facility])    [address] [place] [address]   [phone]            Mar 12, 2018  8:30 AM CDT   Level 2 with NL INFUSION CHAIR 1   [facility])    [address] [place] [address]   [phone]            Mar 13, 2018  8:30 AM CDT   Level 2 with NL INFUSION CHAIR 1   [facility] ([facility])    [address] [place] [address]   [phone]              Who to contact     If you have questions or need follow up  information about today's clinic visit or your schedule please contact [facility] INF[facility]ION [facility] directly at [phone].  Normal or non-critical lab and imaging results will be communicated to you by MyChart, letter or phone within 4 business days after the clinic has received the results. If you do not hear from us within 7 days, please contact the clinic through Ernie'shart or phone. If you have a critical or abnormal lab result, we will notify you by phone as soon as possible.  Submit refill requests through YouFetch or call your pharmacy and they will forward the refill request to us. Please allow 3 business days for your refill to be completed.          Additional Information About Your Visit        Ernie'shart Information     YouFetch gives you secure access to your electronic health record. If you see a primary care provider, you can also send messages to your care team and make appointments. If you have questions, please call your primary care clinic.  If you do not have a primary care provider, please call [phone] and they will assist you.        Care EveryWhere ID     This is your Care EveryWhere ID. This could be used by other organizations to access your [place] medical records  [id number]        Your Vitals Were     Pulse Temperature Respirations Pulse Oximetry          79 97.8  F (36.6  C) (Temporal) 18 99%         Blood Pressure from Last 3 Encounters:   03/05/18 145/66   03/04/18 (!) 173/92   03/04/18 144/75    Weight from Last 3 Encounters:   03/03/18 114.3 kg (251 lb 15.8 oz)   03/01/18 114.3 kg (252 lb)   02/28/18 114.3 kg (252 lb)              Today, you had the following     No orders found for display       Primary Care Provider Office Phone # Fax #    Radha Sheila Hidalgo [phone] [address] [place] [address]        Equal Access to Services     DAVID CURRY : Peter Woodruff, kim price, elizabet lino  daysi barrazamajorshabbir villanueva'aan ah. So [facility] [phone].    ATENCIÓN: Si fionala nathalie, tiene a burton disposición servicios gratuitos de asistencia lingüística. Lilly al [phone].    We comply with applicable federal civil rights laws and [place] laws. We do not discriminate on the basis of race, color, national origin, age, disability, sex, sexual orientation, or gender identity.            Thank you!     Thank you for choosing [facility] SERVICES  for your care. Our goal is always to provide you with excellent care. Hearing back from our patients is one way we can continue to improve our services. Please take a few minutes to complete the written survey that you may receive in the mail after your visit with us. Thank you!             Your Updated Medication List - Protect others around you: Learn how to safely use, store and throw away your medicines at www.disposemymeds.org.          This list is accurate as of 3/5/18 11:44 AM.  Always use your most recent med list.                   Brand Name Dispense Instructions for use Diagnosis    aspirin 81 MG tablet      Take by mouth daily        insulin aspart 100 UNIT/ML injection    NovoLOG PEN     Inject Subcutaneous 3 times daily (with meals)        pantoprazole 40 MG EC tablet    PROTONIX     Take 1 tablet (40 mg) by mouth daily    S/P liver transplant (H)       tacrolimus 1 MG capsule    GENERIC EQUIVALENT     Take 4 capsules (4 mg) by mouth 2 times daily    S/P liver transplant (H)       traMADol 50 MG tablet    ULTRAM     Take 1-2 tablets ( mg) by mouth every 6 hours as needed for pain    S/P liver transplant (H)

## 2018-03-05 NOTE — PATIENT INSTRUCTIONS
Pt to return tonight to ED for Ganciclovir. Copies of medication list and upcoming appointments given prior to discharge.

## 2018-03-05 NOTE — PROGRESS NOTES
Patient here for Ganciclovir infusion, labs and PICC drsg change. Patient brought in lab equipment from the [facility], Blood taken from PICC and given to lab to send off. Tolerated infusion well, to return to ED tonight. Discharged to home in stable condition.

## 2018-03-06 ENCOUNTER — HOSPITAL ENCOUNTER (EMERGENCY)
Facility: CLINIC | Age: 43
Discharge: HOME | End: 2018-03-06
Admitting: FAMILY MEDICINE
Payer: MEDICARE

## 2018-03-06 ENCOUNTER — INFUSION THERAPY VISIT (OUTPATIENT)
Dept: INFUSION THERAPY | Facility: CLINIC | Age: 43
End: 2018-03-06
Payer: MEDICARE

## 2018-03-06 VITALS
TEMPERATURE: 98.8 F | OXYGEN SATURATION: 99 % | DIASTOLIC BLOOD PRESSURE: 103 MMHG | RESPIRATION RATE: 18 BRPM | SYSTOLIC BLOOD PRESSURE: 179 MMHG

## 2018-03-06 VITALS
SYSTOLIC BLOOD PRESSURE: 145 MMHG | TEMPERATURE: 98.1 F | OXYGEN SATURATION: 98 % | DIASTOLIC BLOOD PRESSURE: 74 MMHG | RESPIRATION RATE: 18 BRPM | HEART RATE: 82 BPM

## 2018-03-06 DIAGNOSIS — K83.01 PRIMARY SCLEROSING CHOLANGITIS (H): ICD-10-CM

## 2018-03-06 DIAGNOSIS — B25.9 CYTOMEGALOVIRUS INFECTION, UNSPECIFIED CYTOMEGALOVIRAL INFECTION TYPE (H): ICD-10-CM

## 2018-03-06 DIAGNOSIS — B25.9 CYTOMEGALOVIRUS INFECTION, UNSPECIFIED CYTOMEGALOVIRAL INFECTION TYPE (H): Primary | ICD-10-CM

## 2018-03-06 PROCEDURE — 25000128 H RX IP 250 OP 636: Performed by: FAMILY MEDICINE

## 2018-03-06 PROCEDURE — 96365 THER/PROPH/DIAG IV INF INIT: CPT

## 2018-03-06 PROCEDURE — 40000268 ZZH STATISTIC NO CHARGES

## 2018-03-06 RX ADMIN — GANCICLOVIR SODIUM 435 MG: 500 INJECTION, POWDER, LYOPHILIZED, FOR SOLUTION INTRAVENOUS at 08:57

## 2018-03-06 RX ADMIN — SODIUM CHLORIDE 250 ML: 9 INJECTION, SOLUTION INTRAVENOUS at 20:01

## 2018-03-06 RX ADMIN — GANCICLOVIR SODIUM 435 MG: 500 INJECTION, POWDER, LYOPHILIZED, FOR SOLUTION INTRAVENOUS at 20:00

## 2018-03-06 RX ADMIN — SODIUM CHLORIDE 250 ML: 9 INJECTION, SOLUTION INTRAVENOUS at 08:56

## 2018-03-06 ASSESSMENT — PAIN SCALES - GENERAL: PAINLEVEL: NO PAIN (0)

## 2018-03-06 NOTE — PATIENT INSTRUCTIONS
Pt to return tonight to the ED for Ganciclovir. Copies of medication list and upcoming appointments given prior to discharge.

## 2018-03-06 NOTE — PROGRESS NOTES
Patient here today for Ganciclovir. Blood return pre and post infusion, PICC patent. Tolerated infusion well. Discharged to home in stable condition.

## 2018-03-06 NOTE — ED AVS SNAPSHOT
[facility] Emergency Department    [address] [place] [address]    Phone:  [phone]    Fax:  [phone]                                       Abhishek Downey   MRN: [id number]    Department:  [facility] Emergency Department   Date of Visit:  3/6/2018           After Visit Summary Signature Page     I have received my discharge instructions, and my questions have been answered. I have discussed any challenges I see with this plan with the nurse or doctor.    ..........................................................................................................................................  Patient/Patient Representative Signature      ..........................................................................................................................................  Patient Representative Print Name and Relationship to Patient    ..................................................               ................................................  Date                                            Time    ..........................................................................................................................................  Reviewed by Signature/Title    ...................................................              ..............................................  Date                                                            Time

## 2018-03-06 NOTE — MR AVS SNAPSHOT
After Visit Summary   3/6/2018    Abhishek Downey    MRN: [id number]           Patient Information     Date Of Birth          1975        Visit Information        Provider Department      3/6/2018 8:30 AM NL INFUSION CHAIR 3 [facility]        Today's Diagnoses     Cytomegalovirus infection, unspecified cytomegaloviral infection type (H)    -  1    Primary sclerosing cholangitis          Care Instructions    Pt to return tonight to the ED for Ganciclovir. Copies of medication list and upcoming appointments given prior to discharge.             Follow-ups after your visit        Your next 10 appointments already scheduled     Mar 07, 2018  8:30 AM CST   Level 2 with NL INFUSION CHAIR 5   [facility] ([facility])    [address] [place] [address] Dr [place] [address]   [phone]            Mar 08, 2018  8:30 AM CST   Level 2 with NL INFUSION CHAIR 4   [facility] ([facility])    [address] [place] [address] Dr [place] [address]   [phone]            Mar 09, 2018  8:30 AM CST   Level 2 with NL INFUSION CHAIR 2   [facility] ([facility])    [address] [place] [address] Dr [place] [address]   [phone]            Mar 12, 2018  8:30 AM CDT   Level 2 with NL INFUSION CHAIR 1   [facility] ([facility])    [address] [place] [address] Dr [place] [address]   [phone]            Mar 13, 2018  8:30 AM CDT   Level 2 with NL INFUSION CHAIR 1   [facility] ([facility])    [address] [place] [address] Dr [place] [address]   [phone]              Who to contact     If you have questions or need follow up information about today's clinic visit or your schedule please contact [facility] INFUSION SERVICES directly at [phone].  Normal or non-critical lab and imaging results will be communicated  to you by DebtFoliohart, letter or phone within 4 business days after the clinic has received the results. If you do not hear from us within 7 days, please contact the clinic through Funji or phone. If you have a critical or abnormal lab result, we will notify you by phone as soon as possible.  Submit refill requests through Funji or call your pharmacy and they will forward the refill request to us. Please allow 3 business days for your refill to be completed.          Additional Information About Your Visit        DebtFolioharVindicia Information     Funji gives you secure access to your electronic health record. If you see a primary care provider, you can also send messages to your care team and make appointments. If you have questions, please call your primary care clinic.  If you do not have a primary care provider, please call [phone] and they will assist you.        Care EveryWhere ID     This is your Care EveryWhere ID. This could be used by other organizations to access your [place] medical records  [id number]        Your Vitals Were     Pulse Temperature Respirations Pulse Oximetry          82 98.1  F (36.7  C) (Temporal) 18 98%         Blood Pressure from Last 3 Encounters:   03/06/18 145/74   03/05/18 (!) 157/92   03/05/18 145/66    Weight from Last 3 Encounters:   03/03/18 114.3 kg (251 lb 15.8 oz)   03/01/18 114.3 kg (252 lb)   02/28/18 114.3 kg (252 lb)              Today, you had the following     No orders found for display       Primary Care Provider Office Phone # Fax #    Radha Sheial Hidalgo [phone]       [address] [place] [address]        Equal Access to Services     [facility] AH: Hadii mariann thakur hadashrocio Sodeon, waaxda luqadaha, qaybta kaalmada elizabet floyd. So [facility] [phone].    ATENCIÓN: Si habla español, tiene a burton disposición servicios gratuitos de asistencia lingüística. Llame al [phone].    We comply with  applicable federal civil rights laws and [place] laws. We do not discriminate on the basis of race, color, national origin, age, disability, sex, sexual orientation, or gender identity.            Thank you!     Thank you for choosing [facility]  for your care. Our goal is always to provide you with excellent care. Hearing back from our patients is one way we can continue to improve our services. Please take a few minutes to complete the written survey that you may receive in the mail after your visit with us. Thank you!             Your Updated Medication List - Protect others around you: Learn how to safely use, store and throw away your medicines at www.disposemymeds.org.          This list is accurate as of 3/6/18 10:26 AM.  Always use your most recent med list.                   Brand Name Dispense Instructions for use Diagnosis    aspirin 81 MG tablet      Take by mouth daily        insulin aspart 100 UNIT/ML injection    NovoLOG PEN     Inject Subcutaneous 3 times daily (with meals)        pantoprazole 40 MG EC tablet    PROTONIX     Take 1 tablet (40 mg) by mouth daily    S/P liver transplant (H)       tacrolimus 1 MG capsule    GENERIC EQUIVALENT     Take 4 capsules (4 mg) by mouth 2 times daily    S/P liver transplant (H)       traMADol 50 MG tablet    ULTRAM     Take 1-2 tablets ( mg) by mouth every 6 hours as needed for pain    S/P liver transplant (H)

## 2018-03-06 NOTE — ED AVS SNAPSHOT
[facility] Emergency Department    [address] [place] [address]    Phone:  [phone]    Fax:  [phone]                                       Abhishek Downey   MRN: [id number]    Department:  [facility] Emergency Department   Date of Visit:  3/6/2018           Patient Information     Date Of Birth          1975        Your diagnoses for this visit were:     Cytomegalovirus infection, unspecified cytomegaloviral infection type (H)     Primary sclerosing cholangitis       Future Appointments        Provider Department Dept Phone Center    3/7/2018 8:30 AM 5 Infustion Chair 1 [facility] [phone] [facility]    3/8/2018 8:30 AM ion 4fustion Chair 1 [facility] [phone] [facility]    3/9/2018 8:30 AM Iion Chair 2  air 1 [facility] [phone] [facility]    3/12/2018 8:30 AM ion Chair 1 Chair 1 [facility] [phone] [facility]    3/13/2018 8:30 AM ion Chair 1 Chair 1 [facility] [phone] [facility]      24 Hour Appointment Hotline       To make an appointment at any [facility], call [phone] ([phone]). If you don't have a family doctor or clinic, we will help you find one. [facility] clinics are conveniently located to serve the needs of you and your family.             Review of your medicines      Our records show that you are taking the medicines listed below. If these are incorrect, please call your family doctor or clinic.        Dose / Directions Last dose taken    aspirin 81 MG tablet        Take by mouth daily   Refills:  0        insulin aspart 100 UNIT/ML injection   Commonly known as:  NovoLOG PEN   Indication:  Type 2 Diabetes, per sliding scale per Lock        Inject Subcutaneous 3 times daily (with meals)   Refills:  0        pantoprazole 40 MG EC tablet   Commonly known as:  PROTONIX   Dose:  40  mg        Take 1 tablet (40 mg) by mouth daily   Refills:  0        tacrolimus 1 MG capsule   Commonly known as:  GENERIC EQUIVALENT   Dose:  4 mg        Take 4 capsules (4 mg) by mouth 2 times daily   Refills:  0        traMADol 50 MG tablet   Commonly known as:  ULTRAM   Dose:   mg        Take 1-2 tablets ( mg) by mouth every 6 hours as needed for pain   Refills:  0                Orders Needing Specimen Collection     None      Pending Results     No orders found from 3/4/2018 to 3/7/2018.            Pending Culture Results     No orders found from 3/4/2018 to 3/7/2018.            Pending Results Instructions     If you had any lab results that were not finalized at the time of your Discharge, you can call the ED Lab Result RN at [phone]. You will be contacted by this team for any positive Lab results or changes in treatment. The nurses are available 7 days a week from 10A to 6:30P.  You can leave a message 24 hours per day and they will return your call.        Thank you for choosing [facility]       Thank you for choosing [facility] for your care. Our goal is always to provide you with excellent care. Hearing back from our patients is one way we can continue to improve our services. Please take a few minutes to complete the written survey that you may receive in the mail after you visit with us. Thank you!        Allyes Advertisement Networkhart Information     [employer] gives you secure access to your electronic health record. If you see a primary care provider, you can also send messages to your care team and make appointments. If you have questions, please call your primary care clinic.  If you do not have a primary care provider, please call [phone] and they will assist you.        Care EveryWhere ID     This is your Care EveryWhere ID. This could be used by other organizations to access your [place] medical records  [id number]        Equal Access to Services     DAVID CURRY AH: Peter Woodruff  kim price, elizabet lino. So [facility] [phone].    ATENCIÓN: Si habla español, tiene a burton disposición servicios gratuitos de asistencia lingüística. Llame al [phone].    We comply with applicable federal civil rights laws and [place] laws. We do not discriminate on the basis of race, color, national origin, age, disability, sex, sexual orientation, or gender identity.            After Visit Summary       This is your record. Keep this with you and show to your community pharmacist(s) and doctor(s) at your next visit.

## 2018-03-12 DIAGNOSIS — Z94.4 LIVER REPLACED BY TRANSPLANT (H): ICD-10-CM

## 2018-03-12 LAB
ALP SERPL-CCNC: 136 U/L (ref 40–150)
ALT SERPL W P-5'-P-CCNC: 26 U/L (ref 0–70)
AST SERPL W P-5'-P-CCNC: 19 U/L (ref 0–45)
BILIRUB SERPL-MCNC: 0.8 MG/DL (ref 0.2–1.3)
CREAT SERPL-MCNC: 1.34 MG/DL (ref 0.66–1.25)
GFR SERPL CREATININE-BSD FRML MDRD: 59 ML/MIN/1.7M2
GLUCOSE SERPL-MCNC: 142 MG/DL (ref 70–99)
HCT VFR BLD AUTO: 36.1 % (ref 40–53)
HGB BLD-MCNC: 10.6 G/DL (ref 13.3–17.7)
PLATELET # BLD AUTO: 134 10E9/L (ref 150–450)
POTASSIUM SERPL-SCNC: 5.1 MMOL/L (ref 3.4–5.3)
SODIUM SERPL-SCNC: 143 MMOL/L (ref 133–144)
WBC # BLD AUTO: 1.7 10E9/L (ref 4–11)

## 2018-03-12 PROCEDURE — 36415 COLL VENOUS BLD VENIPUNCTURE: CPT | Performed by: INTERNAL MEDICINE

## 2018-03-12 PROCEDURE — 84460 ALANINE AMINO (ALT) (SGPT): CPT | Performed by: INTERNAL MEDICINE

## 2018-03-12 PROCEDURE — 84450 TRANSFERASE (AST) (SGOT): CPT | Performed by: INTERNAL MEDICINE

## 2018-03-12 PROCEDURE — 85027 COMPLETE CBC AUTOMATED: CPT | Performed by: INTERNAL MEDICINE

## 2018-03-12 PROCEDURE — 84075 ASSAY ALKALINE PHOSPHATASE: CPT | Performed by: INTERNAL MEDICINE

## 2018-03-12 PROCEDURE — 82565 ASSAY OF CREATININE: CPT | Performed by: INTERNAL MEDICINE

## 2018-03-12 PROCEDURE — 84132 ASSAY OF SERUM POTASSIUM: CPT | Performed by: INTERNAL MEDICINE

## 2018-03-12 PROCEDURE — 82947 ASSAY GLUCOSE BLOOD QUANT: CPT | Mod: GA | Performed by: INTERNAL MEDICINE

## 2018-03-12 PROCEDURE — 82247 BILIRUBIN TOTAL: CPT | Performed by: INTERNAL MEDICINE

## 2018-03-12 PROCEDURE — 84295 ASSAY OF SERUM SODIUM: CPT | Performed by: INTERNAL MEDICINE

## 2018-03-13 ENCOUNTER — INFUSION THERAPY VISIT (OUTPATIENT)
Dept: INFUSION THERAPY | Facility: CLINIC | Age: 43
End: 2018-03-13
Attending: FAMILY MEDICINE
Payer: MEDICARE

## 2018-03-13 VITALS
TEMPERATURE: 97.8 F | DIASTOLIC BLOOD PRESSURE: 92 MMHG | SYSTOLIC BLOOD PRESSURE: 149 MMHG | RESPIRATION RATE: 16 BRPM | HEART RATE: 80 BPM

## 2018-03-13 DIAGNOSIS — K83.01 PRIMARY SCLEROSING CHOLANGITIS (H): ICD-10-CM

## 2018-03-13 DIAGNOSIS — B25.9 CYTOMEGALOVIRUS INFECTION, UNSPECIFIED CYTOMEGALOVIRAL INFECTION TYPE (H): Primary | ICD-10-CM

## 2018-03-13 PROCEDURE — G0463 HOSPITAL OUTPT CLINIC VISIT: HCPCS

## 2018-03-13 ASSESSMENT — PAIN SCALES - GENERAL: PAINLEVEL: NO PAIN (0)

## 2018-03-13 NOTE — PATIENT INSTRUCTIONS
Pt to leave sterile occlusive dressing in place x 24 hours, pt acknowledged understanding.  F/u with [facility] transplant  as directed.

## 2018-03-13 NOTE — MR AVS SNAPSHOT
After Visit Summary   3/13/2018    Abhishek Downey    MRN: [id number]           Patient Information     Date Of Birth          1975        Visit Information        Provider Department      3/13/2018 8:30 AM NL INFUSION CHAIR 2 [facility]        Today's Diagnoses     Cytomegalovirus infection, unspecified cytomegaloviral infection type (H)    -  1    Primary sclerosing cholangitis          Care Instructions    Pt to leave sterile occlusive dressing in place x 24 hours, pt acknowledged understanding.  F/u with [facility] transplant  as directed.           Follow-ups after your visit        Follow-up notes from your care team     Return if symptoms worsen or fail to improve.      Who to contact     If you have questions or need follow up information about today's clinic visit or your schedule please contact [facility] INFUSION SERVICES directly at [phone].  Normal or non-critical lab and imaging results will be communicated to you by CivilGEOhart, letter or phone within 4 business days after the clinic has received the results. If you do not hear from us within 7 days, please contact the clinic through CivilGEOhart or phone. If you have a critical or abnormal lab result, we will notify you by phone as soon as possible.  Submit refill requests through [employer] or call your pharmacy and they will forward the refill request to us. Please allow 3 business days for your refill to be completed.          Additional Information About Your Visit        MyChart Information     [employer] gives you secure access to your electronic health record. If you see a primary care provider, you can also send messages to your care team and make appointments. If you have questions, please call your primary care clinic.  If you do not have a primary care provider, please call [phone] and they will assist you.        Care EveryWhere ID     This is your Care EveryWhere ID. This could be used by  other organizations to access your [place] medical records  [id number]        Your Vitals Were     Pulse Temperature Respirations             80 97.8  F (36.6  C) (Temporal) 16          Blood Pressure from Last 3 Encounters:   03/13/18 (!) 149/92   03/06/18 (!) 179/103   03/06/18 145/74    Weight from Last 3 Encounters:   03/03/18 114.3 kg (251 lb 15.8 oz)   03/01/18 114.3 kg (252 lb)   02/28/18 114.3 kg (252 lb)              Today, you had the following     No orders found for display       Primary Care Provider Office Phone # Fax #    Radha Sheila Hidalgo [phone] [address] [place] [address]        Equal Access to Services     DAVID CURRY : Hadii aad ku hadasho Soomaali, waaxda luqadaha, qaybta kaalmada adeegyada, elizabet gray . So [facility] [phone].    ATENCIÓN: Si habla español, tiene a burton disposición servicios gratuitos de asistencia lingüística. Llame al [phone].    We comply with applicable federal civil rights laws and [place] laws. We do not discriminate on the basis of race, color, national origin, age, disability, sex, sexual orientation, or gender identity.            Thank you!     Thank you for choosing [facility] INFUSION SERVICES  for your care. Our goal is always to provide you with excellent care. Hearing back from our patients is one way we can continue to improve our services. Please take a few minutes to complete the written survey that you may receive in the mail after your visit with us. Thank you!             Your Updated Medication List - Protect others around you: Learn how to safely use, store and throw away your medicines at www.disposemymeds.org.          This list is accurate as of 3/13/18  8:50 AM.  Always use your most recent med list.                   Brand Name Dispense Instructions for use Diagnosis    aspirin 81 MG tablet      Take by mouth daily        insulin aspart 100 UNIT/ML injection     NovoLOG PEN     Inject Subcutaneous 3 times daily (with meals)        pantoprazole 40 MG EC tablet    PROTONIX     Take 1 tablet (40 mg) by mouth daily    S/P liver transplant (H)       tacrolimus 1 MG capsule    GENERIC EQUIVALENT     Take 4 capsules (4 mg) by mouth 2 times daily    S/P liver transplant (H)       traMADol 50 MG tablet    ULTRAM     Take 1-2 tablets ( mg) by mouth every 6 hours as needed for pain    S/P liver transplant (H)

## 2018-03-13 NOTE — PROGRESS NOTES
Pt here for DC of PICC line per [facility] transplant order.  Pt tolerated procedure well.  Pressure held to site x 5 mins.  Pt discharged in stable condition.   Face to face time 15 mins

## 2018-03-19 DIAGNOSIS — Z94.4 LIVER REPLACED BY TRANSPLANT (H): ICD-10-CM

## 2018-03-19 LAB
ALP SERPL-CCNC: 123 U/L (ref 40–150)
ALT SERPL W P-5'-P-CCNC: 29 U/L (ref 0–70)
AST SERPL W P-5'-P-CCNC: 21 U/L (ref 0–45)
BILIRUB SERPL-MCNC: 0.7 MG/DL (ref 0.2–1.3)
CREAT SERPL-MCNC: 1.44 MG/DL (ref 0.66–1.25)
GFR SERPL CREATININE-BSD FRML MDRD: 54 ML/MIN/1.7M2
GLUCOSE SERPL-MCNC: 143 MG/DL (ref 70–99)
HCT VFR BLD AUTO: 36.4 % (ref 40–53)
HGB BLD-MCNC: 10.6 G/DL (ref 13.3–17.7)
PLATELET # BLD AUTO: 123 10E9/L (ref 150–450)
POTASSIUM SERPL-SCNC: 4.6 MMOL/L (ref 3.4–5.3)
SODIUM SERPL-SCNC: 140 MMOL/L (ref 133–144)
WBC # BLD AUTO: 2.3 10E9/L (ref 4–11)

## 2018-03-19 PROCEDURE — 85027 COMPLETE CBC AUTOMATED: CPT | Performed by: INTERNAL MEDICINE

## 2018-03-19 PROCEDURE — 82947 ASSAY GLUCOSE BLOOD QUANT: CPT | Mod: GA | Performed by: INTERNAL MEDICINE

## 2018-03-19 PROCEDURE — 82565 ASSAY OF CREATININE: CPT | Performed by: INTERNAL MEDICINE

## 2018-03-19 PROCEDURE — 84460 ALANINE AMINO (ALT) (SGPT): CPT | Performed by: INTERNAL MEDICINE

## 2018-03-19 PROCEDURE — 84075 ASSAY ALKALINE PHOSPHATASE: CPT | Performed by: INTERNAL MEDICINE

## 2018-03-19 PROCEDURE — 82247 BILIRUBIN TOTAL: CPT | Performed by: INTERNAL MEDICINE

## 2018-03-19 PROCEDURE — 84450 TRANSFERASE (AST) (SGOT): CPT | Performed by: INTERNAL MEDICINE

## 2018-03-19 PROCEDURE — 84132 ASSAY OF SERUM POTASSIUM: CPT | Performed by: INTERNAL MEDICINE

## 2018-03-19 PROCEDURE — 36415 COLL VENOUS BLD VENIPUNCTURE: CPT | Performed by: INTERNAL MEDICINE

## 2018-03-19 PROCEDURE — 84295 ASSAY OF SERUM SODIUM: CPT | Performed by: INTERNAL MEDICINE

## 2018-04-03 DIAGNOSIS — Z94.4 LIVER REPLACED BY TRANSPLANT (H): ICD-10-CM

## 2018-04-03 LAB
ALP SERPL-CCNC: 123 U/L (ref 40–150)
ALT SERPL W P-5'-P-CCNC: 38 U/L (ref 0–70)
AST SERPL W P-5'-P-CCNC: 28 U/L (ref 0–45)
BILIRUB SERPL-MCNC: 0.6 MG/DL (ref 0.2–1.3)
CREAT SERPL-MCNC: 1.08 MG/DL (ref 0.66–1.25)
GFR SERPL CREATININE-BSD FRML MDRD: 76 ML/MIN/1.7M2
GLUCOSE SERPL-MCNC: 141 MG/DL (ref 70–99)
HCT VFR BLD AUTO: 36.5 % (ref 40–53)
HGB BLD-MCNC: 11.3 G/DL (ref 13.3–17.7)
PLATELET # BLD AUTO: 100 10E9/L (ref 150–450)
POTASSIUM SERPL-SCNC: 4.6 MMOL/L (ref 3.4–5.3)
SODIUM SERPL-SCNC: 142 MMOL/L (ref 133–144)
WBC # BLD AUTO: 2.5 10E9/L (ref 4–11)

## 2018-04-03 PROCEDURE — 84450 TRANSFERASE (AST) (SGOT): CPT | Performed by: INTERNAL MEDICINE

## 2018-04-03 PROCEDURE — 84295 ASSAY OF SERUM SODIUM: CPT | Performed by: INTERNAL MEDICINE

## 2018-04-03 PROCEDURE — 82947 ASSAY GLUCOSE BLOOD QUANT: CPT | Mod: GA | Performed by: INTERNAL MEDICINE

## 2018-04-03 PROCEDURE — 82565 ASSAY OF CREATININE: CPT | Performed by: INTERNAL MEDICINE

## 2018-04-03 PROCEDURE — 82247 BILIRUBIN TOTAL: CPT | Performed by: INTERNAL MEDICINE

## 2018-04-03 PROCEDURE — 85027 COMPLETE CBC AUTOMATED: CPT | Performed by: INTERNAL MEDICINE

## 2018-04-03 PROCEDURE — 84075 ASSAY ALKALINE PHOSPHATASE: CPT | Performed by: INTERNAL MEDICINE

## 2018-04-03 PROCEDURE — 84460 ALANINE AMINO (ALT) (SGPT): CPT | Performed by: INTERNAL MEDICINE

## 2018-04-03 PROCEDURE — 84132 ASSAY OF SERUM POTASSIUM: CPT | Performed by: INTERNAL MEDICINE

## 2018-04-03 PROCEDURE — 36415 COLL VENOUS BLD VENIPUNCTURE: CPT | Performed by: INTERNAL MEDICINE

## 2018-05-14 ENCOUNTER — APPOINTMENT (OUTPATIENT)
Dept: LAB | Facility: CLINIC | Age: 43
End: 2018-05-14
Payer: COMMERCIAL

## 2018-05-14 DIAGNOSIS — Z94.4 LIVER REPLACED BY TRANSPLANT (H): ICD-10-CM

## 2018-05-14 LAB
ALP SERPL-CCNC: 141 U/L (ref 40–150)
ALT SERPL W P-5'-P-CCNC: 107 U/L (ref 0–70)
AST SERPL W P-5'-P-CCNC: 85 U/L (ref 0–45)
BILIRUB SERPL-MCNC: 0.5 MG/DL (ref 0.2–1.3)
CREAT SERPL-MCNC: 1.2 MG/DL (ref 0.66–1.25)
GFR SERPL CREATININE-BSD FRML MDRD: 67 ML/MIN/1.7M2
GLUCOSE SERPL-MCNC: 141 MG/DL (ref 70–99)
HCT VFR BLD AUTO: 37.8 % (ref 40–53)
HGB BLD-MCNC: 11.5 G/DL (ref 13.3–17.7)
PLATELET # BLD AUTO: 120 10E9/L (ref 150–450)
POTASSIUM SERPL-SCNC: 5.2 MMOL/L (ref 3.4–5.3)
SODIUM SERPL-SCNC: 141 MMOL/L (ref 133–144)
WBC # BLD AUTO: 2.2 10E9/L (ref 4–11)

## 2018-05-14 PROCEDURE — 82565 ASSAY OF CREATININE: CPT | Performed by: INTERNAL MEDICINE

## 2018-05-14 PROCEDURE — 84075 ASSAY ALKALINE PHOSPHATASE: CPT | Performed by: INTERNAL MEDICINE

## 2018-05-14 PROCEDURE — 84450 TRANSFERASE (AST) (SGOT): CPT | Performed by: INTERNAL MEDICINE

## 2018-05-14 PROCEDURE — 82947 ASSAY GLUCOSE BLOOD QUANT: CPT | Mod: GA | Performed by: INTERNAL MEDICINE

## 2018-05-14 PROCEDURE — 36415 COLL VENOUS BLD VENIPUNCTURE: CPT | Performed by: INTERNAL MEDICINE

## 2018-05-14 PROCEDURE — 84295 ASSAY OF SERUM SODIUM: CPT | Performed by: INTERNAL MEDICINE

## 2018-05-14 PROCEDURE — 84132 ASSAY OF SERUM POTASSIUM: CPT | Performed by: INTERNAL MEDICINE

## 2018-05-14 PROCEDURE — 84460 ALANINE AMINO (ALT) (SGPT): CPT | Performed by: INTERNAL MEDICINE

## 2018-05-14 PROCEDURE — 85027 COMPLETE CBC AUTOMATED: CPT | Performed by: INTERNAL MEDICINE

## 2018-05-14 PROCEDURE — 82247 BILIRUBIN TOTAL: CPT | Performed by: INTERNAL MEDICINE

## 2018-08-09 DIAGNOSIS — Z94.4 TRANSPLANTED LIVER (H): Primary | ICD-10-CM

## 2018-08-09 DIAGNOSIS — Z79.899 ENCOUNTER FOR LONG-TERM (CURRENT) USE OF MEDICATIONS: ICD-10-CM

## 2018-09-11 ENCOUNTER — INFUSION THERAPY VISIT (OUTPATIENT)
Dept: INFUSION THERAPY | Facility: CLINIC | Age: 43
End: 2018-09-11
Attending: FAMILY MEDICINE
Payer: MEDICARE

## 2018-09-11 VITALS
HEIGHT: 72 IN | DIASTOLIC BLOOD PRESSURE: 93 MMHG | RESPIRATION RATE: 16 BRPM | SYSTOLIC BLOOD PRESSURE: 140 MMHG | WEIGHT: 269.6 LBS | HEART RATE: 90 BPM | TEMPERATURE: 98.1 F | BODY MASS INDEX: 36.52 KG/M2

## 2018-09-11 DIAGNOSIS — Z94.4 TRANSPLANTED LIVER (H): ICD-10-CM

## 2018-09-11 DIAGNOSIS — Z94.4 TRANSPLANTED LIVER (H): Primary | ICD-10-CM

## 2018-09-11 PROCEDURE — 96365 THER/PROPH/DIAG IV INF INIT: CPT

## 2018-09-11 PROCEDURE — 25000128 H RX IP 250 OP 636: Performed by: FAMILY MEDICINE

## 2018-09-11 RX ADMIN — SODIUM CHLORIDE 1 G: 9 INJECTION, SOLUTION INTRAVENOUS at 12:44

## 2018-09-11 NOTE — PROGRESS NOTES
Pt here for his Solumedrol 1000 mg infusion. Pt tolerated his infusion without any problems. Pt stated that the last time he had taken IV Solumedrol his BGM's were elevated and that he needed to be hospitalized for it. Pt recommended to closely monitor his BGM's starting at before dinner tonight and inform MD if his BGM's are to high. Pts next appointment is scheduled for 09/13/18 at     1130 hrs. Pt given his AVS.

## 2018-09-11 NOTE — MR AVS SNAPSHOT
After Visit Summary   9/11/2018    Abhishek Downey    MRN: [id number]           Patient Information     Date Of Birth          1975        Visit Information        Provider Department      9/11/2018 12:00 PM NL INFUSION CHAIR 2 [facility] Infusion Services        Today's Diagnoses     Transplanted liver (H)    -  1       Follow-ups after your visit        Your next 10 appointments already scheduled     Sep 13, 2018 11:30 AM CDT   Level 2 with NL INFUSION CHAIR 3   [facility] Infusion Services ([facility])    [address] [place] [id number] [phone]              Who to contact     If you have questions or need follow up information about today's clinic visit or your schedule please contact [facility] INFUSION SERVICES directly at [phone].  Normal or non-critical lab and imaging results will be communicated to you by MyChart, letter or phone within 4 business days after the clinic has received the results. If you do not hear from us within 7 days, please contact the clinic through MyChart or phone. If you have a critical or abnormal lab result, we will notify you by phone as soon as possible.  Submit refill requests through [facility] or call your pharmacy and they will forward the refill request to us. Please allow 3 business days for your refill to be completed.          Additional Information About Your Visit        MyChart Information     [facility] gives you secure access to your electronic health record. If you see a primary care provider, you can also send messages to your care team and make appointments. If you have questions, please call your primary care clinic.  If you do not have a primary care provider, please call [phone] and they will assist you.        Care EveryWhere ID     This is your Care EveryWhere ID. This could be used by other organizations to access your [place] medical records  [id number]        Your Vitals  Were     Pulse Temperature Respirations BMI (Body Mass Index)          90 98.1  F (36.7  C) (Temporal) 16 36.56 kg/m2         Blood Pressure from Last 3 Encounters:   09/11/18 (!) 140/93   03/13/18 (!) 149/92   03/06/18 (!) 179/103    Weight from Last 3 Encounters:   09/11/18 122.3 kg (269 lb 9.6 oz)   03/03/18 114.3 kg (251 lb 15.8 oz)   03/01/18 114.3 kg (252 lb)              Today, you had the following     No orders found for display       Primary Care Provider Office Phone # Fax #    Radha Sheila Hidalgo [phone] [address] [place] [address]        Equal Access to Services     DAVID CURRY : Peter melendez Sodeon, waaxda luqadaha, qaybta kaalmada adeegyada, elizabet gray . So [facility] [phone].    ATENCIÓN: Si habla español, tiene a burton disposición servicios gratuitos de asistencia lingüística. Llame al [phone].    We comply with applicable federal civil rights laws and [place] laws. We do not discriminate on the basis of race, color, national origin, age, disability, sex, sexual orientation, or gender identity.            Thank you!     Thank you for choosing [facility] INFUSION SERVICES  for your care. Our goal is always to provide you with excellent care. Hearing back from our patients is one way we can continue to improve our services. Please take a few minutes to complete the written survey that you may receive in the mail after your visit with us. Thank you!             Your Updated Medication List - Protect others around you: Learn how to safely use, store and throw away your medicines at www.disposemymeds.org.          This list is accurate as of 9/11/18  2:11 PM.  Always use your most recent med list.                   Brand Name Dispense Instructions for use Diagnosis    aspirin 81 MG tablet      Take by mouth daily        insulin aspart 100 UNIT/ML injection    NovoLOG PEN     Inject Subcutaneous 3 times daily  (with meals)        pantoprazole 40 MG EC tablet    PROTONIX     Take 1 tablet (40 mg) by mouth daily    S/P liver transplant (H)       tacrolimus 1 MG capsule    GENERIC EQUIVALENT     Take 4 capsules (4 mg) by mouth 2 times daily    S/P liver transplant (H)       traMADol 50 MG tablet    ULTRAM     Take 1-2 tablets ( mg) by mouth every 6 hours as needed for pain    S/P liver transplant (H)

## 2018-09-12 DIAGNOSIS — Z79.899 ENCOUNTER FOR LONG-TERM (CURRENT) USE OF MEDICATIONS: ICD-10-CM

## 2018-09-12 DIAGNOSIS — Z94.4 TRANSPLANTED LIVER (H): ICD-10-CM

## 2018-09-12 LAB
ALBUMIN SERPL-MCNC: 4 G/DL (ref 3.4–5)
ALP SERPL-CCNC: 283 U/L (ref 40–150)
ALT SERPL W P-5'-P-CCNC: 105 U/L (ref 0–70)
ANION GAP SERPL CALCULATED.3IONS-SCNC: 12 MMOL/L (ref 3–14)
AST SERPL W P-5'-P-CCNC: 47 U/L (ref 0–45)
BASOPHILS # BLD AUTO: 0 10E9/L (ref 0–0.2)
BASOPHILS NFR BLD AUTO: 0 %
BILIRUB SERPL-MCNC: 1.8 MG/DL (ref 0.2–1.3)
BUN SERPL-MCNC: 48 MG/DL (ref 7–30)
CALCIUM SERPL-MCNC: 9.3 MG/DL (ref 8.5–10.1)
CHLORIDE SERPL-SCNC: 101 MMOL/L (ref 94–109)
CO2 SERPL-SCNC: 22 MMOL/L (ref 20–32)
CREAT SERPL-MCNC: 2.22 MG/DL (ref 0.66–1.25)
DIFFERENTIAL METHOD BLD: ABNORMAL
EOSINOPHIL NFR BLD AUTO: 0 %
ERYTHROCYTE [DISTWIDTH] IN BLOOD BY AUTOMATED COUNT: 19.9 % (ref 10–15)
GFR SERPL CREATININE-BSD FRML MDRD: 33 ML/MIN/1.7M2
GLUCOSE SERPL-MCNC: 492 MG/DL (ref 70–99)
HCT VFR BLD AUTO: 40.1 % (ref 40–53)
HGB BLD-MCNC: 12.7 G/DL (ref 13.3–17.7)
IMM GRANULOCYTES # BLD: 0.1 10E9/L (ref 0–0.4)
IMM GRANULOCYTES NFR BLD: 1.3 %
LYMPHOCYTES # BLD AUTO: 0.6 10E9/L (ref 0.8–5.3)
LYMPHOCYTES NFR BLD AUTO: 9.4 %
MCH RBC QN AUTO: 24 PG (ref 26.5–33)
MCHC RBC AUTO-ENTMCNC: 31.7 G/DL (ref 31.5–36.5)
MCV RBC AUTO: 76 FL (ref 78–100)
MONOCYTES # BLD AUTO: 0.1 10E9/L (ref 0–1.3)
MONOCYTES NFR BLD AUTO: 1.2 %
NEUTROPHILS # BLD AUTO: 5.2 10E9/L (ref 1.6–8.3)
NEUTROPHILS NFR BLD AUTO: 88.1 %
NRBC # BLD AUTO: 0 10*3/UL
NRBC BLD AUTO-RTO: 0 /100
PLATELET # BLD AUTO: 91 10E9/L (ref 150–450)
POTASSIUM SERPL-SCNC: 5.8 MMOL/L (ref 3.4–5.3)
PROT SERPL-MCNC: 8.1 G/DL (ref 6.8–8.8)
RBC # BLD AUTO: 5.29 10E12/L (ref 4.4–5.9)
SODIUM SERPL-SCNC: 135 MMOL/L (ref 133–144)
WBC # BLD AUTO: 5.9 10E9/L (ref 4–11)

## 2018-09-12 PROCEDURE — 85025 COMPLETE CBC W/AUTO DIFF WBC: CPT | Performed by: INTERNAL MEDICINE

## 2018-09-12 PROCEDURE — 80053 COMPREHEN METABOLIC PANEL: CPT | Performed by: INTERNAL MEDICINE

## 2018-09-13 ENCOUNTER — INFUSION THERAPY VISIT (OUTPATIENT)
Dept: INFUSION THERAPY | Facility: CLINIC | Age: 43
End: 2018-09-13
Attending: FAMILY MEDICINE
Payer: MEDICARE

## 2018-09-13 VITALS
HEART RATE: 103 BPM | DIASTOLIC BLOOD PRESSURE: 86 MMHG | OXYGEN SATURATION: 98 % | SYSTOLIC BLOOD PRESSURE: 146 MMHG | TEMPERATURE: 97.9 F

## 2018-09-13 DIAGNOSIS — Z94.4 TRANSPLANTED LIVER (H): Primary | ICD-10-CM

## 2018-09-13 PROCEDURE — 25000128 H RX IP 250 OP 636: Performed by: FAMILY MEDICINE

## 2018-09-13 PROCEDURE — 96365 THER/PROPH/DIAG IV INF INIT: CPT

## 2018-09-13 RX ADMIN — SODIUM CHLORIDE 1 G: 9 INJECTION, SOLUTION INTRAVENOUS at 12:00

## 2018-09-13 NOTE — PROGRESS NOTES
Infusion Nursing Note:  Abhishek UGERLINE McbrideShayan presents today for Solumedrol Infusion.    Patient seen by provider today: No   present during visit today: Not Applicable.    Note: N/A.    Intravenous Access:  Peripheral IV placed.    Treatment Conditions:  Not Applicable.      Post Infusion Assessment:  Patient tolerated infusion without incident.  Patient desired to leave and not stay for observation following infusion. Asymptomatic.  Site patent and intact, free from redness, edema or discomfort.  Peripheral IV Access discontinued per protocol.    Discharge Plan:   Patient and/or family verbalized understanding of discharge instructions and all questions answered.  Copy of AVS reviewed with patient and/or family.  Patient will return Saturday 9/15 to Emergency Dept for next appointment at approx. 11:30 a.m. and receive final dose of IV Solumedrol.   Patient discharged in stable condition accompanied by: self.  Departure Mode: Ambulatory.    Lian Beach RN

## 2018-09-13 NOTE — MR AVS SNAPSHOT
After Visit Summary   9/13/2018    Abhishek Downey    MRN: [id number]           Patient Information     Date Of Birth          1975        Visit Information        Provider Department      9/13/2018 11:30 AM NL INFUSION CHAIR 3 [facility]        Today's Diagnoses     Transplanted liver (H)    -  1       Follow-ups after your visit        Your next 10 appointments already scheduled     Sep 14, 2018  8:30 AM CDT   LAB with NL LAB PMC   [facility]    [address] [place] [id number] [phone]           Please do not eat 10-12 hours before your appointment if you are coming in fasting for labs on lipids, cholesterol, or glucose (sugar). This does not apply to pregnant women. Water, hot tea and black coffee (with nothing added) are okay. Do not drink other fluids, diet soda or chew gum.              Future tests that were ordered for you today     Open Standing Orders        Priority Remaining Interval Expires Ordered    CBC with platelets differential Routine 4/4 8/8/2019 9/12/2018    Comprehensive metabolic panel Routine 4/4 8/8/2019 9/12/2018            Who to contact     If you have questions or need follow up information about today's clinic visit or your schedule please contact [facility] directly at [phone].  Normal or non-critical lab and imaging results will be communicated to you by MyChart, letter or phone within 4 business days after the clinic has received the results. If you do not hear from us within 7 days, please contact the clinic through MyChart or phone. If you have a critical or abnormal lab result, we will notify you by phone as soon as possible.  Submit refill requests through [employer] or call your pharmacy and they will forward the refill request to us. Please allow 3 business days for your refill to be completed.          Additional Information About Your  Visit        MyChart Information     [facility] gives you secure access to your electronic health record. If you see a primary care provider, you can also send messages to your care team and make appointments. If you have questions, please call your primary care clinic.  If you do not have a primary care provider, please call [phone] and they will assist you.        Care EveryWhere ID     This is your Care EveryWhere ID. This could be used by other organizations to access your [place] medical records  [id number]        Your Vitals Were     Pulse Temperature Pulse Oximetry             103 97.9  F (36.6  C) (Temporal) 98%          Blood Pressure from Last 3 Encounters:   09/13/18 146/86   09/11/18 (!) 140/93   03/13/18 (!) 149/92    Weight from Last 3 Encounters:   09/11/18 122.3 kg (269 lb 9.6 oz)   03/03/18 114.3 kg (251 lb 15.8 oz)   03/01/18 114.3 kg (252 lb)              Today, you had the following     No orders found for display       Primary Care Provider Office Phone # Fax #    Radha Sheila Hidalgo [phone]        [address] [place] [address]        Equal Access to Services     DAVID CURRY : Hadii aad ku hadasho Soomaali, waaxda luqadaha, qaybta kaalmada adeegyada, elizabet tavarez haygradyn willow brar. So [facility] [phone].    ATENCIÓN: Si habla español, tiene a burton disposición servicios gratuitos de asistencia lingüística. Llame al [phone].    We comply with applicable federal civil rights laws and [place] laws. We do not discriminate on the basis of race, color, national origin, age, disability, sex, sexual orientation, or gender identity.            Thank you!     Thank you for choosing [facility] SERVICES  for your care. Our goal is always to provide you with excellent care. Hearing back from our patients is one way we can continue to improve our services. Please take a few minutes to complete the written survey that you may receive in  the mail after your visit with us. Thank you!             Your Updated Medication List - Protect others around you: Learn how to safely use, store and throw away your medicines at www.disposemymeds.org.          This list is accurate as of 9/13/18  3:52 PM.  Always use your most recent med list.                   Brand Name Dispense Instructions for use Diagnosis    aspirin 81 MG tablet      Take by mouth daily        insulin aspart 100 UNIT/ML injection    NovoLOG PEN     Inject Subcutaneous 3 times daily (with meals)        pantoprazole 40 MG EC tablet    PROTONIX     Take 1 tablet (40 mg) by mouth daily    S/P liver transplant (H)       tacrolimus 1 MG capsule    GENERIC EQUIVALENT     Take 4 capsules (4 mg) by mouth 2 times daily    S/P liver transplant (H)       traMADol 50 MG tablet    ULTRAM     Take 1-2 tablets ( mg) by mouth every 6 hours as needed for pain    S/P liver transplant (H)

## 2018-09-14 DIAGNOSIS — Z79.899 ENCOUNTER FOR LONG-TERM (CURRENT) USE OF MEDICATIONS: ICD-10-CM

## 2018-09-14 DIAGNOSIS — Z94.4 TRANSPLANTED LIVER (H): ICD-10-CM

## 2018-09-14 LAB
ALBUMIN SERPL-MCNC: 4.1 G/DL (ref 3.4–5)
ALP SERPL-CCNC: 262 U/L (ref 40–150)
ALT SERPL W P-5'-P-CCNC: 77 U/L (ref 0–70)
ANION GAP SERPL CALCULATED.3IONS-SCNC: 13 MMOL/L (ref 3–14)
AST SERPL W P-5'-P-CCNC: 22 U/L (ref 0–45)
BASOPHILS # BLD AUTO: 0 10E9/L (ref 0–0.2)
BASOPHILS NFR BLD AUTO: 0 %
BILIRUB SERPL-MCNC: 1.6 MG/DL (ref 0.2–1.3)
BUN SERPL-MCNC: 46 MG/DL (ref 7–30)
CALCIUM SERPL-MCNC: 8.7 MG/DL (ref 8.5–10.1)
CHLORIDE SERPL-SCNC: 95 MMOL/L (ref 94–109)
CO2 SERPL-SCNC: 23 MMOL/L (ref 20–32)
CREAT SERPL-MCNC: 1.5 MG/DL (ref 0.66–1.25)
DIFFERENTIAL METHOD BLD: ABNORMAL
EOSINOPHIL NFR BLD AUTO: 0 %
ERYTHROCYTE [DISTWIDTH] IN BLOOD BY AUTOMATED COUNT: 19.1 % (ref 10–15)
GFR SERPL CREATININE-BSD FRML MDRD: 52 ML/MIN/1.7M2
GLUCOSE SERPL-MCNC: 638 MG/DL (ref 70–99)
HCT VFR BLD AUTO: 41.7 % (ref 40–53)
HGB BLD-MCNC: 13.4 G/DL (ref 13.3–17.7)
IMM GRANULOCYTES # BLD: 0.1 10E9/L (ref 0–0.4)
IMM GRANULOCYTES NFR BLD: 1.6 %
LYMPHOCYTES # BLD AUTO: 0.7 10E9/L (ref 0.8–5.3)
LYMPHOCYTES NFR BLD AUTO: 11.5 %
MCH RBC QN AUTO: 24.1 PG (ref 26.5–33)
MCHC RBC AUTO-ENTMCNC: 32.1 G/DL (ref 31.5–36.5)
MCV RBC AUTO: 75 FL (ref 78–100)
MONOCYTES # BLD AUTO: 0.1 10E9/L (ref 0–1.3)
MONOCYTES NFR BLD AUTO: 2.1 %
NEUTROPHILS # BLD AUTO: 4.9 10E9/L (ref 1.6–8.3)
NEUTROPHILS NFR BLD AUTO: 84.8 %
NRBC # BLD AUTO: 0 10*3/UL
NRBC BLD AUTO-RTO: 0 /100
PLATELET # BLD AUTO: 109 10E9/L (ref 150–450)
POTASSIUM SERPL-SCNC: 5.1 MMOL/L (ref 3.4–5.3)
PROT SERPL-MCNC: 8.1 G/DL (ref 6.8–8.8)
RBC # BLD AUTO: 5.55 10E12/L (ref 4.4–5.9)
SODIUM SERPL-SCNC: 131 MMOL/L (ref 133–144)
WBC # BLD AUTO: 5.8 10E9/L (ref 4–11)

## 2018-09-14 PROCEDURE — 80053 COMPREHEN METABOLIC PANEL: CPT | Performed by: INTERNAL MEDICINE

## 2018-09-14 PROCEDURE — 85025 COMPLETE CBC W/AUTO DIFF WBC: CPT | Performed by: INTERNAL MEDICINE

## 2018-09-14 PROCEDURE — 36415 COLL VENOUS BLD VENIPUNCTURE: CPT | Performed by: INTERNAL MEDICINE

## 2018-09-15 ENCOUNTER — HOSPITAL ENCOUNTER (EMERGENCY)
Facility: CLINIC | Age: 43
Discharge: HOME | End: 2018-09-15
Admitting: FAMILY MEDICINE
Payer: MEDICARE

## 2018-09-15 VITALS
DIASTOLIC BLOOD PRESSURE: 111 MMHG | SYSTOLIC BLOOD PRESSURE: 168 MMHG | TEMPERATURE: 96.2 F | OXYGEN SATURATION: 98 % | RESPIRATION RATE: 14 BRPM

## 2018-09-15 DIAGNOSIS — Z94.4 TRANSPLANTED LIVER (H): ICD-10-CM

## 2018-09-15 PROCEDURE — 25000128 H RX IP 250 OP 636: Performed by: FAMILY MEDICINE

## 2018-09-15 PROCEDURE — 40000268 ZZH STATISTIC NO CHARGES

## 2018-09-15 PROCEDURE — 96374 THER/PROPH/DIAG INJ IV PUSH: CPT

## 2018-09-15 RX ADMIN — SODIUM CHLORIDE 1 G: 0.9 INJECTION, SOLUTION INTRAVENOUS at 11:59

## 2018-09-18 ENCOUNTER — PATIENT OUTREACH (OUTPATIENT)
Dept: EDUCATION SERVICES | Facility: CLINIC | Age: 43
End: 2018-09-18

## 2018-09-18 ENCOUNTER — ALLIED HEALTH/NURSE VISIT (OUTPATIENT)
Dept: EDUCATION SERVICES | Facility: CLINIC | Age: 43
End: 2018-09-18
Payer: COMMERCIAL

## 2018-09-18 DIAGNOSIS — Z79.4 TYPE 2 DIABETES MELLITUS WITH COMPLICATION, WITH LONG-TERM CURRENT USE OF INSULIN (H): Primary | ICD-10-CM

## 2018-09-18 DIAGNOSIS — E11.8 TYPE 2 DIABETES MELLITUS WITH COMPLICATION, WITH LONG-TERM CURRENT USE OF INSULIN (H): Primary | ICD-10-CM

## 2018-09-18 PROCEDURE — G0108 DIAB MANAGE TRN  PER INDIV: HCPCS

## 2018-09-18 NOTE — PROGRESS NOTES
"Diabetes Self-Management Education & Support    Diabetes Education Self Management & Training    SUBJECTIVE/OBJECTIVE:  Presents for: Individual review  Accompanied by: Spouse  Diabetes education in the past 24mo: No  Focus of Visit: Taking Medication  Insulin administration technique taught using: Vial & Syringe  Insulin Type: NPH (Humulin-N or Novolin-N)  Diabetes type: Secondary Diabetes  Disease course: Worsening  Cultural Influences/Ethnic Background:  American      Diabetes Symptoms & Complications  Blurred vision: Yes  Fatigue: Yes  Polydipsia: Yes  Polyuria: Yes  Visual change: Yes  Weakness: Yes  Weight loss: Yes  Symptom course: Worsening  Weight trend: Decreasing rapidly       Patient Problem List and Family Medical History reviewed for relevant medical history, current medical status, and diabetes risk factors.    Vitals:  There were no vitals taken for this visit.  Estimated body mass index is 36.56 kg/(m^2) as calculated from the following:    Height as of 9/11/18: 1.829 m (6' 0.01\").    Weight as of 9/11/18: 122.3 kg (269 lb 9.6 oz).   Last 3 BP:   BP Readings from Last 3 Encounters:   09/15/18 (!) 168/111   09/13/18 146/86   09/11/18 (!) 140/93       History   Smoking Status     Never Smoker   Smokeless Tobacco     Never Used       Labs:  Lab Results   Component Value Date    A1C 7.1 02/07/2018     Lab Results   Component Value Date     09/14/2018     No results found for: LDL  No results found for: HDL]  GFR Estimate   Date Value Ref Range Status   09/14/2018 52 (L) >60 mL/min/1.7m2 Final     Comment:     Non  GFR Calc     GFR Estimate If Black   Date Value Ref Range Status   09/14/2018 62 >60 mL/min/1.7m2 Final     Comment:      GFR Calc     Lab Results   Component Value Date    CR 1.50 09/14/2018     No results found for: MICROALBUMIN    Healthy Eating  Patient on a regular basis: Snacks frequently throughout the day  Meals include: Lunch, Dinner, " Snacks  Beverages: Water, Diet soda    Being Active  Being Active Assessed Today: Yes  Exercise:: Currently not exercising    Monitoring  Monitoring Assessed Today: Yes  Did patient bring glucose meter to appointment? : Yes  Blood Glucose Meter: [employer]  Home Glucose (Sugar) Monitoring: 3-4 times per day  Blood glucose trend: Increasing steadily  Low Glucose Range (mg/dL): >200  High Glucose Range (mg/dL): >200  Overall Range (mg/dL): >200    Blood sugars since yesterday:   459 HS last night and then took 40 units Novolin N at 9pm  Today at about 4am 370, at 6:30 pm 325 and took 40 units N. Now in office at 10:44 am BG is 354.     Taking Medications  Taking Novolin N , 2 doses since getting order from [facility] ER yesterday. He was not taking insulin before yesterday and is not taking Novolog.     Taking Medication Assessed Today: Yes  Current Treatments: Insulin Injections  Dose schedule: pre-breakfast  Given by: Patient  Injection/Infusion sites: Abdomen, Arms  Problems taking diabetes medications regularly?: No  Diabetes medication side effects?: No    Problem Solving   needs instruction on how to adjust insulin     Reducing Risks   Had liver transplant last year July. 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 Had liver biopsy at [place] yesterday and is waiting to hear results. If normal will not need further steroid treatment.     Healthy Coping  Emotional response to diabetes: Ready to learn  Informal Support system:: Spouse  Stage of change: PREPARATION (Decided to change - considering how)  Difficulty affording diabetes management supplies?: No  Patient Activation Measure Survey Score:  PRACHI Score (Last Two) 3/22/2011   PRACHI Raw Score 38   Activation Score 52.9   PRACHI Level 2       ASSESSMENT:  Had liver transplant last year July. 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 Had liver biopsy at [place] yesterday and is  waiting to hear results. If normal will not need further steroid treatment.  He is accompanied by his wife Caroline and they are very concerned about his blood sugars. He feels awful, has lost 17 lbs, has stomach ache, blurry vision and weakness. Last time he had insulin was February.   He will be managing blood sugars with doctor here.   It is difficult to know how blood sugars will respond but       Patient's most recent   Lab Results   Component Value Date    A1C 7.1 02/07/2018    A1c yesterday at [place] was 9.0 and 8/3/18 was 6.9 meeting goal of <7.0    INTERVENTION:   Diabetes knowledge and skills assessment:     Patient is knowledgeable in diabetes management concepts related to: Monitoring and Taking Medication    Patient needs further education on the following diabetes management concepts: Monitoring, Taking Medication and Problem Solving    Based on learning assessment above, most appropriate setting for further diabetes education would be: Individual setting.    Education provided today on:  AADE Self-Care Behaviors:  Monitoring: log and interpret results and frequency of monitoring  Taking Medication: action of prescribed medication and when to take medications    Opportunities for ongoing education and support in diabetes-self management were discussed.    Pt verbalized understanding of concepts discussed and recommendations provided today.       Education Materials Provided:  Logbook    PLAN:  See Patient Instructions for co-developed, patient-stated behavior change goals.  Need to consult with PCP on plan for follow up and insulin regimen. Patient will report numbers to me via phone or [facility] today, tomorrow so we can adjust insulin. Will likely need to add Novolog at meals.   See Follow-Up section for recommended follow-up.    Rissa Ballard RDN, PERCY, CDE    Time Spent: 70 minutes  Encounter Type: Individual    Any diabetes medication dose changes were made via the CDE Protocol and Collaborative Practice  Agreement with the patient's primary care provider. A copy of this encounter was shared with the provider.

## 2018-09-18 NOTE — PATIENT INSTRUCTIONS
Take NPH insulin 40 units before breakfast and before bed.   Take Novolog before 10 am and 6 pm meals, 2 units/50 points >150.   150-200 = 2 units  200-250 = 4 units  250-300 = 6 units  300-350 = 8 units  350-400 = 10 units  400-450 = 12 units  450-500 = 14 units

## 2018-09-19 ENCOUNTER — INFUSION THERAPY VISIT (OUTPATIENT)
Dept: INFUSION THERAPY | Facility: CLINIC | Age: 43
End: 2018-09-19
Attending: FAMILY MEDICINE
Payer: MEDICARE

## 2018-09-19 VITALS
RESPIRATION RATE: 20 BRPM | DIASTOLIC BLOOD PRESSURE: 75 MMHG | SYSTOLIC BLOOD PRESSURE: 127 MMHG | TEMPERATURE: 99.1 F | HEART RATE: 90 BPM | BODY MASS INDEX: 35.5 KG/M2 | OXYGEN SATURATION: 99 % | WEIGHT: 261.8 LBS

## 2018-09-19 DIAGNOSIS — Z94.4 TRANSPLANTED LIVER (H): Primary | ICD-10-CM

## 2018-09-19 PROCEDURE — 25000128 H RX IP 250 OP 636: Performed by: FAMILY MEDICINE

## 2018-09-19 PROCEDURE — 96365 THER/PROPH/DIAG IV INF INIT: CPT

## 2018-09-19 RX ADMIN — SODIUM CHLORIDE 1000 MG: 9 INJECTION, SOLUTION INTRAVENOUS at 12:15

## 2018-09-19 NOTE — PROGRESS NOTES
Infusion Nursing Note:  Abhishek Downey presents today for Solumedrol.    Patient seen by provider today: No   present during visit today: Not Applicable.    Note: Patient to receive x 3 doses per [facility] orders.    Intravenous Access:  Peripheral IV placed.    Treatment Conditions:  Not Applicable.      Post Infusion Assessment:  Patient tolerated infusion without incident.  Blood return noted pre and post infusion.  Site patent and intact, free from redness, edema or discomfort.  Access discontinued per protocol.    Discharge Plan:   Discharge instructions reviewed with: Patient.  Patient and/or family verbalized understanding of discharge instructions and all questions answered.  Copy of AVS reviewed with patient and/or family.  Patient will return 9/21/18 for next appointment.  Patient discharged in stable condition accompanied by: self.  Departure Mode: Ambulatory.    La Alexis RN

## 2018-09-19 NOTE — MR AVS SNAPSHOT
After Visit Summary   9/19/2018    Abhishek Downey    MRN: [id number]           Patient Information     Date Of Birth          1975        Visit Information        Provider Department      9/19/2018 11:30 AM NL INFUSION CHAIR 1 [facility] Infusion [facility]        Today's Diagnoses     Transplanted liver (H)    -  1       Follow-ups after your visit        Follow-up notes from your care team     Return in about 2 days (around 9/21/2018).      Your next 10 appointments already scheduled     Sep 21, 2018 11:30 AM CDT   Level 2 with NL INFUSION CHAIR 4   [facility] Services ([facility])    [address] [place] [id number] [phone]            Sep 24, 2018 11:30 AM CDT   Office Visit with Radha Hidalgo MD   [facility]    [address] [place] [id number] [phone]           Bring a current list of meds and any records pertaining to this visit. For Physicals, please bring immunization records and any forms needing to be filled out. Please arrive 10 minutes early to complete paperwork.              Who to contact     If you have questions or need follow up information about today's clinic visit or your schedule please contact [facility] INF[facility] directly at [phone].  Normal or non-critical lab and imaging results will be communicated to you by MyChart, letter or phone within 4 business days after the clinic has received the results. If you do not hear from us within 7 days, please contact the clinic through MyChart or phone. If you have a critical or abnormal lab result, we will notify you by phone as soon as possible.  Submit refill requests through [employer] or call your pharmacy and they will forward the refill request to us. Please allow 3 business days for your refill to be completed.          Additional Information About Your Visit        [facility]  Information     [employer] gives you secure access to your electronic health record. If you see a primary care provider, you can also send messages to your care team and make appointments. If you have questions, please call your primary care clinic.  If you do not have a primary care provider, please call [phone] and they will assist you.        Care EveryWhere ID     This is your Care EveryWhere ID. This could be used by other organizations to access your [place] medical records  [id number]        Your Vitals Were     Pulse Temperature Respirations Pulse Oximetry BMI (Body Mass Index)       90 99.1  F (37.3  C) (Temporal) 20 99% 35.5 kg/m2        Blood Pressure from Last 3 Encounters:   09/19/18 127/75   09/15/18 (!) 168/111   09/13/18 146/86    Weight from Last 3 Encounters:   09/19/18 118.8 kg (261 lb 12.8 oz)   09/11/18 122.3 kg (269 lb 9.6 oz)   03/03/18 114.3 kg (251 lb 15.8 oz)              Today, you had the following     No orders found for display       Primary Care Provider Office Phone # Fax #    Radha Sheila Hidalgo [phone]       [address] [place] [address]        Equal Access to Services     DAVID CURRY AH: Hadii aad ku hadasho Soomaali, waaxda luqadaha, qaybta kaalmada adeegyada, waxay idiin haygradyn willow travis laradha brar. So [facility] [phone].    ATENCIÓN: Si habla español, tiene a burton disposición servicios gratuitos de asistencia lingüística. Llame al [phone].    We comply with applicable federal civil rights laws and [place] laws. We do not discriminate on the basis of race, color, national origin, age, disability, sex, sexual orientation, or gender identity.            Thank you!     Thank you for choosing [facility] SERVICES  for your care. Our goal is always to provide you with excellent care. Hearing back from our patients is one way we can continue to improve our services. Please take a few minutes to complete the written survey  that you may receive in the mail after your visit with us. Thank you!             Your Updated Medication List - Protect others around you: Learn how to safely use, store and throw away your medicines at www.disposemymeds.org.          This list is accurate as of 9/19/18  2:21 PM.  Always use your most recent med list.                   Brand Name Dispense Instructions for use Diagnosis    aspirin 81 MG tablet      Take by mouth daily        insulin aspart 100 UNIT/ML injection    NovoLOG PEN     Inject Subcutaneous 3 times daily (with meals)        pantoprazole 40 MG EC tablet    PROTONIX     Take 1 tablet (40 mg) by mouth daily    S/P liver transplant (H)       tacrolimus 1 MG capsule    GENERIC EQUIVALENT     Take 4 capsules (4 mg) by mouth 2 times daily    S/P liver transplant (H)       traMADol 50 MG tablet    ULTRAM     Take 1-2 tablets ( mg) by mouth every 6 hours as needed for pain    S/P liver transplant (H)

## 2018-09-21 ENCOUNTER — INFUSION THERAPY VISIT (OUTPATIENT)
Dept: INFUSION THERAPY | Facility: CLINIC | Age: 43
End: 2018-09-21
Attending: FAMILY MEDICINE
Payer: MEDICARE

## 2018-09-21 VITALS
OXYGEN SATURATION: 99 % | TEMPERATURE: 97.9 F | HEART RATE: 91 BPM | DIASTOLIC BLOOD PRESSURE: 67 MMHG | RESPIRATION RATE: 20 BRPM | SYSTOLIC BLOOD PRESSURE: 132 MMHG

## 2018-09-21 DIAGNOSIS — Z94.4 TRANSPLANTED LIVER (H): Primary | ICD-10-CM

## 2018-09-21 PROCEDURE — 25000128 H RX IP 250 OP 636: Performed by: FAMILY MEDICINE

## 2018-09-21 PROCEDURE — 96365 THER/PROPH/DIAG IV INF INIT: CPT

## 2018-09-21 RX ADMIN — SODIUM CHLORIDE 1000 MG: 9 INJECTION, SOLUTION INTRAVENOUS at 11:52

## 2018-09-21 ASSESSMENT — PAIN SCALES - GENERAL: PAINLEVEL: NO PAIN (0)

## 2018-09-21 NOTE — PROGRESS NOTES
Infusion Nursing Note:  Abhishek Downey presents today for Solumedrol.    Patient seen by provider today: No   present during visit today: Not Applicable.    Note: N/A.    Intravenous Access:  Peripheral IV placed.    Treatment Conditions:  Not Applicable.      Post Infusion Assessment:  Patient tolerated infusion without incident.  Site patent and intact, free from redness, edema or discomfort.  No evidence of extravasations.  Access discontinued per protocol.    Discharge Plan:   Discharge instructions reviewed with: Patient.  Patient and/or family verbalized understanding of discharge instructions and all questions answered.  Patient discharged in stable condition accompanied by: self.  Departure Mode: Ambulatory.    Kathy Christopher RN

## 2018-09-21 NOTE — MR AVS SNAPSHOT
After Visit Summary   9/21/2018    Abhishek Downey    MRN: [id number]           Patient Information     Date Of Birth          1975        Visit Information        Provider Department      9/21/2018 11:30 AM NL INFUSION CHAIR 4 [facility]        Today's Diagnoses     Transplanted liver (H)    -  1       Follow-ups after your visit        Follow-up notes from your care team     Return in 2 days (on 9/23/2018).      Your next 10 appointments already scheduled     Sep 24, 2018 11:30 AM CDT   Office Visit with Radha Hidalgo MD   [facility]    [address] [place] [id number] [phone]           Bring a current list of meds and any records pertaining to this visit. For Physicals, please bring immunization records and any forms needing to be filled out. Please arrive 10 minutes early to complete paperwork.              Who to contact     If you have questions or need follow up information about today's clinic visit or your schedule please contact [facility] directly at [phone].  Normal or non-critical lab and imaging results will be communicated to you by [employer], letter or phone within 4 business days after the clinic has received the results. If you do not hear from us within 7 days, please contact the clinic through [employer] or phone. If you have a critical or abnormal lab result, we will notify you by phone as soon as possible.  Submit refill requests through [employer] or call your pharmacy and they will forward the refill request to us. Please allow 3 business days for your refill to be completed.          Additional Information About Your Visit        MyChart Information     [employer] gives you secure access to your electronic health record. If you see a primary care provider, you can also send messages to your care team and make appointments. If you have questions,  please call your primary care clinic.  If you do not have a primary care provider, please call [phone] and they will assist you.        Care EveryWhere ID     This is your Care EveryWhere ID. This could be used by other organizations to access your [facility] medical records  [id number]        Your Vitals Were     Pulse Temperature Respirations Pulse Oximetry          91 97.9  F (36.6  C) (Temporal) 20 99%         Blood Pressure from Last 3 Encounters:   09/21/18 132/67   09/19/18 127/75   09/15/18 (!) 168/111    Weight from Last 3 Encounters:   09/19/18 118.8 kg (261 lb 12.8 oz)   09/11/18 122.3 kg (269 lb 9.6 oz)   03/03/18 114.3 kg (251 lb 15.8 oz)              Today, you had the following     No orders found for display       Primary Care Provider Office Phone # Fax #    Radha Sheila Hidalgo [phone]       [address] [place] [address]        Equal Access to Services     [facility]: Hadii aad ku hadasho Soomaali, waaxda luqadaha, qaybta kaalmada adeegyada, waxay reginaldoin hayvinny gray . So [facility] [phone].    ATENCIÓN: Si habla español, tiene a burton disposición servicios gratuitos de asistencia lingüística. Llame al [phone].    We comply with applicable federal civil rights laws and [place] laws. We do not discriminate on the basis of race, color, national origin, age, disability, sex, sexual orientation, or gender identity.            Thank you!     Thank you for choosing [facility]  for your care. Our goal is always to provide you with excellent care. Hearing back from our patients is one way we can continue to improve our services. Please take a few minutes to complete the written survey that you may receive in the mail after your visit with us. Thank you!             Your Updated Medication List - Protect others around you: Learn how to safely use, store and throw away your medicines at www.disposemymeds.org.           This list is accurate as of 9/21/18  4:06 PM.  Always use your most recent med list.                   Brand Name Dispense Instructions for use Diagnosis    aspirin 81 MG tablet      Take by mouth daily        insulin aspart 100 UNIT/ML injection    NovoLOG PEN     Inject Subcutaneous 3 times daily (with meals)        pantoprazole 40 MG EC tablet    PROTONIX     Take 1 tablet (40 mg) by mouth daily    S/P liver transplant (H)       tacrolimus 1 MG capsule    GENERIC EQUIVALENT     Take 4 capsules (4 mg) by mouth 2 times daily    S/P liver transplant (H)       traMADol 50 MG tablet    ULTRAM     Take 1-2 tablets ( mg) by mouth every 6 hours as needed for pain    S/P liver transplant (H)

## 2018-09-22 ENCOUNTER — APPOINTMENT (OUTPATIENT)
Dept: GENERAL RADIOLOGY | Facility: CLINIC | Age: 43
End: 2018-09-22
Attending: FAMILY MEDICINE
Payer: MEDICARE

## 2018-09-22 ENCOUNTER — HOSPITAL ENCOUNTER (EMERGENCY)
Facility: CLINIC | Age: 43
Discharge: ANOTHER HOSPITAL | End: 2018-09-22
Attending: FAMILY MEDICINE | Admitting: FAMILY MEDICINE
Payer: MEDICARE

## 2018-09-22 VITALS
OXYGEN SATURATION: 100 % | TEMPERATURE: 97.7 F | SYSTOLIC BLOOD PRESSURE: 121 MMHG | RESPIRATION RATE: 16 BRPM | DIASTOLIC BLOOD PRESSURE: 64 MMHG | WEIGHT: 265 LBS | BODY MASS INDEX: 35.93 KG/M2

## 2018-09-22 DIAGNOSIS — R07.9 ACUTE CHEST PAIN: ICD-10-CM

## 2018-09-22 DIAGNOSIS — T86.40 COMPLICATION OF TRANSPLANTED LIVER, UNSPECIFIED COMPLICATION (H): ICD-10-CM

## 2018-09-22 DIAGNOSIS — I48.91 NEW ONSET ATRIAL FIBRILLATION (H): ICD-10-CM

## 2018-09-22 DIAGNOSIS — K92.1 GASTROINTESTINAL HEMORRHAGE WITH MELENA: ICD-10-CM

## 2018-09-22 DIAGNOSIS — R73.9 HYPERGLYCEMIA: ICD-10-CM

## 2018-09-22 LAB
ABO + RH BLD: NORMAL
ABO + RH BLD: NORMAL
ALBUMIN SERPL-MCNC: 3.3 G/DL (ref 3.4–5)
ALBUMIN UR-MCNC: NEGATIVE MG/DL
ALP SERPL-CCNC: 138 U/L (ref 40–150)
ALT SERPL W P-5'-P-CCNC: 50 U/L (ref 0–70)
ANION GAP SERPL CALCULATED.3IONS-SCNC: 15 MMOL/L (ref 3–14)
ANISOCYTOSIS BLD QL SMEAR: SLIGHT
APPEARANCE UR: CLEAR
APTT PPP: 25 SEC (ref 22–37)
AST SERPL W P-5'-P-CCNC: 19 U/L (ref 0–45)
BASOPHILS # BLD AUTO: 0 10E9/L (ref 0–0.2)
BASOPHILS NFR BLD AUTO: 0 %
BILIRUB SERPL-MCNC: 0.9 MG/DL (ref 0.2–1.3)
BILIRUB UR QL STRIP: NEGATIVE
BLD GP AB SCN SERPL QL: NORMAL
BLOOD BANK CMNT PATIENT-IMP: NORMAL
BUN SERPL-MCNC: 45 MG/DL (ref 7–30)
CALCIUM SERPL-MCNC: 8.4 MG/DL (ref 8.5–10.1)
CHLORIDE SERPL-SCNC: 100 MMOL/L (ref 94–109)
CO2 SERPL-SCNC: 21 MMOL/L (ref 20–32)
COLOR UR AUTO: ABNORMAL
CREAT SERPL-MCNC: 1.56 MG/DL (ref 0.66–1.25)
DIFFERENTIAL METHOD BLD: ABNORMAL
EOSINOPHIL # BLD AUTO: 0 10E9/L (ref 0–0.7)
EOSINOPHIL NFR BLD AUTO: 0 %
ERYTHROCYTE [DISTWIDTH] IN BLOOD BY AUTOMATED COUNT: 19.9 % (ref 10–15)
GFR SERPL CREATININE-BSD FRML MDRD: 49 ML/MIN/1.7M2
GLUCOSE BLDC GLUCOMTR-MCNC: 512 MG/DL (ref 70–99)
GLUCOSE SERPL-MCNC: 623 MG/DL (ref 70–99)
GLUCOSE UR STRIP-MCNC: >499 MG/DL
HCT VFR BLD AUTO: 33.2 % (ref 40–53)
HEMOCCULT STL QL: POSITIVE
HGB BLD-MCNC: 10.7 G/DL (ref 13.3–17.7)
HGB UR QL STRIP: NEGATIVE
INR PPP: 1.12 (ref 0.86–1.14)
KETONES BLD-SCNC: 0.4 MMOL/L (ref 0–0.6)
KETONES UR STRIP-MCNC: NEGATIVE MG/DL
LACTATE BLD-SCNC: 1.5 MMOL/L (ref 0.7–2)
LACTATE BLD-SCNC: 5.2 MMOL/L (ref 0.7–2)
LEUKOCYTE ESTERASE UR QL STRIP: NEGATIVE
LYMPHOCYTES # BLD AUTO: 1 10E9/L (ref 0.8–5.3)
LYMPHOCYTES NFR BLD AUTO: 11 %
MCH RBC QN AUTO: 24.9 PG (ref 26.5–33)
MCHC RBC AUTO-ENTMCNC: 32.2 G/DL (ref 31.5–36.5)
MCV RBC AUTO: 77 FL (ref 78–100)
MONOCYTES # BLD AUTO: 0.6 10E9/L (ref 0–1.3)
MONOCYTES NFR BLD AUTO: 6 %
NEUTROPHILS # BLD AUTO: 7.6 10E9/L (ref 1.6–8.3)
NEUTROPHILS NFR BLD AUTO: 83 %
NITRATE UR QL: NEGATIVE
NT-PROBNP SERPL-MCNC: 723 PG/ML (ref 0–450)
PH UR STRIP: 6 PH (ref 5–7)
PLATELET # BLD AUTO: 164 10E9/L (ref 150–450)
PLATELET # BLD EST: ABNORMAL 10*3/UL
POLYCHROMASIA BLD QL SMEAR: SLIGHT
POTASSIUM SERPL-SCNC: 3.6 MMOL/L (ref 3.4–5.3)
PROT SERPL-MCNC: 6.3 G/DL (ref 6.8–8.8)
RBC # BLD AUTO: 4.3 10E12/L (ref 4.4–5.9)
RBC MORPH BLD: ABNORMAL
SODIUM SERPL-SCNC: 136 MMOL/L (ref 133–144)
SOURCE: ABNORMAL
SP GR UR STRIP: 1.03 (ref 1–1.03)
SPECIMEN EXP DATE BLD: NORMAL
TROPONIN I SERPL-MCNC: 0.02 UG/L (ref 0–0.04)
TSH SERPL DL<=0.005 MIU/L-ACNC: 1 MU/L (ref 0.4–4)
UROBILINOGEN UR STRIP-MCNC: 0 MG/DL (ref 0–2)
WBC # BLD AUTO: 9.2 10E9/L (ref 4–11)

## 2018-09-22 PROCEDURE — 36415 COLL VENOUS BLD VENIPUNCTURE: CPT | Performed by: FAMILY MEDICINE

## 2018-09-22 PROCEDURE — 96368 THER/DIAG CONCURRENT INF: CPT | Performed by: FAMILY MEDICINE

## 2018-09-22 PROCEDURE — 71045 X-RAY EXAM CHEST 1 VIEW: CPT | Mod: TC

## 2018-09-22 PROCEDURE — 93005 ELECTROCARDIOGRAM TRACING: CPT | Performed by: FAMILY MEDICINE

## 2018-09-22 PROCEDURE — 81003 URINALYSIS AUTO W/O SCOPE: CPT | Performed by: FAMILY MEDICINE

## 2018-09-22 PROCEDURE — 96360 HYDRATION IV INFUSION INIT: CPT | Performed by: FAMILY MEDICINE

## 2018-09-22 PROCEDURE — 80053 COMPREHEN METABOLIC PANEL: CPT | Performed by: FAMILY MEDICINE

## 2018-09-22 PROCEDURE — 93010 ELECTROCARDIOGRAM REPORT: CPT | Mod: Z6 | Performed by: FAMILY MEDICINE

## 2018-09-22 PROCEDURE — 82010 KETONE BODYS QUAN: CPT | Performed by: FAMILY MEDICINE

## 2018-09-22 PROCEDURE — 83880 ASSAY OF NATRIURETIC PEPTIDE: CPT | Performed by: FAMILY MEDICINE

## 2018-09-22 PROCEDURE — 99285 EMERGENCY DEPT VISIT HI MDM: CPT | Mod: 25 | Performed by: FAMILY MEDICINE

## 2018-09-22 PROCEDURE — 85730 THROMBOPLASTIN TIME PARTIAL: CPT | Performed by: FAMILY MEDICINE

## 2018-09-22 PROCEDURE — 36416 COLLJ CAPILLARY BLOOD SPEC: CPT | Performed by: FAMILY MEDICINE

## 2018-09-22 PROCEDURE — 87040 BLOOD CULTURE FOR BACTERIA: CPT | Mod: 91 | Performed by: FAMILY MEDICINE

## 2018-09-22 PROCEDURE — 25000131 ZZH RX MED GY IP 250 OP 636 PS 637: Performed by: FAMILY MEDICINE

## 2018-09-22 PROCEDURE — 85610 PROTHROMBIN TIME: CPT | Performed by: FAMILY MEDICINE

## 2018-09-22 PROCEDURE — 84484 ASSAY OF TROPONIN QUANT: CPT | Performed by: FAMILY MEDICINE

## 2018-09-22 PROCEDURE — 25000125 ZZHC RX 250: Performed by: FAMILY MEDICINE

## 2018-09-22 PROCEDURE — 86901 BLOOD TYPING SEROLOGIC RH(D): CPT | Performed by: FAMILY MEDICINE

## 2018-09-22 PROCEDURE — 82272 OCCULT BLD FECES 1-3 TESTS: CPT | Performed by: FAMILY MEDICINE

## 2018-09-22 PROCEDURE — 84443 ASSAY THYROID STIM HORMONE: CPT | Performed by: FAMILY MEDICINE

## 2018-09-22 PROCEDURE — 85025 COMPLETE CBC W/AUTO DIFF WBC: CPT | Performed by: FAMILY MEDICINE

## 2018-09-22 PROCEDURE — 96367 TX/PROPH/DG ADDL SEQ IV INF: CPT | Performed by: FAMILY MEDICINE

## 2018-09-22 PROCEDURE — C9113 INJ PANTOPRAZOLE SODIUM, VIA: HCPCS | Performed by: FAMILY MEDICINE

## 2018-09-22 PROCEDURE — 25000128 H RX IP 250 OP 636: Performed by: FAMILY MEDICINE

## 2018-09-22 PROCEDURE — 86850 RBC ANTIBODY SCREEN: CPT | Performed by: FAMILY MEDICINE

## 2018-09-22 PROCEDURE — 86900 BLOOD TYPING SEROLOGIC ABO: CPT | Performed by: FAMILY MEDICINE

## 2018-09-22 PROCEDURE — 96366 THER/PROPH/DIAG IV INF ADDON: CPT | Performed by: FAMILY MEDICINE

## 2018-09-22 PROCEDURE — 00000146 ZZHCL STATISTIC GLUCOSE BY METER IP

## 2018-09-22 PROCEDURE — 83605 ASSAY OF LACTIC ACID: CPT | Performed by: FAMILY MEDICINE

## 2018-09-22 PROCEDURE — 96376 TX/PRO/DX INJ SAME DRUG ADON: CPT | Performed by: FAMILY MEDICINE

## 2018-09-22 PROCEDURE — 96365 THER/PROPH/DIAG IV INF INIT: CPT | Performed by: FAMILY MEDICINE

## 2018-09-22 RX ORDER — METHYLPREDNISOLONE SODIUM SUCCINATE 1 G/16ML
1000 INJECTION, POWDER, LYOPHILIZED, FOR SOLUTION INTRAMUSCULAR; INTRAVENOUS
COMMUNITY
Start: 2018-09-18 | End: 2021-01-26

## 2018-09-22 RX ORDER — DILTIAZEM HYDROCHLORIDE 5 MG/ML
20 INJECTION INTRAVENOUS ONCE
Status: COMPLETED | OUTPATIENT
Start: 2018-09-22 | End: 2018-09-22

## 2018-09-22 RX ADMIN — DILTIAZEM HYDROCHLORIDE 5 MG/HR: 5 INJECTION INTRAVENOUS at 01:24

## 2018-09-22 RX ADMIN — SODIUM CHLORIDE 80 MG: 9 INJECTION, SOLUTION INTRAVENOUS at 02:32

## 2018-09-22 RX ADMIN — SODIUM CHLORIDE 1000 ML: 9 INJECTION, SOLUTION INTRAVENOUS at 00:58

## 2018-09-22 RX ADMIN — PIPERACILLIN SODIUM AND TAZOBACTAM SODIUM 3.38 G: 3; .375 INJECTION, POWDER, LYOPHILIZED, FOR SOLUTION INTRAVENOUS at 03:39

## 2018-09-22 RX ADMIN — INSULIN ASPART 14 UNITS: 100 INJECTION, SOLUTION INTRAVENOUS; SUBCUTANEOUS at 01:34

## 2018-09-22 RX ADMIN — DILTIAZEM HYDROCHLORIDE 20 MG: 5 INJECTION INTRAVENOUS at 00:59

## 2018-09-22 NOTE — ED NOTES
DATE:  9/22/2018   TIME OF RECEIPT FROM LAB:  0100  LAB TEST:  Lactic acid  LAB VALUE:  5.2  RESULTS GIVEN WITH READ-BACK TO (PROVIDER):  Dr Major notified  TIME LAB VALUE REPORTED TO PROVIDER:   0106

## 2018-09-22 NOTE — ED NOTES
DATE:  9/22/2018   TIME OF RECEIPT FROM LAB:  0115  LAB TEST:  Blood Glucose  LAB VALUE:  623  RESULTS GIVEN WITH READ-BACK TO (PROVIDER):  Dr. Major  TIME LAB VALUE REPORTED TO PROVIDER:   0117

## 2018-09-22 NOTE — ED NOTES
Pt walked to the bathroom without issue.  EMS here at this time, report given.  Family at bedside.  Cell phone, , shoes, and pants sent with pt.  TY paperwork was complete.  Called Micaela SALDANA at [facility], updated her that pt was leaving at this time.

## 2018-09-22 NOTE — ED PROVIDER NOTES
[facility] ED Provider Note   CC:     Chief Complaint   Patient presents with     Chest Pain     HPI:  Abhishek Downey is a 42 year old male who presented to the emergency department with one-week history of shortness of breath, chest discomfort described as burning, dizziness, and weakness.  Symptoms are triggered by standing up in activity and somewhat relieved by rest and laying down.  Patient had a liver transplant at the [facility] in July 2017.  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  He had been on a course of high-dose steroids which has precipitated his diabetes.  Patient has noticed that over the past week and a half, that his stools have been darker in color, almost black. The stools have lightened recently.  He has a history of upper GI bleed and varices from his primary sclerosing cholangitis  Patient denies any current nausea, vomiting, hematemesis or hematochezia. There has been no prior history of atrial fibrillation or heart disease.  Patient is currently on insulin for type 2 diabetes mellitus, but he has been also on high-dose steroids of methylprednisolone 1 g IV every other day for the past week.  His blood sugars have been running in the 600 range.  Past medical history is also pertinent for history of ulcerative colitis, status post total colectomy.  He has been on a low dose aspirin daily.    Problem List:  Patient Active Problem List    Diagnosis     Transplanted liver (H)     CMV (cytomegalovirus infection) (H)     Type 2 diabetes mellitus with complication, with long-term current use of insulin (H)     C. difficile enteritis     Anemia due to blood loss, acute     C. difficile pouchitis     History of esophageal varices     Thrombocytopenia (H)     History of duodenal ulcer     Mass of left upper extremity     Anemia     Upper GI bleed     Acute  pancreatitis     Primary sclerosing cholangitis     Cirrhosis of liver (H)     S/P total colectomy     Esophageal varices (H)     Mild major depression (H)     CARDIOVASCULAR SCREENING; LDL GOAL LESS THAN 160     H/O ulcerative colitis - s/p total colectomy       MEDS:   Previous Medications    ASPIRIN 81 MG TABLET    Take by mouth daily    INSULIN ASPART (INSULIN ASPART) 100 UNIT/ML INJECTION    Inject Subcutaneous 3 times daily (with meals)    INSULIN NPH (HUMULIN N/NOVOLIN N) 100 UNIT/ML INJECTION    Inject 40 Units Subcutaneous 2 times daily    METHYLPREDNISOLONE SODIUM SUCCINATE (SOLU-MEDROL) 1000 MG INJECTION    Inject 1,000 mg into the vein    TACROLIMUS (PROGRAF - GENERIC EQUIVALENT) 1 MG CAPSULE    Take 3 mg by mouth 2 times daily        ALLERGIES:    Allergies   Allergen Reactions     No Known Allergies        Past Surgical History:   Procedure Laterality Date     COLECTOMY  12/27/07     FLEXIBLE SIGMOIDOSCOPY  10/4/10      ERCP W/W/O NONA OF SPEC-BRUSH/WASH  02/19/10      ERCP W/W/O NONA OF SPEC-BRUSH/WASH  03/05/10      UGI ENDOSCOPY W BANDING ESOPH/GASTRIC VARICES  since 2/2015    monthly at [facility]; last one in August      UPPER GI ENDOSCOPY  11/13/13    [facility]     UPPER GI ENDOSCOPY  2/16/15    [facility]       Social History   Substance Use Topics     Smoking status: Never Smoker     Smokeless tobacco: Never Used     Alcohol use No      Comment: 2-3x per year         Review of Systems   Except as noted in HPI, all other systems were reviewed and are negative    Physical Exam     Vitals were reviewed  Patient Vitals for the past 8 hrs:   BP Temp Temp src Heart Rate Resp SpO2 Weight   09/22/18 0300 134/64 - - 97 20 100 % -   09/22/18 0245 128/75 - - 82 19 99 % -   09/22/18 0230 110/67 - - 105 18 99 % -   09/22/18 0215 116/77 - - 105 15 100 % -   09/22/18 0200 147/78 - - 120 - 98 % -   09/22/18 0145 130/69 - - 114 - 99 % -   09/22/18 0130 (!) 137/100 - - 114 12 100 % -   09/22/18  0115 150/75 - - 97 21 100 % -   09/22/18 0100 161/61 - - 154 12 100 % -   09/22/18 0046 (!) 158/126 97.7  F (36.5  C) Oral 121 22 100 % 120.2 kg (265 lb)   09/22/18 0045 (!) 177/107 - - - - 100 % -     GENERAL APPEARANCE: Alert, extremely pale color and oriented ×3  FACE: normal facies  EYES: Pupils are equal  HENT: normal external exam  NECK: no adenopathy or asymmetry  RESP: Slight increased respiratory effort; bilateral breath sounds are clear  CV: Rapid, irregular heart rate; no appreciable murmur  ABD: soft, no tenderness; no rebound or guarding; bowel sounds are normal  MS: no gross deformities noted; normal muscle tone.  EXT: No calf tenderness or pitting edema  SKIN: no worrisome rash  NEURO: no facial droop; no focal deficits, speech is normal  PSYCH: Flat affect      Available Lab/Imaging Results     Results for orders placed or performed during the hospital encounter of 09/22/18 (from the past 24 hour(s))   CBC with platelets differential   Result Value Ref Range    WBC 9.2 4.0 - 11.0 10e9/L    RBC Count 4.30 (L) 4.4 - 5.9 10e12/L    Hemoglobin 10.7 (L) 13.3 - 17.7 g/dL    Hematocrit 33.2 (L) 40.0 - 53.0 %    MCV 77 (L) 78 - 100 fl    MCH 24.9 (L) 26.5 - 33.0 pg    MCHC 32.2 31.5 - 36.5 g/dL    RDW 19.9 (H) 10.0 - 15.0 %    Platelet Count 164 150 - 450 10e9/L    Diff Method Manual Differential     % Neutrophils 83.0 %    % Lymphocytes 11.0 %    % Monocytes 6.0 %    % Eosinophils 0.0 %    % Basophils 0.0 %    Absolute Neutrophil 7.6 1.6 - 8.3 10e9/L    Absolute Lymphocytes 1.0 0.8 - 5.3 10e9/L    Absolute Monocytes 0.6 0.0 - 1.3 10e9/L    Absolute Eosinophils 0.0 0.0 - 0.7 10e9/L    Absolute Basophils 0.0 0.0 - 0.2 10e9/L    Anisocytosis Slight     Polychromasia Slight     RBC Morphology Consistent with reported results     Platelet Estimate       Automated count confirmed.  Platelet morphology is normal.   ABO/Rh type and screen   Result Value Ref Range    ABO O     RH(D) Neg     Antibody Screen Neg      Test Valid Only At [facility]        Specimen Expires 09/25/2018    INR   Result Value Ref Range    INR 1.12 0.86 - 1.14   Partial thromboplastin time   Result Value Ref Range    PTT 25 22 - 37 sec   Comprehensive metabolic panel   Result Value Ref Range    Sodium 136 133 - 144 mmol/L    Potassium 3.6 3.4 - 5.3 mmol/L    Chloride 100 94 - 109 mmol/L    Carbon Dioxide 21 20 - 32 mmol/L    Anion Gap 15 (H) 3 - 14 mmol/L    Glucose 623 (HH) 70 - 99 mg/dL    Urea Nitrogen 45 (H) 7 - 30 mg/dL    Creatinine 1.56 (H) 0.66 - 1.25 mg/dL    GFR Estimate 49 (L) >60 mL/min/1.7m2    GFR Estimate If Black 60 (L) >60 mL/min/1.7m2    Calcium 8.4 (L) 8.5 - 10.1 mg/dL    Bilirubin Total 0.9 0.2 - 1.3 mg/dL    Albumin 3.3 (L) 3.4 - 5.0 g/dL    Protein Total 6.3 (L) 6.8 - 8.8 g/dL    Alkaline Phosphatase 138 40 - 150 U/L    ALT 50 0 - 70 U/L    AST 19 0 - 45 U/L   Troponin I   Result Value Ref Range    Troponin I ES 0.022 0.000 - 0.045 ug/L   Lactic acid whole blood   Result Value Ref Range    Lactic Acid 5.2 (HH) 0.7 - 2.0 mmol/L   Nt probnp inpatient   Result Value Ref Range    N-Terminal Pro BNP Inpatient 723 (H) 0 - 450 pg/mL   TSH   Result Value Ref Range    TSH 1.00 0.40 - 4.00 mU/L   XR Chest Port 1 View    Narrative    CHEST ONE VIEW PORTABLE  9/22/2018 1:16 AM     HISTORY: SOA, chest burning/pain.    COMPARISON: 2/9/2010.      Impression    IMPRESSION: No radiographic evidence of acute chest abnormality.    RICHMOND ELAM MD   UA reflex to Microscopic   Result Value Ref Range    Color Urine Straw     Appearance Urine Clear     Glucose Urine >499 (A) NEG^Negative mg/dL    Bilirubin Urine Negative NEG^Negative    Ketones Urine Negative NEG^Negative mg/dL    Specific Gravity Urine 1.026 1.003 - 1.035    Blood Urine Negative NEG^Negative    pH Urine 6.0 5.0 - 7.0 pH    Protein Albumin Urine Negative NEG^Negative mg/dL    Urobilinogen mg/dL 0.0 0.0 - 2.0 mg/dL    Nitrite Urine Negative NEG^Negative    Leukocyte  Esterase Urine Negative NEG^Negative    Source Unspecified Urine    Ketone Beta-Hydroxybutyrate Quantitative   Result Value Ref Range    Ketone Quantitative 0.4 0.0 - 0.6 mmol/L   Occult blood stool   Result Value Ref Range    Occult Blood Positive (A) NEG^Negative   Lactic acid whole blood   Result Value Ref Range    Lactic Acid 1.5 0.7 - 2.0 mmol/L   Glucose by meter   Result Value Ref Range    Glucose 512 (HH) 70 - 99 mg/dL       EKG reviewed by me: Atrial fibrillation with rapid ventricular rate of 149.  Normal QT and QRS intervals.  Normal axis.  There is some diffuse ST depression through the anterolateral leads.    The Lactic acid level is elevated due to hyperglycemia and dehydration, at this time there is no sign of severe sepsis or septic shock.      Impression     Final diagnoses:   Acute chest pain   New onset atrial fibrillation (H)   Gastrointestinal hemorrhage with melena   Hyperglycemia   Complication of transplanted liver         ED Course & Medical Decision Making   Abhishek Downey is a 42 year old male who presented to the emergency department with one-week history of chest discomfort with burning sensation in the chest, associated with dizziness, weakness and shortness of breath.  Patient reports that the symptoms have been triggered by activity and relieved by rest.  Patient has been seen at the [facility] since he had his liver transplantation in July 2017.  Most recently, the patient has been receiving high-dose intravenous methylprednisolone for liver rejection.  Patient reported no previous history of heart problems including atrial fibrillation.  He has noted that his blood sugars have been running high and that no one seems to be too concerned that his blood sugars are running in the 600 range.  He has been administering insulin.  He has not had any fevers, chills, cough, diaphoresis, nausea, or vomiting.  He did notice dark to black stools over the past week and a half, but the stools  have been more light brown in color recently.  Patient is on baby aspirin a day.  Patient was seen shortly after arrival.  Temperature is 97.7, blood pressure 177/107 with heart rate of 121-154.  Respiratory rate is 22 with oxygen saturation of 100%.  Patient appears extremely pale in color, and dyspneic.  His exam reveals rapid rate, regular rhythm.  Lung exam is clear.  Abdominal exam reveals no epigastric tenderness.  Patient has Hemoccult positive stools.  On telemetry, he has atrial fibrillation.  Twelve-lead EKG reveals atrial fibrillation with rapid ventricular rate.  There is some ST depressions in the anterior lateral leads.  Laboratory workup reveals a white blood count of 9.2, hemoglobin of 10.7, a drop of 2.7 g of hemoglobin since 8 days ago.  Platelet count is 164, competency metabolic panel reveals normal electrolytes, glucose of 623, urea nitrogen of 45, and creatinine of 1.56.  Liver enzymes are normal.  Troponin is 0.022, lactic acid level 5.2, BNP of 723, TSH of 1.00.  Chest x-ray reveals no acute abnormality.  Urinalysis is negative, quantitative ketone is 0.4.  Patient was started on IV fluids given the high lactate level and suspicion for dehydration and hyperglycemia.  He received 14 units of subcutaneous NovoLog.  The patient's atrial fibrillation was treated with rate control with Cardizem 20 mg bolus followed by a Cardizem drip.  The patient has a suspected upper GI bleed recently, and he was given a dose of Protonix 80 mg IV.  I discussed the case with  with the liver transplant team at the [place], and subsequently with Dr. Dhaliwal the intensive care specialist.  He recommended that we obtain 2 blood cultures, and empirically begin Zosyn given his high risk of infection and his immunosuppressed state.  Patient has been accepted to the [facility] for further management.           This note was dictated using electronic voice recognition software and although  reviewed, may contain grammatical and spelling errors.        Crystal Major MD  09/22/18 0014

## 2018-09-22 NOTE — ED NOTES
Pt up to the bedside to use the urinal.  Pt had increased pain while standing. Urine sample collected and sent to lab.  Pt back to bed.

## 2018-09-22 NOTE — ED TRIAGE NOTES
Pt presents with concerns of chest pain.  Pt states that he has had the pain for the last week.  Pt states that the pain gets worse when he is up and walking, pain improves when laying down.  Pt states that he gets infusions for liver rejection.  Pt had a transplant at the [facility].  Pt appears pale.

## 2018-09-22 NOTE — ED NOTES
Report received from Kimberly COOK RN. Dr Major speaking with provider from [facility] about transfer.

## 2018-09-22 NOTE — ED NOTES
DATE:  9/22/2018   LAB TEST:  glucose  LAB VALUE:  512  RESULTS GIVEN WITH READ-BACK TO (PROVIDER):  Crystal Major MD  TIME LAB VALUE REPORTED TO PROVIDER:   4259

## 2018-09-26 DIAGNOSIS — Z01.89 ENCOUNTER FOR LABORATORY EXAMINATION: Primary | ICD-10-CM

## 2018-09-26 DIAGNOSIS — Z94.4 TRANSPLANTED LIVER (H): ICD-10-CM

## 2018-09-26 DIAGNOSIS — Z79.899 ENCOUNTER FOR LONG-TERM (CURRENT) USE OF MEDICATIONS: ICD-10-CM

## 2018-09-26 LAB
ALBUMIN SERPL-MCNC: 3.2 G/DL (ref 3.4–5)
ALP SERPL-CCNC: 137 U/L (ref 40–150)
ALT SERPL W P-5'-P-CCNC: 62 U/L (ref 0–70)
ANION GAP SERPL CALCULATED.3IONS-SCNC: 6 MMOL/L (ref 3–14)
ANISOCYTOSIS BLD QL SMEAR: ABNORMAL
AST SERPL W P-5'-P-CCNC: 39 U/L (ref 0–45)
BASOPHILS # BLD AUTO: 0 10E9/L (ref 0–0.2)
BASOPHILS NFR BLD AUTO: 1 %
BILIRUB SERPL-MCNC: 0.9 MG/DL (ref 0.2–1.3)
BUN SERPL-MCNC: 15 MG/DL (ref 7–30)
CALCIUM SERPL-MCNC: 7.9 MG/DL (ref 8.5–10.1)
CHLORIDE SERPL-SCNC: 114 MMOL/L (ref 94–109)
CO2 SERPL-SCNC: 23 MMOL/L (ref 20–32)
CREAT SERPL-MCNC: 1.11 MG/DL (ref 0.66–1.25)
DIFFERENTIAL METHOD BLD: ABNORMAL
EOSINOPHIL # BLD AUTO: 0.1 10E9/L (ref 0–0.7)
EOSINOPHIL NFR BLD AUTO: 2 %
ERYTHROCYTE [DISTWIDTH] IN BLOOD BY AUTOMATED COUNT: 19.7 % (ref 10–15)
GFR SERPL CREATININE-BSD FRML MDRD: 73 ML/MIN/1.7M2
GLUCOSE SERPL-MCNC: 156 MG/DL (ref 70–99)
HCT VFR BLD AUTO: 29 % (ref 40–53)
HGB BLD-MCNC: 9.2 G/DL (ref 13.3–17.7)
LYMPHOCYTES # BLD AUTO: 0.8 10E9/L (ref 0.8–5.3)
LYMPHOCYTES NFR BLD AUTO: 32 %
MCH RBC QN AUTO: 24.9 PG (ref 26.5–33)
MCHC RBC AUTO-ENTMCNC: 31.7 G/DL (ref 31.5–36.5)
MCV RBC AUTO: 79 FL (ref 78–100)
MONOCYTES # BLD AUTO: 0.1 10E9/L (ref 0–1.3)
MONOCYTES NFR BLD AUTO: 5 %
NEUTROPHILS # BLD AUTO: 1.6 10E9/L (ref 1.6–8.3)
NEUTROPHILS NFR BLD AUTO: 60 %
PLATELET # BLD AUTO: 100 10E9/L (ref 150–450)
PLATELET # BLD EST: ABNORMAL 10*3/UL
POLYCHROMASIA BLD QL SMEAR: SLIGHT
POTASSIUM SERPL-SCNC: 4.2 MMOL/L (ref 3.4–5.3)
PROT SERPL-MCNC: 6.2 G/DL (ref 6.8–8.8)
RBC # BLD AUTO: 3.69 10E12/L (ref 4.4–5.9)
RBC MORPH BLD: ABNORMAL
SODIUM SERPL-SCNC: 143 MMOL/L (ref 133–144)
WBC # BLD AUTO: 2.6 10E9/L (ref 4–11)

## 2018-09-26 PROCEDURE — 36415 COLL VENOUS BLD VENIPUNCTURE: CPT | Performed by: INTERNAL MEDICINE

## 2018-09-26 PROCEDURE — 99000 SPECIMEN HANDLING OFFICE-LAB: CPT

## 2018-09-26 PROCEDURE — 85025 COMPLETE CBC W/AUTO DIFF WBC: CPT | Performed by: INTERNAL MEDICINE

## 2018-09-26 PROCEDURE — 80053 COMPREHEN METABOLIC PANEL: CPT | Performed by: INTERNAL MEDICINE

## 2018-09-27 LAB
BACTERIA SPEC CULT: NORMAL
BACTERIA SPEC CULT: NORMAL
SPECIMEN SOURCE: NORMAL
SPECIMEN SOURCE: NORMAL

## 2018-10-02 DIAGNOSIS — Z79.899 LONG TERM USE OF DRUG: ICD-10-CM

## 2018-10-02 DIAGNOSIS — Z79.899 ENCOUNTER FOR LONG-TERM (CURRENT) USE OF MEDICATIONS: ICD-10-CM

## 2018-10-02 DIAGNOSIS — Z94.4 TRANSPLANTED LIVER (H): Primary | ICD-10-CM

## 2018-10-02 DIAGNOSIS — Z94.4 TRANSPLANTED LIVER (H): ICD-10-CM

## 2018-10-02 LAB
ALBUMIN SERPL-MCNC: 3.8 G/DL (ref 3.4–5)
ALP SERPL-CCNC: 118 U/L (ref 40–150)
ALT SERPL W P-5'-P-CCNC: 43 U/L (ref 0–70)
ANION GAP SERPL CALCULATED.3IONS-SCNC: 8 MMOL/L (ref 3–14)
AST SERPL W P-5'-P-CCNC: 28 U/L (ref 0–45)
BASOPHILS # BLD AUTO: 0 10E9/L (ref 0–0.2)
BASOPHILS NFR BLD AUTO: 0 %
BILIRUB SERPL-MCNC: 1.2 MG/DL (ref 0.2–1.3)
BUN SERPL-MCNC: 31 MG/DL (ref 7–30)
CALCIUM SERPL-MCNC: 8.2 MG/DL (ref 8.5–10.1)
CHLORIDE SERPL-SCNC: 111 MMOL/L (ref 94–109)
CO2 SERPL-SCNC: 23 MMOL/L (ref 20–32)
CREAT SERPL-MCNC: 2.61 MG/DL (ref 0.66–1.25)
DIFFERENTIAL METHOD BLD: ABNORMAL
EOSINOPHIL NFR BLD AUTO: 2 %
ERYTHROCYTE [DISTWIDTH] IN BLOOD BY AUTOMATED COUNT: 18.8 % (ref 10–15)
GFR SERPL CREATININE-BSD FRML MDRD: 27 ML/MIN/1.7M2
GLUCOSE SERPL-MCNC: 159 MG/DL (ref 70–99)
HCT VFR BLD AUTO: 31.4 % (ref 40–53)
HGB BLD-MCNC: 9.6 G/DL (ref 13.3–17.7)
IMM GRANULOCYTES # BLD: 0 10E9/L (ref 0–0.4)
IMM GRANULOCYTES NFR BLD: 2 %
LYMPHOCYTES # BLD AUTO: 0.5 10E9/L (ref 0.8–5.3)
LYMPHOCYTES NFR BLD AUTO: 35.3 %
MCH RBC QN AUTO: 23.9 PG (ref 26.5–33)
MCHC RBC AUTO-ENTMCNC: 30.6 G/DL (ref 31.5–36.5)
MCV RBC AUTO: 78 FL (ref 78–100)
MONOCYTES # BLD AUTO: 0.1 10E9/L (ref 0–1.3)
MONOCYTES NFR BLD AUTO: 6.5 %
NEUTROPHILS # BLD AUTO: 0.8 10E9/L (ref 1.6–8.3)
NEUTROPHILS NFR BLD AUTO: 54.2 %
NRBC # BLD AUTO: 0 10*3/UL
NRBC BLD AUTO-RTO: 0 /100
PLATELET # BLD AUTO: 121 10E9/L (ref 150–450)
POTASSIUM SERPL-SCNC: 5.5 MMOL/L (ref 3.4–5.3)
PROT SERPL-MCNC: 6.8 G/DL (ref 6.8–8.8)
RBC # BLD AUTO: 4.01 10E12/L (ref 4.4–5.9)
SODIUM SERPL-SCNC: 142 MMOL/L (ref 133–144)
WBC # BLD AUTO: 1.5 10E9/L (ref 4–11)

## 2018-10-02 PROCEDURE — 85025 COMPLETE CBC W/AUTO DIFF WBC: CPT | Performed by: INTERNAL MEDICINE

## 2018-10-02 PROCEDURE — 36415 COLL VENOUS BLD VENIPUNCTURE: CPT | Performed by: INTERNAL MEDICINE

## 2018-10-02 PROCEDURE — 80197 ASSAY OF TACROLIMUS: CPT

## 2018-10-02 PROCEDURE — 80053 COMPREHEN METABOLIC PANEL: CPT | Performed by: INTERNAL MEDICINE

## 2018-10-03 DIAGNOSIS — Z94.4 TRANSPLANTED LIVER (H): ICD-10-CM

## 2018-10-03 DIAGNOSIS — Z79.899 LONG TERM USE OF DRUG: ICD-10-CM

## 2018-10-03 LAB
ALBUMIN SERPL-MCNC: 3.9 G/DL (ref 3.4–5)
ALP SERPL-CCNC: 126 U/L (ref 40–150)
ALT SERPL W P-5'-P-CCNC: 44 U/L (ref 0–70)
ANION GAP SERPL CALCULATED.3IONS-SCNC: 6 MMOL/L (ref 3–14)
AST SERPL W P-5'-P-CCNC: 31 U/L (ref 0–45)
BASOPHILS # BLD AUTO: 0 10E9/L (ref 0–0.2)
BASOPHILS NFR BLD AUTO: 0.5 %
BILIRUB SERPL-MCNC: 1.1 MG/DL (ref 0.2–1.3)
BUN SERPL-MCNC: 32 MG/DL (ref 7–30)
CALCIUM SERPL-MCNC: 8.6 MG/DL (ref 8.5–10.1)
CHLORIDE SERPL-SCNC: 110 MMOL/L (ref 94–109)
CO2 SERPL-SCNC: 23 MMOL/L (ref 20–32)
CREAT SERPL-MCNC: 2.53 MG/DL (ref 0.66–1.25)
DIFFERENTIAL METHOD BLD: ABNORMAL
EOSINOPHIL NFR BLD AUTO: 2.5 %
ERYTHROCYTE [DISTWIDTH] IN BLOOD BY AUTOMATED COUNT: 18.6 % (ref 10–15)
GFR SERPL CREATININE-BSD FRML MDRD: 28 ML/MIN/1.7M2
GLUCOSE SERPL-MCNC: 148 MG/DL (ref 70–99)
HCT VFR BLD AUTO: 31.1 % (ref 40–53)
HGB BLD-MCNC: 9.7 G/DL (ref 13.3–17.7)
IMM GRANULOCYTES # BLD: 0 10E9/L (ref 0–0.4)
IMM GRANULOCYTES NFR BLD: 1 %
LYMPHOCYTES # BLD AUTO: 0.7 10E9/L (ref 0.8–5.3)
LYMPHOCYTES NFR BLD AUTO: 34.3 %
MCH RBC QN AUTO: 24.3 PG (ref 26.5–33)
MCHC RBC AUTO-ENTMCNC: 31.2 G/DL (ref 31.5–36.5)
MCV RBC AUTO: 78 FL (ref 78–100)
MONOCYTES # BLD AUTO: 0.1 10E9/L (ref 0–1.3)
MONOCYTES NFR BLD AUTO: 7.1 %
NEUTROPHILS # BLD AUTO: 1.1 10E9/L (ref 1.6–8.3)
NEUTROPHILS NFR BLD AUTO: 54.6 %
NRBC # BLD AUTO: 0 10*3/UL
NRBC BLD AUTO-RTO: 0 /100
PLATELET # BLD AUTO: 129 10E9/L (ref 150–450)
POTASSIUM SERPL-SCNC: 6 MMOL/L (ref 3.4–5.3)
PROT SERPL-MCNC: 7.1 G/DL (ref 6.8–8.8)
RBC # BLD AUTO: 4 10E12/L (ref 4.4–5.9)
SODIUM SERPL-SCNC: 139 MMOL/L (ref 133–144)
TACROLIMUS BLD-MCNC: 14.2 UG/L (ref 5–15)
TACROLIMUS BLD-MCNC: 18 UG/L (ref 5–15)
TME LAST DOSE: ABNORMAL H
TME LAST DOSE: NORMAL H
WBC # BLD AUTO: 2 10E9/L (ref 4–11)

## 2018-10-03 PROCEDURE — 36415 COLL VENOUS BLD VENIPUNCTURE: CPT

## 2018-10-03 PROCEDURE — 80197 ASSAY OF TACROLIMUS: CPT

## 2018-10-03 PROCEDURE — 85025 COMPLETE CBC W/AUTO DIFF WBC: CPT

## 2018-10-03 PROCEDURE — 80053 COMPREHEN METABOLIC PANEL: CPT

## 2018-10-08 DIAGNOSIS — Z79.899 LONG TERM USE OF DRUG: ICD-10-CM

## 2018-10-08 DIAGNOSIS — Z94.4 TRANSPLANTED LIVER (H): ICD-10-CM

## 2018-10-08 LAB
ALBUMIN SERPL-MCNC: 4 G/DL (ref 3.4–5)
ALP SERPL-CCNC: 130 U/L (ref 40–150)
ALT SERPL W P-5'-P-CCNC: 46 U/L (ref 0–70)
ANION GAP SERPL CALCULATED.3IONS-SCNC: 7 MMOL/L (ref 3–14)
AST SERPL W P-5'-P-CCNC: 36 U/L (ref 0–45)
BASOPHILS # BLD AUTO: 0 10E9/L (ref 0–0.2)
BASOPHILS NFR BLD AUTO: 0 %
BILIRUB SERPL-MCNC: 1.2 MG/DL (ref 0.2–1.3)
BUN SERPL-MCNC: 36 MG/DL (ref 7–30)
CALCIUM SERPL-MCNC: 8.7 MG/DL (ref 8.5–10.1)
CHLORIDE SERPL-SCNC: 107 MMOL/L (ref 94–109)
CO2 SERPL-SCNC: 24 MMOL/L (ref 20–32)
CREAT SERPL-MCNC: 3.15 MG/DL (ref 0.66–1.25)
DIFFERENTIAL METHOD BLD: ABNORMAL
EOSINOPHIL NFR BLD AUTO: 2.9 %
ERYTHROCYTE [DISTWIDTH] IN BLOOD BY AUTOMATED COUNT: 16.8 % (ref 10–15)
GFR SERPL CREATININE-BSD FRML MDRD: 22 ML/MIN/1.7M2
GLUCOSE SERPL-MCNC: 166 MG/DL (ref 70–99)
HCT VFR BLD AUTO: 31.7 % (ref 40–53)
HGB BLD-MCNC: 9.7 G/DL (ref 13.3–17.7)
IMM GRANULOCYTES # BLD: 0 10E9/L (ref 0–0.4)
IMM GRANULOCYTES NFR BLD: 0.6 %
LYMPHOCYTES # BLD AUTO: 0.7 10E9/L (ref 0.8–5.3)
LYMPHOCYTES NFR BLD AUTO: 38.4 %
MCH RBC QN AUTO: 23.4 PG (ref 26.5–33)
MCHC RBC AUTO-ENTMCNC: 30.6 G/DL (ref 31.5–36.5)
MCV RBC AUTO: 77 FL (ref 78–100)
MONOCYTES # BLD AUTO: 0.1 10E9/L (ref 0–1.3)
MONOCYTES NFR BLD AUTO: 7.6 %
NEUTROPHILS # BLD AUTO: 0.9 10E9/L (ref 1.6–8.3)
NEUTROPHILS NFR BLD AUTO: 50.5 %
NRBC # BLD AUTO: 0 10*3/UL
NRBC BLD AUTO-RTO: 0 /100
PLATELET # BLD AUTO: 125 10E9/L (ref 150–450)
POTASSIUM SERPL-SCNC: 5.3 MMOL/L (ref 3.4–5.3)
PROT SERPL-MCNC: 7.2 G/DL (ref 6.8–8.8)
RBC # BLD AUTO: 4.14 10E12/L (ref 4.4–5.9)
SODIUM SERPL-SCNC: 138 MMOL/L (ref 133–144)
WBC # BLD AUTO: 1.7 10E9/L (ref 4–11)

## 2018-10-08 PROCEDURE — 85025 COMPLETE CBC W/AUTO DIFF WBC: CPT

## 2018-10-08 PROCEDURE — 36415 COLL VENOUS BLD VENIPUNCTURE: CPT

## 2018-10-08 PROCEDURE — 80053 COMPREHEN METABOLIC PANEL: CPT

## 2018-10-15 DIAGNOSIS — Z94.4 TRANSPLANTED LIVER (H): ICD-10-CM

## 2018-10-15 DIAGNOSIS — Z79.899 LONG TERM USE OF DRUG: ICD-10-CM

## 2018-10-15 LAB
ALBUMIN SERPL-MCNC: 4 G/DL (ref 3.4–5)
ALP SERPL-CCNC: 137 U/L (ref 40–150)
ALT SERPL W P-5'-P-CCNC: 47 U/L (ref 0–70)
ANION GAP SERPL CALCULATED.3IONS-SCNC: 7 MMOL/L (ref 3–14)
AST SERPL W P-5'-P-CCNC: 37 U/L (ref 0–45)
BASOPHILS # BLD AUTO: 0 10E9/L (ref 0–0.2)
BASOPHILS NFR BLD AUTO: 0 %
BILIRUB SERPL-MCNC: 0.8 MG/DL (ref 0.2–1.3)
BUN SERPL-MCNC: 27 MG/DL (ref 7–30)
CALCIUM SERPL-MCNC: 8.9 MG/DL (ref 8.5–10.1)
CHLORIDE SERPL-SCNC: 109 MMOL/L (ref 94–109)
CO2 SERPL-SCNC: 25 MMOL/L (ref 20–32)
CREAT SERPL-MCNC: 2.41 MG/DL (ref 0.66–1.25)
DIFFERENTIAL METHOD BLD: ABNORMAL
EOSINOPHIL NFR BLD AUTO: 4.1 %
ERYTHROCYTE [DISTWIDTH] IN BLOOD BY AUTOMATED COUNT: 15.6 % (ref 10–15)
GFR SERPL CREATININE-BSD FRML MDRD: 30 ML/MIN/1.7M2
GLUCOSE SERPL-MCNC: 182 MG/DL (ref 70–99)
HCT VFR BLD AUTO: 31.2 % (ref 40–53)
HGB BLD-MCNC: 9.5 G/DL (ref 13.3–17.7)
IMM GRANULOCYTES # BLD: 0 10E9/L (ref 0–0.4)
IMM GRANULOCYTES NFR BLD: 1.4 %
LYMPHOCYTES # BLD AUTO: 0.5 10E9/L (ref 0.8–5.3)
LYMPHOCYTES NFR BLD AUTO: 36.1 %
MCH RBC QN AUTO: 23.2 PG (ref 26.5–33)
MCHC RBC AUTO-ENTMCNC: 30.4 G/DL (ref 31.5–36.5)
MCV RBC AUTO: 76 FL (ref 78–100)
MONOCYTES # BLD AUTO: 0.2 10E9/L (ref 0–1.3)
MONOCYTES NFR BLD AUTO: 10.2 %
NEUTROPHILS # BLD AUTO: 0.7 10E9/L (ref 1.6–8.3)
NEUTROPHILS NFR BLD AUTO: 48.2 %
NRBC # BLD AUTO: 0 10*3/UL
NRBC BLD AUTO-RTO: 0 /100
PLATELET # BLD AUTO: 102 10E9/L (ref 150–450)
POTASSIUM SERPL-SCNC: 5 MMOL/L (ref 3.4–5.3)
PROT SERPL-MCNC: 7 G/DL (ref 6.8–8.8)
RBC # BLD AUTO: 4.1 10E12/L (ref 4.4–5.9)
SODIUM SERPL-SCNC: 141 MMOL/L (ref 133–144)
WBC # BLD AUTO: 1.5 10E9/L (ref 4–11)

## 2018-10-15 PROCEDURE — 80053 COMPREHEN METABOLIC PANEL: CPT

## 2018-10-15 PROCEDURE — 36415 COLL VENOUS BLD VENIPUNCTURE: CPT

## 2018-10-15 PROCEDURE — 85025 COMPLETE CBC W/AUTO DIFF WBC: CPT

## 2018-10-22 DIAGNOSIS — Z94.4 TRANSPLANTED LIVER (H): ICD-10-CM

## 2018-10-22 DIAGNOSIS — Z79.899 LONG TERM USE OF DRUG: ICD-10-CM

## 2018-10-22 LAB
ALBUMIN SERPL-MCNC: 3.8 G/DL (ref 3.4–5)
ALP SERPL-CCNC: 136 U/L (ref 40–150)
ALT SERPL W P-5'-P-CCNC: 46 U/L (ref 0–70)
ANION GAP SERPL CALCULATED.3IONS-SCNC: 7 MMOL/L (ref 3–14)
AST SERPL W P-5'-P-CCNC: 34 U/L (ref 0–45)
BASOPHILS # BLD AUTO: 0 10E9/L (ref 0–0.2)
BASOPHILS NFR BLD AUTO: 0.4 %
BILIRUB SERPL-MCNC: 0.9 MG/DL (ref 0.2–1.3)
BUN SERPL-MCNC: 20 MG/DL (ref 7–30)
CALCIUM SERPL-MCNC: 9 MG/DL (ref 8.5–10.1)
CHLORIDE SERPL-SCNC: 108 MMOL/L (ref 94–109)
CO2 SERPL-SCNC: 26 MMOL/L (ref 20–32)
CREAT SERPL-MCNC: 1.25 MG/DL (ref 0.66–1.25)
DIFFERENTIAL METHOD BLD: ABNORMAL
EOSINOPHIL NFR BLD AUTO: 2 %
ERYTHROCYTE [DISTWIDTH] IN BLOOD BY AUTOMATED COUNT: 15.5 % (ref 10–15)
GFR SERPL CREATININE-BSD FRML MDRD: 64 ML/MIN/1.7M2
GLUCOSE SERPL-MCNC: 162 MG/DL (ref 70–99)
HCT VFR BLD AUTO: 32.7 % (ref 40–53)
HGB BLD-MCNC: 9.7 G/DL (ref 13.3–17.7)
IMM GRANULOCYTES # BLD: 0.1 10E9/L (ref 0–0.4)
IMM GRANULOCYTES NFR BLD: 3.9 %
LYMPHOCYTES # BLD AUTO: 0.9 10E9/L (ref 0.8–5.3)
LYMPHOCYTES NFR BLD AUTO: 33.6 %
MCH RBC QN AUTO: 22.4 PG (ref 26.5–33)
MCHC RBC AUTO-ENTMCNC: 29.7 G/DL (ref 31.5–36.5)
MCV RBC AUTO: 76 FL (ref 78–100)
MONOCYTES # BLD AUTO: 0.2 10E9/L (ref 0–1.3)
MONOCYTES NFR BLD AUTO: 9.4 %
NEUTROPHILS # BLD AUTO: 1.3 10E9/L (ref 1.6–8.3)
NEUTROPHILS NFR BLD AUTO: 50.7 %
NRBC # BLD AUTO: 0 10*3/UL
NRBC BLD AUTO-RTO: 0 /100
PLATELET # BLD AUTO: 108 10E9/L (ref 150–450)
POTASSIUM SERPL-SCNC: 4.8 MMOL/L (ref 3.4–5.3)
PROT SERPL-MCNC: 6.8 G/DL (ref 6.8–8.8)
RBC # BLD AUTO: 4.33 10E12/L (ref 4.4–5.9)
SODIUM SERPL-SCNC: 141 MMOL/L (ref 133–144)
WBC # BLD AUTO: 2.6 10E9/L (ref 4–11)

## 2018-10-22 PROCEDURE — 80053 COMPREHEN METABOLIC PANEL: CPT

## 2018-10-22 PROCEDURE — 36415 COLL VENOUS BLD VENIPUNCTURE: CPT

## 2018-10-22 PROCEDURE — 85025 COMPLETE CBC W/AUTO DIFF WBC: CPT

## 2018-11-06 DIAGNOSIS — Z79.899 LONG TERM USE OF DRUG: ICD-10-CM

## 2018-11-06 DIAGNOSIS — Z94.4 TRANSPLANTED LIVER (H): ICD-10-CM

## 2018-11-06 LAB
ALBUMIN SERPL-MCNC: 4.2 G/DL (ref 3.4–5)
ALP SERPL-CCNC: 153 U/L (ref 40–150)
ALT SERPL W P-5'-P-CCNC: 47 U/L (ref 0–70)
ANION GAP SERPL CALCULATED.3IONS-SCNC: 8 MMOL/L (ref 3–14)
AST SERPL W P-5'-P-CCNC: 30 U/L (ref 0–45)
BASOPHILS # BLD AUTO: 0 10E9/L (ref 0–0.2)
BASOPHILS NFR BLD AUTO: 0.3 %
BILIRUB SERPL-MCNC: 0.8 MG/DL (ref 0.2–1.3)
BUN SERPL-MCNC: 15 MG/DL (ref 7–30)
CALCIUM SERPL-MCNC: 9.2 MG/DL (ref 8.5–10.1)
CHLORIDE SERPL-SCNC: 106 MMOL/L (ref 94–109)
CO2 SERPL-SCNC: 27 MMOL/L (ref 20–32)
CREAT SERPL-MCNC: 1.29 MG/DL (ref 0.66–1.25)
DIFFERENTIAL METHOD BLD: ABNORMAL
EOSINOPHIL NFR BLD AUTO: 4.3 %
ERYTHROCYTE [DISTWIDTH] IN BLOOD BY AUTOMATED COUNT: 15.1 % (ref 10–15)
GFR SERPL CREATININE-BSD FRML MDRD: 61 ML/MIN/1.7M2
GLUCOSE SERPL-MCNC: 194 MG/DL (ref 70–99)
HCT VFR BLD AUTO: 37.1 % (ref 40–53)
HGB BLD-MCNC: 10.7 G/DL (ref 13.3–17.7)
IMM GRANULOCYTES # BLD: 0 10E9/L (ref 0–0.4)
IMM GRANULOCYTES NFR BLD: 1 %
LYMPHOCYTES # BLD AUTO: 0.9 10E9/L (ref 0.8–5.3)
LYMPHOCYTES NFR BLD AUTO: 30 %
MCH RBC QN AUTO: 21.4 PG (ref 26.5–33)
MCHC RBC AUTO-ENTMCNC: 28.8 G/DL (ref 31.5–36.5)
MCV RBC AUTO: 74 FL (ref 78–100)
MONOCYTES # BLD AUTO: 0.3 10E9/L (ref 0–1.3)
MONOCYTES NFR BLD AUTO: 9 %
NEUTROPHILS # BLD AUTO: 1.7 10E9/L (ref 1.6–8.3)
NEUTROPHILS NFR BLD AUTO: 55.4 %
NRBC # BLD AUTO: 0 10*3/UL
NRBC BLD AUTO-RTO: 0 /100
PLATELET # BLD AUTO: 149 10E9/L (ref 150–450)
POTASSIUM SERPL-SCNC: 4.5 MMOL/L (ref 3.4–5.3)
PROT SERPL-MCNC: 7.5 G/DL (ref 6.8–8.8)
RBC # BLD AUTO: 4.99 10E12/L (ref 4.4–5.9)
SODIUM SERPL-SCNC: 141 MMOL/L (ref 133–144)
WBC # BLD AUTO: 3 10E9/L (ref 4–11)

## 2018-11-06 PROCEDURE — 85025 COMPLETE CBC W/AUTO DIFF WBC: CPT

## 2018-11-06 PROCEDURE — 80053 COMPREHEN METABOLIC PANEL: CPT

## 2018-11-06 PROCEDURE — 36415 COLL VENOUS BLD VENIPUNCTURE: CPT

## 2018-12-12 NOTE — MR AVS SNAPSHOT
After Visit Summary   9/18/2018    Abhishek Downey    MRN: [id number]           Patient Information     Date Of Birth          1975        Visit Information        Provider Department      9/18/2018 10:00 AM [facility] DIABETIC ED RESOURCE [place] [facility]        Today's Diagnoses     Type 2 diabetes mellitus with complication, with long-term current use of insulin (H)    -  1       Follow-ups after your visit        Follow-up notes from your care team     Return in about 1 week (around 9/25/2018) for [employer] follow up.      Who to contact     If you have questions or need follow up information about today's clinic visit or your schedule please contact [place] [facility] directly at [phone].  Normal or non-critical lab and imaging results will be communicated to you by [employer], letter or phone within 4 business days after the clinic has received the results. If you do not hear from us within 7 days, please contact the clinic through [employer] or phone. If you have a critical or abnormal lab result, we will notify you by phone as soon as possible.  Submit refill requests through [employer] or call your pharmacy and they will forward the refill request to us. Please allow 3 business days for your refill to be completed.          Additional Information About Your Visit        MyChart Information     [employer] gives you secure access to your electronic health record. If you see a primary care provider, you can also send messages to your care team and make appointments. If you have questions, please call your primary care clinic.  If you do not have a primary care provider, please call [phone] and they will assist you.        Care EveryWhere ID     This is your Care EveryWhere ID. This could be used by other organizations to access your [place] medical records  [id number]         Blood Pressure from Last 3 Encounters:   09/22/18 121/64   09/21/18 132/67  Patient Instructions by Kenna Drake RMA at 04/05/17 03:45 PM     Author:  Kenna Drake RMA Service:  (none) Author Type:  Certified Medical Assistant     Filed:  04/05/17 03:46 PM Encounter Date:  4/5/2017 Status:  Signed     :  Kenna Drake RMA (Certified Medical Assistant)            Next visit with JUAN SPRING is on 04/06/2018 at 10:30 AM in FAMILY PRACTICE SEQ  Next visit with FAMILY PRACTICE is on 04/06/2018 at 10:30 AM in FAMILY PRACTICE SEQ    Fasting Future Labs  · Please come fasting (at least 8 hours) to the laboratory department in 3 months to recheck labs.  · Please drink plenty of water before.  · You can take any prescribed or routine medicine with water.  · You can brush your teeth even if you are fasting.  · NO black coffee and NO gum chewing (even sugar free)    Family Medicine [facility] Hours:  Monday - Friday from 8:00 a.m. to 5:00 p.m.  Saturday from 8:00 a.m. to 12:00 p.m.    Thank you for signing up for [employer] [facility].  This is a free and secure online service for managing your health care needs.  Please check your e-mail for steps to finish your enrollment.    You may now schedule your Family Medicine appointments on-line real time through your [employer] account! You have 24/7 access to view your provider’s open schedule times, no messaging back and forth or waiting on the phone!   · You can schedule these appointments via a computer or using your mobile phone browser [email or web address]  · Please refer to the [employer] Help Desk at [phone] if you have any concerns with scheduling    Additional Educational Resources:  For additional resources regarding your symptoms, diagnosis, or further health information, please visit the Health Resources section on [email or web address] or the Online Health Resources section in [employer].      Revision History        User Key Date/Time User Provider Type Action    > [N/A]    09/19/18 127/75    Weight from Last 3 Encounters:   09/22/18 120.2 kg (265 lb)   09/19/18 118.8 kg (261 lb 12.8 oz)   09/11/18 122.3 kg (269 lb 9.6 oz)              We Performed the Following     DIABETES EDUCATION - Individual  []          Today's Medication Changes          These changes are accurate as of 9/18/18 11:59 PM.  If you have any questions, ask your nurse or doctor.               These medicines have changed or have updated prescriptions.        Dose/Directions    * insulin aspart 100 UNIT/ML injection   Commonly known as:  NovoLOG PEN   Indication:  Type 2 Diabetes, per sliding scale per [place]   This may have changed:  Another medication with the same name was added. Make sure you understand how and when to take each.   Changed by:  Rissa Ballard RD        Inject Subcutaneous 3 times daily (with meals)   Refills:  0       * insulin aspart 100 UNITS/ML injection   Commonly known as:  NovoLOG VIAL   This may have changed:  You were already taking a medication with the same name, and this prescription was added. Make sure you understand how and when to take each.   Used for:  Type 2 diabetes mellitus with complication, with long-term current use of insulin (H)   Changed by:  Rissa Ballard RD        Take 2 units per 50 points blood sugar >150 as directed before meals, up to 50 units daily.   Quantity:  20 mL   Refills:  1       * Notice:  This list has 2 medication(s) that are the same as other medications prescribed for you. Read the directions carefully, and ask your doctor or other care provider to review them with you.         Where to get your medicines      These medications were sent to [facility] [place] [address] [facility]   [address] [facility] [address]     Phone:  [phone]     insulin aspart 100 UNITS/ML injection                Primary Care Provider Office Phone # Fax #    Radha Hidalgo [phone]       [address] [facility]  04/05/17 03:46 PM Kenna Drake, MARCI Certified Medical Assistant Sign             [address] [place] [place] [address]        Equal Access to Services     [facility] PATRICIO : Hadii aad ku hadcamillarocio Sodakotaali, waabyda luqadaha, qaybta kaalelizabet armstrong. So [facility] [phone].    ATENCIÓN: Si habla español, tiene a burton disposición servicios gratuitos de asistencia lingüística. Llame al [phone].    We comply with applicable federal civil rights laws and [place] laws. We do not discriminate on the basis of race, color, national origin, age, disability, sex, sexual orientation, or gender identity.            Thank you!     Thank you for choosing [place] [facility] [place]  for your care. Our goal is always to provide you with excellent care. Hearing back from our patients is one way we can continue to improve our services. Please take a few minutes to complete the written survey that you may receive in the mail after your visit with us. Thank you!             Your Updated Medication List - Protect others around you: Learn how to safely use, store and throw away your medicines at www.disposemymeds.org.          This list is accurate as of 9/18/18 11:59 PM.  Always use your most recent med list.                   Brand Name Dispense Instructions for use Diagnosis    aspirin 81 MG tablet      Take by mouth daily        * insulin aspart 100 UNIT/ML injection    NovoLOG PEN     Inject Subcutaneous 3 times daily (with meals)        * insulin aspart 100 UNITS/ML injection    NovoLOG VIAL    20 mL    Take 2 units per 50 points blood sugar >150 as directed before meals, up to 50 units daily.    Type 2 diabetes mellitus with complication, with long-term current use of insulin (H)       pantoprazole 40 MG EC tablet    PROTONIX     Take 1 tablet (40 mg) by mouth daily    S/P liver transplant (H)       tacrolimus 1 MG capsule    GENERIC EQUIVALENT     Take 3 mg by mouth 2 times daily    S/P liver transplant (H)       traMADol 50 MG tablet    ULTRAM     Take 1-2 tablets (  mg) by mouth every 6 hours as needed for pain    S/P liver transplant (H)       * Notice:  This list has 2 medication(s) that are the same as other medications prescribed for you. Read the directions carefully, and ask your doctor or other care provider to review them with you.

## 2019-05-25 ENCOUNTER — TELEPHONE (OUTPATIENT)
Dept: FAMILY MEDICINE | Facility: CLINIC | Age: 44
End: 2019-05-25

## 2019-05-25 DIAGNOSIS — Z13.6 CARDIOVASCULAR SCREENING; LDL GOAL LESS THAN 160: ICD-10-CM

## 2019-05-25 DIAGNOSIS — Z79.4 TYPE 2 DIABETES MELLITUS WITH COMPLICATION, WITH LONG-TERM CURRENT USE OF INSULIN (H): Primary | ICD-10-CM

## 2019-05-25 DIAGNOSIS — E11.8 TYPE 2 DIABETES MELLITUS WITH COMPLICATION, WITH LONG-TERM CURRENT USE OF INSULIN (H): Primary | ICD-10-CM

## 2019-05-25 NOTE — TELEPHONE ENCOUNTER
"Statin therapy is recommended for patients 40-75 years of age with a diagnosis of diabetes and low-dose aspirin therapy is recommended in those with diabetes and a history of atherosclerotic cardiovascular disease.  Does patient have a diagnosis of diabetes? Yes - Patient has a diagnosis of diabetes  Is patient prescribed a statin? No - Patient is NOT prescribed a statin  Statin Assessment: NA - Not Applicable  The ASCVD Risk score (Brandon RAFI Jr., et al., 2013) failed to calculate for the following reasons:    Cannot find a previous HDL lab    Cannot find a previous total cholesterol lab   Statin Recommendation: consider Add \"statin intentionally not prescribed\" to medication list?  Reason for Recommendation: Routing to provider to determine if statin intentionally not prescribed should be on the medication list or if a statin should be considered with S/P liver transplant  Aspirin Assessment:  Aspirin indicated  Aspirin Recomendation: Continue aspirin - no change in therapy    Reason for Recommendation: Continue current plan  Is provider follow-up required? Yes - Provider follow-up is required  Is pharmacist follow-up required? No - Pharmacist follow-up is NOT required  Follow-Up Plan: Sending to provider to determine if statin intentionally not prescribed should be added to med list or considered with S/P liver transplant.   outreach completed by:   Thanks!     Marsha Lazo, PharmD, AEC  [facility] Pharmacist    on behalf of: [facility].         "

## 2019-05-25 NOTE — LETTER
June 25, 2019      Abhishek Downey  [address] [place] [id number]        Dear Abhishek,     We were contacted by the pharmacy regarding the use of a statin for you. Dr. Hidlago will need to consult with your transplant clinic provider. However, you haven't been in to see Dr. Hidalgo in a while and need a diabetic follow up as well. She would like you to make an appointment to see her and schedule a lab only appointment for A1C, lipid panel, urine microalbumin, and TSH with free T4 reflex for sometime in next month, fasting (nothing to eat or drink except water for at least 8hrs).       Sincerely,        [facility] Team

## 2019-06-17 NOTE — TELEPHONE ENCOUNTER
"I\"m not sure what the right decision for him will be. Will need to consult with his transplant clinic provider. However, he hasn't been in to see me in a while and needs diabetic follow up as well. Please ask him to make an appointment and please place labs for A1C, lipid panel, urine microalbumin, and TSH with free T4 reflex for sometime in next month, fasting.   Radha Hidalgo MD   "

## 2019-06-17 NOTE — TELEPHONE ENCOUNTER
Left message for call back. Please see msg below, and when patient calls schedule an appt. Thank you    Jennifer Armas CMA ([facility])

## 2019-08-01 ENCOUNTER — TRANSFERRED RECORDS (OUTPATIENT)
Dept: HEALTH INFORMATION MANAGEMENT | Facility: CLINIC | Age: 44
End: 2019-08-01

## 2019-08-05 ENCOUNTER — MEDICAL CORRESPONDENCE (OUTPATIENT)
Dept: HEALTH INFORMATION MANAGEMENT | Facility: CLINIC | Age: 44
End: 2019-08-05

## 2019-08-05 DIAGNOSIS — Z79.899 ENCOUNTER FOR LONG-TERM (CURRENT) USE OF MEDICATIONS: ICD-10-CM

## 2019-08-05 DIAGNOSIS — Z94.4 TRANSPLANTED LIVER (H): Primary | ICD-10-CM

## 2019-08-06 DIAGNOSIS — Z79.899 ENCOUNTER FOR LONG-TERM (CURRENT) USE OF MEDICATIONS: ICD-10-CM

## 2019-08-06 DIAGNOSIS — Z94.4 TRANSPLANTED LIVER (H): ICD-10-CM

## 2019-08-06 LAB
ALBUMIN SERPL-MCNC: 4.2 G/DL (ref 3.4–5)
ALP SERPL-CCNC: 232 U/L (ref 40–150)
ALT SERPL W P-5'-P-CCNC: 55 U/L (ref 0–70)
ANION GAP SERPL CALCULATED.3IONS-SCNC: 7 MMOL/L (ref 3–14)
AST SERPL W P-5'-P-CCNC: 41 U/L (ref 0–45)
BILIRUB SERPL-MCNC: 1 MG/DL (ref 0.2–1.3)
BUN SERPL-MCNC: 21 MG/DL (ref 7–30)
CALCIUM SERPL-MCNC: 9.7 MG/DL (ref 8.5–10.1)
CHLORIDE SERPL-SCNC: 109 MMOL/L (ref 94–109)
CO2 SERPL-SCNC: 25 MMOL/L (ref 20–32)
CREAT SERPL-MCNC: 1.14 MG/DL (ref 0.66–1.25)
ERYTHROCYTE [DISTWIDTH] IN BLOOD BY AUTOMATED COUNT: 16.3 % (ref 10–15)
GFR SERPL CREATININE-BSD FRML MDRD: 79 ML/MIN/{1.73_M2}
GLUCOSE SERPL-MCNC: 161 MG/DL (ref 70–99)
HCT VFR BLD AUTO: 44.4 % (ref 40–53)
HGB BLD-MCNC: 14.4 G/DL (ref 13.3–17.7)
MCH RBC QN AUTO: 25.9 PG (ref 26.5–33)
MCHC RBC AUTO-ENTMCNC: 32.4 G/DL (ref 31.5–36.5)
MCV RBC AUTO: 80 FL (ref 78–100)
PLATELET # BLD AUTO: 113 10E9/L (ref 150–450)
POTASSIUM SERPL-SCNC: 5.1 MMOL/L (ref 3.4–5.3)
PROT SERPL-MCNC: 7.8 G/DL (ref 6.8–8.8)
RBC # BLD AUTO: 5.57 10E12/L (ref 4.4–5.9)
SODIUM SERPL-SCNC: 141 MMOL/L (ref 133–144)
WBC # BLD AUTO: 3.7 10E9/L (ref 4–11)

## 2019-08-06 PROCEDURE — 80053 COMPREHEN METABOLIC PANEL: CPT | Performed by: NURSE PRACTITIONER

## 2019-08-06 PROCEDURE — 36415 COLL VENOUS BLD VENIPUNCTURE: CPT | Performed by: NURSE PRACTITIONER

## 2019-08-06 PROCEDURE — 85027 COMPLETE CBC AUTOMATED: CPT | Performed by: NURSE PRACTITIONER

## 2019-08-22 ENCOUNTER — MEDICAL CORRESPONDENCE (OUTPATIENT)
Dept: HEALTH INFORMATION MANAGEMENT | Facility: CLINIC | Age: 44
End: 2019-08-22

## 2019-08-23 DIAGNOSIS — Z94.4 TRANSPLANTED LIVER (H): Primary | ICD-10-CM

## 2019-08-23 DIAGNOSIS — Z79.899 ENCOUNTER FOR LONG-TERM (CURRENT) USE OF OTHER MEDICATIONS: ICD-10-CM

## 2019-08-26 DIAGNOSIS — Z94.4 TRANSPLANTED LIVER (H): ICD-10-CM

## 2019-08-26 DIAGNOSIS — Z79.899 ENCOUNTER FOR LONG-TERM (CURRENT) USE OF OTHER MEDICATIONS: ICD-10-CM

## 2019-08-26 LAB
ALBUMIN SERPL-MCNC: 3.9 G/DL (ref 3.4–5)
ALP SERPL-CCNC: 201 U/L (ref 40–150)
ALT SERPL W P-5'-P-CCNC: 51 U/L (ref 0–70)
ANION GAP SERPL CALCULATED.3IONS-SCNC: 6 MMOL/L (ref 3–14)
AST SERPL W P-5'-P-CCNC: 36 U/L (ref 0–45)
BILIRUB SERPL-MCNC: 0.9 MG/DL (ref 0.2–1.3)
BUN SERPL-MCNC: 8 MG/DL (ref 7–30)
CALCIUM SERPL-MCNC: 9.1 MG/DL (ref 8.5–10.1)
CHLORIDE SERPL-SCNC: 110 MMOL/L (ref 94–109)
CO2 SERPL-SCNC: 26 MMOL/L (ref 20–32)
CREAT SERPL-MCNC: 1.13 MG/DL (ref 0.66–1.25)
ERYTHROCYTE [DISTWIDTH] IN BLOOD BY AUTOMATED COUNT: 17 % (ref 10–15)
GFR SERPL CREATININE-BSD FRML MDRD: 80 ML/MIN/{1.73_M2}
GLUCOSE SERPL-MCNC: 157 MG/DL (ref 70–99)
HCT VFR BLD AUTO: 43.1 % (ref 40–53)
HGB BLD-MCNC: 14.2 G/DL (ref 13.3–17.7)
MCH RBC QN AUTO: 26.5 PG (ref 26.5–33)
MCHC RBC AUTO-ENTMCNC: 32.9 G/DL (ref 31.5–36.5)
MCV RBC AUTO: 81 FL (ref 78–100)
PLATELET # BLD AUTO: 96 10E9/L (ref 150–450)
POTASSIUM SERPL-SCNC: 4.6 MMOL/L (ref 3.4–5.3)
PROT SERPL-MCNC: 7.5 G/DL (ref 6.8–8.8)
RBC # BLD AUTO: 5.35 10E12/L (ref 4.4–5.9)
SODIUM SERPL-SCNC: 142 MMOL/L (ref 133–144)
WBC # BLD AUTO: 3.5 10E9/L (ref 4–11)

## 2019-08-26 PROCEDURE — 85027 COMPLETE CBC AUTOMATED: CPT | Performed by: INTERNAL MEDICINE

## 2019-08-26 PROCEDURE — 80053 COMPREHEN METABOLIC PANEL: CPT | Performed by: INTERNAL MEDICINE

## 2019-08-26 PROCEDURE — 36415 COLL VENOUS BLD VENIPUNCTURE: CPT | Performed by: INTERNAL MEDICINE

## 2019-09-04 ENCOUNTER — OFFICE VISIT (OUTPATIENT)
Dept: FAMILY MEDICINE | Facility: CLINIC | Age: 44
End: 2019-09-04
Payer: COMMERCIAL

## 2019-09-04 VITALS
BODY MASS INDEX: 29.9 KG/M2 | RESPIRATION RATE: 14 BRPM | SYSTOLIC BLOOD PRESSURE: 132 MMHG | WEIGHT: 225.6 LBS | HEART RATE: 90 BPM | HEIGHT: 73 IN | TEMPERATURE: 97.4 F | OXYGEN SATURATION: 98 % | DIASTOLIC BLOOD PRESSURE: 70 MMHG

## 2019-09-04 DIAGNOSIS — F41.1 GAD (GENERALIZED ANXIETY DISORDER): ICD-10-CM

## 2019-09-04 DIAGNOSIS — F32.0 MILD MAJOR DEPRESSION (H): ICD-10-CM

## 2019-09-04 DIAGNOSIS — Z23 NEED FOR VACCINATION: ICD-10-CM

## 2019-09-04 DIAGNOSIS — Z94.4 TRANSPLANTED LIVER (H): ICD-10-CM

## 2019-09-04 DIAGNOSIS — E03.8 SUBCLINICAL HYPOTHYROIDISM: ICD-10-CM

## 2019-09-04 DIAGNOSIS — E11.8 TYPE 2 DIABETES MELLITUS WITH COMPLICATION, WITH LONG-TERM CURRENT USE OF INSULIN (H): Primary | ICD-10-CM

## 2019-09-04 DIAGNOSIS — Z79.4 TYPE 2 DIABETES MELLITUS WITH COMPLICATION, WITH LONG-TERM CURRENT USE OF INSULIN (H): Primary | ICD-10-CM

## 2019-09-04 PROCEDURE — G0009 ADMIN PNEUMOCOCCAL VACCINE: HCPCS | Performed by: FAMILY MEDICINE

## 2019-09-04 PROCEDURE — 90732 PPSV23 VACC 2 YRS+ SUBQ/IM: CPT | Performed by: FAMILY MEDICINE

## 2019-09-04 PROCEDURE — 99214 OFFICE O/P EST MOD 30 MIN: CPT | Mod: 25 | Performed by: FAMILY MEDICINE

## 2019-09-04 RX ORDER — SERTRALINE HYDROCHLORIDE 100 MG/1
100 TABLET, FILM COATED ORAL DAILY
Qty: 30 TABLET | Refills: 1 | Status: SHIPPED | OUTPATIENT
Start: 2019-09-04 | End: 2021-01-26

## 2019-09-04 RX ORDER — SIMVASTATIN 5 MG
5 TABLET ORAL AT BEDTIME
Qty: 90 TABLET | Refills: 0 | Status: SHIPPED | OUTPATIENT
Start: 2019-09-04 | End: 2021-01-26

## 2019-09-04 RX ORDER — MYCOPHENOLATE MOFETIL 500 MG/1
500 TABLET ORAL 2 TIMES DAILY
COMMUNITY
Start: 2019-08-02 | End: 2021-01-26

## 2019-09-04 RX ORDER — METFORMIN HCL 500 MG
2000 TABLET, EXTENDED RELEASE 24 HR ORAL DAILY
COMMUNITY
Start: 2019-08-02 | End: 2021-01-26

## 2019-09-04 ASSESSMENT — ANXIETY QUESTIONNAIRES
1. FEELING NERVOUS, ANXIOUS, OR ON EDGE: MORE THAN HALF THE DAYS
IF YOU CHECKED OFF ANY PROBLEMS ON THIS QUESTIONNAIRE, HOW DIFFICULT HAVE THESE PROBLEMS MADE IT FOR YOU TO DO YOUR WORK, TAKE CARE OF THINGS AT HOME, OR GET ALONG WITH OTHER PEOPLE: SOMEWHAT DIFFICULT
7. FEELING AFRAID AS IF SOMETHING AWFUL MIGHT HAPPEN: SEVERAL DAYS
3. WORRYING TOO MUCH ABOUT DIFFERENT THINGS: MORE THAN HALF THE DAYS
5. BEING SO RESTLESS THAT IT IS HARD TO SIT STILL: SEVERAL DAYS
6. BECOMING EASILY ANNOYED OR IRRITABLE: SEVERAL DAYS
2. NOT BEING ABLE TO STOP OR CONTROL WORRYING: SEVERAL DAYS
GAD7 TOTAL SCORE: 10

## 2019-09-04 ASSESSMENT — PATIENT HEALTH QUESTIONNAIRE - PHQ9
SUM OF ALL RESPONSES TO PHQ QUESTIONS 1-9: 11
5. POOR APPETITE OR OVEREATING: MORE THAN HALF THE DAYS

## 2019-09-04 ASSESSMENT — MIFFLIN-ST. JEOR: SCORE: 1985.8

## 2019-09-04 NOTE — PROGRESS NOTES
"Subjective     Abhishek Downey is a 43 year old male who presents to clinic today for the following health issues:    HPI     (E11.8,  Z79.4) Type 2 diabetes mellitus with complication, with long-term current use of insulin (H)   Comment: The patient has a history of Type II Diabetes Mellitus treated with Metformin 2000 mg every day. He was seen at [facility] on 8/2/2019 to follow up on his Diabetes at which time he was not using anything to treat his Diabetes.  His A1C was 10.7, so he was started on Metformin.     Notes from [facility] 8/1/2019:    S: \"Diabetes  Type of diabetes: Steroid induced  Duration of disease: Patient required insulin after initial transplant and after episode of rejection in 2018. Patient was dismissed from hospital on NPH insulin in September of 2018. Patient reports he stopped therapy about 2 months ago and has not been checking blood glucoses. Denies polyphagia, polydipsia, polyuria, unexplained weight loss or visual changes. Patient has taken no therapy for at least two months.   Therapy: NPH insulin has been used in the past.   Blood glucose values: Blood glucoses two months ago was 100-140 mg/dl.   Hypoglycemia: denies.   Last eye exam: patient reports about 2 years since last eye exam.   Diet/Exercise Denies consumption of sugared beverages. Physical activity includes treadmill 15-20 minutes and stairs 30 minutes about 5 days per week. Does not follow special diet. \"    P: \"#2 Type 2 diabetes. Reviewed patient's fasting blood glucose 156 mg/dl and Hemoglobin A1c of 10.7%. Patient denies hallmark symptoms of hyperglycemia. Reviewed diagnosis. Reviewed will start Metformin and titrate to a goal of 2000 mg per day. Reviewed that patient should take with food. Discussed with patient since his Hemoglobin A1c is elevated would consider starting insulin. Patient would prefer NPH insulin due to cost. Patient will start monitoring blood glucoses and will report results to me in 1 week. " "If persistently above 180 mg/dl, start NPH insulin in AM initially. Reviewed need for dilated eye exam. Discussed initiation of moderate intensity statin. Patient will discuss with his PCP. Encouraged patient to have diabetes follow-up with PCP. Patient is congratulated on physical activity. He has worked with diabetes team in the past and reviewed he would benefit from comprehensive diabetes education. Provided with prescription for test strips and Metformin.\"    Patient states that since then, he has been checking his blood sugars in the morning and evening. His average blood sugar in the morning is 140 and in the evening at dinner is 100. He has been climbing the stairs in his house for exercise for about 30 minutes at a time. He was also advised that he should start a statin for Diabetes control, as recommended by his provider at [place].       (F32.0) Mild major depression (H)  Comment: Patient has a history of Depression. He was seen at [facility] regarding this on 8/1/2019 at which time he was started on Zoloft:     Notes from [facility] Visit on 8/1/2019:    S: \"I interviewed the patient and reviewed the [facility] record at the time of this evaluation. I also visited with his wife. He is here for his annual transplant visit and has a longstanding history of depression which persists. He and his wife thinks that this has been worse in the last year although he has recently been exercising and attempting to lose weight which the both feel is a positive development. He reports that he is sad most days and may be more edgy than usual. His interest has been decreased for years. He notes that he has been hungry and he has been trying to lose weight on purpose. His energy level is okay. His concentration is reasonable. He notes that he has had suicidal ideation for years and has several plans but states that he would act on these plans because of the importance of his family to him. He is not a gun owner. His wife " "notes that a couple of years ago he stopped taking his medications for a brief period of time which she interpreted as suicidal behavior. A number of years ago he was placed on a 72 hour hold because of suicide attempt. He does acknowledge excessive worry. He has tried Prozac in the past and his wife thinks he may have tried other medications as well. He denies any significant new stressors. He does not use alcohol, street drugs or smokes.\"    P: \"We mutually agreed that he would consider a trial of Zoloft beginning at 25 mg daily for 3 days increasing to 50 mg daily thereafter. I discussed common side effects and onset of action. I gave him written information about this. We talked about the potential risk of increased suicidal thinking if this occurs he should go to the emergency room. He has my business card and will contact me should he notice any significant problems with his mood state or side effects. He agrees to contact his local provider within 2 weeks for further assessment and up titration of his medications. If he does not see improvements in his mood overall we also talked about returning for further evaluation and consideration of the Mood disorders program or the inpatient Mood unit. I suggested to both he and his wife he should consider counseling because of multiple adverse childhood events and he seemed agreeable to considering this and can't pursue this with his local providers as well. I have sent in a prescription for Zoloft to his local pharmacy.\"    Patient is still taking Zoloft 50 mg every day. He does not feel like it is helping much. He complains of feeling down, decreased interested in things, lack of energy, feeling tired, and sleeping more than he should. He feels bad about himself and feels like a failure because he isn't working due to his Ulcerative Colitis s/p total colectomy. He is still receiving disability benefits but doesn't feel that this is adequate to support his family. He " "also feels anxiety, has difficulty relaxing, and worries often. He worries about things in his daily life and what other people think of him. He feels that his marriage is going well. He climbs the stairs in his home for exercise but prefers to not exercise outside. He denies any drug use, alcohol use, suicidal ideation, or other associated symptoms. He has not tried counseling.    PHQ-9 SCORE 3/30/2016 8/14/2017 9/4/2019   PHQ-9 Total Score - - -   PHQ-9 Total Score 6 3 11     (F41.1) SHARATH (generalized anxiety disorder)  Comment: See \"(F32.0) Mild major depression (H)\" comment above.    SHARATH-7 SCORE 9/4/2019   Total Score 10       (E03.9) Subclinical hypothyroidism  Comment: The patient had an elevated TSH of 6 mU/L on 8/1/2019 when he was seen at [place]. His endocrinologists at [place] decided that they would monitor his TSH levels and defer putting him on any thyroid medication at that time, due to normal T4. Patient complains of fatigue and consistently loose stools, also with depressive symptoms as above.     (Z94.4) Transplanted liver (H)  Comment: Patient has a history of liver transplant that he follows up with at [facility]. He says that his endocrinologist recommends that he have his testosterone checked. He isn't sure why. He has not had a history of low testosterone.     His medical history is complex as noted below:    Patient Active Problem List   Diagnosis     H/O ulcerative colitis - s/p total colectomy     CARDIOVASCULAR SCREENING; LDL GOAL LESS THAN 160     Mild major depression (H)     Anemia     Upper GI bleed     Acute pancreatitis     Primary sclerosing cholangitis     Cirrhosis of liver (H)     S/P total colectomy     Esophageal varices (H)     Mass of left upper extremity     Anemia due to blood loss, acute     C. difficile pouchitis     History of esophageal varices     Thrombocytopenia (H)     History of duodenal ulcer     C. difficile enteritis     Type 2 diabetes mellitus with complication, " with long-term current use of insulin (H)     CMV (cytomegalovirus infection) (H)     Transplanted liver (H)     Past Surgical History:   Procedure Laterality Date     COLECTOMY  12/27/07     FLEXIBLE SIGMOIDOSCOPY  10/4/10      ERCP W/W/O NONA OF SPEC-BRUSH/WASH  02/19/10     HC ERCP W/W/O NONA OF SPEC-BRUSH/WASH  03/05/10      UGI ENDOSCOPY W BANDING ESOPH/GASTRIC VARICES  since 2/2015    monthly at [facility]; last one in August      UPPER GI ENDOSCOPY  11/13/13    [facility]     UPPER GI ENDOSCOPY  2/16/15    [facility]       Social History     Tobacco Use     Smoking status: Never Smoker     Smokeless tobacco: Never Used   Substance Use Topics     Alcohol use: No     Alcohol/week: 0.0 oz     Comment: 2-3x per year     Family History   Problem Relation Age of Onset     Heart Failure Mother             Allergies   Allergen Reactions     No Known Allergies      Recent Labs   Lab Test 08/26/19  0859 08/06/19  0901 11/06/18  0810  09/22/18  0045  02/07/18  1731  06/13/14 01/23/12  0806   A1C  --   --   --   --   --   --  7.1*  --   --   --  5.7   TRIG  --   --   --   --   --   --   --   --  133  --   --    ALT 51 55 47   < > 50   < > 90*   < > 127   < >  --    CR 1.13 1.14 1.29*   < > 1.56*   < > 1.43*   < > 0.9   < >  --    GFRESTIMATED 80 79 61   < > 49*   < > 55*   < > >60   < >  --    GFRESTBLACK >90 >90 74   < > 60*   < > 66   < > >60   < >  --    POTASSIUM 4.6 5.1 4.5   < > 3.6   < > 5.6*   < > 4.7   < >  --    TSH  --   --   --   --  1.00  --   --   --   --   --   --     < > = values in this interval not displayed.      BP Readings from Last 3 Encounters:   09/04/19 132/70   09/22/18 121/64   09/21/18 132/67    Wt Readings from Last 3 Encounters:   09/04/19 102.3 kg (225 lb 9.6 oz)   09/22/18 120.2 kg (265 lb)   09/19/18 118.8 kg (261 lb 12.8 oz)             Reviewed and updated as needed this visit by Provider    Tobacco  Allergies  Meds  Problems  Med Hx  Surg Hx  Fam Hx         Review  "of Systems   10 point ROS is negative except as noted in HPI.       Objective    /70   Pulse 90   Temp 97.4  F (36.3  C) (Temporal)   Resp 14   Ht 1.86 m (6' 1.23\")   Wt 102.3 kg (225 lb 9.6 oz)   SpO2 98%   BMI 29.58 kg/m    Body mass index is 29.58 kg/m .  Physical Exam   Vitals noted.  Patient alert, oriented, and in no acute distress. His affect is slightly flat, but his speech is clear and thought process/content is normal.   Neck:  Supple without lymphadenopathy, JVD or masses. No thyromegaly.   CV:  RRR without murmur.  Respiratory:  Lungs clear to auscultation bilaterally.  Abdomen:  Fading Ecchymosis on the right mid abdomen. No visible varices.   Extremities: warm and dry without cyanosis, clubbing or edema.    Diagnostic Test Results:  None today.     Assessment & Plan     Assessment    ICD-10-CM    1. Type 2 diabetes mellitus with complication, with long-term current use of insulin (H) E11.8 simvastatin (ZOCOR) 5 MG tablet    Z79.4    2. Mild major depression (H) F32.0 MENTAL HEALTH REFERRAL  - Adult; Outpatient Treatment; Individual/Couples/Family/Group Therapy/Health Psychology; [facility] [phone]; We will contact you to schedule the appointment or please call with any questions     sertraline (ZOLOFT) 100 MG tablet   3. SHARATH (generalized anxiety disorder) F41.1 MENTAL HEALTH REFERRAL  - Adult; Outpatient Treatment; Individual/Couples/Family/Group Therapy/Health Psychology; [facility] [phone]; We will contact you to schedule the appointment or please call with any questions     sertraline (ZOLOFT) 100 MG tablet   4. Subclinical hypothyroidism E03.9    5. Transplanted liver (H) Z94.4    6. Need for vaccination Z23 Pneumococcal vaccine 23 valent PPSV23  (Pneumovax) [90273]     1st  Administration  [81770]       Plan    -Pneumovax booster given in the clinic today as recommended due to his organ transplant history.   -He was also " "recommended to get the Shingles Vaccination, but I'm not aware of any indication in persons under age 50. Will review with [facility] physician to see if they have an indication.      (E11.8,  Z79.4) Type 2 diabetes mellitus with complication, with long-term current use of insulin (H)    Plan: Prescribed Simvastatin 5 mg every day, see orders. We discussed low dose vs moderate dose statin. Due to his DM and also history of liver transplant with recent elevations in his liver functions, I opted to start him on low dose therapy.  He will be due for fasting lipids soon, so we will check these when he follows up in November. If his lipids are elevated and his LFTs are normal, would consider increasing his dose at that time. If his lipids are normal, I recommend just keeping him on low dose therapy.  Encouraged the patient to continue monitoring his blood sugar, exercising, and following a diabetic diet. Follow up in November or sooner if needed.      simvastatin (ZOCOR) 5 MG tablet    (F32.0) Mild major depression (H)  Plan: Raised the patient's Zoloft to 100 mg every day, see orders. Discussed that will need to monitor for any effects on his liver, as the max dose in patient with mild hepatic impairment is 100 mg daily.  Also I placed a referral for counseling, see orders. Encouraged the patient to get outside to exercise, do things he enjoys, and utilize his support system. Follow up in November or sooner if needed.     MENTAL HEALTH REFERRAL  - Adult; Outpatient Treatment; Individual/Couples/Family/Group Therapy/Health Psychology; [facility] [phone]; We will contact you to schedule the appointment or please call with any questions, sertraline     (ZOLOFT) 100 MG tablet    (F41.1) SHARATH (generalized anxiety disorder)  Plan: See \"(F32.0) Mild major depression (H)\" plan above.     (E03.9) Subclinical hypothyroidism  Plan: Discussed maybe starting on a low dose of Levothyroxine due to his elevated " TSH levels, low energy, and depression symptoms. I will contact Mony Lilian and Ludivina Reinoso, his endocrinology PA-Cs at [facility] to determine if there is a reason they do not want him on a thyroid supplement. I will notify the patient and discuss further treatment.     (Z94.4) Transplanted liver (H)  Plan: he continues to have close monitoring of his labs, and I will check liver enzymes and testosterone level in November. Encouraged him to continue following up with endocrinology at [place] as recommended.     See patient instructions:  I am increasing your dose of Zoloft (Sertraline) to 100 mg daily.  For now, take 2 of your 50 mg pills at the same time each day. When you run out, your new pills will be 100 mg so go back to taking only 1 every day.     I'd like you to update me through [employer] in early October about whether you think the increased dose is helpful.  Also, we'll continue to monitor your liver tests to make sure it is safe to be on the higher dose.     Also I am starting you on a new cholesterol medication called Zocor or Simvastatin. Take 1 pill daily in the evening.     We'll check your blood tests in November to see how the cholesterol levels look and whether we need to increase the dose or now. Also we'll be rechecking your diabetes at that time.     I put in a referral for depression counseling for you. Someone will be calling you to set up an appointment.     Please work on doing things that bring you hellen, help you feel better about yourself and give you energy, such as outdoor exercise, doing fun things with your family, maybe a new hobby or project around the house that you can feel good about.     Please let me know sooner than November if you have any concerns or new side effects.     Remember not to eat before your November appointment with me.  This document serves as a record of services personally performed by Radha Hidalgo MD. It was created on their behalf by Jennifer  Vic a trained medical scribe. The creation of this record is based on the provider's personal observations and the statements of the patient. This document has been checked and approved by the attending provider.    Radha Hidalgo MD  [facility]

## 2019-09-04 NOTE — NURSING NOTE
Prior to immunization administration, verified patients identity using patient s name and date of birth. Please see Immunization Activity for additional information.     Screening Questionnaire for Adult Immunization    Are you sick today?   No   Do you have allergies to medications, food, a vaccine component or latex?   No   Have you ever had a serious reaction after receiving a vaccination?   No   Do you have a long-term health problem with heart disease, lung disease, asthma, kidney disease, metabolic disease (e.g. diabetes), anemia, or other blood disorder?   No   Do you have cancer, leukemia, HIV/AIDS, or any other immune system problem?   No   In the past 3 months, have you taken medications that affect  your immune system, such as prednisone, other steroids, or anticancer drugs; drugs for the treatment of rheumatoid arthritis, Crohn s disease, or psoriasis; or have you had radiation treatments?   No   Have you had a seizure, or a brain or other nervous system problem?   No   During the past year, have you received a transfusion of blood or blood     products, or been given immune (gamma) globulin or antiviral drug?   No   For women: Are you pregnant or is there a chance you could become        pregnant during the next month?   No   Have you received any vaccinations in the past 4 weeks?   No     Immunization questionnaire answers were all negative.        Per orders of Dr. Hidalgo, injection of Prev 23 given by Michelle Henning MA. Patient instructed to remain in clinic for 15 minutes afterwards, and to report any adverse reaction to me immediately.       Screening performed by Michelle Henning MA on 9/4/2019 at 3:46 PM.

## 2019-09-04 NOTE — PATIENT INSTRUCTIONS
I am increasing your dose of Zoloft (Sertraline) to 100 mg daily.  For now, take 2 of your 50 mg pills at the same time each day. When you run out, your new pills will be 100 mg so go back to taking only 1 every day.     I'd like you to update me through [employer] in early October about whether you think the increased dose is helpful.  Also, we'll continue to monitor your liver tests to make sure it is safe to be on the higher dose.     Also I am starting you on a new cholesterol medication called Zocor or Simvastatin. Take 1 pill daily in the evening.     We'll check your blood tests in November to see how the cholesterol levels look and whether we need to increase the dose or now. Also we'll be rechecking your diabetes at that time.     I put in a referral for depression counseling for you. Someone will be calling you to set up an appointment.     Please work on doing things that bring you hellen, help you feel better about yourself and give you energy, such as outdoor exercise, doing fun things with your family, maybe a new hobby or project around the house that you can feel good about.     Please let me know sooner than November if you have any concerns or new side effects.     Remember not to eat before your November appointment with me.   Radha Hidalgo MD

## 2019-09-05 ASSESSMENT — ANXIETY QUESTIONNAIRES: GAD7 TOTAL SCORE: 10

## 2019-09-09 DIAGNOSIS — Z79.899 ENCOUNTER FOR LONG-TERM (CURRENT) USE OF MEDICATIONS: ICD-10-CM

## 2019-09-09 DIAGNOSIS — Z94.4 TRANSPLANTED LIVER (H): ICD-10-CM

## 2019-09-09 LAB
ALBUMIN SERPL-MCNC: 4.3 G/DL (ref 3.4–5)
ALP SERPL-CCNC: 163 U/L (ref 40–150)
ALT SERPL W P-5'-P-CCNC: 37 U/L (ref 0–70)
ANION GAP SERPL CALCULATED.3IONS-SCNC: 4 MMOL/L (ref 3–14)
AST SERPL W P-5'-P-CCNC: 28 U/L (ref 0–45)
BILIRUB SERPL-MCNC: 0.7 MG/DL (ref 0.2–1.3)
BUN SERPL-MCNC: 17 MG/DL (ref 7–30)
CALCIUM SERPL-MCNC: 9.5 MG/DL (ref 8.5–10.1)
CHLORIDE SERPL-SCNC: 108 MMOL/L (ref 94–109)
CO2 SERPL-SCNC: 26 MMOL/L (ref 20–32)
CREAT SERPL-MCNC: 1.21 MG/DL (ref 0.66–1.25)
ERYTHROCYTE [DISTWIDTH] IN BLOOD BY AUTOMATED COUNT: 16.1 % (ref 10–15)
GFR SERPL CREATININE-BSD FRML MDRD: 73 ML/MIN/{1.73_M2}
GLUCOSE SERPL-MCNC: 132 MG/DL (ref 70–99)
HCT VFR BLD AUTO: 46.8 % (ref 40–53)
HGB BLD-MCNC: 15.3 G/DL (ref 13.3–17.7)
MCH RBC QN AUTO: 26.9 PG (ref 26.5–33)
MCHC RBC AUTO-ENTMCNC: 32.7 G/DL (ref 31.5–36.5)
MCV RBC AUTO: 82 FL (ref 78–100)
PLATELET # BLD AUTO: 113 10E9/L (ref 150–450)
POTASSIUM SERPL-SCNC: 5.8 MMOL/L (ref 3.4–5.3)
PROT SERPL-MCNC: 8.2 G/DL (ref 6.8–8.8)
RBC # BLD AUTO: 5.69 10E12/L (ref 4.4–5.9)
SODIUM SERPL-SCNC: 138 MMOL/L (ref 133–144)
WBC # BLD AUTO: 4.9 10E9/L (ref 4–11)

## 2019-09-09 PROCEDURE — 85027 COMPLETE CBC AUTOMATED: CPT | Performed by: NURSE PRACTITIONER

## 2019-09-09 PROCEDURE — 80053 COMPREHEN METABOLIC PANEL: CPT | Performed by: NURSE PRACTITIONER

## 2019-09-09 PROCEDURE — 82947 ASSAY GLUCOSE BLOOD QUANT: CPT | Performed by: NURSE PRACTITIONER

## 2019-09-09 PROCEDURE — 36415 COLL VENOUS BLD VENIPUNCTURE: CPT | Performed by: NURSE PRACTITIONER

## 2019-09-10 ENCOUNTER — MEDICAL CORRESPONDENCE (OUTPATIENT)
Dept: HEALTH INFORMATION MANAGEMENT | Facility: CLINIC | Age: 44
End: 2019-09-10

## 2019-09-11 DIAGNOSIS — Z94.4 TRANSPLANTED LIVER (H): Primary | ICD-10-CM

## 2019-09-11 DIAGNOSIS — E87.5 HYPERKALEMIA: ICD-10-CM

## 2019-09-12 DIAGNOSIS — Z94.4 TRANSPLANTED LIVER (H): ICD-10-CM

## 2019-09-12 DIAGNOSIS — E87.5 HYPERKALEMIA: ICD-10-CM

## 2019-09-12 LAB — POTASSIUM SERPL-SCNC: 5.3 MMOL/L (ref 3.4–5.3)

## 2019-09-12 PROCEDURE — 84132 ASSAY OF SERUM POTASSIUM: CPT | Performed by: INTERNAL MEDICINE

## 2019-09-12 PROCEDURE — 36415 COLL VENOUS BLD VENIPUNCTURE: CPT | Performed by: INTERNAL MEDICINE

## 2019-10-01 ENCOUNTER — MYC MEDICAL ADVICE (OUTPATIENT)
Dept: FAMILY MEDICINE | Facility: CLINIC | Age: 44
End: 2019-10-01

## 2019-10-01 DIAGNOSIS — Z94.4 TRANSPLANTED LIVER (H): ICD-10-CM

## 2019-10-01 DIAGNOSIS — Z79.899 ENCOUNTER FOR LONG-TERM (CURRENT) USE OF MEDICATIONS: ICD-10-CM

## 2019-10-01 LAB
ALBUMIN SERPL-MCNC: 4.4 G/DL (ref 3.4–5)
ALP SERPL-CCNC: 135 U/L (ref 40–150)
ALT SERPL W P-5'-P-CCNC: 40 U/L (ref 0–70)
ANION GAP SERPL CALCULATED.3IONS-SCNC: 8 MMOL/L (ref 3–14)
AST SERPL W P-5'-P-CCNC: 24 U/L (ref 0–45)
BILIRUB SERPL-MCNC: 1.1 MG/DL (ref 0.2–1.3)
BUN SERPL-MCNC: 25 MG/DL (ref 7–30)
CALCIUM SERPL-MCNC: 9.4 MG/DL (ref 8.5–10.1)
CHLORIDE SERPL-SCNC: 107 MMOL/L (ref 94–109)
CO2 SERPL-SCNC: 25 MMOL/L (ref 20–32)
CREAT SERPL-MCNC: 1.52 MG/DL (ref 0.66–1.25)
ERYTHROCYTE [DISTWIDTH] IN BLOOD BY AUTOMATED COUNT: 16.3 % (ref 10–15)
GFR SERPL CREATININE-BSD FRML MDRD: 56 ML/MIN/{1.73_M2}
GLUCOSE SERPL-MCNC: 156 MG/DL (ref 70–99)
HCT VFR BLD AUTO: 44.4 % (ref 40–53)
HGB BLD-MCNC: 15 G/DL (ref 13.3–17.7)
MCH RBC QN AUTO: 28.2 PG (ref 26.5–33)
MCHC RBC AUTO-ENTMCNC: 33.8 G/DL (ref 31.5–36.5)
MCV RBC AUTO: 84 FL (ref 78–100)
PLATELET # BLD AUTO: 116 10E9/L (ref 150–450)
POTASSIUM SERPL-SCNC: 5 MMOL/L (ref 3.4–5.3)
PROT SERPL-MCNC: 8 G/DL (ref 6.8–8.8)
RBC # BLD AUTO: 5.32 10E12/L (ref 4.4–5.9)
SODIUM SERPL-SCNC: 140 MMOL/L (ref 133–144)
WBC # BLD AUTO: 5.5 10E9/L (ref 4–11)

## 2019-10-01 PROCEDURE — 85027 COMPLETE CBC AUTOMATED: CPT | Performed by: NURSE PRACTITIONER

## 2019-10-01 PROCEDURE — 80053 COMPREHEN METABOLIC PANEL: CPT | Performed by: NURSE PRACTITIONER

## 2019-10-01 PROCEDURE — 82947 ASSAY GLUCOSE BLOOD QUANT: CPT | Performed by: NURSE PRACTITIONER

## 2019-10-01 PROCEDURE — 36415 COLL VENOUS BLD VENIPUNCTURE: CPT | Performed by: NURSE PRACTITIONER

## 2019-11-04 ENCOUNTER — HEALTH MAINTENANCE LETTER (OUTPATIENT)
Age: 44
End: 2019-11-04

## 2020-02-10 ENCOUNTER — HEALTH MAINTENANCE LETTER (OUTPATIENT)
Age: 45
End: 2020-02-10

## 2020-03-26 ENCOUNTER — MEDICAL CORRESPONDENCE (OUTPATIENT)
Dept: HEALTH INFORMATION MANAGEMENT | Facility: CLINIC | Age: 45
End: 2020-03-26

## 2020-03-26 DIAGNOSIS — Z94.4 LIVER REPLACED BY TRANSPLANT (H): ICD-10-CM

## 2020-03-26 DIAGNOSIS — Z79.899 OTHER LONG TERM (CURRENT) DRUG THERAPY: Primary | ICD-10-CM

## 2020-03-27 DIAGNOSIS — Z79.899 OTHER LONG TERM (CURRENT) DRUG THERAPY: ICD-10-CM

## 2020-03-27 DIAGNOSIS — Z94.4 LIVER REPLACED BY TRANSPLANT (H): ICD-10-CM

## 2020-03-27 LAB
ALBUMIN SERPL-MCNC: 3.5 G/DL (ref 3.4–5)
ALP SERPL-CCNC: 480 U/L (ref 40–150)
ALT SERPL W P-5'-P-CCNC: 336 U/L (ref 0–70)
ANION GAP SERPL CALCULATED.3IONS-SCNC: 4 MMOL/L (ref 3–14)
AST SERPL W P-5'-P-CCNC: 216 U/L (ref 0–45)
BILIRUB SERPL-MCNC: 6.2 MG/DL (ref 0.2–1.3)
BUN SERPL-MCNC: 15 MG/DL (ref 7–30)
CALCIUM SERPL-MCNC: 9.5 MG/DL (ref 8.5–10.1)
CHLORIDE SERPL-SCNC: 105 MMOL/L (ref 94–109)
CO2 SERPL-SCNC: 28 MMOL/L (ref 20–32)
CREAT SERPL-MCNC: 0.98 MG/DL (ref 0.66–1.25)
ERYTHROCYTE [DISTWIDTH] IN BLOOD BY AUTOMATED COUNT: 15.4 % (ref 10–15)
GFR SERPL CREATININE-BSD FRML MDRD: >90 ML/MIN/{1.73_M2}
GLUCOSE SERPL-MCNC: 272 MG/DL (ref 70–99)
HCT VFR BLD AUTO: 47.4 % (ref 40–53)
HGB BLD-MCNC: 15.7 G/DL (ref 13.3–17.7)
MCH RBC QN AUTO: 29.6 PG (ref 26.5–33)
MCHC RBC AUTO-ENTMCNC: 33.1 G/DL (ref 31.5–36.5)
MCV RBC AUTO: 89 FL (ref 78–100)
PLATELET # BLD AUTO: 92 10E9/L (ref 150–450)
POTASSIUM SERPL-SCNC: 4.8 MMOL/L (ref 3.4–5.3)
PROT SERPL-MCNC: 7.6 G/DL (ref 6.8–8.8)
RBC # BLD AUTO: 5.31 10E12/L (ref 4.4–5.9)
SODIUM SERPL-SCNC: 137 MMOL/L (ref 133–144)
WBC # BLD AUTO: 2.6 10E9/L (ref 4–11)

## 2020-03-27 PROCEDURE — 80053 COMPREHEN METABOLIC PANEL: CPT | Performed by: INTERNAL MEDICINE

## 2020-03-27 PROCEDURE — 36415 COLL VENOUS BLD VENIPUNCTURE: CPT | Performed by: INTERNAL MEDICINE

## 2020-03-27 PROCEDURE — 85027 COMPLETE CBC AUTOMATED: CPT | Performed by: INTERNAL MEDICINE

## 2020-04-13 ENCOUNTER — MEDICAL CORRESPONDENCE (OUTPATIENT)
Dept: HEALTH INFORMATION MANAGEMENT | Facility: CLINIC | Age: 45
End: 2020-04-13

## 2020-04-13 DIAGNOSIS — Z94.4 TRANSPLANTED LIVER (H): ICD-10-CM

## 2020-04-13 DIAGNOSIS — Z79.899 ENCOUNTER FOR LONG-TERM (CURRENT) USE OF MEDICATIONS: ICD-10-CM

## 2020-04-13 LAB
ALBUMIN SERPL-MCNC: 3.3 G/DL (ref 3.4–5)
ALP SERPL-CCNC: 326 U/L (ref 40–150)
ALT SERPL W P-5'-P-CCNC: 167 U/L (ref 0–70)
ANION GAP SERPL CALCULATED.3IONS-SCNC: 4 MMOL/L (ref 3–14)
AST SERPL W P-5'-P-CCNC: 72 U/L (ref 0–45)
BILIRUB SERPL-MCNC: 2.6 MG/DL (ref 0.2–1.3)
BUN SERPL-MCNC: 14 MG/DL (ref 7–30)
CALCIUM SERPL-MCNC: 8.7 MG/DL (ref 8.5–10.1)
CHLORIDE SERPL-SCNC: 111 MMOL/L (ref 94–109)
CO2 SERPL-SCNC: 27 MMOL/L (ref 20–32)
CREAT SERPL-MCNC: 1.17 MG/DL (ref 0.66–1.25)
ERYTHROCYTE [DISTWIDTH] IN BLOOD BY AUTOMATED COUNT: 14.5 % (ref 10–15)
GFR SERPL CREATININE-BSD FRML MDRD: 76 ML/MIN/{1.73_M2}
GLUCOSE SERPL-MCNC: 180 MG/DL (ref 70–99)
HCT VFR BLD AUTO: 37.8 % (ref 40–53)
HGB BLD-MCNC: 12.8 G/DL (ref 13.3–17.7)
MCH RBC QN AUTO: 29.4 PG (ref 26.5–33)
MCHC RBC AUTO-ENTMCNC: 33.9 G/DL (ref 31.5–36.5)
MCV RBC AUTO: 87 FL (ref 78–100)
PLATELET # BLD AUTO: 95 10E9/L (ref 150–450)
POTASSIUM SERPL-SCNC: 4.9 MMOL/L (ref 3.4–5.3)
PROT SERPL-MCNC: 6.8 G/DL (ref 6.8–8.8)
RBC # BLD AUTO: 4.36 10E12/L (ref 4.4–5.9)
SODIUM SERPL-SCNC: 142 MMOL/L (ref 133–144)
WBC # BLD AUTO: 2.5 10E9/L (ref 4–11)

## 2020-04-13 PROCEDURE — 80197 ASSAY OF TACROLIMUS: CPT | Performed by: INTERNAL MEDICINE

## 2020-04-13 PROCEDURE — 36415 COLL VENOUS BLD VENIPUNCTURE: CPT | Performed by: NURSE PRACTITIONER

## 2020-04-13 PROCEDURE — 80053 COMPREHEN METABOLIC PANEL: CPT | Performed by: NURSE PRACTITIONER

## 2020-04-13 PROCEDURE — 85027 COMPLETE CBC AUTOMATED: CPT | Performed by: NURSE PRACTITIONER

## 2020-04-13 PROCEDURE — 82947 ASSAY GLUCOSE BLOOD QUANT: CPT | Performed by: NURSE PRACTITIONER

## 2020-04-14 LAB
TACROLIMUS BLD-MCNC: 9.4 UG/L (ref 5–15)
TME LAST DOSE: 2000 H

## 2020-04-22 DIAGNOSIS — Z79.899 OTHER LONG TERM (CURRENT) DRUG THERAPY: ICD-10-CM

## 2020-04-22 DIAGNOSIS — Z94.4 LIVER REPLACED BY TRANSPLANT (H): ICD-10-CM

## 2020-04-22 LAB
ALBUMIN SERPL-MCNC: 3.8 G/DL (ref 3.4–5)
ALP SERPL-CCNC: 258 U/L (ref 40–150)
ALT SERPL W P-5'-P-CCNC: 102 U/L (ref 0–70)
ANION GAP SERPL CALCULATED.3IONS-SCNC: 4 MMOL/L (ref 3–14)
AST SERPL W P-5'-P-CCNC: 82 U/L (ref 0–45)
BILIRUB SERPL-MCNC: 3 MG/DL (ref 0.2–1.3)
BUN SERPL-MCNC: 13 MG/DL (ref 7–30)
CALCIUM SERPL-MCNC: 9.1 MG/DL (ref 8.5–10.1)
CHLORIDE SERPL-SCNC: 109 MMOL/L (ref 94–109)
CO2 SERPL-SCNC: 26 MMOL/L (ref 20–32)
CREAT SERPL-MCNC: 1.04 MG/DL (ref 0.66–1.25)
ERYTHROCYTE [DISTWIDTH] IN BLOOD BY AUTOMATED COUNT: 18.3 % (ref 10–15)
GFR SERPL CREATININE-BSD FRML MDRD: 88 ML/MIN/{1.73_M2}
GLUCOSE SERPL-MCNC: 208 MG/DL (ref 70–99)
HCT VFR BLD AUTO: 38.4 % (ref 40–53)
HGB BLD-MCNC: 13.3 G/DL (ref 13.3–17.7)
MCH RBC QN AUTO: 30.5 PG (ref 26.5–33)
MCHC RBC AUTO-ENTMCNC: 34.6 G/DL (ref 31.5–36.5)
MCV RBC AUTO: 88 FL (ref 78–100)
PLATELET # BLD AUTO: 95 10E9/L (ref 150–450)
POTASSIUM SERPL-SCNC: 4.7 MMOL/L (ref 3.4–5.3)
PROT SERPL-MCNC: 7.3 G/DL (ref 6.8–8.8)
RBC # BLD AUTO: 4.36 10E12/L (ref 4.4–5.9)
SODIUM SERPL-SCNC: 139 MMOL/L (ref 133–144)
WBC # BLD AUTO: 2.1 10E9/L (ref 4–11)

## 2020-04-22 PROCEDURE — 80053 COMPREHEN METABOLIC PANEL: CPT | Performed by: INTERNAL MEDICINE

## 2020-04-22 PROCEDURE — 85027 COMPLETE CBC AUTOMATED: CPT | Performed by: INTERNAL MEDICINE

## 2020-04-22 PROCEDURE — 36415 COLL VENOUS BLD VENIPUNCTURE: CPT | Performed by: INTERNAL MEDICINE

## 2020-04-29 ENCOUNTER — TELEPHONE (OUTPATIENT)
Dept: FAMILY MEDICINE | Facility: CLINIC | Age: 45
End: 2020-04-29

## 2020-04-29 NOTE — TELEPHONE ENCOUNTER
Patient is due for a PHQ-9.  Index start date:1/04/2020  Index end date:5/03/2020    Please call patient.

## 2020-05-05 ENCOUNTER — MEDICAL CORRESPONDENCE (OUTPATIENT)
Dept: HEALTH INFORMATION MANAGEMENT | Facility: CLINIC | Age: 45
End: 2020-05-05

## 2020-05-05 DIAGNOSIS — Z79.899 OTHER LONG TERM (CURRENT) DRUG THERAPY: Primary | ICD-10-CM

## 2020-05-05 DIAGNOSIS — Z94.4 TRANSPLANTED LIVER (H): ICD-10-CM

## 2020-05-05 PROCEDURE — 80197 ASSAY OF TACROLIMUS: CPT

## 2020-05-06 DIAGNOSIS — Z94.4 TRANSPLANTED LIVER (H): Primary | ICD-10-CM

## 2020-05-06 DIAGNOSIS — Z79.899 ENCOUNTER FOR LONG-TERM (CURRENT) USE OF OTHER MEDICATIONS: ICD-10-CM

## 2020-05-06 DIAGNOSIS — Z79.899 OTHER LONG TERM (CURRENT) DRUG THERAPY: ICD-10-CM

## 2020-05-06 LAB
ALBUMIN SERPL-MCNC: 3.9 G/DL (ref 3.4–5)
ALP SERPL-CCNC: 240 U/L (ref 40–150)
ALT SERPL W P-5'-P-CCNC: 132 U/L (ref 0–70)
ANION GAP SERPL CALCULATED.3IONS-SCNC: 5 MMOL/L (ref 3–14)
AST SERPL W P-5'-P-CCNC: 105 U/L (ref 0–45)
BILIRUB SERPL-MCNC: 1.9 MG/DL (ref 0.2–1.3)
BUN SERPL-MCNC: 12 MG/DL (ref 7–30)
CALCIUM SERPL-MCNC: 9 MG/DL (ref 8.5–10.1)
CHLORIDE SERPL-SCNC: 108 MMOL/L (ref 94–109)
CO2 SERPL-SCNC: 26 MMOL/L (ref 20–32)
CREAT SERPL-MCNC: 1.12 MG/DL (ref 0.66–1.25)
ERYTHROCYTE [DISTWIDTH] IN BLOOD BY AUTOMATED COUNT: 17.1 % (ref 10–15)
GFR SERPL CREATININE-BSD FRML MDRD: 80 ML/MIN/{1.73_M2}
GLUCOSE SERPL-MCNC: 127 MG/DL (ref 70–99)
HCT VFR BLD AUTO: 42 % (ref 40–53)
HGB BLD-MCNC: 14.5 G/DL (ref 13.3–17.7)
MCH RBC QN AUTO: 31.7 PG (ref 26.5–33)
MCHC RBC AUTO-ENTMCNC: 34.5 G/DL (ref 31.5–36.5)
MCV RBC AUTO: 92 FL (ref 78–100)
PLATELET # BLD AUTO: 86 10E9/L (ref 150–450)
POTASSIUM SERPL-SCNC: 4.5 MMOL/L (ref 3.4–5.3)
PROT SERPL-MCNC: 6.8 G/DL (ref 6.8–8.8)
RBC # BLD AUTO: 4.58 10E12/L (ref 4.4–5.9)
SODIUM SERPL-SCNC: 139 MMOL/L (ref 133–144)
TACROLIMUS BLD-MCNC: 10.2 UG/L (ref 5–15)
TME LAST DOSE: NORMAL H
WBC # BLD AUTO: 2.1 10E9/L (ref 4–11)

## 2020-05-06 PROCEDURE — 36415 COLL VENOUS BLD VENIPUNCTURE: CPT | Performed by: INTERNAL MEDICINE

## 2020-05-06 PROCEDURE — 85027 COMPLETE CBC AUTOMATED: CPT | Performed by: INTERNAL MEDICINE

## 2020-05-06 PROCEDURE — 80053 COMPREHEN METABOLIC PANEL: CPT | Performed by: INTERNAL MEDICINE

## 2020-07-06 DIAGNOSIS — Z94.4 TRANSPLANTED LIVER (H): ICD-10-CM

## 2020-07-06 DIAGNOSIS — Z79.899 OTHER LONG TERM (CURRENT) DRUG THERAPY: ICD-10-CM

## 2020-07-06 LAB
ALBUMIN SERPL-MCNC: 3.9 G/DL (ref 3.4–5)
ALP SERPL-CCNC: 196 U/L (ref 40–150)
ALT SERPL W P-5'-P-CCNC: 66 U/L (ref 0–70)
ANION GAP SERPL CALCULATED.3IONS-SCNC: 5 MMOL/L (ref 3–14)
AST SERPL W P-5'-P-CCNC: 53 U/L (ref 0–45)
BILIRUB SERPL-MCNC: 2.1 MG/DL (ref 0.2–1.3)
BUN SERPL-MCNC: 15 MG/DL (ref 7–30)
CALCIUM SERPL-MCNC: 9 MG/DL (ref 8.5–10.1)
CHLORIDE SERPL-SCNC: 106 MMOL/L (ref 94–109)
CO2 SERPL-SCNC: 27 MMOL/L (ref 20–32)
CREAT SERPL-MCNC: 1.14 MG/DL (ref 0.66–1.25)
ERYTHROCYTE [DISTWIDTH] IN BLOOD BY AUTOMATED COUNT: 13.1 % (ref 10–15)
GFR SERPL CREATININE-BSD FRML MDRD: 78 ML/MIN/{1.73_M2}
GLUCOSE SERPL-MCNC: 129 MG/DL (ref 70–99)
HCT VFR BLD AUTO: 45.1 % (ref 40–53)
HGB BLD-MCNC: 16.1 G/DL (ref 13.3–17.7)
MCH RBC QN AUTO: 31 PG (ref 26.5–33)
MCHC RBC AUTO-ENTMCNC: 35.7 G/DL (ref 31.5–36.5)
MCV RBC AUTO: 87 FL (ref 78–100)
PLATELET # BLD AUTO: 100 10E9/L (ref 150–450)
POTASSIUM SERPL-SCNC: 4.2 MMOL/L (ref 3.4–5.3)
PROT SERPL-MCNC: 7.2 G/DL (ref 6.8–8.8)
RBC # BLD AUTO: 5.19 10E12/L (ref 4.4–5.9)
SODIUM SERPL-SCNC: 138 MMOL/L (ref 133–144)
WBC # BLD AUTO: 4.4 10E9/L (ref 4–11)

## 2020-07-06 PROCEDURE — 36415 COLL VENOUS BLD VENIPUNCTURE: CPT | Performed by: INTERNAL MEDICINE

## 2020-07-06 PROCEDURE — 80053 COMPREHEN METABOLIC PANEL: CPT | Performed by: INTERNAL MEDICINE

## 2020-07-06 PROCEDURE — 85027 COMPLETE CBC AUTOMATED: CPT | Performed by: INTERNAL MEDICINE

## 2020-08-03 DIAGNOSIS — Z94.4 TRANSPLANTED LIVER (H): ICD-10-CM

## 2020-08-03 DIAGNOSIS — Z79.899 OTHER LONG TERM (CURRENT) DRUG THERAPY: ICD-10-CM

## 2020-08-03 LAB
ALBUMIN SERPL-MCNC: 4.4 G/DL (ref 3.4–5)
ALP SERPL-CCNC: 196 U/L (ref 40–150)
ALT SERPL W P-5'-P-CCNC: 76 U/L (ref 0–70)
ANION GAP SERPL CALCULATED.3IONS-SCNC: 3 MMOL/L (ref 3–14)
AST SERPL W P-5'-P-CCNC: 48 U/L (ref 0–45)
BILIRUB SERPL-MCNC: 1.6 MG/DL (ref 0.2–1.3)
BUN SERPL-MCNC: 17 MG/DL (ref 7–30)
CALCIUM SERPL-MCNC: 10.1 MG/DL (ref 8.5–10.1)
CHLORIDE SERPL-SCNC: 111 MMOL/L (ref 94–109)
CO2 SERPL-SCNC: 27 MMOL/L (ref 20–32)
CREAT SERPL-MCNC: 1.3 MG/DL (ref 0.66–1.25)
ERYTHROCYTE [DISTWIDTH] IN BLOOD BY AUTOMATED COUNT: 13.7 % (ref 10–15)
GFR SERPL CREATININE-BSD FRML MDRD: 67 ML/MIN/{1.73_M2}
GLUCOSE SERPL-MCNC: 148 MG/DL (ref 70–99)
HCT VFR BLD AUTO: 45.4 % (ref 40–53)
HGB BLD-MCNC: 15.9 G/DL (ref 13.3–17.7)
MCH RBC QN AUTO: 30.8 PG (ref 26.5–33)
MCHC RBC AUTO-ENTMCNC: 35 G/DL (ref 31.5–36.5)
MCV RBC AUTO: 88 FL (ref 78–100)
PLATELET # BLD AUTO: 96 10E9/L (ref 150–450)
POTASSIUM SERPL-SCNC: 4.9 MMOL/L (ref 3.4–5.3)
PROT SERPL-MCNC: 7.7 G/DL (ref 6.8–8.8)
RBC # BLD AUTO: 5.17 10E12/L (ref 4.4–5.9)
SODIUM SERPL-SCNC: 141 MMOL/L (ref 133–144)
WBC # BLD AUTO: 3.5 10E9/L (ref 4–11)

## 2020-08-03 PROCEDURE — 36415 COLL VENOUS BLD VENIPUNCTURE: CPT | Performed by: INTERNAL MEDICINE

## 2020-08-03 PROCEDURE — 85027 COMPLETE CBC AUTOMATED: CPT | Performed by: INTERNAL MEDICINE

## 2020-08-03 PROCEDURE — 80053 COMPREHEN METABOLIC PANEL: CPT | Performed by: INTERNAL MEDICINE

## 2020-11-16 ENCOUNTER — HEALTH MAINTENANCE LETTER (OUTPATIENT)
Age: 45
End: 2020-11-16

## 2021-01-19 ENCOUNTER — VIRTUAL VISIT (OUTPATIENT)
Dept: FAMILY MEDICINE | Facility: CLINIC | Age: 46
End: 2021-01-19
Payer: COMMERCIAL

## 2021-01-19 DIAGNOSIS — F32.0 MILD MAJOR DEPRESSION (H): Primary | ICD-10-CM

## 2021-01-19 PROCEDURE — 99213 OFFICE O/P EST LOW 20 MIN: CPT | Mod: 95 | Performed by: FAMILY MEDICINE

## 2021-01-19 RX ORDER — SERTRALINE HYDROCHLORIDE 25 MG/1
TABLET, FILM COATED ORAL
Qty: 60 TABLET | Refills: 1 | Status: SHIPPED | OUTPATIENT
Start: 2021-01-19 | End: 2021-10-20

## 2021-01-19 NOTE — PROGRESS NOTES
Mateo is a 45 year old who is being evaluated via a billable video visit.      Video visit performed in lieu of an in-person visit due to current concerns regarding the current COVID-19 situation including social distancing and keeping at risk people safe.  Patient agreed to proceed with this visit due to these circumstances.     How would you like to obtain your AVS? MyChart  If the video visit is dropped, the invitation should be resent by: Text to cell phone: [phone]  Will anyone else be joining your video visit? No    Video Start Time: 11:40 am  Assessment & Plan       ICD-10-CM    1. Mild major depression (H)  F32.0 sertraline (ZOLOFT) 25 MG tablet     MENTAL HEALTH REFERRAL  - Adult; Outpatient Treatment; Individual/Couples/Family/Group Therapy/Health Psychology; [facility] [phone]; We will contact you to schedule the appointment or please call with any questions      We talked about the potential benefit of therapy and medication.  At this time he wants to start back on Zoloft and agrees to take it consistently now.  He wants to do low-dose.  See orders.  He agreed to referral for mental health counseling as well, but then he sent me a message saying he is not ready for that.  He does not feel comfortable talking to someone else at this time and wants to just try the medication.  We are going to follow-up in 1 month and he agrees that if things are not improving he will consider therapy at that time.  He does agree to work on communication with his wife and work on not arguing with her and not being angry.  He thinks the medication is going to help with that.    15 minutes spent on the date of the encounter doing chart review, history and exam, documentation and further activities as noted above    Return in about 1 month (around 2/19/2021) for using a video visit.    Radha Hidalgo MD  [facility]    Subjective     Mateo is a 45 year old  who presents to clinic today for the following health issues:    HPI       Medication Followup of Zoloft    Taking Medication as prescribed: NO    Side Effects:  n/a    Medication Helping Symptoms:  not applicable     His big concern today is his mood.  He is angry, mad, sad, arguing with his wife a lot. He does not feel suicidal or homicidal.  He has no weapons at home. He is not currently taking his Zoloft.  He was prescribed in the past but took it very briefly.  Also was prescribed Celexa and Prozac in the past.  He now realizes and admits that he needs to be on it and work hard on improving.  He is committed to making a change.      His health is otherwise good at this time with his liver.  No other concerns today.    Review of Systems   Constitutional, HEENT, cardiovascular, pulmonary, gi and gu systems are negative, except as otherwise noted.      Objective           Vitals:  No vitals were obtained today due to virtual visit.    Physical Exam   GENERAL: Healthy, alert and no distress  EYES: Eyes grossly normal to inspection.  No discharge or erythema, or obvious scleral/conjunctival abnormalities.  RESP: No audible wheeze, cough, or visible cyanosis.  No visible retractions or increased work of breathing.    SKIN: Visible skin clear. No significant rash, abnormal pigmentation or lesions.  NEURO: Cranial nerves grossly intact.  Mentation and speech appropriate for age.  PSYCH: Mentation appears normal, affect velásquez but cooperative, judgement and insight intact, normal speech and appearance well-groomed.  He seems angry but when talking he does not sound angry, just resolved to work hard on getting better.               Video-Visit Details    Type of service:  Video Visit    Video End Time:11:48 am    Originating Location (pt. Location): Home    Distant Location (provider location):  [facility]     Platform used for Video Visit: [employer]

## 2021-01-21 ENCOUNTER — MYC MEDICAL ADVICE (OUTPATIENT)
Dept: FAMILY MEDICINE | Facility: CLINIC | Age: 46
End: 2021-01-21

## 2021-03-05 ENCOUNTER — HOSPITAL ENCOUNTER (EMERGENCY)
Facility: CLINIC | Age: 46
Discharge: HOME | End: 2021-03-05
Attending: EMERGENCY MEDICINE | Admitting: EMERGENCY MEDICINE
Payer: MEDICARE

## 2021-03-05 VITALS
RESPIRATION RATE: 18 BRPM | HEART RATE: 112 BPM | TEMPERATURE: 99.3 F | OXYGEN SATURATION: 99 % | BODY MASS INDEX: 33.57 KG/M2 | WEIGHT: 256 LBS | DIASTOLIC BLOOD PRESSURE: 126 MMHG | SYSTOLIC BLOOD PRESSURE: 190 MMHG

## 2021-03-05 DIAGNOSIS — L73.9 FOLLICULITIS: ICD-10-CM

## 2021-03-05 PROCEDURE — 99283 EMERGENCY DEPT VISIT LOW MDM: CPT | Performed by: EMERGENCY MEDICINE

## 2021-03-05 PROCEDURE — 99282 EMERGENCY DEPT VISIT SF MDM: CPT | Performed by: EMERGENCY MEDICINE

## 2021-03-05 RX ORDER — CEPHALEXIN 500 MG/1
500 CAPSULE ORAL 4 TIMES DAILY
Qty: 28 CAPSULE | Refills: 0 | Status: SHIPPED | OUTPATIENT
Start: 2021-03-05 | End: 2021-03-12

## 2021-03-05 NOTE — ED NOTES
Reviewed discharge instruction, verbalized understanding. No questions or concerns. Meds reviewed.

## 2021-03-05 NOTE — ED PROVIDER NOTES
History     Chief Complaint   Patient presents with     Head pain     HPI  Abhishek Downey is a 45 year old male who presents with some skin irritation at the base of the skull posteriorly.  Its been present for 3 to 4 days.  He has been picking and squeezing at this per his wife.  It is sore.  No fevers.  He has never had this in this region before.    Allergies:  Allergies   Allergen Reactions     No Known Allergies        Problem List:    Patient Active Problem List    Diagnosis Date Noted     Transplanted liver (H) 09/11/2018     Priority: Medium     CMV (cytomegalovirus infection) (H) 02/07/2018     Priority: Medium     Type 2 diabetes mellitus with complication, with long-term current use of insulin (H) 08/07/2017     Priority: Medium     C. difficile enteritis 06/27/2017     Priority: Medium     Anemia due to blood loss, acute 06/26/2017     Priority: Medium     C. difficile pouchitis 06/26/2017     Priority: Medium     History of esophageal varices 06/26/2017     Priority: Medium     Thrombocytopenia (H) 06/26/2017     Priority: Medium     History of duodenal ulcer 06/26/2017     Priority: Medium     Mass of left upper extremity 03/30/2016     Priority: Medium     Anemia 09/05/2015     Priority: Medium     Upper GI bleed 09/05/2015     Priority: Medium     Acute pancreatitis 09/05/2015     Priority: Medium     Primary sclerosing cholangitis 09/05/2015     Priority: Medium     Cirrhosis of liver (H) 09/05/2015     Priority: Medium     S/P total colectomy 09/05/2015     Priority: Medium     Esophageal varices (H) 09/05/2015     Priority: Medium     Mild major depression (H) 03/18/2011     Priority: Medium     CARDIOVASCULAR SCREENING; LDL GOAL LESS THAN 160 10/31/2010     Priority: Medium     H/O ulcerative colitis - s/p total colectomy 10/31/2008     Priority: Medium        Past Medical History:    Past Medical History:   Diagnosis Date     Cirrhosis of liver (H)      DU (duodenal ulcer) 5/2015     PSC  (primary sclerosing cholangitis)      Ulcerative colitis        Past Surgical History:    Past Surgical History:   Procedure Laterality Date     COLECTOMY  12/27/07     FLEXIBLE SIGMOIDOSCOPY  10/4/10      ERCP W/W/O NONA OF SPEC-BRUSH/WASH  02/19/10     HC ERCP W/W/O NONA OF SPEC-BRUSH/WASH  03/05/10      UGI ENDOSCOPY W BANDING ESOPH/GASTRIC VARICES  since 2/2015    monthly at [facility]; last one in August      UPPER GI ENDOSCOPY  11/13/13    [facility]     UPPER GI ENDOSCOPY  2/16/15    [facility]       Family History:    Family History   Problem Relation Age of Onset     Heart Failure Mother        Social History:  Marital Status:   [2]  Social History     Tobacco Use     Smoking status: Never Smoker     Smokeless tobacco: Never Used   Substance Use Topics     Alcohol use: No     Alcohol/week: 0.0 standard drinks     Comment: 2-3x per year     Drug use: No        Medications:    cephALEXin (KEFLEX) 500 MG capsule  aspirin 81 MG tablet  sertraline (ZOLOFT) 25 MG tablet  tacrolimus (PROGRAF - GENERIC EQUIVALENT) 1 MG capsule          Review of Systems  All other systems are reviewed and are negative    Physical Exam   BP: (!) 190/126  Pulse: 112  Temp: 99.3  F (37.4  C)  Resp: 18  Weight: 116.1 kg (256 lb)  SpO2: 99 %      Physical Exam  Vitals signs and nursing note reviewed.   Constitutional:       General: He is not in acute distress.     Appearance: He is well-developed. He is not diaphoretic.   HENT:      Head: Normocephalic and atraumatic.   Eyes:      General: No scleral icterus.        Right eye: No discharge.         Left eye: No discharge.      Conjunctiva/sclera: Conjunctivae normal.   Neck:      Musculoskeletal: Normal range of motion and neck supple.   Cardiovascular:      Rate and Rhythm: Normal rate and regular rhythm.      Heart sounds: Normal heart sounds. No murmur.   Pulmonary:      Effort: Pulmonary effort is normal. No respiratory distress.      Breath sounds: Normal  breath sounds. No stridor.   Abdominal:      Palpations: Abdomen is soft.      Tenderness: There is no abdominal tenderness.   Musculoskeletal: Normal range of motion.   Skin:     General: Skin is warm and dry.      Coloration: Skin is not pale.      Findings: No erythema or rash.      Comments: Skin at the base of the skull across the bottom is swollen, boggy with some superficial breakdown.  No obvious areas of fluctuance.   Neurological:      Mental Status: He is alert.      Cranial Nerves: No cranial nerve deficit.      Motor: No abnormal muscle tone.         ED Course        Procedures               Critical Care time:  none               No results found for this or any previous visit (from the past 24 hour(s)).    Medications - No data to display    Assessments & Plan (with Medical Decision Making)  45-year-old male with what appears to be folliculitis.  Will place on Keflex.  Follow-up if not improving in 3 days.  Return anytime sooner the emergency department condition worsens or any other concern.     I have reviewed the nursing notes.    I have reviewed the findings, diagnosis, plan and need for follow up with the patient.       Discharge Medication List as of 3/5/2021  5:15 PM      START taking these medications    Details   cephALEXin (KEFLEX) 500 MG capsule Take 1 capsule (500 mg) by mouth 4 times daily for 7 days, Disp-28 capsule, R-0, E-Prescribe             Final diagnoses:   Folliculitis       3/5/2021   [facility] EMERGENCY DEPT     Abebe Moore MD  03/05/21 [id number]

## 2021-03-05 NOTE — ED TRIAGE NOTES
"Presents to ED with concerns of posterior head pain and \"lumps\" that reportedly started with a pimple. Has been present for a week.   "

## 2021-03-29 ENCOUNTER — NURSE TRIAGE (OUTPATIENT)
Dept: FAMILY MEDICINE | Facility: CLINIC | Age: 46
End: 2021-03-29

## 2021-03-29 ENCOUNTER — MYC MEDICAL ADVICE (OUTPATIENT)
Dept: FAMILY MEDICINE | Facility: CLINIC | Age: 46
End: 2021-03-29

## 2021-03-29 NOTE — TELEPHONE ENCOUNTER
S-(situation): intermittent mild to mod chest pains for past 2 weeks that lasts last  A few seconds, especially when he exerts himself.     B-(background): NO family Hx of heart disease. However, he is a diabetic. He says he is a  little overweight, and borderline Hypertension. His BP on Saturday was 150/102 P: 103.  He has not checked it since.     A-(assessment): Chest SX of unknown etiology     R-(recommendations):  He should be seen within 24 hours. He has appt tomorrow with MAGDI Trejo, in [place] at 9:20 AM.  RN has scheduled him for a follow-up appt for his diabetes with  Dr. Oliveira on 4/12/21/ at 1:30 PM...................SHANA Davidson    Additional Information    Negative: Severe difficulty breathing (e.g., struggling for each breath, speaks in single words)    Negative: Passed out (i.e., fainted, collapsed and was not responding)    Negative: Chest pain lasting longer than 5 minutes and ANY of the following:* Over 50 years old* Over 30 years old and at least one cardiac risk factor (i.e., high blood pressure, diabetes, high cholesterol, obesity, smoker or strong family history of heart disease)* Pain is crushing, pressure-like, or heavy * Took nitroglycerin and chest pain was not relieved* History of heart disease (i.e., angina, heart attack, bypass surgery, angioplasty, CHF)    Negative: Visible sweat on face or sweat dripping down face    Negative: Sounds like a life-threatening emergency to the triager    Negative: SEVERE chest pain    Negative: Pain also present in shoulder(s) or arm(s) or jaw    Negative: Difficulty breathing    Negative: Cocaine use within last 3 days    Negative: History of prior 'blood clot' in leg or lungs (i.e., deep vein thrombosis, pulmonary embolism)    Negative: Recent illness requiring prolonged bed rest (i.e., immobilization)    Negative: Hip or leg fracture in past 2 months (e.g, or had cast on leg or ankle)    Negative: Major surgery in the past month    Negative:  "Recent long-distance travel with prolonged time in car, bus, plane, or train (i.e., within past 2 weeks; 6 or more hours duration)    Negative: Heart beating irregularly or very rapidly    Negative: Chest pain lasting longer than 5 minutes    Negative: Intermittent chest pain and pain has been increasing in severity or frequency    Negative: Dizziness or lightheadedness    Negative: Coughing up blood    Negative: Patient sounds very sick or weak to the triager    Intermittent chest pains persist > 3 days    Answer Assessment - Initial Assessment Questions  1. LOCATION: \"Where does it hurt?\"        C/O chest pain , off center to left of chest.   2. RADIATION: \"Does the pain go anywhere else?\" (e.g., into neck, jaw, arms, back)      Does NOT radiate  3. ONSET: \"When did the chest pain begin?\" (Minutes, hours or days)       Intermittent for past 2 weeks.   4. PATTERN \"Does the pain come and go, or has it been constant since it started?\"  \"Does it get worse with exertion?\"        Intermittent. When he walks around , he notices that he gets more SOB.   5. DURATION: \"How long does it last\" (e.g., seconds, minutes, hours)      It lasts for 2 to 3 minutes. Once he rests he feels better.   6. SEVERITY: \"How bad is the pain?\"  (e.g., Scale 1-10; mild, moderate, or severe)     - MILD (1-3): doesn't interfere with normal activities      - MODERATE (4-7): interferes with normal activities or awakens from sleep     - SEVERE (8-10): excruciating pain, unable to do any normal activities        Mild to Moderate. Mild during time of call.   7. CARDIAC RISK FACTORS: \"Do you have any history of heart problems or risk factors for heart disease?\" (e.g., prior heart attack, angina; high blood pressure, diabetes, being overweight, high cholesterol, smoking, or strong family history of heart disease)      Hx of diabetes type II, HTN-borderline, overweight.   8. PULMONARY RISK FACTORS: \"Do you have any history of lung disease?\"  (e.g., blood " "clots in lung, asthma, emphysema, birth control pills)      NO Hx of lung problems.   9. CAUSE: \"What do you think is causing the chest pain?\"      Etiology unknown, either his diabetes or high blood pressure.   10. OTHER SYMPTOMS: \"Do you have any other symptoms?\" (e.g., dizziness, nausea, vomiting, sweating, fever, difficulty breathing, cough)         He gets tired when he walks. \" The other day I was trying to put on a bed sheet and I got SOB . \"   11. PREGNANCY: \"Is there any chance you are pregnant?\" \"When was your last menstrual period?\"         NA.    Protocols used: CHEST PAIN-A-[place]      "

## 2021-03-29 NOTE — TELEPHONE ENCOUNTER
My chart message sent to patient to chose and schedule a visit type for the continued concerns and request for continued medication prescribed in the ER on 3/5/2021.    Cathleen Mosquera RN

## 2021-04-04 ENCOUNTER — HEALTH MAINTENANCE LETTER (OUTPATIENT)
Age: 46
End: 2021-04-04

## 2021-04-17 ENCOUNTER — APPOINTMENT (OUTPATIENT)
Dept: CT IMAGING | Facility: CLINIC | Age: 46
End: 2021-04-17
Attending: FAMILY MEDICINE
Payer: MEDICARE

## 2021-04-17 ENCOUNTER — HOSPITAL ENCOUNTER (EMERGENCY)
Facility: CLINIC | Age: 46
Discharge: HOME | End: 2021-04-17
Attending: FAMILY MEDICINE | Admitting: FAMILY MEDICINE
Payer: MEDICARE

## 2021-04-17 VITALS
SYSTOLIC BLOOD PRESSURE: 154 MMHG | OXYGEN SATURATION: 99 % | DIASTOLIC BLOOD PRESSURE: 100 MMHG | BODY MASS INDEX: 33.83 KG/M2 | HEART RATE: 85 BPM | RESPIRATION RATE: 20 BRPM | TEMPERATURE: 98.7 F | WEIGHT: 258 LBS

## 2021-04-17 DIAGNOSIS — Z94.4 TRANSPLANTED LIVER (H): ICD-10-CM

## 2021-04-17 DIAGNOSIS — E11.8 TYPE 2 DIABETES MELLITUS WITH COMPLICATION, WITH LONG-TERM CURRENT USE OF INSULIN (H): ICD-10-CM

## 2021-04-17 DIAGNOSIS — R10.84 ABDOMINAL PAIN, GENERALIZED: ICD-10-CM

## 2021-04-17 DIAGNOSIS — Z79.4 TYPE 2 DIABETES MELLITUS WITH COMPLICATION, WITH LONG-TERM CURRENT USE OF INSULIN (H): ICD-10-CM

## 2021-04-17 DIAGNOSIS — R19.7 DIARRHEA, UNSPECIFIED TYPE: ICD-10-CM

## 2021-04-17 LAB
ALBUMIN SERPL-MCNC: 4 G/DL (ref 3.4–5)
ALP SERPL-CCNC: 162 U/L (ref 40–150)
ALT SERPL W P-5'-P-CCNC: 41 U/L (ref 0–70)
ANION GAP SERPL CALCULATED.3IONS-SCNC: 6 MMOL/L (ref 3–14)
AST SERPL W P-5'-P-CCNC: 27 U/L (ref 0–45)
BASOPHILS # BLD AUTO: 0 10E9/L (ref 0–0.2)
BASOPHILS NFR BLD AUTO: 0.3 %
BILIRUB SERPL-MCNC: 1.1 MG/DL (ref 0.2–1.3)
BUN SERPL-MCNC: 23 MG/DL (ref 7–30)
CALCIUM SERPL-MCNC: 8.9 MG/DL (ref 8.5–10.1)
CHLORIDE SERPL-SCNC: 107 MMOL/L (ref 94–109)
CO2 SERPL-SCNC: 26 MMOL/L (ref 20–32)
CREAT SERPL-MCNC: 1.46 MG/DL (ref 0.66–1.25)
CRP SERPL-MCNC: 9.9 MG/L (ref 0–8)
DIFFERENTIAL METHOD BLD: ABNORMAL
EOSINOPHIL # BLD AUTO: 0.1 10E9/L (ref 0–0.7)
EOSINOPHIL NFR BLD AUTO: 3.3 %
ERYTHROCYTE [DISTWIDTH] IN BLOOD BY AUTOMATED COUNT: 14.5 % (ref 10–15)
GFR SERPL CREATININE-BSD FRML MDRD: 58 ML/MIN/{1.73_M2}
GLUCOSE SERPL-MCNC: 187 MG/DL (ref 70–99)
HCT VFR BLD AUTO: 38.9 % (ref 40–53)
HEMOCCULT STL QL: POSITIVE
HGB BLD-MCNC: 13.9 G/DL (ref 13.3–17.7)
IMM GRANULOCYTES # BLD: 0 10E9/L (ref 0–0.4)
IMM GRANULOCYTES NFR BLD: 0.8 %
LIPASE SERPL-CCNC: 133 U/L (ref 73–393)
LYMPHOCYTES # BLD AUTO: 1 10E9/L (ref 0.8–5.3)
LYMPHOCYTES NFR BLD AUTO: 27.5 %
MCH RBC QN AUTO: 31.7 PG (ref 26.5–33)
MCHC RBC AUTO-ENTMCNC: 35.7 G/DL (ref 31.5–36.5)
MCV RBC AUTO: 89 FL (ref 78–100)
MONOCYTES # BLD AUTO: 0.3 10E9/L (ref 0–1.3)
MONOCYTES NFR BLD AUTO: 6.8 %
NEUTROPHILS # BLD AUTO: 2.3 10E9/L (ref 1.6–8.3)
NEUTROPHILS NFR BLD AUTO: 61.3 %
NRBC # BLD AUTO: 0 10*3/UL
NRBC BLD AUTO-RTO: 0 /100
PLATELET # BLD AUTO: 105 10E9/L (ref 150–450)
POTASSIUM SERPL-SCNC: 4 MMOL/L (ref 3.4–5.3)
PROT SERPL-MCNC: 7.1 G/DL (ref 6.8–8.8)
RBC # BLD AUTO: 4.38 10E12/L (ref 4.4–5.9)
SODIUM SERPL-SCNC: 139 MMOL/L (ref 133–144)
WBC # BLD AUTO: 3.7 10E9/L (ref 4–11)

## 2021-04-17 PROCEDURE — 82272 OCCULT BLD FECES 1-3 TESTS: CPT | Performed by: FAMILY MEDICINE

## 2021-04-17 PROCEDURE — 250N000011 HC RX IP 250 OP 636: Performed by: FAMILY MEDICINE

## 2021-04-17 PROCEDURE — 99285 EMERGENCY DEPT VISIT HI MDM: CPT | Mod: 25 | Performed by: FAMILY MEDICINE

## 2021-04-17 PROCEDURE — 250N000009 HC RX 250: Performed by: FAMILY MEDICINE

## 2021-04-17 PROCEDURE — 80053 COMPREHEN METABOLIC PANEL: CPT | Performed by: FAMILY MEDICINE

## 2021-04-17 PROCEDURE — 87493 C DIFF AMPLIFIED PROBE: CPT | Performed by: FAMILY MEDICINE

## 2021-04-17 PROCEDURE — 36415 COLL VENOUS BLD VENIPUNCTURE: CPT | Performed by: FAMILY MEDICINE

## 2021-04-17 PROCEDURE — 74177 CT ABD & PELVIS W/CONTRAST: CPT

## 2021-04-17 PROCEDURE — 85025 COMPLETE CBC W/AUTO DIFF WBC: CPT | Performed by: FAMILY MEDICINE

## 2021-04-17 PROCEDURE — 87506 IADNA-DNA/RNA PROBE TQ 6-11: CPT | Performed by: FAMILY MEDICINE

## 2021-04-17 PROCEDURE — 86140 C-REACTIVE PROTEIN: CPT | Performed by: FAMILY MEDICINE

## 2021-04-17 PROCEDURE — 83690 ASSAY OF LIPASE: CPT | Performed by: FAMILY MEDICINE

## 2021-04-17 PROCEDURE — 99284 EMERGENCY DEPT VISIT MOD MDM: CPT | Performed by: FAMILY MEDICINE

## 2021-04-17 RX ORDER — IOPAMIDOL 755 MG/ML
100 INJECTION, SOLUTION INTRAVASCULAR ONCE
Status: COMPLETED | OUTPATIENT
Start: 2021-04-17 | End: 2021-04-17

## 2021-04-17 RX ORDER — OXYCODONE HYDROCHLORIDE 5 MG/1
5 TABLET ORAL EVERY 6 HOURS PRN
Status: DISCONTINUED | OUTPATIENT
Start: 2021-04-17 | End: 2021-04-18 | Stop reason: HOSPADM

## 2021-04-17 RX ORDER — OXYCODONE HYDROCHLORIDE 5 MG/1
5 TABLET ORAL EVERY 6 HOURS PRN
Qty: 2 TABLET | Refills: 0 | Status: SHIPPED | OUTPATIENT
Start: 2021-04-17 | End: 2021-10-20

## 2021-04-17 RX ORDER — OXYCODONE HYDROCHLORIDE 5 MG/1
5 TABLET ORAL EVERY 6 HOURS PRN
Qty: 8 TABLET | Refills: 0 | Status: SHIPPED | OUTPATIENT
Start: 2021-04-17 | End: 2021-10-20

## 2021-04-17 RX ADMIN — IOPAMIDOL 97 ML: 755 INJECTION, SOLUTION INTRAVENOUS at 21:08

## 2021-04-17 RX ADMIN — SODIUM CHLORIDE 60 ML: 9 INJECTION, SOLUTION INTRAVENOUS at 21:08

## 2021-04-17 ASSESSMENT — ENCOUNTER SYMPTOMS
BLOOD IN STOOL: 0
DIARRHEA: 1
RESPIRATORY NEGATIVE: 1
HEMATOLOGIC/LYMPHATIC NEGATIVE: 1
VOMITING: 0
APPETITE CHANGE: 1
PSYCHIATRIC NEGATIVE: 1
NAUSEA: 1
CHILLS: 0
FEVER: 0
ACTIVITY CHANGE: 1
CARDIOVASCULAR NEGATIVE: 1
ENDOCRINE NEGATIVE: 1
NEUROLOGICAL NEGATIVE: 1
EYES NEGATIVE: 1
MUSCULOSKELETAL NEGATIVE: 1
ABDOMINAL DISTENTION: 1
ABDOMINAL PAIN: 1

## 2021-04-18 LAB
C COLI+JEJUNI+LARI FUSA STL QL NAA+PROBE: NOT DETECTED
C DIFF TOX B STL QL: NEGATIVE
EC STX1 GENE STL QL NAA+PROBE: NOT DETECTED
EC STX2 GENE STL QL NAA+PROBE: NOT DETECTED
ENTERIC PATHOGEN COMMENT: NORMAL
NOROV GI+II ORF1-ORF2 JNC STL QL NAA+PR: NOT DETECTED
RVA NSP5 STL QL NAA+PROBE: NOT DETECTED
SALMONELLA SP RPOD STL QL NAA+PROBE: NOT DETECTED
SHIGELLA SP+EIEC IPAH STL QL NAA+PROBE: NOT DETECTED
SPECIMEN SOURCE: NORMAL
V CHOL+PARA RFBL+TRKH+TNAA STL QL NAA+PR: NOT DETECTED
Y ENTERO RECN STL QL NAA+PROBE: NOT DETECTED

## 2021-04-18 NOTE — ED TRIAGE NOTES
"Pt c/o abd pain, Diarrhea that is \"like coffee grounds\".  Pt has a complicated abd sx and liver transplant hx.    "

## 2021-04-18 NOTE — ED PROVIDER NOTES
History     Chief Complaint   Patient presents with     Diarrhea     Abdominal Pain     HPI  Abhishek Downey is a 45 year old male who presents to the ER with concerns about abdominal pain and diarrhea symptoms.  Patient states that he has had abdominal pain and bloating symptoms specially after eating starting April 6 approximately 2 weeks ago.  Patient states that every time he eats he has increased pain and bloating symptoms.  Majority of his pain is in the left lower quadrant of his abdomen when present.  He does have some diffuse pain throughout his abdomen at times however.  He denies fever or chills symptoms.  He states that he has had diarrhea that is been nonbloody since the start of this abdominal pain symptoms 2 weeks ago.  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  He had a J-pouch at 1 point but that was taken down and he was reconnected approximately 11 years ago.  Patient also with a history for type II diabetes mellitus and liver transplant.  Patient denies any fall or injury that might of trigger abdominal pain symptoms.  When asked if he has had any weight loss and he states that he thinks he might have as he has not been able to tolerate eating much during the last 2 weeks.  Has had no change with his urination and no pain or burning with urination.  He admits to nausea symptoms but has had no actual vomiting.  He denies any cough or shortness of breath symptoms.      Patient Demographics  - 45 y.o. Male; born [birth year]   Patient Address Communication Language Race / Ethnicity Marital Status   [address] [place] [id number] [phone] (Home)  [phone] (Mobile)  [email or web address]  [email or web address]  [email or web address] English - Written (Preferred) White / Unknown      Source Comments  - [facility]  Patient records contain information from all sites at [facility].  For routine questions regarding patient  "/74   Pulse 64   Temp 98.8  F (37.1  C) (Tympanic)   Resp 18   Ht 1.676 m (5' 6\")   Wt 80.9 kg (178 lb 6.4 oz)   SpO2 95%   BMI 28.79 kg/m      Pt VS, afebrile.  Denies pain upon second assessment.  SIDDHARTHA drain continue to output yellow drainage with minimal clots.  Abdomen still rounded and distended but decreased since yesterday.     Will continue to monitor.  Aracelis Bucio RN.............................7/12/2020 4:29 PM    " records, call [phone] during business hours, M-F 8:00 AM - 5:00 PM Central Time.   Record requests for emergency care only can be directed to [phone] at any time.    Allergies    No Known Active Allergies    Medications  Reconcile with Patient's Chart  Medication Sig Dispensed Refills Start Date End Date Status   aspirin 81 mg chewable tablet   Chew 1 tablet daily.   0 07/27/2017   Active   insulin  unit/mL (3 mL) injection   Inject 0.1-0.4 mL (10-40 Units total) under the skin 2 (two) times a day before breakfast and dinner. 40 units AM, 10 units PM, adjust as needed   0 03/31/2020   Active   acetaminophen (TYLENOL) 325 mg tablet   Take 2 tablets (650 mg total) by mouth See Admin Instructions. Give prior to Thymoglobulin, premedication.   0 04/05/2020   Active   diphenhydrAMINE (BENADRYL) 50 mg/mL injection   Infuse 1 mL (50 mg total) into a venous catheter See Admin Instructions. Give prior to Thymoglobulin, premedication. 10 mL   0 04/05/2020   Active       Additional Information  Patient not taking. Reported on 9/23/2020     True Metrix Glucose Test Strip strips       0 04/08/2020   Active   mycophenolate (CELLCEPT) 500 mg tablet   TAKE 2 TABLETS BY MOUTH TWO TIMES A  tablet   3 09/10/2020   Active   tacrolimus (PROGRAF) 0.5 mg capsule   Take 1 capsule (0.5 mg total) by mouth 2 (two) times a day. 180 capsule   3 09/25/2020   Active   tacrolimus (PROGRAF) 1 mg capsule    Indications: Transplant Liver (HCC), Medication Therapy Long Term Not Anticoagulant Take 2 capsules (2 mg total) by mouth 2 (two) times a day. 360 capsule   3 03/16/2021   Active     Active Problems  Reconcile with Patient's Chart  Problem Noted Date   Rejection Graft 03/27/2020   Hypertension NOS 08/01/2019   Atrial Fibrillation 09/22/2018   Last Assessment & Plan:     Formatting of this note might be different from the original.  It seems that his atrial fibrillation was likely induced by a significant drop in  hemoglobin. He has been largely asymptomatic since leaving the hospital, although he is somewhat deconditioned. Unfortunately, I am still waiting on the results of the Holter monitor. If the Holter shows no evidence of atrial fibrillation, there will be no further workup. If there is evidence of afib then we will consider overnight oximetry and thyroid function testing. He does have subclinical hypothyroidism TSH 7.7 on 8/3/18. In any case, he does not require rate control or anticoagulation at this time.     I will be in touch with the results and next steps, if any, via phone later today.      Rejection Liver Transplant 2018   Diabetes Mellitus Type 2 2017   Complication Liver Transplant 2017   Transplant Liver 2017   Ileal Pouch 2017   Pouchitis Ileal 2013     Resolved Problems    Problem Noted Date Resolved Date   Pruritus 2018   Liver Disease 2015   Varix Esophageal 2014     Encounters  - from Last 3 Months  Date Type Specialty Care Team Description   2021 Orders Only Transplant Ludivina Reinoso APRN, C.N.P.   Transplant Liver (HCC)   2021 Refill Transplant Shanelle Ledbetter D.NAndresP., M.A.N., R.N.   Med Refill   2021 Hospital Encounter Laboratory Medicine Ludivina Reinoso APRN, C.N.P.   Transplant Liver (HCC)   2021 Orders Only Transplant Ludivina Reinoso APRN, C.N.P.   Transplant Liver (HCC)     Immunizations  Reconcile with Patient's Chart  Name Administration Dates Next Due   HepA Adult 2014, 2013     HepB Dialysis 2014, 2013, 2013     Influenza (IM) Preservative Free 2013     PCV13 2013     PPSV23 2013     Tdap 2013     influenza vaccine quad (FLUZONE/FLUARIX) (6 months and older)(PF) 2018       Surgical History    Surgery Date Site/Laterality Comments   TRANSPLANT LIVER -  DONOR WITH BACK TABLE PREP OF LIVER ALLOGRAFT  2017 N/A Transplant Liver -  Donor with Back Table Prep of Liver Allograft     ORGAN TRANSPLANT          TOTAL COLECTOMY     with pouch     CHOLECYSTECTOMY          LAPAROSCOPIC ASSISSTED TOTAL COLECTOMY W/ J-POUCH            Medical History    Medical History Date Comments   Diabetes Mellitus NOS   steroid induced. diet controlled    Blood Transfusion No Diagnosis       Hypertension NOS       Primary Sclerosing Cholangitis       Liver Disease       Colitis Ulcerative (HCC)         Social History    Tobacco Use Types Packs/Day Years Used Date   Never Smoker           Smokeless Tobacco: Never Used           Alcohol Use Standard Drinks/Week Comments   No 0 (1 standard drink = 0.6 oz pure alcohol)       Alcohol Habits Answer Date Recorded   How often do you have a drink containing alcohol? Never 2019   How many drinks containing alcohol do you have on a typical day when you are drinking? Not asked     How often do you have six or more drinks on one occasion? Never 2019     Social Isolation Answer Date Recorded   In a typical week, how many times do you talk on the phone with family, friends, or neighbors? More than three times a week 2019   How often do you get together with friends or relatives? Never 2019   How often do you attend Tenriism or Mormon services? Never 2019   Do you belong to any clubs or organizations such as Tenriism groups, unions, fraternal or athletic groups, or school groups? No 2019   How often do you attend meetings of the clubs or organizations you belong to? Never 2019   Are you now , , , , never  or living with a partner?  2019     Physical Activity Answer Date Recorded   On average, how many days per week do you engage in moderate to strenuous exercise (like walking fast, running, jogging, dancing, swimming, biking, or other activities that cause a light or heavy sweat)? 5 days 2019    On average, how many minutes do you engage in exercise at this level? 30 min 07/27/2019     Stress Answer Date Recorded   Do you feel stress - tense, restless, nervous, or anxious, or unable to sleep at night because your mind is troubled all the time - these days? Rather much 07/27/2019     Education Answer Date Recorded   What is the highest level of school you have completed or the highest degree you have received? 9th grade 07/27/2019     Financial Resource Strain Answer Date Recorded   How hard is it for you to pay for the very basics like food, housing, medical care, and heating? Not hard at all 07/27/2019     Food Insecurity Answer Date Recorded   Within the past 12 months, you worried that your food would run out before you got money to buy more. Never true 07/27/2019   Within the past 12 months, the food you bought just didn't last and you didn't have money to get more. Never true 07/27/2019     Transportation Needs Answer Date Recorded   In the past 12 months, has lack of transportation kept you from medical appointments or from getting medications? No 07/27/2019   In the past 12 months, has lack of transportation kept you from meetings, work, or getting things needed for daily living? No 07/27/2019     Sex Assigned at Birth Date Recorded   Male 09/30/2016 8:41 PM CDT         Review of Systems   Constitutional: Positive for activity change and appetite change. Negative for chills and fever.   HENT: Negative.    Eyes: Negative.    Respiratory: Negative.    Cardiovascular: Negative.    Gastrointestinal: Positive for abdominal distention, abdominal pain, diarrhea and nausea. Negative for blood in stool and vomiting.   Endocrine: Negative.    Genitourinary: Negative.    Musculoskeletal: Negative.    Skin: Negative.  Negative for rash.   Neurological: Negative.    Hematological: Negative.    Psychiatric/Behavioral: Negative.    All other systems reviewed and are negative.      Physical Exam   BP: (!)  209/100  Pulse: 92  Temp: 98.7  F (37.1  C)  Resp: 20  Weight: 117 kg (258 lb)  SpO2: 100 %      Physical Exam  Vitals signs and nursing note reviewed.   Constitutional:       General: He is in acute distress (Mild -abdominal pain but patient refused offer of pain medication or antinausea meds at the time of exam.).      Appearance: He is well-developed.   HENT:      Head: Normocephalic and atraumatic.      Mouth/Throat:      Mouth: Mucous membranes are dry.   Eyes:      Extraocular Movements: Extraocular movements intact.      Pupils: Pupils are equal, round, and reactive to light.   Cardiovascular:      Rate and Rhythm: Normal rate.   Pulmonary:      Effort: Pulmonary effort is normal. No respiratory distress.   Abdominal:      General: Abdomen is protuberant. A surgical scar is present. Bowel sounds are increased. There is distension. There are no signs of injury.      Palpations: Abdomen is soft.      Tenderness: There is generalized abdominal tenderness and tenderness in the left lower quadrant. There is guarding (Mild). There is no right CVA tenderness, left CVA tenderness or rebound.      Hernia: No hernia is present.          Comments: Multiple healed surgical scars noted to the abdomen.   Skin:     Capillary Refill: Capillary refill takes less than 2 seconds.   Neurological:      Mental Status: He is alert and oriented to person, place, and time.   Psychiatric:         Mood and Affect: Mood normal.         Behavior: Behavior normal.         ED Course        Procedures               Critical Care time:  none            Results for orders placed or performed during the hospital encounter of 04/17/21 (from the past 24 hour(s))   Occult blood stool   Result Value Ref Range    Occult Blood Positive (A) NEG^Negative   CBC with platelets differential   Result Value Ref Range    WBC 3.7 (L) 4.0 - 11.0 10e9/L    RBC Count 4.38 (L) 4.4 - 5.9 10e12/L    Hemoglobin 13.9 13.3 - 17.7 g/dL    Hematocrit 38.9 (L) 40.0 -  53.0 %    MCV 89 78 - 100 fl    MCH 31.7 26.5 - 33.0 pg    MCHC 35.7 31.5 - 36.5 g/dL    RDW 14.5 10.0 - 15.0 %    Platelet Count 105 (L) 150 - 450 10e9/L    Diff Method Automated Method     % Neutrophils 61.3 %    % Lymphocytes 27.5 %    % Monocytes 6.8 %    % Eosinophils 3.3 %    % Basophils 0.3 %    % Immature Granulocytes 0.8 %    Nucleated RBCs 0 0 /100    Absolute Neutrophil 2.3 1.6 - 8.3 10e9/L    Absolute Lymphocytes 1.0 0.8 - 5.3 10e9/L    Absolute Monocytes 0.3 0.0 - 1.3 10e9/L    Absolute Eosinophils 0.1 0.0 - 0.7 10e9/L    Absolute Basophils 0.0 0.0 - 0.2 10e9/L    Abs Immature Granulocytes 0.0 0 - 0.4 10e9/L    Absolute Nucleated RBC 0.0    Comprehensive metabolic panel   Result Value Ref Range    Sodium 139 133 - 144 mmol/L    Potassium 4.0 3.4 - 5.3 mmol/L    Chloride 107 94 - 109 mmol/L    Carbon Dioxide 26 20 - 32 mmol/L    Anion Gap 6 3 - 14 mmol/L    Glucose 187 (H) 70 - 99 mg/dL    Urea Nitrogen 23 7 - 30 mg/dL    Creatinine 1.46 (H) 0.66 - 1.25 mg/dL    GFR Estimate 58 (L) >60 mL/min/[1.73_m2]    GFR Estimate If Black 67 >60 mL/min/[1.73_m2]    Calcium 8.9 8.5 - 10.1 mg/dL    Bilirubin Total 1.1 0.2 - 1.3 mg/dL    Albumin 4.0 3.4 - 5.0 g/dL    Protein Total 7.1 6.8 - 8.8 g/dL    Alkaline Phosphatase 162 (H) 40 - 150 U/L    ALT 41 0 - 70 U/L    AST 27 0 - 45 U/L   Lipase   Result Value Ref Range    Lipase 133 73 - 393 U/L   CRP inflammation   Result Value Ref Range    CRP Inflammation 9.9 (H) 0.0 - 8.0 mg/L   CT Abdomen Pelvis w Contrast    Narrative    CT ABDOMEN AND PELVIS WITH CONTRAST 4/17/2021 9:18 PM    CLINICAL HISTORY: Abdominal distension; Abdominal pain, acute,  nonlocalized.    TECHNIQUE: CT scan of the abdomen and pelvis was performed following  injection of IV contrast. Multiplanar reformats were obtained. Dose  reduction techniques were used.    CONTRAST: 97 mL-Isovue 370    COMPARISON: None.    FINDINGS:   LOWER CHEST: No infiltrates or effusions.    HEPATOBILIARY: Pneumobilia and  portal vein stent noted. Portal venous  system appears patent. Hepatic venous system appears patent.  Cholecystectomy.    PANCREAS: No significant mass, duct dilatation, or inflammatory  change.    SPLEEN: Lobular enlarged spleen similar to previous maximum AP  dimension 15.8 cm, unchanged.    ADRENAL GLANDS: No significant nodules.    KIDNEYS/BLADDER: No significant mass, stones, or hydronephrosis.    BOWEL: Colectomy and J-pouch. No bowel obstruction.    PELVIC ORGANS: No pelvic masses.    ADDITIONAL FINDINGS: Mild adenopathy in the bowel mesentery which is  stable, enlarged lymph node measuring 1.8 x 1.4 cm seen anterior to  the sacrum. No free air or free fluid.    MUSCULOSKELETAL: Survey of the visualized bony structures demonstrates  no destructive bony lesions.      Impression    IMPRESSION:   No acute process demonstrated in the abdomen and pelvis.    ELLEN SAHU MD       Medications   oxyCODONE (ROXICODONE) tablet 5 mg (has no administration in time range)   sodium chloride (PF) 0.9% PF flush 3 mL (3 mLs Intracatheter Given 4/17/21 2108)   sodium chloride 0.9 % bag 100mL for CT scan flush use (60 mLs Intravenous Given 4/17/21 2108)   iopamidol (ISOVUE-370) solution 100 mL (97 mLs Intravenous Given 4/17/21 2108)       Assessments & Plan (with Medical Decision Making)  45-year-old male to the ER secondary to concerns of nearly 2 weeks of abdominal pain with decreased appetite and nonbloody diarrhea.  Patient with history for multiple prior abdominal surgeries including a total colectomy and liver transplantation.  Patient with exam findings that were relatively reassuring.  Given the history of past C. difficile colitis and the length of symptoms stool was collected for culture and for C. difficile toxin evaluation.  Those results are still pending.  CT scan was reassuring.  Patient elected to go home awaiting results of the stool test.  Pending those results further recommendations for treatment may be  needed especially if positive for C. Difficile.      I have reviewed the nursing notes.    I have reviewed the findings, diagnosis, plan and need for follow up with the patient.       Discharge Medication List as of 4/17/2021 10:33 PM      START taking these medications    Details   !! oxyCODONE (ROXICODONE) 5 MG tablet Take 1 tablet (5 mg) by mouth every 6 hours as needed for pain, Disp-8 tablet, R-0, E-Prescribe      !! oxyCODONE (ROXICODONE) 5 MG tablet Take 1 tablet (5 mg) by mouth every 6 hours as needed for pain, Disp-2 tablet, R-0, Local Print       !! - Potential duplicate medications found. Please discuss with provider.               I verbally discussed the findings of the evaluation today in the ER. I have verbally discussed with Abhishek the suggested treatment(s) as described in the discharge instructions and handouts. I have prescribed the above listed medications and instructed him on appropriate use of these medications.      I have verbally suggested he follow-up in his clinic or return to the ER for increased symptoms. See the follow-up recommendations documented  in the after visit summary in this visit's EPIC chart.      Final diagnoses:   Abdominal pain, generalized   Diarrhea, unspecified type   Type 2 diabetes mellitus with complication, with long-term current use of insulin (H)   Transplanted liver (H)       4/17/2021   [facility] EMERGENCY DEPT     Dion Rajan,   04/17/21 3509

## 2021-04-18 NOTE — DISCHARGE INSTRUCTIONS
Please read and follow the handout(s) instructions. Return, if needed, for increased or worsening symptoms and as directed by the handout(s).    You can use [employer] to find the results of your stool tests and we will contact you should they come back abnormal as we will need to start you on a medication to treat depending on the results of the stool tests.    Return to the ER should she have increase or worsening symptoms as directed on the handout.  I encourage you to eat some cultured yogurt a couple times a day in hopes of helping the diarrhea.    Your test today otherwise looked very normal.    See the handout on gastroparesis as this is possibly a reason for your symptoms and this can be caused by the diabetes as we discussed.

## 2021-05-29 ENCOUNTER — HEALTH MAINTENANCE LETTER (OUTPATIENT)
Age: 46
End: 2021-05-29

## 2021-06-05 DIAGNOSIS — Z79.899 OTHER LONG TERM (CURRENT) DRUG THERAPY: ICD-10-CM

## 2021-06-05 DIAGNOSIS — Z94.4 TRANSPLANTED LIVER (H): ICD-10-CM

## 2021-06-05 LAB
ALBUMIN SERPL-MCNC: 4.6 G/DL (ref 3.4–5)
ALP SERPL-CCNC: 159 U/L (ref 40–150)
ALT SERPL W P-5'-P-CCNC: 80 U/L (ref 0–70)
ANION GAP SERPL CALCULATED.3IONS-SCNC: 4 MMOL/L (ref 3–14)
AST SERPL W P-5'-P-CCNC: 60 U/L (ref 0–45)
BILIRUB SERPL-MCNC: 1.6 MG/DL (ref 0.2–1.3)
BUN SERPL-MCNC: 13 MG/DL (ref 7–30)
CALCIUM SERPL-MCNC: 9.4 MG/DL (ref 8.5–10.1)
CHLORIDE SERPL-SCNC: 108 MMOL/L (ref 94–109)
CO2 SERPL-SCNC: 29 MMOL/L (ref 20–32)
CREAT SERPL-MCNC: 1.55 MG/DL (ref 0.66–1.25)
ERYTHROCYTE [DISTWIDTH] IN BLOOD BY AUTOMATED COUNT: 12.5 % (ref 10–15)
GFR SERPL CREATININE-BSD FRML MDRD: 54 ML/MIN/{1.73_M2}
GLUCOSE SERPL-MCNC: 148 MG/DL (ref 70–99)
HCT VFR BLD AUTO: 43.6 % (ref 40–53)
HGB BLD-MCNC: 15.5 G/DL (ref 13.3–17.7)
MCH RBC QN AUTO: 30.7 PG (ref 26.5–33)
MCHC RBC AUTO-ENTMCNC: 35.6 G/DL (ref 31.5–36.5)
MCV RBC AUTO: 86 FL (ref 78–100)
PLATELET # BLD AUTO: 96 10E9/L (ref 150–450)
POTASSIUM SERPL-SCNC: 4.7 MMOL/L (ref 3.4–5.3)
PROT SERPL-MCNC: 7.8 G/DL (ref 6.8–8.8)
RBC # BLD AUTO: 5.05 10E12/L (ref 4.4–5.9)
SODIUM SERPL-SCNC: 141 MMOL/L (ref 133–144)
WBC # BLD AUTO: 2.7 10E9/L (ref 4–11)

## 2021-06-05 PROCEDURE — 80053 COMPREHEN METABOLIC PANEL: CPT | Performed by: INTERNAL MEDICINE

## 2021-06-05 PROCEDURE — 85027 COMPLETE CBC AUTOMATED: CPT | Performed by: INTERNAL MEDICINE

## 2021-06-05 PROCEDURE — 36415 COLL VENOUS BLD VENIPUNCTURE: CPT | Performed by: INTERNAL MEDICINE

## 2021-07-02 DIAGNOSIS — Z94.4 TRANSPLANTED LIVER (H): ICD-10-CM

## 2021-07-02 DIAGNOSIS — Z79.899 OTHER LONG TERM (CURRENT) DRUG THERAPY: ICD-10-CM

## 2021-07-02 LAB
ALBUMIN SERPL-MCNC: 4.4 G/DL (ref 3.4–5)
ALP SERPL-CCNC: 149 U/L (ref 40–150)
ALT SERPL W P-5'-P-CCNC: 59 U/L (ref 0–70)
ANION GAP SERPL CALCULATED.3IONS-SCNC: 5 MMOL/L (ref 3–14)
AST SERPL W P-5'-P-CCNC: 46 U/L (ref 0–45)
BILIRUB SERPL-MCNC: 1.7 MG/DL (ref 0.2–1.3)
BUN SERPL-MCNC: 13 MG/DL (ref 7–30)
CALCIUM SERPL-MCNC: 9.5 MG/DL (ref 8.5–10.1)
CHLORIDE SERPL-SCNC: 109 MMOL/L (ref 94–109)
CO2 SERPL-SCNC: 27 MMOL/L (ref 20–32)
CREAT SERPL-MCNC: 1.45 MG/DL (ref 0.66–1.25)
ERYTHROCYTE [DISTWIDTH] IN BLOOD BY AUTOMATED COUNT: 13 % (ref 10–15)
GFR SERPL CREATININE-BSD FRML MDRD: 58 ML/MIN/{1.73_M2}
GLUCOSE SERPL-MCNC: 122 MG/DL (ref 70–99)
HCT VFR BLD AUTO: 43.8 % (ref 40–53)
HGB BLD-MCNC: 16.1 G/DL (ref 13.3–17.7)
MCH RBC QN AUTO: 30.6 PG (ref 26.5–33)
MCHC RBC AUTO-ENTMCNC: 36.8 G/DL (ref 31.5–36.5)
MCV RBC AUTO: 83 FL (ref 78–100)
PLATELET # BLD AUTO: 98 10E9/L (ref 150–450)
POTASSIUM SERPL-SCNC: 4.6 MMOL/L (ref 3.4–5.3)
PROT SERPL-MCNC: 7.6 G/DL (ref 6.8–8.8)
RBC # BLD AUTO: 5.26 10E12/L (ref 4.4–5.9)
SODIUM SERPL-SCNC: 141 MMOL/L (ref 133–144)
WBC # BLD AUTO: 3.2 10E9/L (ref 4–11)

## 2021-07-02 PROCEDURE — 36415 COLL VENOUS BLD VENIPUNCTURE: CPT | Performed by: INTERNAL MEDICINE

## 2021-07-02 PROCEDURE — 80053 COMPREHEN METABOLIC PANEL: CPT | Performed by: INTERNAL MEDICINE

## 2021-07-02 PROCEDURE — 85027 COMPLETE CBC AUTOMATED: CPT | Performed by: INTERNAL MEDICINE

## 2021-07-15 ENCOUNTER — TRANSFERRED RECORDS (OUTPATIENT)
Dept: HEALTH INFORMATION MANAGEMENT | Facility: CLINIC | Age: 46
End: 2021-07-15
Payer: COMMERCIAL

## 2021-07-15 LAB
ALT SERPL-CCNC: 39 U/L (ref 7–55)
AST SERPL-CCNC: 41 U/L (ref 8–48)
CHOLESTEROL (EXTERNAL): 147 MG/DL
CREATININE (EXTERNAL): 1.61 MG/DL (ref 0.74–1.35)
GFR ESTIMATED (EXTERNAL): 52 ML/MIN/BSA
GFR ESTIMATED (IF AFRICAN AMERICAN) (EXTERNAL): 60 ML/MIN/BSA
GLUCOSE (EXTERNAL): 134 MG/DL (ref 70–140)
HBA1C MFR BLD: 5.9 % (ref 4–5.6)
HDLC SERPL-MCNC: 55 MG/DL
INR (EXTERNAL): 1.1 (ref 0.9–1.1)
LDL CHOLESTEROL (EXTERNAL): 69 MG/DL
NON HDL CHOLESTEROL (EXTERNAL): 92 MG/DL
POTASSIUM (EXTERNAL): 4.2 MMOL/L (ref 3.6–5.2)
TRIGLYCERIDES (EXTERNAL): 116 MG/DL
TSH SERPL-ACNC: 3.1 MIU/L (ref 0.3–4.2)

## 2021-09-18 ENCOUNTER — HEALTH MAINTENANCE LETTER (OUTPATIENT)
Age: 46
End: 2021-09-18

## 2021-10-20 ENCOUNTER — OFFICE VISIT (OUTPATIENT)
Dept: FAMILY MEDICINE | Facility: CLINIC | Age: 46
End: 2021-10-20
Payer: COMMERCIAL

## 2021-10-20 VITALS
DIASTOLIC BLOOD PRESSURE: 98 MMHG | HEIGHT: 72 IN | RESPIRATION RATE: 18 BRPM | SYSTOLIC BLOOD PRESSURE: 152 MMHG | HEART RATE: 89 BPM | BODY MASS INDEX: 32.94 KG/M2 | OXYGEN SATURATION: 99 % | WEIGHT: 243.2 LBS | TEMPERATURE: 96.8 F

## 2021-10-20 DIAGNOSIS — I10 ESSENTIAL HYPERTENSION: ICD-10-CM

## 2021-10-20 DIAGNOSIS — F33.1 MODERATE EPISODE OF RECURRENT MAJOR DEPRESSIVE DISORDER (H): Primary | ICD-10-CM

## 2021-10-20 PROCEDURE — 99214 OFFICE O/P EST MOD 30 MIN: CPT | Performed by: PHYSICIAN ASSISTANT

## 2021-10-20 RX ORDER — TACROLIMUS 0.5 MG/1
1.5 CAPSULE ORAL 2 TIMES DAILY
COMMUNITY
Start: 2021-10-19

## 2021-10-20 RX ORDER — AMLODIPINE BESYLATE 10 MG/1
10 TABLET ORAL DAILY
COMMUNITY
Start: 2021-09-20 | End: 2021-10-20

## 2021-10-20 RX ORDER — AMLODIPINE BESYLATE 10 MG/1
10 TABLET ORAL DAILY
Qty: 90 TABLET | Refills: 3 | Status: SHIPPED | OUTPATIENT
Start: 2021-10-20

## 2021-10-20 RX ORDER — SYRING-NEEDL,DISP,INSUL,0.3 ML 31GX15/64"
SYRINGE, EMPTY DISPOSABLE MISCELLANEOUS
COMMUNITY
Start: 2021-04-23

## 2021-10-20 RX ORDER — CIPROFLOXACIN 500 MG/1
500 TABLET, FILM COATED ORAL 2 TIMES DAILY
COMMUNITY
Start: 2021-10-07

## 2021-10-20 ASSESSMENT — PATIENT HEALTH QUESTIONNAIRE - PHQ9
5. POOR APPETITE OR OVEREATING: SEVERAL DAYS
SUM OF ALL RESPONSES TO PHQ QUESTIONS 1-9: 19

## 2021-10-20 ASSESSMENT — MIFFLIN-ST. JEOR: SCORE: 2031.15

## 2021-10-20 ASSESSMENT — ANXIETY QUESTIONNAIRES
GAD7 TOTAL SCORE: 5
2. NOT BEING ABLE TO STOP OR CONTROL WORRYING: SEVERAL DAYS
7. FEELING AFRAID AS IF SOMETHING AWFUL MIGHT HAPPEN: SEVERAL DAYS
6. BECOMING EASILY ANNOYED OR IRRITABLE: SEVERAL DAYS
1. FEELING NERVOUS, ANXIOUS, OR ON EDGE: NOT AT ALL
5. BEING SO RESTLESS THAT IT IS HARD TO SIT STILL: NOT AT ALL
3. WORRYING TOO MUCH ABOUT DIFFERENT THINGS: SEVERAL DAYS

## 2021-10-20 NOTE — PATIENT INSTRUCTIONS
Start Zoloft. Increase after 2 weeks.     MyChart with any questions, concerns or changes that you need to let us know about.     Suicide Hotline Information:   Contact a local mental health clinic or the following:     National Suicide Prevention Kurrcvlw599-174-MCWW (842-655-1374) www.suicidepreventionlifeline.org    National [place] of Mental Tdwoke811-721-6831jmy.[facility].nih.gov    National [place] on Mental Injnelz551-649-9598hkg.jaclyn.org    Mental Health [place] 495-133-8872tzh.[facility].org    National Suicide Auloejn840-VSMUINN (369-933-0308)  Take action. Remove means to self-harm, such as guns, rope, or stockpiled pills.   If there is an immediate risk, call 911.   In less critical situations, call the 24-hour suicide crisis hotline 562-693-VZWL (095-079-8259). If the person can be driven somewhere safely, take the person to the closest hospital ER.

## 2021-10-20 NOTE — PROGRESS NOTES
Assessment & Plan   Moderate episode of recurrent major depressive disorder (H)  Significant worsening depression in the setting of a significant life event. He has been on several antidepressants in the past but has not taken them for long enough to know if they have an effect. Shared decision making was discussed including medication side effects and a possible increased risk of SI. The patient reports mild suicidal ideation but no plan. He has not attempted suicide in the past. He feels safe at home. We will start Zoloft with a quick titration to 100 mg over the next several weeks and the patient will follow up in 3 to 4 weeks for reevaluation. Warning signs and symptoms discussed on when to return to clinic or go to the ER. Pt verbalized understanding.    - sertraline (ZOLOFT) 50 MG tablet; Take 1 tablet (50 mg) by mouth daily for 14 days, THEN 2 tablets (100 mg) daily.    Essential hypertension  Elevated here today. Refilled Amlodipine. Recheck in 3-4 weeks at next appointment and if still elevated will add a second HTN medication.   - amLODIPine (NORVASC) 10 MG tablet; Take 1 tablet (10 mg) by mouth daily     BMI:   Estimated body mass index is 32.98 kg/m  as calculated from the following:    Height as of this encounter: 1.829 m (6').    Weight as of this encounter: 110.3 kg (243 lb 3.2 oz).     Depression Screening Follow Up    PHQ 10/20/2021   PHQ-9 Total Score 19   Q9: Thoughts of better off dead/self-harm past 2 weeks Several days     Follow Up      Follow Up Actions Taken  Crisis resource information provided in the After Visit Summary  Patient to follow up with PCP.  Clinic staff to schedule appointment if able.    Discussed the following ways the patient can remain in a safe environment:      Return in about 4 weeks (around 11/17/2021) for In-Clinic Visit, Video Visit, Depression recheck.    NASRIN Corral [facility]    Rafael Galindo is a 46 year old who  presents for the following health issues     HPI   Depression Followup    How are you doing with your depression since your last visit? Worsened-Would like medication not currently on anything. Has been years since he has been on anything     Are you having other symptoms that might be associated with depression? No    Have you had a significant life event?  Relationship Concerns-wife asking for divorce has been unable to see kids and pets      Are you feeling anxious or having panic attacks?   No    Do you have any concerns with your use of alcohol or other drugs? No    Social History     Tobacco Use     Smoking status: Never Smoker     Smokeless tobacco: Never Used   Substance Use Topics     Alcohol use: No     Alcohol/week: 0.0 standard drinks     Comment: 2-3x per year     Drug use: No     PHQ 8/14/2017 9/4/2019 10/20/2021   PHQ-9 Total Score 3 11 19   Q9: Thoughts of better off dead/self-harm past 2 weeks Not at all Not at all Several days     SHARATH-7 SCORE 9/4/2019 10/20/2021   Total Score 10 5     Last PHQ-9 10/20/2021   1.  Little interest or pleasure in doing things 0   2.  Feeling down, depressed, or hopeless 3   3.  Trouble falling or staying asleep, or sleeping too much 3   4.  Feeling tired or having little energy 3   5.  Poor appetite or overeating 3   6.  Feeling bad about yourself 3   7.  Trouble concentrating 3   8.  Moving slowly or restless 0   Q9: Thoughts of better off dead/self-harm past 2 weeks 1   PHQ-9 Total Score 19   Difficulty at work, home, or with people -     SHARATH-7  10/20/2021   1. Feeling nervous, anxious, or on edge 0   2. Not being able to stop or control worrying 1   3. Worrying too much about different things 1   4. Trouble relaxing 1   5. Being so restless that it is hard to sit still 0   6. Becoming easily annoyed or irritable 1   7. Feeling afraid, as if something awful might happen 1   SHARATH-7 Total Score 5   If you checked any problems, how difficult have they made it for you to  do your work, take care of things at home, or get along with other people? -       Suicide Assessment Five-step Evaluation and Treatment (SAFE-T)      How many servings of fruits and vegetables do you eat daily?  1-2    On average, how many sweetened beverages do you drink each day (Examples: soda, juice, sweet tea, etc.  Do NOT count diet or artificially sweetened beverages)?   0    How many days per week do you exercise enough to make your heart beat faster? 3 or less    How many minutes a day do you exercise enough to make your heart beat faster? 9 or less    How many days per week do you miss taking your medication? 0    Review of Systems   See HPI       Objective    BP (!) 152/98 (BP Location: Right arm, Patient Position: Sitting, Cuff Size: Adult Large)   Pulse 89   Temp 96.8  F (36  C) (Tympanic)   Resp 18   Ht 1.829 m (6')   Wt 110.3 kg (243 lb 3.2 oz)   SpO2 99%   BMI 32.98 kg/m    Body mass index is 32.98 kg/m .  Physical Exam   Constitutional: healthy, alert, and no distress  Head: Normocephalic. Atraumatic  Eyes: No conjunctival injection, sclera anicteric  Respiratory: No resp distress.  Musculoskeletal: extremities normal- no gross deformities noted, and normal muscle tone  Neurologic: Gait normal. CN 2-12 grossly intact  Psychiatric: mentation appears normal and affect normal/bright

## 2021-10-21 ASSESSMENT — ANXIETY QUESTIONNAIRES: GAD7 TOTAL SCORE: 5

## 2021-11-11 ENCOUNTER — TELEPHONE (OUTPATIENT)
Dept: BEHAVIORAL HEALTH | Facility: CLINIC | Age: 46
End: 2021-11-11
Payer: COMMERCIAL

## 2021-11-14 ASSESSMENT — ANXIETY QUESTIONNAIRES
GAD7 TOTAL SCORE: 0
1. FEELING NERVOUS, ANXIOUS, OR ON EDGE: NOT AT ALL
2. NOT BEING ABLE TO STOP OR CONTROL WORRYING: NOT AT ALL
5. BEING SO RESTLESS THAT IT IS HARD TO SIT STILL: NOT AT ALL
6. BECOMING EASILY ANNOYED OR IRRITABLE: NOT AT ALL
3. WORRYING TOO MUCH ABOUT DIFFERENT THINGS: NOT AT ALL
7. FEELING AFRAID AS IF SOMETHING AWFUL MIGHT HAPPEN: NOT AT ALL
GAD7 TOTAL SCORE: 0
8. IF YOU CHECKED OFF ANY PROBLEMS, HOW DIFFICULT HAVE THESE MADE IT FOR YOU TO DO YOUR WORK, TAKE CARE OF THINGS AT HOME, OR GET ALONG WITH OTHER PEOPLE?: NOT DIFFICULT AT ALL
4. TROUBLE RELAXING: NOT AT ALL
7. FEELING AFRAID AS IF SOMETHING AWFUL MIGHT HAPPEN: NOT AT ALL
GAD7 TOTAL SCORE: 0

## 2021-11-14 ASSESSMENT — PATIENT HEALTH QUESTIONNAIRE - PHQ9
SUM OF ALL RESPONSES TO PHQ QUESTIONS 1-9: 1
10. IF YOU CHECKED OFF ANY PROBLEMS, HOW DIFFICULT HAVE THESE PROBLEMS MADE IT FOR YOU TO DO YOUR WORK, TAKE CARE OF THINGS AT HOME, OR GET ALONG WITH OTHER PEOPLE: NOT DIFFICULT AT ALL
SUM OF ALL RESPONSES TO PHQ QUESTIONS 1-9: 1

## 2021-11-15 ENCOUNTER — VIRTUAL VISIT (OUTPATIENT)
Dept: PSYCHOLOGY | Facility: CLINIC | Age: 46
End: 2021-11-15
Attending: FAMILY MEDICINE
Payer: COMMERCIAL

## 2021-11-15 DIAGNOSIS — F33.9 MAJOR DEPRESSIVE DISORDER, RECURRENT EPISODE WITH ANXIOUS DISTRESS (H): Primary | ICD-10-CM

## 2021-11-15 PROCEDURE — 90834 PSYTX W PT 45 MINUTES: CPT | Mod: 95 | Performed by: STUDENT IN AN ORGANIZED HEALTH CARE EDUCATION/TRAINING PROGRAM

## 2021-11-15 ASSESSMENT — ANXIETY QUESTIONNAIRES
GAD7 TOTAL SCORE: 0
3. WORRYING TOO MUCH ABOUT DIFFERENT THINGS: SEVERAL DAYS
1. FEELING NERVOUS, ANXIOUS, OR ON EDGE: SEVERAL DAYS
2. NOT BEING ABLE TO STOP OR CONTROL WORRYING: NOT AT ALL
5. BEING SO RESTLESS THAT IT IS HARD TO SIT STILL: NOT AT ALL
IF YOU CHECKED OFF ANY PROBLEMS ON THIS QUESTIONNAIRE, HOW DIFFICULT HAVE THESE PROBLEMS MADE IT FOR YOU TO DO YOUR WORK, TAKE CARE OF THINGS AT HOME, OR GET ALONG WITH OTHER PEOPLE: SOMEWHAT DIFFICULT
4. TROUBLE RELAXING: NOT AT ALL
6. BECOMING EASILY ANNOYED OR IRRITABLE: SEVERAL DAYS
7. FEELING AFRAID AS IF SOMETHING AWFUL MIGHT HAPPEN: SEVERAL DAYS
GAD7 TOTAL SCORE: 4

## 2021-11-15 ASSESSMENT — COLUMBIA-SUICIDE SEVERITY RATING SCALE - C-SSRS
REASONS FOR IDEATION PAST MONTH: MOSTLY TO END OR STOP THE PAIN (YOU COULDN'T GO ON LIVING WITH THE PAIN OR HOW YOU WERE FEELING)
4. HAVE YOU HAD THESE THOUGHTS AND HAD SOME INTENTION OF ACTING ON THEM?: NO
ATTEMPT LIFETIME: NO
5. HAVE YOU STARTED TO WORK OUT OR WORKED OUT THE DETAILS OF HOW TO KILL YOURSELF? DO YOU INTEND TO CARRY OUT THIS PLAN?: NO
TOTAL  NUMBER OF ABORTED OR SELF INTERRUPTED ATTEMPTS PAST 3 MONTHS: NO
4. HAVE YOU HAD THESE THOUGHTS AND HAD SOME INTENTION OF ACTING ON THEM?: NO
1. IN THE PAST MONTH, HAVE YOU WISHED YOU WERE DEAD OR WISHED YOU COULD GO TO SLEEP AND NOT WAKE UP?: YES
2. HAVE YOU ACTUALLY HAD ANY THOUGHTS OF KILLING YOURSELF LIFETIME?: YES
TOTAL  NUMBER OF ABORTED OR SELF INTERRUPTED ATTEMPTS PAST LIFETIME: NO
TOTAL  NUMBER OF INTERRUPTED ATTEMPTS LIFETIME: NO
2. HAVE YOU ACTUALLY HAD ANY THOUGHTS OF KILLING YOURSELF?: YES
ATTEMPT PAST THREE MONTHS: NO
TOTAL  NUMBER OF INTERRUPTED ATTEMPTS PAST 3 MONTHS: NO
5. HAVE YOU STARTED TO WORK OUT OR WORKED OUT THE DETAILS OF HOW TO KILL YOURSELF? DO YOU INTEND TO CARRY OUT THIS PLAN?: NO
3. HAVE YOU BEEN THINKING ABOUT HOW YOU MIGHT KILL YOURSELF?: YES
REASONS FOR IDEATION LIFETIME: MOSTLY TO END OR STOP THE PAIN (YOU COULDN'T GO ON LIVING WITH THE PAIN OR HOW YOU WERE FEELING)
1. IN THE PAST MONTH, HAVE YOU WISHED YOU WERE DEAD OR WISHED YOU COULD GO TO SLEEP AND NOT WAKE UP?: YES

## 2021-11-15 ASSESSMENT — PATIENT HEALTH QUESTIONNAIRE - PHQ9: SUM OF ALL RESPONSES TO PHQ QUESTIONS 1-9: 1

## 2021-11-15 NOTE — PROGRESS NOTES
[facility]   Mental Health & Addiction Services     Progress Note - Initial Visit    Patient  Name:  Abhishek Downey Date: 11/15/2021         Service Type: Individual     Visit Start Time: 2:30  Visit End Time: 3:15    Visit #: 1    Session length: 45 min    Attendees: Client attended alone    Service Modality:  Video Visit:      Provider verified identity through the following two step process.  Patient provided:  Patient photo, Patient  and Patient address    Telemedicine Visit: The patient's condition can be safely assessed and treated via synchronous audio and visual telemedicine encounter.      Reason for Telemedicine Visit: Patient has requested telehealth visit    Originating Site (Patient Location): Patient's home    Distant Site (Provider Location): [facility] Outpatient Setting: [facility]    Consent:  The patient/guardian has verbally consented to: the potential risks and benefits of telemedicine (video visit) versus in person care; bill my insurance or make self-payment for services provided; and responsibility for payment of non-covered services.     Patient would like the video invitation sent by:  My Chart    Mode of Communication:  Video Conference via Amwell    As the provider I attest to compliance with applicable laws and regulations related to telemedicine.       DATA:   Interactive Complexity: No   Crisis: No     Presenting Concerns/  Current Stressors:    Client reported feeling depressed after  from his wife and kids and currently living with his mum.         ASSESSMENT:  Mental Status Assessment:  Appearance:   Appropriate   Eye Contact:   Good   Psychomotor Behavior: Normal   Attitude:   Cooperative  Interested  Orientation:   All  Speech   Rate / Production: Normal/ Responsive   Volume:  Normal   Mood:    Sad   Affect:    Blunted   Thought Content:  Clear   Thought Form:  Coherent   Insight:    Good       Safety Issues and Plan for Safety and Risk  "Management:     Centre Suicide Severity Rating Scale (Lifetime/Recent)  Centre Suicide Severity Rating (Lifetime/Recent) 11/15/2021   1. Wish to be Dead (Lifetime) Yes   Wish to be Dead Description (Lifetime) Client reported that when he had the first divorce with his wife in 2009, he felt very depressed and had suicidal thoughts. As a result he checked himself into a hopital and was there for 72 hours before being discharged home.   1. Wish to be Dead (Recent) Yes   Wish to be Dead Description (Recent) Client reported that he just  with his wife and was so depresed that he thought of death but no actual plan to do catarino. Client reported that I cant do it because \" I have to be there for my Kids and my wife and I have been talking recently about the possibilities of getting back\".   2. Non-Specific Active Suicidal Thoughts (Lifetime) Yes   2. Non-Specific Active Suicidal Thoughts (Recent) Yes   3. Active Suicidal Ideation with any Methods (Not Plan) Without Intent to Act (Lifetime) Yes   Active Suicidal Ideation with any Methods (Not Plan) Description (Lifetime) Client reported that when he had the first divorce with his wife in 2009, he felt very depressed and had suicidal thoughts. As a result he checked himself into a hopital and was there for 72 hours before being discharged home.   3. Active Suicidal Ideation with any Methods (Not Plan) Without Intent to Act (Recent) Yes   Active Suicidal Ideation with any Methods (Not Plan) Description (Recent) Client reported that when he had the first divorce with his wife in 2009, he felt very depressed and had suicidal thoughts. As a result he checked himself into a hopital and was there for 72 hours before being discharged home.   4. Active Suicidal Ideation with Some Intent to Act, Without Specific Plan (Lifetime) No   4. Active Suicidal Ideation with Some Intent to Act, Without Specific Plan (Recent) No   Active Suicidal Ideation with Some Intent to Act, " Without Specific Plan Description (Recent) NA   5. Active Suicidal Ideation with Specific Plan and Intent (Lifetime) No   5. Active Suicidal Ideation with Specific Plan and Intent (Recent) No   Active Suicidal Ideation with Specific Plan and Intent Description (Recent) NA   Most Severe Ideation Rating (Lifetime) NA   Frequency (Lifetime) NA   Controllability (Lifetime) 1   Protective Factors  (Lifetime) 0   Reasons for Ideation (Lifetime) 4   Most Severe Ideation Rating (Past Month) 1   Most Severe Ideation Description (Past Month) NA   Comments NA   Frequency (Past Month) 1   Duration (Past Month) 1   Controllability (Past Month) 1   Protective Factors (Past Month) 0   Reasons for Ideation (Past Month) 4   Actual Attempt (Lifetime) No   Actual Attempt (Past 3 Months) No   Has subject engaged in non-suicidal self-injurious behavior? (Lifetime) No   Has subject engaged in non-suicidal self-injurious behavior? (Past 3 Months) No   Interrupted Attempts (Lifetime) No   Interrupted Attempts (Past 3 Months) No   Aborted or Self-Interrupted Attempt (Lifetime) No   Aborted or Self-Interrupted Attempt (Past 3 Months) No   Most Recent Attempt Actual Lethality Code NA     Patient denies current fears or concerns for personal safety.  Patient denies current or recent suicidal ideation or behaviors.  Patient denies current or recent homicidal ideation or behaviors.  Patient denies current or recent self injurious behavior or ideation.  Patient denies other safety concerns.  Recommended that patient call 911 or go to the local ED should there be a change in any of these risk factors.  Patient reports there are no firearms in the house.     Diagnostic Criteria:  PHQ 9/4/2019 10/20/2021 11/14/2021   PHQ-9 Total Score 11 19 1   Q9: Thoughts of better off dead/self-harm past 2 weeks Not at all Several days Not at all     SHARATH-7 SCORE 9/4/2019 10/20/2021 11/14/2021   Total Score - - 0 (minimal anxiety)   Total Score 10 5 0       Major  Depressive Disorder   - Depressed mood. Note: In children and adolescents, can be irritable mood.     - Diminished interest or pleasure in all, or almost all, activities.    - Psychomotor activity agitation.    - Fatigue or loss of energy.    - Feelings of worthlessness or excessive guilt.    - Diminished ability to think or concentrate, or indecisiveness.   B) The symptoms cause clinically significant distress or impairment in social, occupational, or other important areas of functioning  C) The episode is not attributable to the physiological effects of a substance or to another medical condition  D) The occurence of major depressive episode is not better explained by other thought / psychotic disorders  E) There has never been a manic episode or hypomanic episode      DSM5 Diagnoses: (Sustained by DSM5 Criteria Listed Above)  Diagnoses: 296.32 (F33.1) Major Depressive Disorder, Recurrent Episode, Moderate _ and With anxious distress  Psychosocial & Contextual Factors: Client reported that he has been  from his wife and kids for some months now and is currently living with his mum. Client reported that he had some legal problems related to the separation and the wife has obtain a protection order preventing him from coming to the house.  WHODAS 2.0 (12 item):   WHODAS 2.0 Total Score 11/14/2021   Total Score 18   Total Score MyChart 18     Intervention:   Mindfulness- Patient was educated on relaxation and mindfulness techniques  Collateral Reports Completed:  Not Applicable      PLAN: (Homework, other):  1. Provider will continue Diagnostic Assessment.  Patient was given the following to do until next session:  Practice deep breathing exercise x2 a day and write down 4 things that give him hope to process with therapist next session.    2. Provider recommended the following referrals: None at this time.      3.  Suicide Risk and Safety Concerns were assessed for Abhishek Downey.    Patient meets the  following risk assessment and triage: No observable or reported risk at this time.      KATHRYN Mcdonald  November 15, 2021    ----- Service Performed and Documented by KATHRYN------  This note was reviewed by LAURA [facility]

## 2021-11-16 ASSESSMENT — PATIENT HEALTH QUESTIONNAIRE - PHQ9
10. IF YOU CHECKED OFF ANY PROBLEMS, HOW DIFFICULT HAVE THESE PROBLEMS MADE IT FOR YOU TO DO YOUR WORK, TAKE CARE OF THINGS AT HOME, OR GET ALONG WITH OTHER PEOPLE: SOMEWHAT DIFFICULT
SUM OF ALL RESPONSES TO PHQ QUESTIONS 1-9: 5
SUM OF ALL RESPONSES TO PHQ QUESTIONS 1-9: 5

## 2021-11-17 ENCOUNTER — VIRTUAL VISIT (OUTPATIENT)
Dept: FAMILY MEDICINE | Facility: CLINIC | Age: 46
End: 2021-11-17
Payer: COMMERCIAL

## 2021-11-17 DIAGNOSIS — F33.1 MODERATE EPISODE OF RECURRENT MAJOR DEPRESSIVE DISORDER (H): ICD-10-CM

## 2021-11-17 PROCEDURE — 99213 OFFICE O/P EST LOW 20 MIN: CPT | Mod: 95 | Performed by: PHYSICIAN ASSISTANT

## 2021-11-17 RX ORDER — SERTRALINE HYDROCHLORIDE 100 MG/1
100 TABLET, FILM COATED ORAL DAILY
Qty: 90 TABLET | Refills: 3 | Status: SHIPPED | OUTPATIENT
Start: 2021-11-17 | End: 2021-11-24

## 2021-11-17 ASSESSMENT — PATIENT HEALTH QUESTIONNAIRE - PHQ9: SUM OF ALL RESPONSES TO PHQ QUESTIONS 1-9: 5

## 2021-11-17 NOTE — PROGRESS NOTES
Mateo is a 46 year old who is being evaluated via a billable video visit.      How would you like to obtain your AVS? MyChart  If the video visit is dropped, the invitation should be resent by: Text to cell phone: [phone]  Will anyone else be joining your video visit? No    Video Start Time: 2:20 PM    Assessment & Plan   Moderate episode of recurrent major depressive disorder (H)  Much improved on current dose of Zoloft. Going to counseling as well. PHQ-9 is not well controlled. Follow-up in 6 months for recheck.   - sertraline (ZOLOFT) 100 MG tablet; Take 1 tablet (100 mg) by mouth daily    BMI:   Estimated body mass index is 32.98 kg/m  as calculated from the following:    Height as of 10/20/21: 1.829 m (6').    Weight as of 10/20/21: 110.3 kg (243 lb 3.2 oz).     Return in about 6 months (around 5/17/2022) for In-Clinic Visit.    Sukh Robertson PA-C  [facility]    Subjective   Mateo is a 46 year old who presents for the following health issues     HPI     Depression Followup    How are you doing with your depression since your last visit? Improved     Are you having other symptoms that might be associated with depression? No    Have you had a significant life event?  No     Are you feeling anxious or having panic attacks?   No    Do you have any concerns with your use of alcohol or other drugs? No    Social History     Tobacco Use     Smoking status: Never Smoker     Smokeless tobacco: Never Used   Substance Use Topics     Alcohol use: No     Alcohol/week: 0.0 standard drinks     Comment: 2-3x per year     Drug use: No     PHQ 10/20/2021 11/14/2021 11/16/2021   PHQ-9 Total Score 19 1 5   Q9: Thoughts of better off dead/self-harm past 2 weeks Several days Not at all Not at all     SHARATH-7 SCORE 9/4/2019 10/20/2021 11/14/2021   Total Score - - 0 (minimal anxiety)   Total Score 10 5 0     Last PHQ-9 11/16/2021   1.  Little interest or pleasure in doing things 0   2.  Feeling down,  depressed, or hopeless 0   3.  Trouble falling or staying asleep, or sleeping too much 0   4.  Feeling tired or having little energy 2   5.  Poor appetite or overeating 0   6.  Feeling bad about yourself 3   7.  Trouble concentrating 0   8.  Moving slowly or restless 0   Q9: Thoughts of better off dead/self-harm past 2 weeks 0   PHQ-9 Total Score 5   Difficulty at work, home, or with people -       Suicide Assessment Five-step Evaluation and Treatment (SAFE-T)      How many servings of fruits and vegetables do you eat daily?  0-1    On average, how many sweetened beverages do you drink each day (Examples: soda, juice, sweet tea, etc.  Do NOT count diet or artificially sweetened beverages)?   5    How many days per week do you exercise enough to make your heart beat faster? 3 or less    How many minutes a day do you exercise enough to make your heart beat faster? 20 - 29    How many days per week do you miss taking your medication? 0    Review of Systems   See HPi       Objective           Vitals:  No vitals were obtained today due to virtual visit.    Physical Exam   GENERAL: Healthy, alert and no distress  EYES: Eyes grossly normal to inspection.  No discharge or erythema, or obvious scleral/conjunctival abnormalities.  RESP: No audible wheeze, cough, or visible cyanosis.  No visible retractions or increased work of breathing.    SKIN: Visible skin clear. No significant rash, abnormal pigmentation or lesions.  NEURO: Cranial nerves grossly intact.  Mentation and speech appropriate for age.  PSYCH: Mentation appears normal, affect normal/bright, judgement and insight intact, normal speech and appearance well-groomed.        Video-Visit Details    Type of service:  Video Visit    Video End Time:2:25 PM    Originating Location (pt. Location): Home    Distant Location (provider location):  [facility]     Platform used for Video Visit: Fast Orientation

## 2021-11-17 NOTE — PATIENT INSTRUCTIONS
Continue Zoloft as prescribed.     Great job going to counseling! Keep working on stress reduction.

## 2021-11-22 ENCOUNTER — MYC MEDICAL ADVICE (OUTPATIENT)
Dept: FAMILY MEDICINE | Facility: CLINIC | Age: 46
End: 2021-11-22
Payer: COMMERCIAL

## 2021-11-23 NOTE — TELEPHONE ENCOUNTER
Forwarded to Shar for review.   LM to call care team to schedule VV unless otherwise directed by Shar.  DEEPAK Bell RN

## 2021-11-24 ENCOUNTER — VIRTUAL VISIT (OUTPATIENT)
Dept: FAMILY MEDICINE | Facility: CLINIC | Age: 46
End: 2021-11-24
Payer: COMMERCIAL

## 2021-11-24 DIAGNOSIS — F33.1 MODERATE EPISODE OF RECURRENT MAJOR DEPRESSIVE DISORDER (H): Primary | ICD-10-CM

## 2021-11-24 PROCEDURE — 99214 OFFICE O/P EST MOD 30 MIN: CPT | Mod: 95 | Performed by: PHYSICIAN ASSISTANT

## 2021-11-24 RX ORDER — SERTRALINE HYDROCHLORIDE 100 MG/1
150 TABLET, FILM COATED ORAL DAILY
Qty: 135 TABLET | Refills: 3 | Status: SHIPPED | OUTPATIENT
Start: 2021-11-24 | End: 2022-10-31

## 2021-11-24 RX ORDER — HYDROXYZINE PAMOATE 25 MG/1
25-50 CAPSULE ORAL 3 TIMES DAILY PRN
Qty: 90 CAPSULE | Refills: 0 | Status: ON HOLD | OUTPATIENT
Start: 2021-11-24 | End: 2022-03-21

## 2021-11-24 ASSESSMENT — PATIENT HEALTH QUESTIONNAIRE - PHQ9: 5. POOR APPETITE OR OVEREATING: NOT AT ALL

## 2021-11-24 ASSESSMENT — ANXIETY QUESTIONNAIRES
1. FEELING NERVOUS, ANXIOUS, OR ON EDGE: NEARLY EVERY DAY
5. BEING SO RESTLESS THAT IT IS HARD TO SIT STILL: NOT AT ALL
6. BECOMING EASILY ANNOYED OR IRRITABLE: NOT AT ALL
3. WORRYING TOO MUCH ABOUT DIFFERENT THINGS: MORE THAN HALF THE DAYS
2. NOT BEING ABLE TO STOP OR CONTROL WORRYING: MORE THAN HALF THE DAYS
GAD7 TOTAL SCORE: 7
7. FEELING AFRAID AS IF SOMETHING AWFUL MIGHT HAPPEN: NOT AT ALL

## 2021-11-24 NOTE — PROGRESS NOTES
Mateo is a 46 year old who is being evaluated via a billable video visit.      How would you like to obtain your AVS? MyChart  If the video visit is dropped, the invitation should be resent by: Text to cell phone: [phone]  Will anyone else be joining your video visit? No    Video Start Time: 1:15 PM    Assessment & Plan     Moderate episode of recurrent major depressive disorder (H)  Had been previously well controlled on Zoloft 100 mg but there are significant life stressors now.  We will increase to 150 mg of Zoloft and also start hydroxyzine at night as needed for sleep and for acute anxiety.  He we will continue therapy.  He will follow-up as needed if symptoms or not controlled.  - sertraline (ZOLOFT) 100 MG tablet; Take 1.5 tablets (150 mg) by mouth daily  - hydrOXYzine (VISTARIL) 25 MG capsule; Take 1-2 capsules (25-50 mg) by mouth 3 times daily as needed for anxiety Or 1-2 tabs at night for sleep     BMI:   Estimated body mass index is 32.98 kg/m  as calculated from the following:    Height as of 10/20/21: 1.829 m (6').    Weight as of 10/20/21: 110.3 kg (243 lb 3.2 oz).       Return if symptoms worsen or fail to improve, for Video Visit.    Sukh Robertson PA-C  [facility]   Mateo is a 46 year old who presents for the following health issues     HPI   Depression and Anxiety Follow-Up    How are you doing with your depression since your last visit? Improved Slightly     How are you doing with your anxiety since your last visit?  No change    Are you having other symptoms that might be associated with depression or anxiety? No    Have you had a significant life event? No     Do you have any concerns with your use of alcohol or other drugs? No    Social History     Tobacco Use     Smoking status: Never Smoker     Smokeless tobacco: Never Used   Substance Use Topics     Alcohol use: No     Alcohol/week: 0.0 standard drinks     Comment: 2-3x per year     Drug use:  No     PHQ 11/14/2021 11/16/2021 11/24/2021   PHQ-9 Total Score 1 5 14   Q9: Thoughts of better off dead/self-harm past 2 weeks Not at all Not at all Not at all     SHARATH-7 SCORE 10/20/2021 11/14/2021 11/24/2021   Total Score - 0 (minimal anxiety) -   Total Score 5 0 7     Last PHQ-9 11/24/2021   1.  Little interest or pleasure in doing things 2   2.  Feeling down, depressed, or hopeless 2   3.  Trouble falling or staying asleep, or sleeping too much 3   4.  Feeling tired or having little energy 3   5.  Poor appetite or overeating 3   6.  Feeling bad about yourself 1   7.  Trouble concentrating 0   8.  Moving slowly or restless 0   Q9: Thoughts of better off dead/self-harm past 2 weeks 0   PHQ-9 Total Score 14   Difficulty at work, home, or with people -     SHARATH-7  11/24/2021   1. Feeling nervous, anxious, or on edge 3   2. Not being able to stop or control worrying 2   3. Worrying too much about different things 2   4. Trouble relaxing 0   5. Being so restless that it is hard to sit still 0   6. Becoming easily annoyed or irritable 0   7. Feeling afraid, as if something awful might happen 0   SHARATH-7 Total Score 7   If you checked any problems, how difficult have they made it for you to do your work, take care of things at home, or get along with other people? -     Suicide Assessment Five-step Evaluation and Treatment (SAFE-T)      How many servings of fruits and vegetables do you eat daily?  0-1    On average, how many sweetened beverages do you drink each day (Examples: soda, juice, sweet tea, etc.  Do NOT count diet or artificially sweetened beverages)?   1    How many days per week do you exercise enough to make your heart beat faster? 3 or less    How many minutes a day do you exercise enough to make your heart beat faster? 9 or less    How many days per week do you miss taking your medication? 0    Review of Systems   See HPI       Objective       Vitals:  No vitals were obtained today due to virtual  visit.    Physical Exam   GENERAL: Healthy, alert and no distress  EYES: Eyes grossly normal to inspection.  No discharge or erythema, or obvious scleral/conjunctival abnormalities.  HENT: Normal cephalic/atraumatic.  External ears, nose and mouth without ulcers or lesions.  No nasal drainage visible.  NECK: No asymmetry, visible masses or scars  RESP: No audible wheeze, cough, or visible cyanosis.  No visible retractions or increased work of breathing.    SKIN: Visible skin clear. No significant rash, abnormal pigmentation or lesions.  NEURO: Cranial nerves grossly intact.  Mentation and speech appropriate for age.  PSYCH: Mentation appears normal, affect normal/bright, judgement and insight intact, normal speech and appearance well-groomed.          Video-Visit Details    Type of service:  Video Visit    Video End Time:1:22 PM    Originating Location (pt. Location): Home    Distant Location (provider location):  [facility]     Platform used for Video Visit: Homa

## 2021-11-25 ASSESSMENT — ANXIETY QUESTIONNAIRES: GAD7 TOTAL SCORE: 7

## 2021-11-25 ASSESSMENT — PATIENT HEALTH QUESTIONNAIRE - PHQ9: SUM OF ALL RESPONSES TO PHQ QUESTIONS 1-9: 14

## 2021-12-13 ENCOUNTER — VIRTUAL VISIT (OUTPATIENT)
Dept: PSYCHOLOGY | Facility: CLINIC | Age: 46
End: 2021-12-13
Payer: COMMERCIAL

## 2021-12-13 DIAGNOSIS — F33.9 MAJOR DEPRESSIVE DISORDER, RECURRENT EPISODE WITH ANXIOUS DISTRESS (H): Primary | ICD-10-CM

## 2021-12-13 PROCEDURE — 90791 PSYCH DIAGNOSTIC EVALUATION: CPT | Mod: 95 | Performed by: STUDENT IN AN ORGANIZED HEALTH CARE EDUCATION/TRAINING PROGRAM

## 2021-12-13 NOTE — PROGRESS NOTES
"    [facility]  Provider Name: Oneil Yeager       Credentials:  LGSW    PATIENT'S NAME: Abhishek Downey  PREFERRED NAME: Mateo  PRONOUNS:   he/him    MRN: [id number]  : 1975  ADDRESS: [address] [place] [id number]  ACCT. NUMBER:  [id number]  DATE OF SERVICE: 21  START TIME: 11 am  END TIME: 11: 50 am  PREFERRED PHONE: [phone]  May we leave a program related message: Yes  SERVICE MODALITY:  Video Visit:      Provider verified identity through the following two step process.  Patient provided:  Patient photo, Patient  and Patient address    Telemedicine Visit: The patient's condition can be safely assessed and treated via synchronous audio and visual telemedicine encounter.      Reason for Telemedicine Visit: Patient has requested telehealth visit    Originating Site (Patient Location): Patient's home    Distant Site (Provider Location): [facility] MENTAL HEALTH & ADDI[facility]    Consent:  The patient/guardian has verbally consented to: the potential risks and benefits of telemedicine (video visit) versus in person care; bill my insurance or make self-payment for services provided; and responsibility for payment of non-covered services.     Patient would like the video invitation sent by:  My Chart    Mode of Communication:  Video Conference via Amwell    As the provider I attest to compliance with applicable laws and regulations related to telemedicine.    UNIVERSAL ADULT Mental Health DIAGNOSTIC ASSESSMENT    Identifying Information:  Patient is a 46 year old,  .  The pronoun use throughout this assessment reflects the patient's chosen pronoun.  Patient was referred for an assessment by primary care clinic.  Patient attended the session alone.    Chief Complaint:   The reason for seeking services at this time is: \" Had a psychological exam with a parent capacity assessment and recommended to seek therapy \"   The problem(s) began 3 " years. Patient has attempted to resolve these concerns in the past through domestic violence classes and therapy.    Social/Family History:  Patient reported they grew up in [place], [place].  They were raised by biological parents  .  Parents .  Patient reported that their childhood was stressful and unsupportive.  Patient described their current relationships with family of origin as dysfuncctional.     The patient describes their cultural background as .  Cultural influences and impact on patient's life structure, values, norms, and healthcare: Grew up in [facility]..  Contextual influences on patient's health include: Family Factors relating to a separation from wife and kids and Economic Factors relating to limmited ioncome as patient has no job as he is currently on disability.    These factors will be addressed in the Preliminary Treatment plan. Patient identified their preferred language to be English. Patient reported they does not need the assistance of an  or other support involved in therapy.     Patient reported had no significant delays in developmental tasks.   Patient's highest education level was some high school but no degree  .  Patient identified the following learning problems: none reported.  Modifications will not be used to assist communication in therapy.  Patient reports they are  able to understand written materials.    Patient reported the following relationship history was  but currently .  Patient's current relationship status is  for 19 years.   Patient identified their sexual orientation as heterosexual.  Patient reported having 2 child(oliver). Patient identified mother; spouse as part of their support system.  Patient identified the quality of these relationships as fair,  .      Patient's current living/housing situation involves staying with someone.  The immediate members of family and household include Lindsay Downey  71,Mother  and they report that housing is not stable.    Patient is currently disabled.  Patient reports their finances are obtained through disability. Patient does not identify finances as a current stressor.      Patient reported that they have been involved with the legal system.  Danco and ofp by spouse. . Patient does report being under probation/ parole/ jurisdiction. They are under the jurisdiction of the court: to see a therapist for atleast 12 sessions.    Patient's Strengths and Limitations:  Patient identified the following strengths or resources that will help them succeed in treatment: commitment to health and well being and motivation. Things that may interfere with the patient's success in treatment include: housing instability.     Personal and Family Medical History:  Patient does not report a family history of mental health concerns.  Patient reports family history includes Heart Failure in his mother..     Patient does report Mental Health Diagnosis and/or Treatment.  Patient Patient reported the following previous diagnoses which include(s): Depression.  Patient reported symptoms began 2010.   Patient has not received mental health services in the past: .  Psychiatric Hospitalizations: [facility] 2010.  Patient denies a history of civil commitment.  Patient is not receiving other mental health services.  These include none.         Patient has had a physical exam to rule out medical causes for current symptoms.  Date of last physical exam was within the past year. Client was encouraged to follow up with PCP if symptoms were to develop. The patient has a [facility] Primary Care Provider, who is named No Ref-Primary, Physician..  Patient reports the following current medical concerns: Taking medication for colon removal and liver transplant.  Patient denies any issues with pain..   There are significant appetite / nutritional concerns / weight changes.   Patient does report a history of head  injury / trauma / cognitive impairment.      Patient reports current meds as:   Sertraline  Abu, Oneil, LGSW.       Medication Adherence:  Patient reports taking.  taking prescribed medications as prescribed.    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     DU (duodenal ulcer) 5/2015    treated nonsurgically      PSC (primary sclerosing cholangitis)      Ulcerative colitis          Current Mental Status Exam:   Appearance:  Appropriate    Eye Contact:  Good   Psychomotor:  Normal       Gait / station:  no problem  Attitude / Demeanor: Cooperative   Speech      Rate / Production: Normal/ Responsive      Volume:  Normal  volume      Language:  good  Mood:   Depressed   Affect:   Blunted    Thought Content: Clear   Thought Process: Coherent       Associations: No loosening of associations  Insight:   Good   Judgment:  Intact   Orientation:  All  Attention/concentration: Good    Rating Scales:    PHQ9:    PHQ-9 SCORE 11/14/2021 11/16/2021 11/24/2021   PHQ-9 Total Score - - -   PHQ-9 Total Score MyChart 1 (Minimal depression) 5 (Mild depression) -   PHQ-9 Total Score 1 5 14       GAD7:    SHARATH-7 SCORE 10/20/2021 11/14/2021 11/24/2021   Total Score - 0 (minimal anxiety) -   Total Score 5 0 7     CGI:     First:No data recorded    Most recentNo data recorded    Substance Use:  Patient did not report a family history of substance use concerns; see medical history section for details.  Patient has not received chemical dependency treatment in the past.  Patient has ever been to detox.      Patient is not currently receiving any chemical dependency treatment.           Substance History of use Age of first use Date of last use     Pattern and duration of use (include amounts and frequency)   Alcohol currently use     03/15/21 REPORTS SUBSTANCE USE: reports using substance 1 times per week  and has 1 bottle at a time.   Patient reports heaviest use was past.   Cannabis   never used     REPORTS SUBSTANCE USE: N/A     Amphetamines   never used     REPORTS SUBSTANCE USE: N/A   Cocaine/crack    never used       REPORTS SUBSTANCE USE: N/A   Hallucinogens never used         REPORTS SUBSTANCE USE: N/A   Inhalants never used         REPORTS SUBSTANCE USE: N/A   Heroin never used         REPORTS SUBSTANCE USE: N/A   Other Opiates never used     REPORTS SUBSTANCE USE: N/A   Benzodiazepine   never used     REPORTS SUBSTANCE USE: N/A   Barbiturates never used     REPORTS SUBSTANCE USE: N/A   Over the counter meds never used     REPORTS SUBSTANCE USE: N/A   Caffeine Never used    REPORTS SUBSTANCE USE: N/A   Nicotine  never used     REPORTS SUBSTANCE USE: N/A   Other substances not listed above:  Identify:  never used     REPORTS SUBSTANCE USE: N/A     Patient reported the following problems as a result of their substance use: no problems, not applicable.     CAGE- AID:    CAGE-AID Total Score 11/14/2021   Total Score 0   Total Score MyChart 0 (A total score of 2 or greater is considered clinically significant)       Substance Use: No symptoms    Based on the negative CAGE score and clinical interview there  are not indications of drug or alcohol abuse.      Significant Losses / Trauma / Abuse / Neglect Issues:   Patient did not  serve in the .  There are indications or report of significant loss, trauma, abuse or neglect issues related to: are no indications and client denies any losses, trauma, abuse, or neglect concerns.  Concerns for possible neglect are not present.     Safety Assessment:   Current Safety Concerns:  [place] Suicide Severity Rating Scale (Lifetime/Recent)  [place] Suicide Severity Rating (Lifetime/Recent) 11/15/2021   1. Wish to be Dead (Lifetime) Yes   Wish to be Dead Description (Lifetime) Client reported that when he had the first divorce with his wife in 2009, he felt very  "depressed and had suicidal thoughts. As a result he checked himself into a hopital and was there for 72 hours before being discharged home.   1. Wish to be Dead (Recent) Yes   Wish to be Dead Description (Recent) Client reported that he just  with his wife and was so depresed that he thought of death but no actual plan to do catarino. Client reported that I cant do it because \" I have to be there for my Kids and my wife and I have been talking recently about the possibilities of getting back\".   2. Non-Specific Active Suicidal Thoughts (Lifetime) Yes   2. Non-Specific Active Suicidal Thoughts (Recent) Yes   3. Active Suicidal Ideation with any Methods (Not Plan) Without Intent to Act (Lifetime) Yes   Active Suicidal Ideation with any Methods (Not Plan) Description (Lifetime) Client reported that when he had the first divorce with his wife in 2009, he felt very depressed and had suicidal thoughts. As a result he checked himself into a hopital and was there for 72 hours before being discharged home.   3. Active Suicidal Ideation with any Methods (Not Plan) Without Intent to Act (Recent) Yes   Active Suicidal Ideation with any Methods (Not Plan) Description (Recent) Client reported that when he had the first divorce with his wife in 2009, he felt very depressed and had suicidal thoughts. As a result he checked himself into a hopital and was there for 72 hours before being discharged home.   4. Active Suicidal Ideation with Some Intent to Act, Without Specific Plan (Lifetime) No   4. Active Suicidal Ideation with Some Intent to Act, Without Specific Plan (Recent) No   Active Suicidal Ideation with Some Intent to Act, Without Specific Plan Description (Recent) NA   5. Active Suicidal Ideation with Specific Plan and Intent (Lifetime) No   5. Active Suicidal Ideation with Specific Plan and Intent (Recent) No   Active Suicidal Ideation with Specific Plan and Intent Description (Recent) NA   Most Severe Ideation Rating " (Lifetime) NA   Frequency (Lifetime) NA   Controllability (Lifetime) 1   Protective Factors  (Lifetime) 0   Reasons for Ideation (Lifetime) 4   Most Severe Ideation Rating (Past Month) 1   Most Severe Ideation Description (Past Month) NA   Comments NA   Frequency (Past Month) 1   Duration (Past Month) 1   Controllability (Past Month) 1   Protective Factors (Past Month) 0   Reasons for Ideation (Past Month) 4   Actual Attempt (Lifetime) No   Actual Attempt (Past 3 Months) No   Has subject engaged in non-suicidal self-injurious behavior? (Lifetime) No   Has subject engaged in non-suicidal self-injurious behavior? (Past 3 Months) No   Interrupted Attempts (Lifetime) No   Interrupted Attempts (Past 3 Months) No   Aborted or Self-Interrupted Attempt (Lifetime) No   Aborted or Self-Interrupted Attempt (Past 3 Months) No   Most Recent Attempt Actual Lethality Code NA     Patient denies current homicidal ideation and behaviors.  Patient denies current self-injurious ideation and behaviors.    Patient denied risk behaviors associated with substance use.  Patient denies any high risk behaviors associated with mental health symptoms.  Patient reports the following current concerns for their personal safety: None.  Patient reports there are not firearms in the house.       .    History of Safety Concerns:  Patient denied a history of homicidal ideation.     Patient denied a history of personal safety concerns.    Patient denied a history of assaultive behaviors.    Patient denied a history of sexual assault behaviors.     Patient denied a history of risk behaviors associated with substance use.  Patient denies any history of high risk behaviors associated with mental health symptoms.  Patient reports the following protective factors: forward or future oriented thinking; dedication to family or friends; safe and stable environment; regular sleep; sense of belonging; purpose; secure attachment; abstinence from substances;  effective problem solving skills; commitment to well being; sense of meaning; financial stability    Risk Plan:  See Recommendations for Safety and Risk Management Plan    Review of Symptoms per patient report:  Depression: Lack of interest, Excessive or inappropriate guilt, Change in energy level, Change in appetite, Feelings of hopelessness, Feelings of helplessness, Low self-worth and Withdrawn  Renata:  No Symptoms  Psychosis: No Symptoms  Anxiety: Excessive worry, Nervousness, Sleep disturbance and Poor concentration  Panic:  No symptoms  Post Traumatic Stress Disorder:  No Symptoms   Eating Disorder: No Symptoms  ADD / ADHD:  No symptoms  Conduct Disorder: No symptoms  Autism Spectrum Disorder: No symptoms  Obsessive Compulsive Disorder: No Symptoms    Patient reports the following compulsive behaviors and treatment history: None reported.      Diagnostic Criteria:   Major Depressive Disorder   - Depressed mood. Note: In children and adolescents, can be irritable mood.     - Diminished interest or pleasure in all, or almost all, activities.    - Fatigue or loss of energy.    - Feelings of worthlessness or excessive guilt.    - Diminished ability to think or concentrate, or indecisiveness.   B) The symptoms cause clinically significant distress or impairment in social, occupational, or other important areas of functioning  C) The episode is not attributable to the physiological effects of a substance or to another medical condition  D) The occurence of major depressive episode is not better explained by other thought / psychotic disorders  E) There has never been a manic episode or hypomanic episode    Functional Status:  Patient reports the following functional impairments: relationship(s).     WHODAS:   WHODAS 2.0 Total Score 11/14/2021   Total Score 18   Total Score MyChart 18     Nonprogrammatic care:  Patient is requesting basic services to address current mental health concerns.    Clinical Summary:   1.  "Reason for assessment: \" Had a psychological exam with a parent capacity  and recommended to seek therapy \"  .  2. Psychosocial, Cultural and Contextual Factors: Patient is currently living with his mother and has been court ordered to stay away from his wife and kids because of uncontrollable anger problems. Patient is struggling with housing and financial problems and reported that he is scheduled to be in court next month for the divorce hearing and he feared losing custody of his two kids.  3. Principal DSM5 Diagnoses  (Sustained by DSM5 Criteria Listed Above):   296.33 (F33.2) Major Depressive Disorder, Recurrent Episode, Severe _ and With anxious distress.  4. Other Diagnoses that is relevant to services:   None.  5. Provisional Diagnosis:  296.32 (F33.1) Major Depressive Disorder, Recurrent Episode, Moderate _ and With anxious distress as evidenced by Trouble sleeping,  Increased restlessness,, moving more slowly ,  Fatigue, tiredness, Feeling worthless ,   Difficulty thinking clearly, concentrating .  6. Prognosis: Expect Improvement.  7. Likely consequences of symptoms if not treated: Present condition could deteriorate causing significant physical health problems.  8. Client strengths include:  motivated, open to learning, open to suggestions / feedback, wants to learn and willing to ask questions .     Recommendations:     1. Plan for Safety and Risk Management:   Recommended that patient call 911 or go to the local ED should there be a change in any of these risk factors..          Report to child / adult protection services was NA.     2. Patient's identified None at this time.     3. Initial Treatment will focus on:    Depressed Mood - with emphasis on behavior activation and stress reduction skills.     4. Resources/Service Plan:    services are not indicated.   Modifications to assist communication are not indicated.   Additional disability accommodations are not indicated.      5. " Collaboration:   Collaboration / coordination of treatment will be initiated with the following  support professionals: None at this time.      6.  Referrals:   The following referral(s) will be initiated: None at this time. Next Scheduled Appointment: 12/28/21.     A Release of Information has been obtained for the following: None at this time.    7. SANDRA:    SANDRA:  Discussed the general effects of drugs and alcohol on health and well-being. Provider gave patient printed information about the effects of chemical use on their health and well being.     8. Records:   These were reviewed at time of assessment.   Information in this assessment was obtained from the medical record and  provided by patient who is a good historian.    Patient will have open access to their mental health medical record.      Provider Name/ Credentials:  KATHRYN Mcdonald  December 13, 2021    ----- Service Performed and Documented by KATHRYN------  This note was reviewed by LAURA [facility] HospitalSW

## 2021-12-28 ENCOUNTER — DOCUMENTATION ONLY (OUTPATIENT)
Dept: EDUCATION SERVICES | Facility: CLINIC | Age: 46
End: 2021-12-28
Payer: COMMERCIAL

## 2021-12-28 NOTE — PROGRESS NOTES
Chart reviewed by Ambulatory Quality and Data team    Please abstract the following data from this visit with this patient into the appropriate field in Epic:    Tests that can be patient reported without a hard copy:        Other Tests found in the patient's chart through Chart Review/Care Everywhere:    A1c done by this group [facility] on this date: 7/15    Note to Abstraction: If this section is blank, no results were found via Chart Review/Care Everywhere.

## 2022-01-25 ENCOUNTER — VIRTUAL VISIT (OUTPATIENT)
Dept: PSYCHOLOGY | Facility: CLINIC | Age: 47
End: 2022-01-25
Payer: COMMERCIAL

## 2022-01-25 DIAGNOSIS — F33.1 MAJOR DEPRESSIVE DISORDER, RECURRENT EPISODE, MODERATE WITH ANXIOUS DISTRESS (H): Primary | ICD-10-CM

## 2022-01-25 PROCEDURE — 90834 PSYTX W PT 45 MINUTES: CPT | Mod: 95 | Performed by: STUDENT IN AN ORGANIZED HEALTH CARE EDUCATION/TRAINING PROGRAM

## 2022-01-25 ASSESSMENT — ANXIETY QUESTIONNAIRES
4. TROUBLE RELAXING: SEVERAL DAYS
5. BEING SO RESTLESS THAT IT IS HARD TO SIT STILL: NOT AT ALL
3. WORRYING TOO MUCH ABOUT DIFFERENT THINGS: SEVERAL DAYS
6. BECOMING EASILY ANNOYED OR IRRITABLE: NOT AT ALL
7. FEELING AFRAID AS IF SOMETHING AWFUL MIGHT HAPPEN: SEVERAL DAYS
GAD7 TOTAL SCORE: 4
1. FEELING NERVOUS, ANXIOUS, OR ON EDGE: SEVERAL DAYS
2. NOT BEING ABLE TO STOP OR CONTROL WORRYING: NOT AT ALL

## 2022-01-25 ASSESSMENT — PATIENT HEALTH QUESTIONNAIRE - PHQ9: SUM OF ALL RESPONSES TO PHQ QUESTIONS 1-9: 10

## 2022-01-25 NOTE — PROGRESS NOTES
Progress Note    Patient Name: Abhishek Downey  Date: 01/25/2022         Service Type: Individual      Session Start Time: 10.00  Session End Time: 10.48     Session Length: 48 mns    Session #: 03    Attendees: Client attended alone    Service Modality:  Video Visit:      Provider verified identity through the following two step process.  Patient provided:  Patient is known previously to provider    Telemedicine Visit: The patient's condition can be safely assessed and treated via synchronous audio and visual telemedicine encounter.      Reason for Telemedicine Visit: Patient has requested telehealth visit    Originating Site (Patient Location): Patient's home    Distant Site (Provider Location): @[facility]@    Consent:  The patient/guardian has verbally consented to: the potential risks and benefits of telemedicine (video visit) versus in person care; bill my insurance or make self-payment for services provided; and responsibility for payment of non-covered services.     Patient would like the video invitation sent by:  My Chart    Mode of Communication:  Video Conference via Amwell    As the provider I attest to compliance with applicable laws and regulations related to telemedicine.     Treatment Plan Last Reviewed:  01/25/2022  PHQ-9 / SHARATH-7 :   PHQ 11/16/2021 11/24/2021 1/25/2022   PHQ-9 Total Score 5 14 10   Q9: Thoughts of better off dead/self-harm past 2 weeks Not at all Not at all Not at all     SHARATH-7 SCORE 10/20/2021 11/14/2021 11/24/2021   Total Score - 0 (minimal anxiety) -   Total Score 5 0 7         DATA  Interactive Complexity: No  Crisis: No       Progress Since Last Session (Related to Symptoms / Goals / Homework):   Symptoms: Improving as patient PHQ9 score indicates a decrease in depression symptoms.    Homework: Achieved / completed to satisfaction      Episode of Care Goals: Minimal progress - PREPARATION (Decided to change - considering how);  Intervened by negotiating a change plan and determining options / strategies for behavior change, identifying triggers, exploring social supports, and working towards setting a date to begin behavior change     Current / Ongoing Stressors and Concerns:   Patient reported tat he is still unable to see kids due to court order and the challenges of living with mum at 46 are current stressors.     Treatment Objective(s) Addressed in This Session:   Decrease frequency and intensity of feeling down, depressed, hopeless       Intervention:   Motivational Interviewing    Stage of Change: PREPARATION (Decided to change - considering how)    MI Intervention: Co-Developed Goal: with focus on working to address issues underlying depressive feelings, Expressed Empathy/Understanding, Permission to raise concern or advise, Open-ended questions and Reflections: simple and complex     Change Talk Expressed by the Patient: Committment to change    Provider Response to Change Talk: A - Affirmed patient's thoughts, decisions, or attempts at behavior change and R - Reflected patient's change talk          ASSESSMENT: Current Emotional / Mental Status (status of significant symptoms):   Risk status (Self / Other harm or suicidal ideation)   Patient denies current fears or concerns for personal safety.   Patient denies current or recent suicidal ideation or behaviors.   Patient denies current or recent homicidal ideation or behaviors.   Patient denies current or recent self injurious behavior or ideation.   Patient denies other safety concerns.   Patient reports there has been no change in risk factors since their last session.     Patient reports there has been no change in protective factors since their last session.     Recommended that patient call 911 or go to the local ED should there be a change in any of these risk factors.     Appearance:   Appropriate    Eye Contact:   Good    Psychomotor Behavior: Normal     Attitude:   Cooperative    Orientation:   All   Speech    Rate / Production: Normal/ Responsive    Volume:  Normal    Mood:    Normal   Affect:    Appropriate    Thought Content:  Clear    Thought Form:  Coherent  Logical    Insight:    Good      Medication Review:   No changes to current psychiatric medication(s)     Medication Compliance:   Yes     Changes in Health Issues:   None reported     Chemical Use Review:   Substance Use: Chemical use reviewed, no active concerns identified      Tobacco Use: No current tobacco use.      Diagnosis:  296.32 (F33.1) Major Depressive Disorder, Recurrent Episode, Moderate _ and With anxious distress    Collateral Reports Completed:   Not Applicable    PLAN: (Patient Tasks / Therapist Tasks / Other)  Create a positive journal with at least three positive things daily.        Oneil Yeager [facility]      ----- Service Performed and Documented by [facility]------  This note was reviewed and clinical supervision by LAURA Wild [facility]     2/1/2022                                                      ______________________________________________________________________    Treatment Plan    Patient's Name: Abhishek Downey  YOB: 1975    Date: 01/25/2022     DSM5 Diagnoses: 296.32 (F33.1) Major Depressive Disorder, Recurrent Episode, Moderate _ and With anxious distress  Psychosocial / Contextual Factors:   WHODAS:     Referral / Collaboration:  None at this time.    Anticipated number of session or this episode of care: 15-20       MeasurableTreatment Goal(s) related to diagnosis / functional impairment(s)  Goal 1: Patient will address issues underlying depressive feelings.    I will know I've met my goal when I am at peace with where I am and self.      Objective #A (Patient Action)    Patient will identify specific triggers to feelings of depression 90 % of the time.  Status: Continued - Date(s): 04/25/2022    Intervention(s)  Therapist will provide psychoeducation,  behavioral activation, and cognitive restructuring.)    Objective #B  Patient will identify specific areas of cognitive distortion and challenge irrational thoughts with reality   Status: Continued - Date(s): 04/25/2022       Intervention(s)  Therapist will teach patient how negative thoughts can lead to negative feelings and actions and ways to reframe thoughts in a more positive way leading  to more positive feelings and helpful behaviors    Objective #C  Patient will learn relaxation techniques to manage depression.  Status: Continued - Date(s): 04/25/2022    Intervention(s)  Therapist will teach patient  thought stopping, deep breathing exercises, mindful meditation and creative visualization.            Patient has reviewed and agreed to the above plan.      KATHRYN Mcdonald  January 25, 2022

## 2022-02-07 ENCOUNTER — VIRTUAL VISIT (OUTPATIENT)
Dept: PSYCHOLOGY | Facility: CLINIC | Age: 47
End: 2022-02-07
Payer: COMMERCIAL

## 2022-02-07 DIAGNOSIS — F33.1 MAJOR DEPRESSIVE DISORDER, RECURRENT EPISODE, MODERATE WITH ANXIOUS DISTRESS (H): Primary | ICD-10-CM

## 2022-02-07 PROCEDURE — 90834 PSYTX W PT 45 MINUTES: CPT | Mod: 95 | Performed by: STUDENT IN AN ORGANIZED HEALTH CARE EDUCATION/TRAINING PROGRAM

## 2022-02-07 NOTE — PROGRESS NOTES
Progress Note    Patient Name: Abhishek Downey  Date: 02/07/2022         Service Type: Individual      Session Start Time: 10.00  Session End Time: 10.50     Session Length: 50 mns    Session #: 04    Attendees: Client attended alone    Service Modality:  Video Visit:      Provider verified identity through the following two step process.  Patient provided:  Patient is known previously to provider    Telemedicine Visit: The patient's condition can be safely assessed and treated via synchronous audio and visual telemedicine encounter.      Reason for Telemedicine Visit: Patient has requested telehealth visit    Originating Site (Patient Location): Patient's home    Distant Site (Provider Location): [facility] MENTAL HEALTH & ADDICTION [facility] COUNSELING [facility]    Consent:  The patient/guardian has verbally consented to: the potential risks and benefits of telemedicine (video visit) versus in person care; bill my insurance or make self-payment for services provided; and responsibility for payment of non-covered services.     Patient would like the video invitation sent by:  My Chart    Mode of Communication:  Video Conference via Amwell    As the provider I attest to compliance with applicable laws and regulations related to telemedicine.     Treatment Plan Last Reviewed: 01/25/2022   PHQ-9 / SHARATH-7 : 07    PHQ 11/16/2021 11/24/2021 1/25/2022   PHQ-9 Total Score 5 14 10   Q9: Thoughts of better off dead/self-harm past 2 weeks Not at all Not at all Not at all         DATA  Interactive Complexity: No  Crisis: No       Progress Since Last Session (Related to Symptoms / Goals / Homework):   Symptoms: Improving as patient reported that he has been talking more with wife and feels motivated as she is considering couples therapy for them.    Homework: Achieved / completed to satisfaction      Episode of Care Goals: Satisfactory progress - ACTION (Actively working towards  change); Intervened by reinforcing change plan / affirming steps taken     Current / Ongoing Stressors and Concerns:   Patient reported current stressors as discomfort living with mum and the limitations he has seeing his children base on a court order. Patient reported that he can't stay for long with Mum due to leasing agreement and his name not  being on the agreement so he is forced to move between mum and sister's home.     Treatment Objective(s) Addressed in This Session:   Increase interest, engagement, and pleasure in doing things       Intervention:   Motivational Interviewing: Utilizing OARS to build insight around situation, observing barriers patient is seeing in his relationship with partner, and identifying what is needed to improve his anger issues  and work on ways to identify effective communication styles to improve relationship with partner and kids.        ASSESSMENT: Current Emotional / Mental Status (status of significant symptoms):   Risk status (Self / Other harm or suicidal ideation)   Patient denies current fears or concerns for personal safety.   Patient denies current or recent suicidal ideation or behaviors.   Patient denies current or recent homicidal ideation or behaviors.   Patient denies current or recent self injurious behavior or ideation.   Patient denies other safety concerns.   Patient reports there has been no change in risk factors since their last session.     Patient reports there has been no change in protective factors since their last session.     Recommended that patient call 911 or go to the local ED should there be a change in any of these risk factors.     Appearance:   Appropriate    Eye Contact:   Good    Psychomotor Behavior: Normal    Attitude:   Cooperative  Interested   Orientation:   All   Speech    Rate / Production: Normal     Volume:  Normal    Mood:    Normal   Affect:    Appropriate    Thought Content:  Clear    Thought Form:  Coherent  Logical     Insight:    Good      Medication Review:   No changes to current psychiatric medication(s)     Medication Compliance:   Yes     Changes in Health Issues:   None reported     Chemical Use Review:   Substance Use: Chemical use reviewed, no active concerns identified      Tobacco Use: No current tobacco use.      Diagnosis:  296.32 (F33.1) Major Depressive Disorder, Recurrent Episode, Moderate _ and With anxious distress    Collateral Reports Completed:   Not Applicable    PLAN: (Patient Tasks / Therapist Tasks / Other)  Make a list of triggers to anger problems and people patient is still currently angry with to begin processing internal feelings next session.          Oneil Yeager [facility]  February 7, 2022     ----- Service Performed and Documented by [facility]------  This note was reviewed and clinical supervision by LAURA Wild [facility]     2/9/2022                                                    ______________________________________________________________________    Treatment Plan    Patient's Name: Abhishek Downey  YOB: 1975    Date: 01/25/2022     DSM5 Diagnoses: 296.32 (F33.1) Major Depressive Disorder, Recurrent Episode, Moderate _ and With anxious distress  Psychosocial / Contextual Factors:   WHODAS:     Referral / Collaboration:  None at this time.    Anticipated number of session or this episode of care: 15-20       MeasurableTreatment Goal(s) related to diagnosis / functional impairment(s)  Goal 1: Patient will address issues underlying depressive feelings.    I will know I've met my goal when I am at peace with where I am and self.      Objective #A (Patient Action)    Patient will identify specific triggers to feelings of depression 90 % of the time.  Status: Continued - Date(s): 04/25/2022    Intervention(s)  Therapist will provide psychoeducation, behavioral activation, and cognitive restructuring.)    Objective #B  Patient will identify specific areas of cognitive distortion  and challenge irrational thoughts with reality   Status: Continued - Date(s): 04/25/2022       Intervention(s)  Therapist will teach patient how negative thoughts can lead to negative feelings and actions and ways to reframe thoughts in a more positive way leading  to more positive feelings and helpful behaviors    Objective #C  Patient will learn relaxation techniques to manage depression.  Status: Continued - Date(s): 04/25/2022    Intervention(s)  Therapist will teach patient  thought stopping, deep breathing exercises, mindful meditation and creative visualization.            Patient has reviewed and agreed to the above plan.      KATHRYN Mcdonald  January 25, 2022

## 2022-02-21 ENCOUNTER — VIRTUAL VISIT (OUTPATIENT)
Dept: PSYCHOLOGY | Facility: CLINIC | Age: 47
End: 2022-02-21
Payer: COMMERCIAL

## 2022-02-21 DIAGNOSIS — F33.1 MAJOR DEPRESSIVE DISORDER, RECURRENT EPISODE, MODERATE WITH ANXIOUS DISTRESS (H): Primary | ICD-10-CM

## 2022-02-21 DIAGNOSIS — F41.1 GENERALIZED ANXIETY DISORDER: ICD-10-CM

## 2022-02-21 PROCEDURE — 90834 PSYTX W PT 45 MINUTES: CPT | Mod: 95 | Performed by: STUDENT IN AN ORGANIZED HEALTH CARE EDUCATION/TRAINING PROGRAM

## 2022-02-21 ASSESSMENT — ANXIETY QUESTIONNAIRES
1. FEELING NERVOUS, ANXIOUS, OR ON EDGE: MORE THAN HALF THE DAYS
2. NOT BEING ABLE TO STOP OR CONTROL WORRYING: MORE THAN HALF THE DAYS
5. BEING SO RESTLESS THAT IT IS HARD TO SIT STILL: NOT AT ALL
GAD7 TOTAL SCORE: 10
7. FEELING AFRAID AS IF SOMETHING AWFUL MIGHT HAPPEN: NEARLY EVERY DAY
IF YOU CHECKED OFF ANY PROBLEMS ON THIS QUESTIONNAIRE, HOW DIFFICULT HAVE THESE PROBLEMS MADE IT FOR YOU TO DO YOUR WORK, TAKE CARE OF THINGS AT HOME, OR GET ALONG WITH OTHER PEOPLE: SOMEWHAT DIFFICULT
4. TROUBLE RELAXING: SEVERAL DAYS
6. BECOMING EASILY ANNOYED OR IRRITABLE: NOT AT ALL
3. WORRYING TOO MUCH ABOUT DIFFERENT THINGS: MORE THAN HALF THE DAYS

## 2022-02-21 ASSESSMENT — PATIENT HEALTH QUESTIONNAIRE - PHQ9: SUM OF ALL RESPONSES TO PHQ QUESTIONS 1-9: 15

## 2022-02-21 NOTE — PROGRESS NOTES
Progress Note    Patient Name: Abhishek Downey  Date: 02/21/2022         Service Type: Individual      Session Start Time: 10.00  Session End Time: 10.45     Session Length: 45 mns    Session #: 05    Attendees: Client attended alone    Service Modality:  Video Visit:      Provider verified identity through the following two step process.  Patient provided:  Patient is known previously to provider    Telemedicine Visit: The patient's condition can be safely assessed and treated via synchronous audio and visual telemedicine encounter.      Reason for Telemedicine Visit: Patient has requested telehealth visit    Originating Site (Patient Location): Patient's home    Distant Site (Provider Location): [facility] MENTAL HEALTH & ADDICTION [facility]SEL[facility]    Consent:  The patient/guardian has verbally consented to: the potential risks and benefits of telemedicine (video visit) versus in person care; bill my insurance or make self-payment for services provided; and responsibility for payment of non-covered services.     Patient would like the video invitation sent by:  My Chart    Mode of Communication:  Video Conference via Amwell    As the provider I attest to compliance with applicable laws and regulations related to telemedicine.     Treatment Plan Last Reviewed: 01/25/2022   PHQ-9 / SHARATH-7 :   PHQ 11/24/2021 1/25/2022 2/21/2022   PHQ-9 Total Score 14 10 15   Q9: Thoughts of better off dead/self-harm past 2 weeks Not at all Not at all Not at all     SHARATH-7 SCORE 11/14/2021 11/24/2021 2/21/2022   Total Score 0 (minimal anxiety) - -   Total Score 0 7 10           DATA  Interactive Complexity: No  Crisis: No       Progress Since Last Session (Related to Symptoms / Goals / Homework):   Symptoms: Worsening as evident by PHQ9 and SHARATH 7 scores    Homework: Partially completed Discussed current triggers to pt's anxious and depressive mood today.      Episode of Care  Goals: No improvement - ACTION (Actively working towards change); Intervened by reinforcing change plan / affirming steps taken     Current / Ongoing Stressors and Concerns:   Patient reported that he has been getting ambiguous massages from partner about them resolving their differences. Pt reported feeling horrible not to be able to control how his current life and future will be  because he has no home, no job and is on disability and limited income. Pt reported that a zoom meeting with an  is scheduled for 3/10 to make final decision about the divorce and his wife  reported she is thinking of dropping the divorce but there is nothing she is doing to match her words with action because in their last conversation she was talking of having a 100% custody of the kids.  Patient reported an increase in depressive and anxious feelings.     Treatment Objective(s) Addressed in This Session:   use distraction each time intrusive worry surfaces  Increase interest, engagement, and pleasure in doing things       Intervention:   Reviewed thought-feeling-action connection and discussed ABC model, where A=activating/adverse event, B=belief about self, and C=consequence.  Provided example of the ABC model relative to client's presenting concerns.  Assisted client in using ABC model to understand recent interaction.  Reviewed cognitive distortions and helpful versus unhelpful responses.  Discussed techniques to challenge the belief about the event by disputing the validity of the belief. Encouraged patient to identify things that bring him hellen out of the house and actively do one of those daily.         ASSESSMENT: Current Emotional / Mental Status (status of significant symptoms):   Risk status (Self / Other harm or suicidal ideation)   Patient denies current fears or concerns for personal safety.   Patient denies current or recent suicidal ideation or behaviors.   Patient denies current or recent homicidal ideation or  behaviors.   Patient denies current or recent self injurious behavior or ideation.   Patient denies other safety concerns.   Patient reports there has been no change in risk factors since their last session.     Patient reports there has been no change in protective factors since their last session.     Recommended that patient call 911 or go to the local ED should there be a change in any of these risk factors.     Appearance:   Appropriate    Eye Contact:   Good    Psychomotor Behavior: Normal    Attitude:   Cooperative  Interested   Orientation:   All   Speech    Rate / Production: Normal     Volume:  Normal    Mood:    Anxious  Depressed  Anhedonia   Affect:    Subdued  Worrisome    Thought Content:  Clear    Thought Form:  Coherent    Insight:    Good      Medication Review:   No changes to current psychiatric medication(s)     Medication Compliance:   Yes     Changes in Health Issues:   None reported     Chemical Use Review:   Substance Use: Chemical use reviewed, no active concerns identified      Tobacco Use: No current tobacco use.      Diagnosis:    296.32 (F33.1) Major Depressive Disorder, Recurrent Episode, Moderate _ and With anxious distress    Collateral Reports Completed:   Not Applicable    PLAN: (Patient Tasks / Therapist Tasks / Other)    Identify 4 things that bring him hellen out of the house and actively do one of those daily.       Oneil Yeager [facility]  February 21, 2022                                              ----- Service Performed and Documented by [facility]------  This note was reviewed and clinical supervision by LAURA Wild [facility]     2/28/2022   ______________________________________________________________________    Treatment Plan    Patient's Name: Abhishek Downey  YOB: 1975    Date: 01/25/2022     DSM5 Diagnoses: 296.32 (F33.1) Major Depressive Disorder, Recurrent Episode, Moderate _ and With anxious distress  Psychosocial / Contextual Factors:   WHODAS:      Referral / Collaboration:  None at this time.    Anticipated number of session or this episode of care: 15-20       MeasurableTreatment Goal(s) related to diagnosis / functional impairment(s)  Goal 1: Patient will address issues underlying depressive feelings.    I will know I've met my goal when I am at peace with where I am and self.      Objective #A (Patient Action)    Patient will identify specific triggers to feelings of depression 90 % of the time.  Status: Continued - Date(s): 04/25/2022    Intervention(s)  Therapist will provide psychoeducation, behavioral activation, and cognitive restructuring.)    Objective #B  Patient will identify specific areas of cognitive distortion and challenge irrational thoughts with reality   Status: Continued - Date(s): 04/25/2022       Intervention(s)  Therapist will teach patient how negative thoughts can lead to negative feelings and actions and ways to reframe thoughts in a more positive way leading  to more positive feelings and helpful behaviors    Objective #C  Patient will learn relaxation techniques to manage depression.  Status: Continued - Date(s): 04/25/2022    Intervention(s)  Therapist will teach patient  thought stopping, deep breathing exercises, mindful meditation and creative visualization.            Patient has reviewed and agreed to the above plan.      KATHRYN Mcdonald  January 25, 2022

## 2022-02-22 ASSESSMENT — ANXIETY QUESTIONNAIRES: GAD7 TOTAL SCORE: 10

## 2022-03-05 ENCOUNTER — HEALTH MAINTENANCE LETTER (OUTPATIENT)
Age: 47
End: 2022-03-05

## 2022-03-08 ENCOUNTER — VIRTUAL VISIT (OUTPATIENT)
Dept: PSYCHOLOGY | Facility: CLINIC | Age: 47
End: 2022-03-08
Payer: COMMERCIAL

## 2022-03-08 DIAGNOSIS — F41.1 GAD (GENERALIZED ANXIETY DISORDER): ICD-10-CM

## 2022-03-08 DIAGNOSIS — F33.1 MAJOR DEPRESSIVE DISORDER, RECURRENT EPISODE, MODERATE WITH ANXIOUS DISTRESS (H): Primary | ICD-10-CM

## 2022-03-08 PROCEDURE — 90834 PSYTX W PT 45 MINUTES: CPT | Mod: 95 | Performed by: STUDENT IN AN ORGANIZED HEALTH CARE EDUCATION/TRAINING PROGRAM

## 2022-03-08 ASSESSMENT — ANXIETY QUESTIONNAIRES
GAD7 TOTAL SCORE: 9
7. FEELING AFRAID AS IF SOMETHING AWFUL MIGHT HAPPEN: MORE THAN HALF THE DAYS
1. FEELING NERVOUS, ANXIOUS, OR ON EDGE: MORE THAN HALF THE DAYS
6. BECOMING EASILY ANNOYED OR IRRITABLE: NOT AT ALL
4. TROUBLE RELAXING: SEVERAL DAYS
5. BEING SO RESTLESS THAT IT IS HARD TO SIT STILL: NOT AT ALL
2. NOT BEING ABLE TO STOP OR CONTROL WORRYING: MORE THAN HALF THE DAYS
3. WORRYING TOO MUCH ABOUT DIFFERENT THINGS: MORE THAN HALF THE DAYS

## 2022-03-08 ASSESSMENT — PATIENT HEALTH QUESTIONNAIRE - PHQ9
SUM OF ALL RESPONSES TO PHQ QUESTIONS 1-9: 6
SUM OF ALL RESPONSES TO PHQ QUESTIONS 1-9: 6
10. IF YOU CHECKED OFF ANY PROBLEMS, HOW DIFFICULT HAVE THESE PROBLEMS MADE IT FOR YOU TO DO YOUR WORK, TAKE CARE OF THINGS AT HOME, OR GET ALONG WITH OTHER PEOPLE: NOT DIFFICULT AT ALL

## 2022-03-08 NOTE — PROGRESS NOTES
[facility]                                     Progress Note    Patient Name: Abhishek Downey  Date: 03/08/2022         Service Type: Individual      Session Start Time: 10.00  Session End Time: 10.49     Session Length: 49 mns    Session #: 06    Attendees: Client attended alone    Service Modality:  Video Visit:      Provider verified identity through the following two step process.  Patient provided:  Patient is known previously to provider    Telemedicine Visit: The patient's condition can be safely assessed and treated via synchronous audio and visual telemedicine encounter.      Reason for Telemedicine Visit: Patient has requested telehealth visit    Originating Site (Patient Location): Patient's home    Distant Site (Provider Location): Provider Remote Setting- Home Office    Consent:  The patient/guardian has verbally consented to: the potential risks and benefits of telemedicine (video visit) versus in person care; bill my insurance or make self-payment for services provided; and responsibility for payment of non-covered services.     Patient would like the video invitation sent by:  My Chart    Mode of Communication:  Video Conference via Amwell    As the provider I attest to compliance with applicable laws and regulations related to telemedicine.    DATA  Interactive Complexity: No  Crisis: No        Progress Since Last Session (Related to Symptoms / Goals / Homework):   Symptoms: Worsening as evident by pt's PHQ9 scores    Homework: Partially completed      Episode of Care Goals: Minimal progress - ACTION (Actively working towards change); Intervened by reinforcing change plan / affirming steps taken     Current / Ongoing Stressors and Concerns:   Patient reported current stressors being thoughts about his wife and kids and the current limitations in place to prevent him from seeing them. Pt reported that his wife had obtained a DANCO order as well as a OFP after their separation  but has dropped the DANCO. Pt reported wife has promised to defer court hearing about their divorce to a later date so they can have family therapy to see if they can work things out but there is still an OFP in place so they cannot not meet. Pt reported that he has been struggling with the best ways to communicate his own needs with wife and feels that he constantly tries to meet her needs without considering himself and his values.     Treatment Objective(s) Addressed in This Session:   Decrease frequency and intensity of feeling down, depressed, hopeless       Intervention:    Introduced pt to DBT interpersonal effectiveness. Discussed the three main DBT interpersonal effective communication goals: Gaining our objective, maintaining our relationships, or keeping our self-respect. Provider couched pt that when we work towards gaining our objective, we need skills that involve clarifying what we want from the interaction, and identifying what we need to do in order to get the results we want when we interact with others. Encourage pt to utilize skills learned  in his communication with partner.    Assessments completed prior to visit:   PHQ9:   PHQ-9 SCORE 11/14/2021 11/16/2021 11/24/2021 1/25/2022 2/21/2022 3/8/2022 3/8/2022   PHQ-9 Total Score - - - - - - -   PHQ-9 Total Score MyChart 1 (Minimal depression) 5 (Mild depression) - - - - 6 (Mild depression)   PHQ-9 Total Score 1 5 14 10 15 6 10     GAD7:   SHARATH-7 SCORE 9/4/2019 10/20/2021 11/14/2021 11/24/2021 2/21/2022 3/8/2022   Total Score - - 0 (minimal anxiety) - - -   Total Score 10 5 0 7 10 9         ASSESSMENT: Current Emotional / Mental Status (status of significant symptoms):   Risk status (Self / Other harm or suicidal ideation)   Patient denies current fears or concerns for personal safety.   Patient denies current or recent suicidal ideation or behaviors.   Patient denies current or recent homicidal ideation or behaviors.   Patient denies current or  recent self injurious behavior or ideation.   Patient denies other safety concerns.   Patient reports there has been no change in risk factors since their last session.     Patient reports there has been no change in protective factors since their last session.     Recommended that patient call 911 or go to the local ED should there be a change in any of these risk factors.     Appearance:   Appropriate    Eye Contact:   Good    Psychomotor Behavior: Normal    Attitude:   Cooperative  Interested   Orientation:   All   Speech    Rate / Production: Normal/ Responsive    Volume:  Normal    Mood:    Anxious  Depressed    Affect:    Worrisome    Thought Content:  Rumination    Thought Form:  Coherent    Insight:    Good      Medication Review:   No changes to current psychiatric medication(s)     Medication Compliance:   Yes     Changes in Health Issues:   None reported     Chemical Use Review:   Substance Use: Chemical use reviewed, no active concerns identified      Tobacco Use: No current tobacco use.      Diagnosis:  296.32 (F33.1) Major Depressive Disorder, Recurrent Episode, Moderate _ and With anxious distress  Collateral Reports Completed:   Not Applicable    PLAN: (Patient Tasks / Therapist Tasks / Other)  Connect with friends and practice at least  2 outdoor activities weekly. Check with partner if she is willing to drop OFP to open the road for couples therapy.        Oneil Yeager [facility]            ----- Service Performed and Documented by [facility]------  This note was reviewed and clinical supervision by LAURA [facility]     3/14/2022                                              ______________________________________________________________________    Individual Treatment Plan    Patient's Name: Abhishek Downey  YOB: 1975    Date of Creation:  01/25/2022   Date Treatment Plan Last Reviewed/Revised:  01/25/2022     DSM5 Diagnoses: 296.32 (F33.1) Major Depressive Disorder, Recurrent  Episode, Moderate _ and With anxious distress  Psychosocial / Contextual Factors:   PROMIS (reviewed every 90 days):     Referral / Collaboration:  Referral to another professional/service is not indicated at this time..    Anticipated number of session for this episode of care: 15-25  Anticipation frequency of session: Biweekly  Anticipated Duration of each session: 38-52 minutes  Treatment plan will be reviewed in 90 days or when goals have been changed.        MeasurableTreatment Goal(s) related to diagnosis / functional impairment(s)  Goal 1: Patient will address issues underlying depressive feelings.    I will know I've met my goal when I am at peace with where I am and self.      Objective #A (Patient Action)    Patient will identify specific triggers to feelings of depression 90 % of the time.  Status: Continued - Date(s): 04/25/2022    Intervention(s)  Therapist will provide psychoeducation, behavioral activation, and cognitive restructuring.)    Objective #B  Patient will identify specific areas of cognitive distortion and challenge irrational thoughts with reality   Status: Continued - Date(s): 04/25/2022       Intervention(s)  Therapist will teach patient how negative thoughts can lead to negative feelings and actions and ways to reframe thoughts in a more positive way leading  to more positive feelings and helpful behaviors    Objective #C  Patient will learn relaxation techniques to manage depression.  Status: Continued - Date(s): 04/25/2022    Intervention(s)  Therapist will teach patient  thought stopping, deep breathing exercises, mindful meditation and creative visualization.            Patient has reviewed and agreed to the above plan.      KATHRYN Mcdonald  January 25, 2022

## 2022-03-09 ASSESSMENT — ANXIETY QUESTIONNAIRES: GAD7 TOTAL SCORE: 9

## 2022-03-17 ENCOUNTER — HOSPITAL ENCOUNTER (INPATIENT)
Facility: CLINIC | Age: 47
LOS: 6 days | Discharge: ANOTHER HOSPITAL | DRG: 917 | End: 2022-03-23
Attending: EMERGENCY MEDICINE | Admitting: INTERNAL MEDICINE
Payer: MEDICARE

## 2022-03-17 ENCOUNTER — APPOINTMENT (OUTPATIENT)
Dept: GENERAL RADIOLOGY | Facility: CLINIC | Age: 47
DRG: 917 | End: 2022-03-17
Attending: EMERGENCY MEDICINE
Payer: MEDICARE

## 2022-03-17 DIAGNOSIS — R44.3 HALLUCINATIONS: ICD-10-CM

## 2022-03-17 DIAGNOSIS — Z11.52 ENCOUNTER FOR SCREENING LABORATORY TESTING FOR SEVERE ACUTE RESPIRATORY SYNDROME CORONAVIRUS 2 (SARS-COV-2): ICD-10-CM

## 2022-03-17 DIAGNOSIS — T45.0X4A: ICD-10-CM

## 2022-03-17 DIAGNOSIS — G47.9 SLEEP DISORDER: Primary | ICD-10-CM

## 2022-03-17 PROBLEM — Z94.4 TRANSPLANTED LIVER (H): Chronic | Status: ACTIVE | Noted: 2018-09-11

## 2022-03-17 PROBLEM — E11.8 TYPE 2 DIABETES MELLITUS WITH COMPLICATION, WITH LONG-TERM CURRENT USE OF INSULIN (H): Status: ACTIVE | Noted: 2017-08-07

## 2022-03-17 PROBLEM — Z79.4 TYPE 2 DIABETES MELLITUS WITH COMPLICATION, WITH LONG-TERM CURRENT USE OF INSULIN (H): Status: ACTIVE | Noted: 2017-08-07

## 2022-03-17 PROBLEM — R65.10 SIRS (SYSTEMIC INFLAMMATORY RESPONSE SYNDROME) (H): Status: ACTIVE | Noted: 2022-03-17

## 2022-03-17 PROBLEM — E11.8 TYPE 2 DIABETES MELLITUS WITH COMPLICATION, WITH LONG-TERM CURRENT USE OF INSULIN (H): Chronic | Status: ACTIVE | Noted: 2017-08-07

## 2022-03-17 PROBLEM — Z94.4 TRANSPLANTED LIVER (H): Status: ACTIVE | Noted: 2018-09-11

## 2022-03-17 PROBLEM — Z79.4 TYPE 2 DIABETES MELLITUS WITH COMPLICATION, WITH LONG-TERM CURRENT USE OF INSULIN (H): Chronic | Status: ACTIVE | Noted: 2017-08-07

## 2022-03-17 PROBLEM — E87.20 LACTIC ACIDOSIS: Status: ACTIVE | Noted: 2022-03-17

## 2022-03-17 LAB
ALBUMIN SERPL-MCNC: 3.8 G/DL (ref 3.4–5)
ALBUMIN SERPL-MCNC: 4.6 G/DL (ref 3.4–5)
ALBUMIN UR-MCNC: 70 MG/DL
ALP SERPL-CCNC: 111 U/L (ref 40–150)
ALP SERPL-CCNC: 148 U/L (ref 40–150)
ALT SERPL W P-5'-P-CCNC: 39 U/L (ref 0–70)
ALT SERPL W P-5'-P-CCNC: 53 U/L (ref 0–70)
AMMONIA PLAS-SCNC: 26 UMOL/L (ref 10–50)
AMPHETAMINES UR QL SCN: ABNORMAL
ANION GAP SERPL CALCULATED.3IONS-SCNC: 1 MMOL/L (ref 3–14)
ANION GAP SERPL CALCULATED.3IONS-SCNC: 10 MMOL/L (ref 3–14)
ANION GAP SERPL CALCULATED.3IONS-SCNC: 8 MMOL/L (ref 3–14)
APAP SERPL-MCNC: <2 MG/L (ref 10–30)
APPEARANCE UR: CLEAR
AST SERPL W P-5'-P-CCNC: 20 U/L (ref 0–45)
AST SERPL W P-5'-P-CCNC: 29 U/L (ref 0–45)
BARBITURATES UR QL: ABNORMAL
BASOPHILS # BLD AUTO: 0 10E3/UL (ref 0–0.2)
BASOPHILS NFR BLD AUTO: 0 %
BENZODIAZ UR QL: ABNORMAL
BILIRUB SERPL-MCNC: 1.1 MG/DL (ref 0.2–1.3)
BILIRUB SERPL-MCNC: 1.4 MG/DL (ref 0.2–1.3)
BILIRUB UR QL STRIP: NEGATIVE
BUN SERPL-MCNC: 17 MG/DL (ref 7–30)
BUN SERPL-MCNC: 18 MG/DL (ref 7–30)
BUN SERPL-MCNC: 19 MG/DL (ref 7–30)
CALCIUM SERPL-MCNC: 8.6 MG/DL (ref 8.5–10.1)
CALCIUM SERPL-MCNC: 8.9 MG/DL (ref 8.5–10.1)
CALCIUM SERPL-MCNC: 9.8 MG/DL (ref 8.5–10.1)
CANNABINOIDS UR QL SCN: ABNORMAL
CHLORIDE BLD-SCNC: 103 MMOL/L (ref 94–109)
CHLORIDE BLD-SCNC: 110 MMOL/L (ref 94–109)
CHLORIDE BLD-SCNC: 113 MMOL/L (ref 94–109)
CK SERPL-CCNC: 57 U/L (ref 30–300)
CO2 SERPL-SCNC: 23 MMOL/L (ref 20–32)
CO2 SERPL-SCNC: 23 MMOL/L (ref 20–32)
CO2 SERPL-SCNC: 28 MMOL/L (ref 20–32)
COCAINE UR QL: ABNORMAL
COLOR UR AUTO: YELLOW
CREAT SERPL-MCNC: 1.11 MG/DL (ref 0.66–1.25)
CREAT SERPL-MCNC: 1.21 MG/DL (ref 0.66–1.25)
CREAT SERPL-MCNC: 1.28 MG/DL (ref 0.66–1.25)
EOSINOPHIL # BLD AUTO: 0 10E3/UL (ref 0–0.7)
EOSINOPHIL NFR BLD AUTO: 0 %
ERYTHROCYTE [DISTWIDTH] IN BLOOD BY AUTOMATED COUNT: 14 % (ref 10–15)
ETHANOL SERPL-MCNC: <0.01 G/DL
GFR SERPL CREATININE-BSD FRML MDRD: 71 ML/MIN/1.73M2
GFR SERPL CREATININE-BSD FRML MDRD: 76 ML/MIN/1.73M2
GFR SERPL CREATININE-BSD FRML MDRD: 84 ML/MIN/1.73M2
GLUCOSE BLD-MCNC: 212 MG/DL (ref 70–99)
GLUCOSE BLD-MCNC: 318 MG/DL (ref 70–99)
GLUCOSE BLD-MCNC: 428 MG/DL (ref 70–99)
GLUCOSE BLDC GLUCOMTR-MCNC: 193 MG/DL (ref 70–99)
GLUCOSE BLDC GLUCOMTR-MCNC: 313 MG/DL (ref 70–99)
GLUCOSE UR STRIP-MCNC: >=1000 MG/DL
HBA1C MFR BLD: 8.6 % (ref 0–5.6)
HCT VFR BLD AUTO: 47.1 % (ref 40–53)
HGB BLD-MCNC: 16.9 G/DL (ref 13.3–17.7)
HGB UR QL STRIP: ABNORMAL
IMM GRANULOCYTES # BLD: 0.1 10E3/UL
IMM GRANULOCYTES NFR BLD: 1 %
INR PPP: 1.06 (ref 0.85–1.15)
KETONES UR STRIP-MCNC: ABNORMAL MG/DL
LACTATE SERPL-SCNC: 2.1 MMOL/L (ref 0.7–2)
LACTATE SERPL-SCNC: 3.7 MMOL/L (ref 0.7–2)
LACTATE SERPL-SCNC: 4.1 MMOL/L (ref 0.7–2)
LEUKOCYTE ESTERASE UR QL STRIP: NEGATIVE
LYMPHOCYTES # BLD AUTO: 0.4 10E3/UL (ref 0.8–5.3)
LYMPHOCYTES NFR BLD AUTO: 6 %
MCH RBC QN AUTO: 30.8 PG (ref 26.5–33)
MCHC RBC AUTO-ENTMCNC: 35.9 G/DL (ref 31.5–36.5)
MCV RBC AUTO: 86 FL (ref 78–100)
MONOCYTES # BLD AUTO: 0.3 10E3/UL (ref 0–1.3)
MONOCYTES NFR BLD AUTO: 5 %
MUCOUS THREADS #/AREA URNS LPF: PRESENT /LPF
NEUTROPHILS # BLD AUTO: 6.3 10E3/UL (ref 1.6–8.3)
NEUTROPHILS NFR BLD AUTO: 88 %
NITRATE UR QL: NEGATIVE
NRBC # BLD AUTO: 0 10E3/UL
NRBC BLD AUTO-RTO: 0 /100
OPIATES UR QL SCN: ABNORMAL
PH UR STRIP: 5.5 [PH] (ref 5–7)
PLATELET # BLD AUTO: 122 10E3/UL (ref 150–450)
POTASSIUM BLD-SCNC: 3.5 MMOL/L (ref 3.4–5.3)
POTASSIUM BLD-SCNC: 3.7 MMOL/L (ref 3.4–5.3)
POTASSIUM BLD-SCNC: 3.7 MMOL/L (ref 3.4–5.3)
PROT SERPL-MCNC: 6.6 G/DL (ref 6.8–8.8)
PROT SERPL-MCNC: 8.1 G/DL (ref 6.8–8.8)
RBC # BLD AUTO: 5.48 10E6/UL (ref 4.4–5.9)
RBC URINE: <1 /HPF
SALICYLATES SERPL-MCNC: <2 MG/DL
SARS-COV-2 RNA RESP QL NAA+PROBE: NEGATIVE
SODIUM SERPL-SCNC: 136 MMOL/L (ref 133–144)
SODIUM SERPL-SCNC: 141 MMOL/L (ref 133–144)
SODIUM SERPL-SCNC: 142 MMOL/L (ref 133–144)
SP GR UR STRIP: 1.04 (ref 1–1.03)
TROPONIN I SERPL HS-MCNC: 4 NG/L
UROBILINOGEN UR STRIP-MCNC: NORMAL MG/DL
WBC # BLD AUTO: 7.2 10E3/UL (ref 4–11)
WBC URINE: 2 /HPF

## 2022-03-17 PROCEDURE — 36415 COLL VENOUS BLD VENIPUNCTURE: CPT | Performed by: NURSE PRACTITIONER

## 2022-03-17 PROCEDURE — 71045 X-RAY EXAM CHEST 1 VIEW: CPT

## 2022-03-17 PROCEDURE — 83605 ASSAY OF LACTIC ACID: CPT | Performed by: NURSE PRACTITIONER

## 2022-03-17 PROCEDURE — 250N000011 HC RX IP 250 OP 636: Performed by: INTERNAL MEDICINE

## 2022-03-17 PROCEDURE — 82550 ASSAY OF CK (CPK): CPT | Performed by: NURSE PRACTITIONER

## 2022-03-17 PROCEDURE — 84484 ASSAY OF TROPONIN QUANT: CPT | Performed by: NURSE PRACTITIONER

## 2022-03-17 PROCEDURE — U0005 INFEC AGEN DETEC AMPLI PROBE: HCPCS | Performed by: EMERGENCY MEDICINE

## 2022-03-17 PROCEDURE — 99285 EMERGENCY DEPT VISIT HI MDM: CPT | Mod: 25 | Performed by: EMERGENCY MEDICINE

## 2022-03-17 PROCEDURE — 80053 COMPREHEN METABOLIC PANEL: CPT | Performed by: EMERGENCY MEDICINE

## 2022-03-17 PROCEDURE — 258N000003 HC RX IP 258 OP 636: Performed by: EMERGENCY MEDICINE

## 2022-03-17 PROCEDURE — 80307 DRUG TEST PRSMV CHEM ANLYZR: CPT | Performed by: EMERGENCY MEDICINE

## 2022-03-17 PROCEDURE — 83605 ASSAY OF LACTIC ACID: CPT | Performed by: EMERGENCY MEDICINE

## 2022-03-17 PROCEDURE — 85610 PROTHROMBIN TIME: CPT | Performed by: EMERGENCY MEDICINE

## 2022-03-17 PROCEDURE — 36415 COLL VENOUS BLD VENIPUNCTURE: CPT | Performed by: EMERGENCY MEDICINE

## 2022-03-17 PROCEDURE — 258N000003 HC RX IP 258 OP 636: Performed by: NURSE PRACTITIONER

## 2022-03-17 PROCEDURE — 85014 HEMATOCRIT: CPT | Performed by: EMERGENCY MEDICINE

## 2022-03-17 PROCEDURE — 80179 DRUG ASSAY SALICYLATE: CPT | Performed by: EMERGENCY MEDICINE

## 2022-03-17 PROCEDURE — 80143 DRUG ASSAY ACETAMINOPHEN: CPT | Performed by: EMERGENCY MEDICINE

## 2022-03-17 PROCEDURE — 96376 TX/PRO/DX INJ SAME DRUG ADON: CPT | Performed by: EMERGENCY MEDICINE

## 2022-03-17 PROCEDURE — 250N000011 HC RX IP 250 OP 636: Performed by: NURSE PRACTITIONER

## 2022-03-17 PROCEDURE — 81001 URINALYSIS AUTO W/SCOPE: CPT | Performed by: EMERGENCY MEDICINE

## 2022-03-17 PROCEDURE — 120N000002 HC R&B MED SURG/OB UMMC

## 2022-03-17 PROCEDURE — 96375 TX/PRO/DX INJ NEW DRUG ADDON: CPT | Performed by: EMERGENCY MEDICINE

## 2022-03-17 PROCEDURE — 250N000011 HC RX IP 250 OP 636: Performed by: EMERGENCY MEDICINE

## 2022-03-17 PROCEDURE — 96374 THER/PROPH/DIAG INJ IV PUSH: CPT | Performed by: EMERGENCY MEDICINE

## 2022-03-17 PROCEDURE — 82077 ASSAY SPEC XCP UR&BREATH IA: CPT | Performed by: EMERGENCY MEDICINE

## 2022-03-17 PROCEDURE — 99223 1ST HOSP IP/OBS HIGH 75: CPT | Mod: AI | Performed by: INTERNAL MEDICINE

## 2022-03-17 PROCEDURE — 93005 ELECTROCARDIOGRAM TRACING: CPT | Performed by: EMERGENCY MEDICINE

## 2022-03-17 PROCEDURE — 250N000009 HC RX 250: Performed by: NURSE PRACTITIONER

## 2022-03-17 PROCEDURE — C9803 HOPD COVID-19 SPEC COLLECT: HCPCS | Performed by: EMERGENCY MEDICINE

## 2022-03-17 PROCEDURE — 96361 HYDRATE IV INFUSION ADD-ON: CPT | Performed by: EMERGENCY MEDICINE

## 2022-03-17 PROCEDURE — 83036 HEMOGLOBIN GLYCOSYLATED A1C: CPT | Performed by: NURSE PRACTITIONER

## 2022-03-17 PROCEDURE — 84155 ASSAY OF PROTEIN SERUM: CPT | Performed by: NURSE PRACTITIONER

## 2022-03-17 PROCEDURE — 250N000012 HC RX MED GY IP 250 OP 636 PS 637: Performed by: NURSE PRACTITIONER

## 2022-03-17 PROCEDURE — 82140 ASSAY OF AMMONIA: CPT | Performed by: NURSE PRACTITIONER

## 2022-03-17 PROCEDURE — 93010 ELECTROCARDIOGRAM REPORT: CPT | Performed by: EMERGENCY MEDICINE

## 2022-03-17 RX ORDER — CIPROFLOXACIN 250 MG/1
500 TABLET, FILM COATED ORAL EVERY 12 HOURS SCHEDULED
Status: DISCONTINUED | OUTPATIENT
Start: 2022-03-18 | End: 2022-03-23 | Stop reason: HOSPADM

## 2022-03-17 RX ORDER — LORAZEPAM 2 MG/ML
0.5 INJECTION INTRAMUSCULAR ONCE
Status: COMPLETED | OUTPATIENT
Start: 2022-03-17 | End: 2022-03-17

## 2022-03-17 RX ORDER — LABETALOL HYDROCHLORIDE 5 MG/ML
10 INJECTION, SOLUTION INTRAVENOUS ONCE
Status: COMPLETED | OUTPATIENT
Start: 2022-03-17 | End: 2022-03-17

## 2022-03-17 RX ORDER — LORAZEPAM 2 MG/ML
1 INJECTION INTRAMUSCULAR ONCE
Status: COMPLETED | OUTPATIENT
Start: 2022-03-17 | End: 2022-03-18

## 2022-03-17 RX ORDER — LORAZEPAM 2 MG/ML
1 INJECTION INTRAMUSCULAR ONCE
Status: COMPLETED | OUTPATIENT
Start: 2022-03-17 | End: 2022-03-17

## 2022-03-17 RX ORDER — PHYSOSTIGMINE SALICYLATE 1 MG/ML
1 INJECTION INTRAVENOUS ONCE
Status: COMPLETED | OUTPATIENT
Start: 2022-03-17 | End: 2022-03-18

## 2022-03-17 RX ORDER — ONDANSETRON 4 MG/1
4 TABLET, ORALLY DISINTEGRATING ORAL EVERY 6 HOURS PRN
Status: DISCONTINUED | OUTPATIENT
Start: 2022-03-17 | End: 2022-03-23 | Stop reason: HOSPADM

## 2022-03-17 RX ORDER — AMOXICILLIN 250 MG
1 CAPSULE ORAL 2 TIMES DAILY PRN
Status: DISCONTINUED | OUTPATIENT
Start: 2022-03-17 | End: 2022-03-23 | Stop reason: HOSPADM

## 2022-03-17 RX ORDER — AMOXICILLIN 250 MG
2 CAPSULE ORAL 2 TIMES DAILY PRN
Status: DISCONTINUED | OUTPATIENT
Start: 2022-03-17 | End: 2022-03-23 | Stop reason: HOSPADM

## 2022-03-17 RX ORDER — POTASSIUM CHLORIDE 750 MG/1
20 TABLET, EXTENDED RELEASE ORAL ONCE
Status: COMPLETED | OUTPATIENT
Start: 2022-03-17 | End: 2022-03-18

## 2022-03-17 RX ORDER — SODIUM CHLORIDE 9 MG/ML
INJECTION, SOLUTION INTRAVENOUS CONTINUOUS
Status: DISCONTINUED | OUTPATIENT
Start: 2022-03-17 | End: 2022-03-21

## 2022-03-17 RX ORDER — SODIUM CHLORIDE 9 MG/ML
INJECTION, SOLUTION INTRAVENOUS CONTINUOUS
Status: DISCONTINUED | OUTPATIENT
Start: 2022-03-17 | End: 2022-03-18

## 2022-03-17 RX ORDER — NICOTINE POLACRILEX 4 MG
15-30 LOZENGE BUCCAL
Status: DISCONTINUED | OUTPATIENT
Start: 2022-03-17 | End: 2022-03-23 | Stop reason: HOSPADM

## 2022-03-17 RX ORDER — AMLODIPINE BESYLATE 10 MG/1
10 TABLET ORAL DAILY
Status: DISCONTINUED | OUTPATIENT
Start: 2022-03-18 | End: 2022-03-23 | Stop reason: HOSPADM

## 2022-03-17 RX ORDER — ASPIRIN 81 MG/1
81 TABLET, CHEWABLE ORAL DAILY
Status: DISCONTINUED | OUTPATIENT
Start: 2022-03-18 | End: 2022-03-23 | Stop reason: HOSPADM

## 2022-03-17 RX ORDER — PANTOPRAZOLE SODIUM 40 MG/1
40 TABLET, DELAYED RELEASE ORAL
Status: DISCONTINUED | OUTPATIENT
Start: 2022-03-18 | End: 2022-03-23 | Stop reason: HOSPADM

## 2022-03-17 RX ORDER — TACROLIMUS 1 MG/1
2 CAPSULE ORAL
Status: DISCONTINUED | OUTPATIENT
Start: 2022-03-17 | End: 2022-03-18

## 2022-03-17 RX ORDER — DEXTROSE MONOHYDRATE 25 G/50ML
25-50 INJECTION, SOLUTION INTRAVENOUS
Status: DISCONTINUED | OUTPATIENT
Start: 2022-03-17 | End: 2022-03-23 | Stop reason: HOSPADM

## 2022-03-17 RX ORDER — ONDANSETRON 2 MG/ML
4 INJECTION INTRAMUSCULAR; INTRAVENOUS EVERY 6 HOURS PRN
Status: DISCONTINUED | OUTPATIENT
Start: 2022-03-17 | End: 2022-03-23 | Stop reason: HOSPADM

## 2022-03-17 RX ORDER — ACETAMINOPHEN 325 MG/1
650 TABLET ORAL EVERY 6 HOURS PRN
Status: DISCONTINUED | OUTPATIENT
Start: 2022-03-17 | End: 2022-03-23 | Stop reason: HOSPADM

## 2022-03-17 RX ORDER — FAMOTIDINE 20 MG/1
20 TABLET, FILM COATED ORAL 2 TIMES DAILY PRN
Status: DISCONTINUED | OUTPATIENT
Start: 2022-03-17 | End: 2022-03-23 | Stop reason: HOSPADM

## 2022-03-17 RX ORDER — PHYSOSTIGMINE SALICYLATE 1 MG/ML
1 INJECTION INTRAVENOUS ONCE
Status: COMPLETED | OUTPATIENT
Start: 2022-03-17 | End: 2022-03-17

## 2022-03-17 RX ADMIN — LORAZEPAM 0.5 MG: 2 INJECTION INTRAMUSCULAR; INTRAVENOUS at 19:28

## 2022-03-17 RX ADMIN — SODIUM CHLORIDE: 9 INJECTION, SOLUTION INTRAVENOUS at 17:59

## 2022-03-17 RX ADMIN — SODIUM CHLORIDE 1000 ML: 9 INJECTION, SOLUTION INTRAVENOUS at 15:04

## 2022-03-17 RX ADMIN — LORAZEPAM 1 MG: 2 INJECTION INTRAMUSCULAR; INTRAVENOUS at 15:04

## 2022-03-17 RX ADMIN — LORAZEPAM 1 MG: 2 INJECTION INTRAMUSCULAR; INTRAVENOUS at 21:07

## 2022-03-17 RX ADMIN — LORAZEPAM 0.5 MG: 2 INJECTION INTRAMUSCULAR; INTRAVENOUS at 17:39

## 2022-03-17 RX ADMIN — SODIUM CHLORIDE 1000 ML: 9 INJECTION, SOLUTION INTRAVENOUS at 16:44

## 2022-03-17 RX ADMIN — LABETALOL HYDROCHLORIDE 10 MG: 5 INJECTION, SOLUTION INTRAVENOUS at 17:34

## 2022-03-17 RX ADMIN — INSULIN ASPART 4 UNITS: 100 INJECTION, SOLUTION INTRAVENOUS; SUBCUTANEOUS at 18:00

## 2022-03-17 RX ADMIN — LABETALOL HYDROCHLORIDE 10 MG: 5 INJECTION, SOLUTION INTRAVENOUS at 15:33

## 2022-03-17 RX ADMIN — SODIUM CHLORIDE 1000 ML: 9 INJECTION, SOLUTION INTRAVENOUS at 19:29

## 2022-03-17 RX ADMIN — SODIUM CHLORIDE: 9 INJECTION, SOLUTION INTRAVENOUS at 20:42

## 2022-03-17 RX ADMIN — TACROLIMUS 2 MG: 1 CAPSULE ORAL at 21:08

## 2022-03-17 RX ADMIN — PHYSOSTIGMINE SALICYLATE 1 MG: 1 INJECTION INTRAVENOUS at 17:40

## 2022-03-17 ASSESSMENT — ACTIVITIES OF DAILY LIVING (ADL)
ADLS_ACUITY_SCORE: 3

## 2022-03-17 NOTE — ED PROVIDER NOTES
"    [facility] EMERGENCY DEPARTMENT ([facility])    3/17/22     ED 4     History     Chief Complaint   Patient presents with     Drug Overdose     19 benadryl tablets at 0930 today     The history is provided by the patient and medical records.     Abhishek Downey is a 46 year old male presents to the ER via ambulance from the [facility] after he apparently took a Benadryl overdose earlier this morning.  Approximately 5 hours ago the patient took 19 Benadryl tablets for an unknown reason.  The patient is a very poor historian as he is symptomatic and is unable to provide an accurate history.  There were no other apparent associated ingestions and the patient presents to the ER for evaluation via ambulance.    This part of the document was transcribed by Mariann Saleem, Medical Scribe.      Past Medical History:   Diagnosis Date     Cirrhosis of liver (H)     Due to PSC     DU (duodenal ulcer) 5/2015    treated nonsurgically      PSC (primary sclerosing cholangitis)      Ulcerative colitis    History of liver transplant      Current Outpatient Medications   Medication Sig Dispense Refill     amLODIPine (NORVASC) 10 MG tablet Take 1 tablet (10 mg) by mouth daily 90 tablet 3     aspirin 81 MG tablet Take by mouth daily       BD VEO INSULIN SYRINGE U/F 31G X 15/64\" 0.5 ML        ciprofloxacin (CIPRO) 500 MG tablet Take 500 mg by mouth 2 times daily       hydrOXYzine (VISTARIL) 25 MG capsule Take 1-2 capsules (25-50 mg) by mouth 3 times daily as needed for anxiety Or 1-2 tabs at night for sleep 90 capsule 0     insulin  UNIT/ML injection Inject 40 Units Subcutaneous       sertraline (ZOLOFT) 100 MG tablet Take 1.5 tablets (150 mg) by mouth daily 135 tablet 3     tacrolimus (GENERIC EQUIVALENT) 0.5 MG capsule Take 0.5 mg by mouth 2 times daily        Allergies   Allergen Reactions     No Known Allergies      Social History     Socioeconomic History     Marital status:      Spouse name: Not " on file     Number of children: Not on file     Years of education: Not on file     Highest education level: Not on file   Occupational History     Not on file   Tobacco Use     Smoking status: Never Smoker     Smokeless tobacco: Never Used   Substance and Sexual Activity     Alcohol use: No     Alcohol/week: 0.0 standard drinks     Comment: 2-3x per year     Drug use: No     Sexual activity: Not Currently   Other Topics Concern     Not on file   Social History Narrative     with two kids     Social Determinants of Health     Financial Resource Strain: Not on file   Food Insecurity: Not on file   Transportation Needs: Not on file   Physical Activity: Not on file   Stress: Not on file   Social Connections: Not on file   Intimate Partner Violence: Not on file   Housing Stability: Not on file     Past Surgical History:   Procedure Laterality Date     COLECTOMY  12/27/07     FLEXIBLE SIGMOIDOSCOPY  10/4/10     HC ERCP W/W/O NONA OF SPEC-BRUSH/WASH  02/19/10     HC ERCP W/W/O NONA OF SPEC-BRUSH/WASH  03/05/10      UGI ENDOSCOPY W BANDING ESOPH/GASTRIC VARICES  since 2/2015    monthly at [facility]; last one in August      UPPER GI ENDOSCOPY  11/13/13    [facility]     UPPER GI ENDOSCOPY  2/16/15    [facility]     Family History   Problem Relation Age of Onset     Heart Failure Mother            Review of Systems  A complete review of systems was performed with pertinent positives and negatives noted in the HPI, and all other systems negative.   Review of systems thought unreliable as the patient is currently hallucinating and an unreliable historian    Physical Exam   BP: (!) 176/143  Pulse: (!) 137  Temp: 97.9  F (36.6  C)  Resp: 18  Weight: 117.9 kg (260 lb)  SpO2: 98 %  Physical Exam  Vitals and nursing note reviewed.   Constitutional:       Appearance: He is not diaphoretic.      Comments: Awake but actively hallucinating   HENT:      Head: Atraumatic.   Eyes:      Extraocular Movements: Extraocular  movements intact.      Pupils: Pupils are equal, round, and reactive to light.   Cardiovascular:      Rate and Rhythm: Tachycardia present.   Pulmonary:      Breath sounds: Normal breath sounds.   Abdominal:      Palpations: Abdomen is soft.      Tenderness: There is no abdominal tenderness.   Musculoskeletal:         General: No deformity.      Cervical back: Neck supple.   Skin:     General: Skin is dry.   Neurological:      Cranial Nerves: No cranial nerve deficit.      Comments: Moving all extremities with equal strength   Psychiatric:      Comments: Unable to completely assess         ED Course      Procedures        Patient was placed on cardiac monitor and oximetry.  IV was established for blood draw and fluid administration as well as medication administration.        EKG revealed a incomplete right bundle branch block with a sinus tach at a rate of 117.  Patient's TX interval was 0.142 with a QRS duration of point 096.  Patient had a normal axis with no acute ST or T wave changes significant for ischemia.  This is read by me personally.    Results for orders placed or performed during the hospital encounter of 03/17/22   XR Chest Port 1 View     Status: None    Narrative    XR CHEST PORT 1 VIEW 3/17/2022 3:54 PM    HISTORY: overdose    COMPARISON: 9/22/18      Impression    IMPRESSION: No focal infiltrate, pleural effusion or pneumothorax. The  cardiac and mediastinal silhouettes are normal.    WENDY MARIO MD         SYSTEM ID:  [id number]   INR     Status: Normal   Result Value Ref Range    INR 1.06 0.85 - 1.15   Comprehensive metabolic panel     Status: Abnormal   Result Value Ref Range    Sodium 136 133 - 144 mmol/L    Potassium 3.7 3.4 - 5.3 mmol/L    Chloride 103 94 - 109 mmol/L    Carbon Dioxide (CO2) 23 20 - 32 mmol/L    Anion Gap 10 3 - 14 mmol/L    Urea Nitrogen 17 7 - 30 mg/dL    Creatinine 1.28 (H) 0.66 - 1.25 mg/dL    Calcium 9.8 8.5 - 10.1 mg/dL    Glucose 428 (H) 70 - 99 mg/dL    Alkaline  Phosphatase 148 40 - 150 U/L    AST 29 0 - 45 U/L    ALT 53 0 - 70 U/L    Protein Total 8.1 6.8 - 8.8 g/dL    Albumin 4.6 3.4 - 5.0 g/dL    Bilirubin Total 1.4 (H) 0.2 - 1.3 mg/dL    GFR Estimate 71 >60 mL/min/1.73m2   Acetaminophen level     Status: Abnormal   Result Value Ref Range    Acetaminophen <2 (L) 10 - 30 mg/L   Salicylate level     Status: Normal   Result Value Ref Range    Salicylate <2 <20 mg/dL   Alcohol ethyl     Status: Normal   Result Value Ref Range    Alcohol ethyl <0.01 <=0.01 g/dL   CBC with platelets and differential     Status: Abnormal   Result Value Ref Range    WBC Count 7.2 4.0 - 11.0 10e3/uL    RBC Count 5.48 4.40 - 5.90 10e6/uL    Hemoglobin 16.9 13.3 - 17.7 g/dL    Hematocrit 47.1 40.0 - 53.0 %    MCV 86 78 - 100 fL    MCH 30.8 26.5 - 33.0 pg    MCHC 35.9 31.5 - 36.5 g/dL    RDW 14.0 10.0 - 15.0 %    Platelet Count 122 (L) 150 - 450 10e3/uL    % Neutrophils 88 %    % Lymphocytes 6 %    % Monocytes 5 %    % Eosinophils 0 %    % Basophils 0 %    % Immature Granulocytes 1 %    NRBCs per 100 WBC 0 <1 /100    Absolute Neutrophils 6.3 1.6 - 8.3 10e3/uL    Absolute Lymphocytes 0.4 (L) 0.8 - 5.3 10e3/uL    Absolute Monocytes 0.3 0.0 - 1.3 10e3/uL    Absolute Eosinophils 0.0 0.0 - 0.7 10e3/uL    Absolute Basophils 0.0 0.0 - 0.2 10e3/uL    Absolute Immature Granulocytes 0.1 <=0.4 10e3/uL    Absolute NRBCs 0.0 10e3/uL   Lactic acid whole blood STAT     Status: Abnormal   Result Value Ref Range    Lactic Acid 4.1 (HH) 0.7 - 2.0 mmol/L   EKG 12 lead     Status: None (Preliminary result)   Result Value Ref Range    Systolic Blood Pressure  mmHg    Diastolic Blood Pressure  mmHg    Ventricular Rate 117 BPM    Atrial Rate 117 BPM    UT Interval 142 ms    QRS Duration 96 ms     ms    QTc 479 ms    P Axis 51 degrees    R AXIS 54 degrees    T Axis 16 degrees    Interpretation ECG       Sinus tachycardia  Incomplete right bundle branch block  Nonspecific T wave abnormality  Abnormal ECG     CBC with  platelets differential     Status: Abnormal    Narrative    The following orders were created for panel order CBC with platelets differential.  Procedure                               Abnormality         Status                     ---------                               -----------         ------                     CBC with platelets and d...[[id number]]  Abnormal            Final result                 Please view results for these tests on the individual orders.     Medications   sodium chloride (PF) 0.9% PF flush 3 mL (has no administration in time range)   sodium chloride (PF) 0.9% PF flush 3 mL (has no administration in time range)   sodium chloride 0.9% infusion (has no administration in time range)   0.9% sodium chloride BOLUS (has no administration in time range)   labetalol (NORMODYNE/TRANDATE) injection 10 mg (has no administration in time range)   LORazepam (ATIVAN) injection 0.5 mg (has no administration in time range)   0.9% sodium chloride BOLUS (1,000 mLs Intravenous New Bag 3/17/22 1504)   LORazepam (ATIVAN) injection 1 mg (1 mg Intravenous Given 3/17/22 1504)   labetalol (NORMODYNE/TRANDATE) injection 10 mg (10 mg Intravenous Given 3/17/22 1533)     Patient received some Ativan for some agitation and received labetalol IV for his blood pressure and heart rate control.  The case was discussed with the medicine service submitted a intermediate care bed can be arranged for the patient as well as a sitter.    Assessments & Plan (with Medical Decision Making)     I have reviewed the nursing notes.    Patient presents clinically as a antihistamine overdose and was given IV fluids as well as some Ativan and labetalol IV.  Patient currently has a heart rate of 121 with a blood pressure 155/111.  Patient will be admitted to an intermediate care bed for continued treatment and observation.        Final diagnoses:   Poisoning by antihistamine, undetermined intent, initial encounter     This case required  critical care of greater than 60 minutes independent of procedures      Wesley Guy MD  [facility] EMERGENCY DEPARTMENT  3/17/2022     Wesley Guy MD  03/17/22 [id number]

## 2022-03-17 NOTE — ED NOTES
Bed: ED04  Expected date: 3/17/22  Expected time:   Means of arrival:   Comments:  [facility] 45 y/o male Benadryl overdose   Tachycardia and hypotension

## 2022-03-17 NOTE — PROGRESS NOTES
Transfer to [place].    Accepted on [facility] for benadryl overdose.     It was unknown that patient had liver transplant at [place] and he will need hepatology following on [place].     Zeus Bellamy MD on 3/17/2022 at 4:41 PM

## 2022-03-17 NOTE — H&P
[facility]    History and Physical - Hospitalist Service, GOLD TEAM   Date of Admission:  3/17/2022    Assessment & Plan      Abhishek Downey is a 46 year old male admitted on 3/17/2022. He has past medical history of liver transplant (2017) for PSC, diabetes type 2 on insulin, ulcerative colitis, chronic pouchitis, GERD, and hypertension.  Per ED report, patient ingested 19-25 mg Benadryl tablets on the morning of 3/17.  EMS was called, and was brought to the emergency room.  He is admitted to the hospital medicine service for evaluation and management of anticholinergic toxicity.     #poisoning by antihistamine, undetermined intent  Per ED report, patient ingested 19 benadryl tablets around 0930. Unclear if self harm was intended. On my exam at 1645, patient certainly with anticholinergic CNS toxicity - confused and disoriented, appears to be responding to visual hallucinations. Patient is tachycardic 120s, hypertensive 150s/100s. No airway compromise. EKG sinus tachy w/ incomplete RBBB.  Half-life of p.o. Benadryl in an adult is 9 to 12 hours.  Discussed case with Poison Control.    -symptom management: labetalol IV for tachycardia/hypertension, benzodiazepine for agitated delirium.     -may utilize dexmedetomidine if agitation unmanaged with benzodiazepines    -physostigmine 1mg IV if available; may be unavailable due to shortage. Ok to repeat dose x1.    -straight cath q6h if patient unable to void due to anticholinergic toxicity    #lactic acidosis  Lactic acid 4.1 after 2L IVF.    -additional 1L NS bolus ordered    -trend lactic acid, next check 1800    #Hx liver transplant (2017) for PSC  Follows at [facility].  Annual visits, last 2021.     -transfer to [place]; there is no hepatology service on [facility]    -IS: tacrolimus 2mg BID.  This is the dose from [facility] notes.    #elevated creatinine, mild  Unclear if diagnosis of CKD made  previously or if the elevated creatinine values in our system in the past represent an acute illness.    -monitor BMP    -Fluid resuscitation    #ulcerative colitis  #chronic pouchitis  Patient follows with [facility]. His last visit in Sept 2021, it looks like there was a plan to try rotating antibiotics for his chronic pouchitis: metronidazole 500mg BID x 4 weeks, 4 weeks off, then ciprofloxacin 500mg BID x 4 weeks. Patient unable to tell me where he was at in the rotation.    -resume ciprofloxacin 500mg BID from his medication list    #major depressive disorder  Patient recently started counseling and PTA on sertraline. One psychiatric hospital stay many years ago for suicidal thoughts.     -psychiatry consult when patient's delirium resolves    -continue PTA sertraline     #GERD    -continue PTA pantoprazole     Diet:   regular  DVT Prophylaxis: Low Risk/Ambulatory with no VTE prophylaxis indicated and Ambulate every shift  Baeza Catheter: Not present  Central Lines: None  Cardiac Monitoring: None  Code Status:   FULL    Clinically Significant Risk Factors Present on Admission               # Platelet Defect: home medication list includes an antiplatelet medication       Disposition Plan   Expected Discharge: TBD  Anticipated discharge location:  Awaiting care coordination huddle     The patient's care was discussed with the Attending Physician, Dr. Epperson, Bedside Nurse, Patient and ED MD.    RAMA Thao [facility]  Hospitalist Service, [facility]  Securely message with the Vocera Web Console (learn more here)  Text page via [facility] Paging/Directory   Please see signed in provider for up to date coverage information      ______________________________________________________________________    Chief Complaint   Unable to state    History is obtained from the electronic health record and emergency department  physician.    History of Present Illness   Abhishek Downey is a 46 year old male who has past medical history as outline above who was brought to the ED via EMS for evaluation and management of anticholinergic overdose. Per ED report, patient took 19 benadryl tablets and EMS was called. Medication was taken around 0930 this AM. We should therefore be past the peak effect of this medication. However, the patient has agitated delirium. He is unable to communicate with me.  I am unsure if the patient ingested medication in addition to the Benadryl.  I am also unsure of the specific timing of the ingestion or the source of the information regarding the Benadryl ingestion amount. On a review of the chart, it seems the patient is going through a divorce and has been diagnosed with depression and is prescribed sertraline.  He also is in therapy.    The patient's medical history includes a liver transplant in 2017 for PSC.  As he is a transplant patient, we intend to locate a bed for him at the [facility] so that he may have access to transplant hepatology consultation if needed.      Review of Systems    Review of systems not obtained due to patient factors - confusion, lack of cooperation and mental status.    Past Medical History    I have reviewed this patient's medical history and updated it with pertinent information if needed.   Past Medical History:   Diagnosis Date     Cirrhosis of liver (H)     Due to PSC     DU (duodenal ulcer) 5/2015    treated nonsurgically      PSC (primary sclerosing cholangitis)      Ulcerative colitis        Past Surgical History   I have reviewed this patient's surgical history and updated it with pertinent information if needed.  Past Surgical History:   Procedure Laterality Date     COLECTOMY  12/27/07     FLEXIBLE SIGMOIDOSCOPY  10/4/10      ERCP W/W/O NONA OF SPEC-BRUSH/WASH  02/19/10      ERCP W/W/O NONA OF SPEC-BRUSH/WASH  03/05/10      UGI ENDOSCOPY W  "BANDING ESOPH/GASTRIC VARICES  since 2/2015    monthly at [facility]; last one in August      UPPER GI ENDOSCOPY  11/13/13    [facility]     UPPER GI ENDOSCOPY  2/16/15    [facility]       Social History   I have reviewed this patient's social history and updated it with pertinent information if needed.  Social History     Tobacco Use     Smoking status: Never Smoker     Smokeless tobacco: Never Used   Substance Use Topics     Alcohol use: No     Alcohol/week: 0.0 standard drinks     Comment: 2-3x per year     Drug use: No       Family History   I have reviewed this patient's family history and updated it with pertinent information if needed.  Family History   Problem Relation Age of Onset     Heart Failure Mother        Prior to Admission Medications   Prior to Admission Medications   Prescriptions Last Dose Informant Patient Reported? Taking?   BD VEO INSULIN SYRINGE U/F 31G X 15/64\" 0.5 ML Unknown at Unknown time  Yes Yes   amLODIPine (NORVASC) 10 MG tablet 3/17/2022 at Unknown time  No Yes   Sig: Take 1 tablet (10 mg) by mouth daily   aspirin 81 MG tablet Unknown at Unknown time  Yes Yes   Sig: Take by mouth daily   ciprofloxacin (CIPRO) 500 MG tablet Unknown at Unknown time  Yes Yes   Sig: Take 500 mg by mouth 2 times daily   hydrOXYzine (VISTARIL) 25 MG capsule 3/17/2022 at Unknown time  No Yes   Sig: Take 1-2 capsules (25-50 mg) by mouth 3 times daily as needed for anxiety Or 1-2 tabs at night for sleep   insulin  UNIT/ML injection 3/17/2022 at Unknown time  Yes Yes   Sig: Inject 40 Units Subcutaneous   sertraline (ZOLOFT) 100 MG tablet 3/17/2022 at Unknown time  No Yes   Sig: Take 1.5 tablets (150 mg) by mouth daily   tacrolimus (GENERIC EQUIVALENT) 0.5 MG capsule 3/17/2022 at Unknown time  Yes Yes   Sig: Take 0.5 mg by mouth 2 times daily       Facility-Administered Medications: None     Allergies   Allergies   Allergen Reactions     No Known Allergies      Physical Exam   Vital Signs: Temp: " 97.9  F (36.6  C) Temp src: Oral BP: (!) 155/95 Pulse: 117   Resp: 15 SpO2: 99 % O2 Device: None (Room air)    Weight: 260 lbs 0 oz    General Appearance: lying in bed, delirious. Looking at ceiling, frequently shifting weight back and forth in bed  Eyes: mydriasis. Pupils are equal, minimally reactive to light  HEENT: no congestion or rhinorrhea  Respiratory: LS CTAB  Cardiovascular: tachycardia, regular, s1, s2  GI: hypoactive BS. Multiple surgical scars. Non-tender abd  Genitourinary: unable to void  Skin: dry, intact. Warm. Erythema of bilateral arms and face  Musculoskeletal: strength intact all extremities  Neurologic: confused. Doesn't answer orientation questions. Raises hands in the air appearing to interact with a hallucination. Moves all extremities  Psychiatric: agitated delirium    Data   Data reviewed today: I reviewed all medications, new labs and imaging results over the last 24 hours. I personally reviewed the EKG tracing showing ST, RBBB.    Recent Labs   Lab 03/17/22  1717 03/17/22  1505   WBC  --  7.2   HGB  --  16.9   MCV  --  86   PLT  --  122*   INR  --  1.06   NA  --  136   POTASSIUM  --  3.7   CHLORIDE  --  103   CO2  --  23   BUN  --  17   CR  --  1.28*   ANIONGAP  --  10   BREN  --  9.8   * 428*   ALBUMIN  --  4.6   PROTTOTAL  --  8.1   BILITOTAL  --  1.4*   ALKPHOS  --  148   ALT  --  53   AST  --  29

## 2022-03-18 LAB
ALBUMIN SERPL-MCNC: 3.8 G/DL (ref 3.4–5)
ALP SERPL-CCNC: 107 U/L (ref 40–150)
ALT SERPL W P-5'-P-CCNC: 43 U/L (ref 0–70)
ANION GAP SERPL CALCULATED.3IONS-SCNC: 7 MMOL/L (ref 3–14)
AST SERPL W P-5'-P-CCNC: 26 U/L (ref 0–45)
ATRIAL RATE - MUSE: 117 BPM
ATRIAL RATE - MUSE: 95 BPM
BILIRUB SERPL-MCNC: 1.4 MG/DL (ref 0.2–1.3)
BUN SERPL-MCNC: 16 MG/DL (ref 7–30)
CALCIUM SERPL-MCNC: 9.1 MG/DL (ref 8.5–10.1)
CHLORIDE BLD-SCNC: 114 MMOL/L (ref 94–109)
CO2 SERPL-SCNC: 23 MMOL/L (ref 20–32)
CREAT SERPL-MCNC: 1.17 MG/DL (ref 0.66–1.25)
DIASTOLIC BLOOD PRESSURE - MUSE: NORMAL MMHG
DIASTOLIC BLOOD PRESSURE - MUSE: NORMAL MMHG
ERYTHROCYTE [DISTWIDTH] IN BLOOD BY AUTOMATED COUNT: 14.6 % (ref 10–15)
GFR SERPL CREATININE-BSD FRML MDRD: 79 ML/MIN/1.73M2
GLUCOSE BLD-MCNC: 192 MG/DL (ref 70–99)
GLUCOSE BLD-MCNC: 192 MG/DL (ref 70–99)
GLUCOSE BLDC GLUCOMTR-MCNC: 173 MG/DL (ref 70–99)
GLUCOSE BLDC GLUCOMTR-MCNC: 177 MG/DL (ref 70–99)
GLUCOSE BLDC GLUCOMTR-MCNC: 196 MG/DL (ref 70–99)
GLUCOSE BLDC GLUCOMTR-MCNC: 349 MG/DL (ref 70–99)
HCT VFR BLD AUTO: 38 % (ref 40–53)
HGB BLD-MCNC: 13.6 G/DL (ref 13.3–17.7)
INTERPRETATION ECG - MUSE: NORMAL
INTERPRETATION ECG - MUSE: NORMAL
MAGNESIUM SERPL-MCNC: 2 MG/DL (ref 1.6–2.3)
MCH RBC QN AUTO: 31 PG (ref 26.5–33)
MCHC RBC AUTO-ENTMCNC: 35.8 G/DL (ref 31.5–36.5)
MCV RBC AUTO: 87 FL (ref 78–100)
P AXIS - MUSE: 51 DEGREES
P AXIS - MUSE: 55 DEGREES
PLATELET # BLD AUTO: 94 10E3/UL (ref 150–450)
POTASSIUM BLD-SCNC: 3.5 MMOL/L (ref 3.4–5.3)
POTASSIUM BLD-SCNC: 3.7 MMOL/L (ref 3.4–5.3)
PR INTERVAL - MUSE: 132 MS
PR INTERVAL - MUSE: 142 MS
PROT SERPL-MCNC: 6.5 G/DL (ref 6.8–8.8)
QRS DURATION - MUSE: 90 MS
QRS DURATION - MUSE: 96 MS
QT - MUSE: 344 MS
QT - MUSE: 366 MS
QTC - MUSE: 459 MS
QTC - MUSE: 479 MS
R AXIS - MUSE: 53 DEGREES
R AXIS - MUSE: 54 DEGREES
RBC # BLD AUTO: 4.39 10E6/UL (ref 4.4–5.9)
SODIUM SERPL-SCNC: 144 MMOL/L (ref 133–144)
SYSTOLIC BLOOD PRESSURE - MUSE: NORMAL MMHG
SYSTOLIC BLOOD PRESSURE - MUSE: NORMAL MMHG
T AXIS - MUSE: 16 DEGREES
T AXIS - MUSE: 48 DEGREES
VENTRICULAR RATE- MUSE: 117 BPM
VENTRICULAR RATE- MUSE: 95 BPM
WBC # BLD AUTO: 4.3 10E3/UL (ref 4–11)

## 2022-03-18 PROCEDURE — 99233 SBSQ HOSP IP/OBS HIGH 50: CPT | Performed by: INTERNAL MEDICINE

## 2022-03-18 PROCEDURE — 250N000009 HC RX 250: Performed by: STUDENT IN AN ORGANIZED HEALTH CARE EDUCATION/TRAINING PROGRAM

## 2022-03-18 PROCEDURE — 250N000009 HC RX 250: Performed by: NURSE PRACTITIONER

## 2022-03-18 PROCEDURE — 258N000003 HC RX IP 258 OP 636: Performed by: NURSE PRACTITIONER

## 2022-03-18 PROCEDURE — 36415 COLL VENOUS BLD VENIPUNCTURE: CPT | Performed by: NURSE PRACTITIONER

## 2022-03-18 PROCEDURE — 250N000013 HC RX MED GY IP 250 OP 250 PS 637: Performed by: NURSE PRACTITIONER

## 2022-03-18 PROCEDURE — 82435 ASSAY OF BLOOD CHLORIDE: CPT | Performed by: NURSE PRACTITIONER

## 2022-03-18 PROCEDURE — 250N000011 HC RX IP 250 OP 636: Performed by: INTERNAL MEDICINE

## 2022-03-18 PROCEDURE — 51798 US URINE CAPACITY MEASURE: CPT

## 2022-03-18 PROCEDURE — 36415 COLL VENOUS BLD VENIPUNCTURE: CPT | Performed by: INTERNAL MEDICINE

## 2022-03-18 PROCEDURE — 84132 ASSAY OF SERUM POTASSIUM: CPT | Performed by: INTERNAL MEDICINE

## 2022-03-18 PROCEDURE — 250N000011 HC RX IP 250 OP 636: Performed by: NURSE PRACTITIONER

## 2022-03-18 PROCEDURE — 250N000012 HC RX MED GY IP 250 OP 636 PS 637: Performed by: INTERNAL MEDICINE

## 2022-03-18 PROCEDURE — 250N000011 HC RX IP 250 OP 636: Performed by: STUDENT IN AN ORGANIZED HEALTH CARE EDUCATION/TRAINING PROGRAM

## 2022-03-18 PROCEDURE — 120N000002 HC R&B MED SURG/OB UMMC

## 2022-03-18 PROCEDURE — 85027 COMPLETE CBC AUTOMATED: CPT | Performed by: NURSE PRACTITIONER

## 2022-03-18 PROCEDURE — 83735 ASSAY OF MAGNESIUM: CPT | Performed by: INTERNAL MEDICINE

## 2022-03-18 PROCEDURE — 96376 TX/PRO/DX INJ SAME DRUG ADON: CPT

## 2022-03-18 PROCEDURE — 250N000012 HC RX MED GY IP 250 OP 636 PS 637: Performed by: NURSE PRACTITIONER

## 2022-03-18 PROCEDURE — 51701 INSERT BLADDER CATHETER: CPT

## 2022-03-18 RX ORDER — PROCHLORPERAZINE MALEATE 5 MG
5 TABLET ORAL EVERY 6 HOURS PRN
Status: DISCONTINUED | OUTPATIENT
Start: 2022-03-18 | End: 2022-03-23 | Stop reason: HOSPADM

## 2022-03-18 RX ORDER — MAGNESIUM SULFATE HEPTAHYDRATE 40 MG/ML
2 INJECTION, SOLUTION INTRAVENOUS ONCE
Status: COMPLETED | OUTPATIENT
Start: 2022-03-18 | End: 2022-03-18

## 2022-03-18 RX ORDER — LABETALOL HYDROCHLORIDE 5 MG/ML
10 INJECTION, SOLUTION INTRAVENOUS EVERY 4 HOURS PRN
Status: DISCONTINUED | OUTPATIENT
Start: 2022-03-18 | End: 2022-03-23 | Stop reason: HOSPADM

## 2022-03-18 RX ORDER — TACROLIMUS 0.5 MG/1
0.5 CAPSULE ORAL
Status: DISCONTINUED | OUTPATIENT
Start: 2022-03-18 | End: 2022-03-21

## 2022-03-18 RX ORDER — LORAZEPAM 2 MG/ML
1 INJECTION INTRAMUSCULAR ONCE
Status: COMPLETED | OUTPATIENT
Start: 2022-03-18 | End: 2022-03-18

## 2022-03-18 RX ORDER — PHYSOSTIGMINE SALICYLATE 1 MG/ML
2 INJECTION INTRAVENOUS ONCE
Status: COMPLETED | OUTPATIENT
Start: 2022-03-18 | End: 2022-03-18

## 2022-03-18 RX ORDER — PROCHLORPERAZINE 25 MG
25 SUPPOSITORY, RECTAL RECTAL EVERY 12 HOURS PRN
Status: DISCONTINUED | OUTPATIENT
Start: 2022-03-18 | End: 2022-03-23 | Stop reason: HOSPADM

## 2022-03-18 RX ORDER — LORAZEPAM 2 MG/ML
1 INJECTION INTRAMUSCULAR EVERY 4 HOURS PRN
Status: DISCONTINUED | OUTPATIENT
Start: 2022-03-18 | End: 2022-03-23 | Stop reason: HOSPADM

## 2022-03-18 RX ORDER — LORAZEPAM 2 MG/ML
2 INJECTION INTRAMUSCULAR
Status: COMPLETED | OUTPATIENT
Start: 2022-03-18 | End: 2022-03-18

## 2022-03-18 RX ORDER — POTASSIUM CHLORIDE 7.45 MG/ML
10 INJECTION INTRAVENOUS
Status: COMPLETED | OUTPATIENT
Start: 2022-03-18 | End: 2022-03-18

## 2022-03-18 RX ADMIN — POTASSIUM CHLORIDE 10 MEQ: 7.46 INJECTION, SOLUTION INTRAVENOUS at 15:37

## 2022-03-18 RX ADMIN — SODIUM CHLORIDE: 9 INJECTION, SOLUTION INTRAVENOUS at 16:40

## 2022-03-18 RX ADMIN — POTASSIUM CHLORIDE 10 MEQ: 7.46 INJECTION, SOLUTION INTRAVENOUS at 13:22

## 2022-03-18 RX ADMIN — ASPIRIN 81 MG CHEWABLE TABLET 81 MG: 81 TABLET CHEWABLE at 09:57

## 2022-03-18 RX ADMIN — POTASSIUM CHLORIDE 20 MEQ: 750 TABLET, EXTENDED RELEASE ORAL at 00:04

## 2022-03-18 RX ADMIN — TACROLIMUS 2 MG: 1 CAPSULE ORAL at 10:48

## 2022-03-18 RX ADMIN — LORAZEPAM 1 MG: 2 INJECTION INTRAMUSCULAR; INTRAVENOUS at 03:43

## 2022-03-18 RX ADMIN — LORAZEPAM 1 MG: 2 INJECTION INTRAMUSCULAR; INTRAVENOUS at 00:04

## 2022-03-18 RX ADMIN — LABETALOL HYDROCHLORIDE 10 MG: 5 INJECTION, SOLUTION INTRAVENOUS at 23:11

## 2022-03-18 RX ADMIN — SODIUM CHLORIDE: 9 INJECTION, SOLUTION INTRAVENOUS at 23:13

## 2022-03-18 RX ADMIN — CIPROFLOXACIN HYDROCHLORIDE 500 MG: 250 TABLET, FILM COATED ORAL at 09:57

## 2022-03-18 RX ADMIN — SODIUM CHLORIDE: 9 INJECTION, SOLUTION INTRAVENOUS at 06:23

## 2022-03-18 RX ADMIN — PHYSOSTIGMINE SALICYLATE 2 MG: 1 INJECTION INTRAVENOUS at 04:46

## 2022-03-18 RX ADMIN — CIPROFLOXACIN HYDROCHLORIDE 500 MG: 250 TABLET, FILM COATED ORAL at 20:25

## 2022-03-18 RX ADMIN — ONDANSETRON 4 MG: 2 INJECTION INTRAMUSCULAR; INTRAVENOUS at 21:32

## 2022-03-18 RX ADMIN — AMLODIPINE BESYLATE 10 MG: 10 TABLET ORAL at 09:57

## 2022-03-18 RX ADMIN — TACROLIMUS 0.5 MG: 0.5 CAPSULE ORAL at 18:42

## 2022-03-18 RX ADMIN — MAGNESIUM SULFATE 2 G: 2 INJECTION INTRAVENOUS at 14:28

## 2022-03-18 RX ADMIN — PHYSOSTIGMINE SALICYLATE 1 MG: 1 INJECTION INTRAVENOUS at 00:09

## 2022-03-18 RX ADMIN — PANTOPRAZOLE SODIUM 40 MG: 40 TABLET, DELAYED RELEASE ORAL at 09:57

## 2022-03-18 RX ADMIN — LORAZEPAM 2 MG: 2 INJECTION INTRAMUSCULAR; INTRAVENOUS at 09:57

## 2022-03-18 ASSESSMENT — ACTIVITIES OF DAILY LIVING (ADL)
ADLS_ACUITY_SCORE: 3
ADLS_ACUITY_SCORE: 7
ADLS_ACUITY_SCORE: 3

## 2022-03-18 NOTE — ED NOTES
Patient's brief changed. Patient aggressive during changing, tries hitting staff and getting out of bed.

## 2022-03-18 NOTE — ED NOTES
Pt has an order for straight cathter q6 hours. Pt has been incontinence of urine x4 this am. Pt has been cleaned each time. Bladder scanned for 204 at 3.00 am.

## 2022-03-18 NOTE — PROGRESS NOTES
[facility]    Medicine Progress Note - Hospitalist Service, GOLD TEAM 18    Date of Admission:  3/17/2022    Assessment & Plan          Abhishek Downey is a 46 year old male admitted on 3/17/2022. He has past medical history of liver transplant (2017) for PSC, diabetes type 2 on insulin, ulcerative colitis, chronic pouchitis, GERD, and hypertension.  Per ED report, patient ingested 19-25 mg Benadryl tablets on the morning of 3/17.  EMS was called, and was brought to the emergency room.  He is admitted to the hospital medicine service for evaluation and management of anticholinergic toxicity.     poisoning by antihistamine, undetermined intent  -Per ED report, patient ingested 19 benadryl tablets around 0930 3/17, denied taking anything else   - showing anticholinergic CNS toxicity - confused and disoriented, visual hallucinations, initially   Tachycardic to  120 now in 90s, hypertensive . No airway compromise. EKG sinus tachy w/ incomplete RBBB.  - Half-life of p.o. Benadryl in an adult is 9 to 12 hours.  on admission Discussed case with Poison Control and recommended:      -symptom management: labetalol IV for tachycardia/hypertension, benzodiazepine for agitated delirium. Received labetolol 10 mg IV x 2 so far , ativan several times ( 7 mg total since admission till 0957)   and has it PRN as well     -may utilize dexmedetomidine if agitation unmanaged with benzodiazepines  -Has received physostigmine 1 mg on 3/17 at 1740, again 3/18 at 0009 and 0446 AM ,  Per poison control lasts only for few hrs, physostigmine has nationwide   shortage but we have some for now . If still available can give a dose, no real limit on amounts  , usual 1-2 mg dose ( needs to be given over 5 min) , use if agitation cannot be handles by ativan and call poison  control to discuss more doses if needed       -straight cath q6h if patient unable to void due to anticholinergic toxicity  - I  also spoke  with poison control so not unusual to have some delirium agitation still and likely will last into tomorrow   - repeat EKG to check for qTC ( 479--459 )  and QRS ( 96-90)  prolongation, for QRS prolongation poison control recommended 1-2 amps of bicarb and if improves can give a in drip , for qTC prolongation recommended K and Mg replacement which will do   -poison control recommended  ongoing telemetry and close monitoring till heart rate vitals better and stable       -serum acetaminophen < 2, salicylate < 2 , ethyl alcohol < 0.01        Hypertension   -Takes amlodipine 10 mg every day, continue    lactic acidosis  Lactic acid 4.1 received IV fluids  ---3.7--2.1      Primary sclerosing cholangitis diagnosed in 2010 , status post  liver transplant (2017)  Follows at [facility].  Annual visits, last 2021.   -LFT normal   -transfer to [place]; there is no hepatology service on [facility]  -IS: tacrolimus 2mg BID this was restarted , however per pharmacy last fill of tacrolimus was 0.5 mg bid , so will check levels in AM and for now switch over to 0.5 mg bid   - I  Reached out to  [facility] and awaiting call back,  Goal 4-6  . Ok to continue with 0.5 mg bid for now , not sure how accurate tomorrows level will be as got 2 mg here, can check an other tacrolumis level after 3-4 doses again        liver lesion  - noted on CT in 9/2021 , defer  back to [facility] for further management     elevated creatinine, mild  Cr was 1.4 back in 9/2021 so  suspect underlying CKD .    -monitor BMP    -Fluid resuscitation      T2DM  - unknown how much insulin he takes PTA as confused, found mention of NPH  40 units q AM , asked pharmacy to help out , for now  Start NPH 10 Units subcutaneous and continue accu-checks insulin sliding scale , he has not been eating much , per pharmacy ha snot filled NPH since last May   -HgA1c 8.9 3/17        ulcerative colitis diagnosed in 1998  Status post colectomy and iloanal anastomosis  in 2009 for medically refractory disease   chronic pouchitis, antibiotics dependent  Plan was for 4 weeks of flagyl 500 mg bid , 4 weeks  off then 4 weeks of cipro bid , if fails consider vedolizumab per GI , unknown where he is in his cycle so will need to check , plus he has not filled flagyl per pharmacy   - follows with [facility].  last visit in Sept 2021    -resume ciprofloxacin 500mg BID from his medication list     major depressive disorder  Patient recently started counseling and PTA on sertraline. One psychiatric hospital stay many years ago for suicidal thoughts.     -psychiatry consult when patient's delirium resolves    -continue PTA sertraline      GERD    -continue PTA pantoprazole         Diet: Combination Diet Regular Diet Adult; Safe Tray - NO utensils    DVT Prophylaxis: mechanical   Baeza Catheter: Not present  Central Lines: None  Cardiac Monitoring: None  Code Status: Full Code      Disposition Plan   Transfer to [place]      The patient's care was discussed with ER RN     Lupe Lacey MD  Hospitalist Service, GOLD TEAM 18  [facility]  Securely message with the Vocera Web Console (learn more here)  Text page via [facility] Paging/Directory   Please see signed in provider for up to date coverage information      Clinically Significant Risk Factors Present on Admission               # Platelet Defect: home medication list includes an antiplatelet medication   # Diabetes, type II: last A1C 8.6 % (Ref range: 0.0 - 5.6 %)      ______________________________________________________________________    Interval History      In ER still, less agitated, more redirectable, does answers questions but still with some confusion, denies chest pain  No SON no nausea vomiting, denies taking anything else with benadryl , deniues suicidal ideations   Data reviewed today: I reviewed all medications, new labs and imaging results over  the last 24 hours. I personally reviewed the EKG tracing showing sinus tach.    Physical Exam   Vital Signs: Temp: 98.3  F (36.8  C) Temp src: Oral BP: (!) 149/99 Pulse: 110   Resp: 22 SpO2: 100 % O2 Device: None (Room air)    Weight: 260 lbs 0 oz     General appearence: awake, no apparent distress    HEENT: EOMI, PEARLA, sclera nonicteric,  moist,  mucus membranes,   NECK : supple  RESPIRATORY: lungs clear to auscultation bilateral,  no wheezing or crackles   CARDIOVASCULAR:S1 S2 regular rate and rhythm, no rubs gallops or murmurs appreciated  GASTROINTESTINAL:soft, non-distended , non-tender , + bowel sounds, no masses felt   SKIN: warm and dry, no mottling noted   NEUROLOGIC; awake, speech clear , still some confusion, telling me I. Trying to hide something in my hand, moves all extremities    EXTREMITIES: no clubbing, cyanosis or edema , moves all extremity, good pedal pulses   MUSCULOSKELETAL: without deformity         Data   Recent Labs   Lab 03/18/22  1133 03/18/22  0831 03/18/22  0300 03/17/22  2248 03/17/22  2222 03/17/22  1814 03/17/22  1717 03/17/22  1505   WBC  --  4.3  --   --   --   --   --  7.2   HGB  --  13.6  --   --   --   --   --  16.9   MCV  --  87  --   --   --   --   --  86   PLT  --  94*  --   --   --   --   --  122*   INR  --   --   --   --   --   --   --  1.06   NA  --  144  --   --  142 141  --  136   POTASSIUM 3.7 3.5  --   --  3.5 3.7  --  3.7   CHLORIDE  --  114*  --   --  113* 110*  --  103   CO2  --  23  --   --  28 23  --  23   BUN  --  16  --   --  19 18  --  17   CR  --  1.17  --   --  1.11 1.21  --  1.28*   ANIONGAP  --  7  --   --  1* 8  --  10   BREN  --  9.1  --   --  8.6 8.9  --  9.8   GLC  --  192*  192* 349* 193* 212* 318*   < > 428*   ALBUMIN  --  3.8  --   --  3.8  --   --  4.6   PROTTOTAL  --  6.5*  --   --  6.6*  --   --  8.1   BILITOTAL  --  1.4*  --   --  1.1  --   --  1.4*   ALKPHOS  --  107  --   --  111  --   --  148   ALT  --  43  --   --  39  --   --  53   AST  --   26  --   --  20  --   --  29    < > = values in this interval not displayed.     Recent Results (from the past 24 hour(s))   XR Chest Port 1 View    Narrative    XR CHEST PORT 1 VIEW 3/17/2022 3:54 PM    HISTORY: overdose    COMPARISON: 9/22/18      Impression    IMPRESSION: No focal infiltrate, pleural effusion or pneumothorax. The  cardiac and mediastinal silhouettes are normal.    WENDY MARIO MD         SYSTEM ID:  [id number]

## 2022-03-18 NOTE — ED NOTES
Called hospitalist team and updated them about the patient's delay bed admission. Pt's current condition such as blood pressure, Physical assessment, and treatment suggestions from the Poison control. Spoke with MEDHAT Glover NP from the hospitalist team. She is planing to came and see the patient.

## 2022-03-18 NOTE — PROGRESS NOTES
Cross cover from night shift    Still really agitated, trying to climb out of bed, swinging at nurses.   Talked to poison control -   Will do 2 mg physostigmine over 10 mins (in addition to the 1 mg x 2 he's already gotten).   Can step up on benzos - he is not somnolent at all with multiple 1 mg doses.  Put in for a 2 mg dose ativan prn once, more can be ordered by day team.  can keep going up on benzos if not making him very somnolent  Next step would be Precedex if really agitated    Will need t/f to [place]  Call poison control with questions    Shobha Coronado (Radha Perez MD  Internal Medicine/Pediatrics  Hospitalist

## 2022-03-19 LAB
GLUCOSE BLDC GLUCOMTR-MCNC: 176 MG/DL (ref 70–99)
GLUCOSE BLDC GLUCOMTR-MCNC: 204 MG/DL (ref 70–99)
GLUCOSE BLDC GLUCOMTR-MCNC: 270 MG/DL (ref 70–99)
GLUCOSE BLDC GLUCOMTR-MCNC: 282 MG/DL (ref 70–99)
MAGNESIUM SERPL-MCNC: 2.2 MG/DL (ref 1.6–2.3)
POTASSIUM BLD-SCNC: 3.8 MMOL/L (ref 3.4–5.3)

## 2022-03-19 PROCEDURE — 99222 1ST HOSP IP/OBS MODERATE 55: CPT | Mod: GC | Performed by: INTERNAL MEDICINE

## 2022-03-19 PROCEDURE — 250N000012 HC RX MED GY IP 250 OP 636 PS 637: Performed by: INTERNAL MEDICINE

## 2022-03-19 PROCEDURE — 250N000011 HC RX IP 250 OP 636: Performed by: STUDENT IN AN ORGANIZED HEALTH CARE EDUCATION/TRAINING PROGRAM

## 2022-03-19 PROCEDURE — 250N000013 HC RX MED GY IP 250 OP 250 PS 637: Performed by: NURSE PRACTITIONER

## 2022-03-19 PROCEDURE — 99233 SBSQ HOSP IP/OBS HIGH 50: CPT | Performed by: INTERNAL MEDICINE

## 2022-03-19 PROCEDURE — 250N000012 HC RX MED GY IP 250 OP 636 PS 637: Performed by: NURSE PRACTITIONER

## 2022-03-19 PROCEDURE — 258N000003 HC RX IP 258 OP 636: Performed by: NURSE PRACTITIONER

## 2022-03-19 PROCEDURE — 93010 ELECTROCARDIOGRAM REPORT: CPT | Performed by: INTERNAL MEDICINE

## 2022-03-19 PROCEDURE — 99207 PR CDG-MDM COMPONENT: MEETS HIGH - UP CODED: CPT | Performed by: INTERNAL MEDICINE

## 2022-03-19 PROCEDURE — 93005 ELECTROCARDIOGRAM TRACING: CPT

## 2022-03-19 PROCEDURE — 36415 COLL VENOUS BLD VENIPUNCTURE: CPT | Performed by: INTERNAL MEDICINE

## 2022-03-19 PROCEDURE — 120N000002 HC R&B MED SURG/OB UMMC

## 2022-03-19 PROCEDURE — 83735 ASSAY OF MAGNESIUM: CPT | Performed by: INTERNAL MEDICINE

## 2022-03-19 PROCEDURE — 84132 ASSAY OF SERUM POTASSIUM: CPT | Performed by: INTERNAL MEDICINE

## 2022-03-19 RX ADMIN — INSULIN ASPART 3 UNITS: 100 INJECTION, SOLUTION INTRAVENOUS; SUBCUTANEOUS at 16:41

## 2022-03-19 RX ADMIN — INSULIN ASPART 2 UNITS: 100 INJECTION, SOLUTION INTRAVENOUS; SUBCUTANEOUS at 12:09

## 2022-03-19 RX ADMIN — CIPROFLOXACIN HYDROCHLORIDE 500 MG: 250 TABLET, FILM COATED ORAL at 20:24

## 2022-03-19 RX ADMIN — PANTOPRAZOLE SODIUM 40 MG: 40 TABLET, DELAYED RELEASE ORAL at 08:37

## 2022-03-19 RX ADMIN — TACROLIMUS 0.5 MG: 0.5 CAPSULE ORAL at 08:37

## 2022-03-19 RX ADMIN — SODIUM CHLORIDE: 9 INJECTION, SOLUTION INTRAVENOUS at 12:10

## 2022-03-19 RX ADMIN — TACROLIMUS 0.5 MG: 0.5 CAPSULE ORAL at 18:23

## 2022-03-19 RX ADMIN — LABETALOL HYDROCHLORIDE 10 MG: 5 INJECTION, SOLUTION INTRAVENOUS at 13:11

## 2022-03-19 RX ADMIN — INSULIN ASPART 1 UNITS: 100 INJECTION, SOLUTION INTRAVENOUS; SUBCUTANEOUS at 10:05

## 2022-03-19 RX ADMIN — CIPROFLOXACIN HYDROCHLORIDE 500 MG: 250 TABLET, FILM COATED ORAL at 08:37

## 2022-03-19 RX ADMIN — AMLODIPINE BESYLATE 10 MG: 10 TABLET ORAL at 08:37

## 2022-03-19 RX ADMIN — ASPIRIN 81 MG CHEWABLE TABLET 81 MG: 81 TABLET CHEWABLE at 08:37

## 2022-03-19 RX ADMIN — SODIUM CHLORIDE: 9 INJECTION, SOLUTION INTRAVENOUS at 22:22

## 2022-03-19 ASSESSMENT — ACTIVITIES OF DAILY LIVING (ADL)
ADLS_ACUITY_SCORE: 7
ADLS_ACUITY_SCORE: 7
ADLS_ACUITY_SCORE: 13
ADLS_ACUITY_SCORE: 11
ADLS_ACUITY_SCORE: 13
ADLS_ACUITY_SCORE: 13
ADLS_ACUITY_SCORE: 7
ADLS_ACUITY_SCORE: 13
ADLS_ACUITY_SCORE: 7
ADLS_ACUITY_SCORE: 13
ADLS_ACUITY_SCORE: 7
ADLS_ACUITY_SCORE: 13
ADLS_ACUITY_SCORE: 7
ADLS_ACUITY_SCORE: 13
ADLS_ACUITY_SCORE: 7
ADLS_ACUITY_SCORE: 7
ADLS_ACUITY_SCORE: 13
ADLS_ACUITY_SCORE: 7
ADLS_ACUITY_SCORE: 13

## 2022-03-19 NOTE — PROGRESS NOTES
[employer] Cover Progress Note    Patient seen as transfer from [facility].  H&P and inpatient orders reviewed.     Evaluated patient at bedside. He remains agitated and confused. 1:1 sitter in place. Only complaint at this time is nausea.     Naomi Gifford PA-C  Hospitalist Service  [facility] [place]  Pager: [phone]

## 2022-03-19 NOTE — CONSULTS
GASTROENTEROLOGY CONSULTATION      Date of Admission:  3/17/2022           Reason for Consultation:   We were asked by Naomi Gifford to evaluate this patient admitted after overdose who is status post liver transplant           ASSESSMENT AND RECOMMENDATIONS:   Assessment:  46 year old male with a history of PSC status post liver transplant in 2017 complicated by an episode of rejection 2020, UC status post colectomy with IPAA complicated by recurrent pouchitis on rotating antibiotics, depression, admitted after intentional Benadryl overdose with anticholinergic toxicity.  Hepatology consulted to help manage immunosuppression.    #. Status post liver transplant 2017  #. Chronic immunosuppression  Patient follows with [facility], appears to follow with them annually.  Most recent goal tacrolimus trough seems to be 8-10 per hepatology note in 2021, no additional follow-up since then.  Most recent trough level was 9.4 in July 2021.  At that time, he was on 2 mg twice daily of tacrolimus, but at some point appears to have been decreased down to 0.5 mg twice daily of tacrolimus for unclear reasons based on chart review.  Since admission, he received 2 doses of 2 mg of tacrolimus, and subsequently changed after med rec to 0.5 mg x 2 doses.  At this point, given sporadic dosing, tacrolimus level would not be helpful.  Suggest ongoing 0.5 mg q12 hours over the weekend, with tacrolimus trough level drawn on Monday morning prior to his a.m. dose.    He has no signs of decompensation at this time, with normal liver chemistries in the setting of his Benadryl overdose and unclear intake of immunosuppression recently.  We will continue to watch him carefully,     # Ulcerative colitis s/p colectomy w/ IPAA  # Recurrent pouchitis  Patient unable to describe current symptomatology to us.  Would continue his outpatient regimen of antibiotics for pouchitis.  He can follow-up with IBD specialists at [facility] for  additional care after discharge.    #Status post Benadryl overdose with anticholinergic toxicity  Care as per primary team.    Recommendations  -Continue tacrolimus 0.5 mg q 12 hours over the weekend  -Monday AM trough level (should be 11+ hours after prior dose, just prior to receiving AM dose)  -Daily MELD labs  -We will continue to follow    Gastroenterology outpatient follow up recommendations: With [facility] team    Thank you for involving us in this patient's care. Please do not hesitate to contact the GI service with any questions or concerns.     Pt care plan discussed with Dr. Mendez, GI staff physician.    Elliot Rubio MD  -------------------------------------------------------------------------------------------------------------------           History of Present Illness:   Abhishek Downey is a 46 year old male with a history of PSC status post liver transplant in 2017 complicated by an episode of rejection 2020, UC status post colectomy with IPAA complicated by recurrent pouchitis on rotating antibiotics, depression, admitted after intentional Benadryl overdose with anticholinergic toxicity.  Hepatology consulted to help manage immunosuppression.  History obtained primarily from chart review as patient only answering yes or no to questioning this morning given recent toxic ingestion.    Admitted after intentional benadryl overdose (19 25mg tabs). Exhibited anticholinergic toxicity in the ED with tachycardia, confusion, hallucinations. Subsequently transferred to  for higher level of care, improving but still with altered sensorium.    Labs here on admission notable for normal liver chemistries, normal BMP. CBC normal other than low platelets in the 90s. INR 1, albumin 3.8. Lactic acid was 4.1 on admission, now 2.1.      Per [facility] notes:  He is status post  donor liver transplant for PSC on 2017. His posttransplant course was complicated by steroid resistant liver allograft  "rejection, treated with Thymoglobulin in April 2020, CMV viremia ( CMV status D+/R-), and portal vein stenosis status post stent placement. Mr. Downey has a history of ulcerative colitis, status post colectomy and ileal pouch-anal anastomosis, recurrent pouchitis, as well as C. difficile episode of the pouch prior to transplant and insulin-dependent diabetes mellitus.      Pouchitis last summer, saw Dr. Gwendolyn Rose at [place] for this. \"primary sclerosing cholangitis diagnosed in 2010, status post liver transplantation in 2017, diabetes type 2 on insulin, ulcerative colitis, diagnosed in 1998, status post IPAA in 2009 for medically refractory disease, recently hospitalized in April 2021 for episode of pouchitis with dehydration. Plan: 1. Rotating antibiotics with 4 weeks of Flagyl 500 mg b.i.d., 4 weeks off, then 4 weeks Cipro 500 mg b.i.d.. If this approach were to fail, could consider vedolizumab.\"    Unclear where patient is in above cycle of antibiotics.    Most recent goal trough 8-10 per [facility] notes 6/2021. Last pharmacy refill of this shows Tacro 0.5 mg BID. Last level in Care Everywhere 9.4 on 7/15/21.     Was receiving 2 mg BID here x2, then switched to 0.5mg BID yesterday PM.              Past Medical History:   Reviewed and edited as appropriate  Past Medical History:   Diagnosis Date     Cirrhosis of liver (H)     Due to PSC     DU (duodenal ulcer) 5/2015    treated nonsurgically      PSC (primary sclerosing cholangitis)      Ulcerative colitis             Past Surgical History:   Reviewed and edited as appropriate   Past Surgical History:   Procedure Laterality Date     COLECTOMY  12/27/07     FLEXIBLE SIGMOIDOSCOPY  10/4/10      ERCP W/W/O NONA OF SPEC-BRUSH/WASH  02/19/10      ERCP W/W/O NONA OF SPEC-BRUSH/WASH  03/05/10      UGI ENDOSCOPY W BANDING ESOPH/GASTRIC VARICES  since 2/2015    monthly at [facility]; last one in August      UPPER GI ENDOSCOPY  11/13/13    [facility]     UPPER GI " ENDOSCOPY  2/16/15    [facility]            Previous Endoscopy:   No results found for this or any previous visit.         Social History:   Reviewed and edited as appropriate  Social History     Socioeconomic History     Marital status:      Spouse name: Not on file     Number of children: Not on file     Years of education: Not on file     Highest education level: Not on file   Occupational History     Not on file   Tobacco Use     Smoking status: Never Smoker     Smokeless tobacco: Never Used   Substance and Sexual Activity     Alcohol use: No     Alcohol/week: 0.0 standard drinks     Comment: 2-3x per year     Drug use: No     Sexual activity: Not Currently   Other Topics Concern     Not on file   Social History Narrative     with two kids     Social Determinants of Health     Financial Resource Strain: Not on file   Food Insecurity: Not on file   Transportation Needs: Not on file   Physical Activity: Not on file   Stress: Not on file   Social Connections: Not on file   Intimate Partner Violence: Not on file   Housing Stability: Not on file            Family History:   Reviewed and edited as appropriate  Family History   Problem Relation Age of Onset     Heart Failure Mother      No known history of colorectal cancer, liver disease, or inflammatory bowel disease.         Allergies:   Reviewed and edited as appropriate     Allergies   Allergen Reactions     No Known Allergies             Medications:     Current Facility-Administered Medications   Medication     acetaminophen (TYLENOL) tablet 650 mg     amLODIPine (NORVASC) tablet 10 mg     aspirin (ASA) chewable tablet 81 mg     ciprofloxacin (CIPRO) tablet 500 mg     glucose gel 15-30 g    Or     dextrose 50 % injection 25-50 mL    Or     glucagon injection 1 mg     famotidine (PEPCID) tablet 20 mg     insulin aspart (NovoLOG) injection (RAPID ACTING)     insulin aspart (NovoLOG) injection (RAPID ACTING)     insulin aspart (NovoLOG)  injection (RAPID ACTING)     labetalol (NORMODYNE/TRANDATE) injection 10 mg     LORazepam (ATIVAN) injection 1 mg     melatonin tablet 1 mg     menthol-zinc oxide (CALMOSEPTINE) 0.44-20.6 % ointment OINT     ondansetron (ZOFRAN-ODT) ODT tab 4 mg    Or     ondansetron (ZOFRAN) injection 4 mg     pantoprazole (PROTONIX) EC tablet 40 mg     prochlorperazine (COMPAZINE) injection 5 mg    Or     prochlorperazine (COMPAZINE) tablet 5 mg    Or     prochlorperazine (COMPAZINE) suppository 25 mg     senna-docusate (SENOKOT-S/PERICOLACE) 8.6-50 MG per tablet 1 tablet    Or     senna-docusate (SENOKOT-S/PERICOLACE) 8.6-50 MG per tablet 2 tablet     sodium chloride (PF) 0.9% PF flush 3 mL     sodium chloride (PF) 0.9% PF flush 3 mL     sodium chloride 0.9% infusion     tacrolimus (GENERIC EQUIVALENT) capsule 0.5 mg             Review of Systems:     A complete 10 point review of systems was performed and is negative except as noted in the HPI           Physical Exam:   BP (!) 152/102   Pulse 95   Temp 98.3  F (36.8  C) (Oral)   Resp 19   Wt 114.3 kg (251 lb 15.8 oz)   SpO2 96%   BMI 34.18 kg/m    Wt:   Wt Readings from Last 2 Encounters:   03/18/22 114.3 kg (251 lb 15.8 oz)   10/20/21 110.3 kg (243 lb 3.2 oz)      Constitutional: Drowsy, opens eyes briefly, answers yes or no  Eyes: Sclera anicteric  Ears/nose/mouth/throat: Moist mucus membranes  CV: No edema  Respiratory: Breathing comfortably on room air  Abd: Soft, nontender, nondistended, bowel sounds present  Skin: warm, perfused, no jaundice  Neuro: No asterixis, although no myoclonus, although patient not participatory in examination, unable to fully assess mental status  MSK: No gross deformities         Data:   Labs and imaging below were independently reviewed and interpreted    BMP  Recent Labs   Lab 03/18/22  2136 03/18/22  1829 03/18/22  1302 03/18/22  1133 03/18/22  0831 03/17/22  2248 03/17/22  2222 03/17/22  1814 03/17/22  1717 03/17/22  1505   NA  --   --    --   --  144  --  142 141  --  136   POTASSIUM  --   --   --  3.7 3.5  --  3.5 3.7  --  3.7   CHLORIDE  --   --   --   --  114*  --  113* 110*  --  103   BREN  --   --   --   --  9.1  --  8.6 8.9  --  9.8   CO2  --   --   --   --  23  --  28 23  --  23   BUN  --   --   --   --  16  --  19 18  --  17   CR  --   --   --   --  1.17  --  1.11 1.21  --  1.28*   * 173* 177*  --  192*  192*   < > 212* 318*   < > 428*    < > = values in this interval not displayed.     CBC  Recent Labs   Lab 03/18/22  0831 03/17/22  1505   WBC 4.3 7.2   RBC 4.39* 5.48   HGB 13.6 16.9   HCT 38.0* 47.1   MCV 87 86   MCH 31.0 30.8   MCHC 35.8 35.9   RDW 14.6 14.0   PLT 94* 122*     INR  Recent Labs   Lab 03/17/22  1505   INR 1.06     LFTs  Recent Labs   Lab 03/18/22  0831 03/17/22  2222 03/17/22  1505   ALKPHOS 107 111 148   AST 26 20 29   ALT 43 39 53   BILITOTAL 1.4* 1.1 1.4*   PROTTOTAL 6.5* 6.6* 8.1   ALBUMIN 3.8 3.8 4.6      PANCNo lab results found in last 7 days.    Imaging: No relevant GI imaging, reviewed CXR

## 2022-03-19 NOTE — PROGRESS NOTES
7944 Patient received to 6512-01 from [facility]. GCS 14. Drowsy, arouse to voice, follows commands, resistive to patient care at times. Saturating >95% on room air. Incontinent of bowel and bladder. Blanchable redness from stool/urine to buttocks. Peripheral line in place, NS infusing @ 100 ml/hr.     On 1:1 watch for suicidal precaution. Room cleared of excess linen, equipment and other ligature risk items. Sitter in arms reach at bedside.

## 2022-03-19 NOTE — PROGRESS NOTES
[facility]    Medicine Progress Note - Hospitalist Service, GOLD TEAM 8    Date of Admission:  3/17/2022    Assessment & Plan        Abhishek Downey is a 46 year old male admitted on 3/17/2022. He has past medical history of liver transplant (2017) for PSC, diabetes type 2 on insulin, ulcerative colitis, chronic pouchitis, GERD, and hypertension.  Per ED report, patient ingested 19-25 mg Benadryl tablets on the morning of 3/17.  EMS was called, and was brought to the emergency room.  He is admitted to the hospital medicine service for evaluation and management of anticholinergic toxicity.     poisoning by antihistamine, undetermined intent  -Per ED report, patient ingested 19 benadryl tablets around 0930 3/17, denied taking anything else   - showing anticholinergic CNS toxicity - confused and disoriented, visual hallucinations, initially   Tachycardic hypertensive . No airway compromise. EKG sinus tachy w/ incomplete RBBB.  - Half-life of p.o. Benadryl in an adult is 9 to 12 hours.  on admission Discussed case with Poison Control and recommended:      -symptom management: labetalol IV for tachycardia/hypertension, benzodiazepine for agitated delirium.  -Has received physostigmine 1 mg on 3/17 at 1740, again 3/18 at 0009 and 0446 AM    -straight cath q6h if patient unable to void due to anticholinergic toxicity  - repeat EKG to check for qTC ( 479--459 )  and QRS ( 96-90)  prolongation, for QRS prolongation poison control recommended 1-2 amps of bicarb and if improves can give a in drip , for qTC prolongation recommended K and Mg replacement which will do   -ok to stop telemetry  -transfer orders to Med Surg floor  -1:1 sitter, safety tray, suicide precautions     Hypertension   -Takes amlodipine 10 mg every day, continue    lactic acidosis - resolved     Primary sclerosing cholangitis diagnosed in 2010 , status post  liver transplant (2017)  Follows at [facility]  [place].  Annual visits, last 2021.   -LFT normal   - Hepatology consulted for immunosuppression     liver lesion  - noted on CT in 9/2021 , defer  back to [place] for further management     elevated creatinine, mild  Cr was 1.4 back in 9/2021 so  suspect underlying CKD .    -monitor BMP    -Fluid resuscitation      T2DM  - unknown how much insulin he takes PTA as confused, found mention of NPH  40 units q AM , asked pharmacy to help out , for now  Start NPH 10 Units subcutaneous and continue accu-checks insulin sliding scale , he has not been eating much , per pharmacy ha snot filled NPH since last May   -HgA1c 8.9 3/17      ulcerative colitis diagnosed in 1998  Status post colectomy and iloanal anastomosis in 2009 for medically refractory disease   chronic pouchitis, antibiotics dependent  Plan was for 4 weeks of flagyl 500 mg bid , 4 weeks  off then 4 weeks of cipro bid , if fails consider vedolizumab per GI , unknown where he is in his cycle so will need to check , plus he has not filled flagyl per pharmacy   - follows with [facility].  last visit in Sept 2021    -resume ciprofloxacin 500mg BID from his medication list     major depressive disorder  Hx of suicidal ideation  Patient recently started counseling and PTA on sertraline. One psychiatric hospital stay many years ago for suicidal thoughts.     -psychiatry consult when patient's delirium resolves    -continue PTA sertraline      GERD    -continue PTA pantoprazole           Diet: Combination Diet Regular Diet Adult; Safe Tray - NO utensils    DVT Prophylaxis: Pneumatic Compression Devices  Baeza Catheter: Not present  Central Lines: None  Cardiac Monitoring: None  Code Status: Full Code      Disposition Plan   Expected Discharge: 03/21/2022     Anticipated discharge location:  Awaiting care coordination huddle  Delays:     likely inpatient psychiatry        The patient's care was discussed with the Bedside Nurse and  Patient.    Melo Wilhelm MD  Hospitalist Service, GOLD TEAM 8  [facility]  Securely message with the [employer] Web Console (learn more here)  Text page via [facility] Paging/Directory   Please see signed in provider for up to date coverage information      Clinically Significant Risk Factors Present on Admission             # Diabetes, type II: last A1C 8.6 % (Ref range: 0.0 - 5.6 %)      ______________________________________________________________________    Interval History   Patient is somnolent today. Awakes to touch and voice but will fall back asleep. No concerns at this time.     Otherwise 4pt ROS was reviewed and is negative     Data reviewed today: I reviewed all medications, new labs and imaging results over the last 24 hours. I personally reviewed no images or EKG's today.    Physical Exam   Vital Signs: Temp: 97.3  F (36.3  C) Temp src: Oral BP: (!) 152/109 Pulse: 93   Resp: 18 SpO2: 97 % O2 Device: None (Room air)    Weight: 241 lbs 6.46 oz  Gen: NAD, lying comfortably in bed  Eyes: EOMI, conjuctiva clear  Mouth: OP clear, no lesions  CV: RRR, no murmurs, 2+ radial pulses  RESP: CTA bilaterally, no w/r/c  Abd: soft, nontender, nondistended  Ext: no edema bilaterally  Neuro: CNII-CNXII intact     Data   Recent Labs   Lab 03/19/22  1158 03/19/22  0835 03/19/22  0546 03/18/22  2136 03/18/22  1302 03/18/22  1133 03/18/22  0831 03/17/22  2248 03/17/22  2222 03/17/22  1814 03/17/22  1717 03/17/22  1505   WBC  --   --   --   --   --   --  4.3  --   --   --   --  7.2   HGB  --   --   --   --   --   --  13.6  --   --   --   --  16.9   MCV  --   --   --   --   --   --  87  --   --   --   --  86   PLT  --   --   --   --   --   --  94*  --   --   --   --  122*   INR  --   --   --   --   --   --   --   --   --   --   --  1.06   NA  --   --   --   --   --   --  144  --  142 141  --  136   POTASSIUM  --   --  3.8  --   --  3.7 3.5  --  3.5 3.7  --  3.7   CHLORIDE  --   --    --   --   --   --  114*  --  113* 110*  --  103   CO2  --   --   --   --   --   --  23  --  28 23  --  23   BUN  --   --   --   --   --   --  16  --  19 18  --  17   CR  --   --   --   --   --   --  1.17  --  1.11 1.21  --  1.28*   ANIONGAP  --   --   --   --   --   --  7  --  1* 8  --  10   BREN  --   --   --   --   --   --  9.1  --  8.6 8.9  --  9.8   * 176*  --  196*   < >  --  192*  192*   < > 212* 318*   < > 428*   ALBUMIN  --   --   --   --   --   --  3.8  --  3.8  --   --  4.6   PROTTOTAL  --   --   --   --   --   --  6.5*  --  6.6*  --   --  8.1   BILITOTAL  --   --   --   --   --   --  1.4*  --  1.1  --   --  1.4*   ALKPHOS  --   --   --   --   --   --  107  --  111  --   --  148   ALT  --   --   --   --   --   --  43  --  39  --   --  53   AST  --   --   --   --   --   --  26  --  20  --   --  29    < > = values in this interval not displayed.     No results found for this or any previous visit (from the past 24 hour(s)).

## 2022-03-19 NOTE — PLAN OF CARE
Major Shift Events: For majority of shift pt was withdrawn, lethargic and drowsy. Pt was a RASS of -1 until about 1600. Pt is starting to become more alert. Pt was disoriented to place until around 1600 where he was oriented x 4. Pt has had frequent loose stools throughout the course of shift. Pt was bladder scanned and then encouraged to void. Pt was encouraged to eat and drink as well. Pt received 100 ml/hr of normal saline.  Pt was maintained on suicide precautions. Pt had a sitter for duration of shift. Items were removed from the room that were deemed unsafe. Pt was given one PRN of labetalol to maintain systolic <160 and diastolic <110.     Plan: Continue suicide precautions and a bedside sitter to maintain safety. If continues to have loose stools rule out C-diff. If not voiding bladder scan again and potentially straight cath if needed. Monitor blood pressure and mental status.     For vital signs and complete assessments, please see documentation flowsheets.

## 2022-03-20 LAB
ALBUMIN SERPL-MCNC: 3.5 G/DL (ref 3.4–5)
ALP SERPL-CCNC: 108 U/L (ref 40–150)
ALT SERPL W P-5'-P-CCNC: 31 U/L (ref 0–70)
ANION GAP SERPL CALCULATED.3IONS-SCNC: 5 MMOL/L (ref 3–14)
AST SERPL W P-5'-P-CCNC: 21 U/L (ref 0–45)
BILIRUB DIRECT SERPL-MCNC: 0.4 MG/DL (ref 0–0.2)
BILIRUB SERPL-MCNC: 1.3 MG/DL (ref 0.2–1.3)
BUN SERPL-MCNC: 17 MG/DL (ref 7–30)
CALCIUM SERPL-MCNC: 8.4 MG/DL (ref 8.5–10.1)
CHLORIDE BLD-SCNC: 112 MMOL/L (ref 94–109)
CO2 SERPL-SCNC: 23 MMOL/L (ref 20–32)
CREAT SERPL-MCNC: 1.06 MG/DL (ref 0.66–1.25)
GFR SERPL CREATININE-BSD FRML MDRD: 89 ML/MIN/1.73M2
GLUCOSE BLD-MCNC: 222 MG/DL (ref 70–99)
GLUCOSE BLDC GLUCOMTR-MCNC: 192 MG/DL (ref 70–99)
GLUCOSE BLDC GLUCOMTR-MCNC: 200 MG/DL (ref 70–99)
GLUCOSE BLDC GLUCOMTR-MCNC: 266 MG/DL (ref 70–99)
GLUCOSE BLDC GLUCOMTR-MCNC: 273 MG/DL (ref 70–99)
MAGNESIUM SERPL-MCNC: 1.9 MG/DL (ref 1.6–2.3)
PHOSPHATE SERPL-MCNC: 2.4 MG/DL (ref 2.5–4.5)
POTASSIUM BLD-SCNC: 3.9 MMOL/L (ref 3.4–5.3)
PROT SERPL-MCNC: 6.5 G/DL (ref 6.8–8.8)
SODIUM SERPL-SCNC: 140 MMOL/L (ref 133–144)
TACROLIMUS BLD-MCNC: 3.5 UG/L (ref 5–15)
TME LAST DOSE: ABNORMAL H
TME LAST DOSE: ABNORMAL H

## 2022-03-20 PROCEDURE — 80053 COMPREHEN METABOLIC PANEL: CPT | Performed by: INTERNAL MEDICINE

## 2022-03-20 PROCEDURE — 36415 COLL VENOUS BLD VENIPUNCTURE: CPT | Performed by: INTERNAL MEDICINE

## 2022-03-20 PROCEDURE — 99356 PR PROLONGED SERV,INPATIENT,1ST HR: CPT | Performed by: INTERNAL MEDICINE

## 2022-03-20 PROCEDURE — 83735 ASSAY OF MAGNESIUM: CPT | Performed by: INTERNAL MEDICINE

## 2022-03-20 PROCEDURE — 250N000011 HC RX IP 250 OP 636: Performed by: INTERNAL MEDICINE

## 2022-03-20 PROCEDURE — 80197 ASSAY OF TACROLIMUS: CPT | Performed by: INTERNAL MEDICINE

## 2022-03-20 PROCEDURE — 250N000012 HC RX MED GY IP 250 OP 636 PS 637: Performed by: INTERNAL MEDICINE

## 2022-03-20 PROCEDURE — 84100 ASSAY OF PHOSPHORUS: CPT | Performed by: INTERNAL MEDICINE

## 2022-03-20 PROCEDURE — 99207 PR CDG-MDM COMPONENT: MEETS HIGH - UP CODED: CPT | Performed by: INTERNAL MEDICINE

## 2022-03-20 PROCEDURE — 250N000013 HC RX MED GY IP 250 OP 250 PS 637: Performed by: NURSE PRACTITIONER

## 2022-03-20 PROCEDURE — 258N000003 HC RX IP 258 OP 636: Performed by: NURSE PRACTITIONER

## 2022-03-20 PROCEDURE — 82248 BILIRUBIN DIRECT: CPT | Performed by: INTERNAL MEDICINE

## 2022-03-20 PROCEDURE — 250N000013 HC RX MED GY IP 250 OP 250 PS 637

## 2022-03-20 PROCEDURE — 99233 SBSQ HOSP IP/OBS HIGH 50: CPT | Performed by: INTERNAL MEDICINE

## 2022-03-20 PROCEDURE — 120N000002 HC R&B MED SURG/OB UMMC

## 2022-03-20 RX ORDER — MAGNESIUM SULFATE HEPTAHYDRATE 40 MG/ML
2 INJECTION, SOLUTION INTRAVENOUS ONCE
Status: COMPLETED | OUTPATIENT
Start: 2022-03-20 | End: 2022-03-20

## 2022-03-20 RX ORDER — MIRTAZAPINE 15 MG/1
15 TABLET, FILM COATED ORAL AT BEDTIME
Status: DISCONTINUED | OUTPATIENT
Start: 2022-03-20 | End: 2022-03-23 | Stop reason: HOSPADM

## 2022-03-20 RX ADMIN — MIRTAZAPINE 15 MG: 15 TABLET, FILM COATED ORAL at 21:08

## 2022-03-20 RX ADMIN — CIPROFLOXACIN HYDROCHLORIDE 500 MG: 250 TABLET, FILM COATED ORAL at 08:43

## 2022-03-20 RX ADMIN — TACROLIMUS 0.5 MG: 0.5 CAPSULE ORAL at 08:43

## 2022-03-20 RX ADMIN — CIPROFLOXACIN HYDROCHLORIDE 500 MG: 250 TABLET, FILM COATED ORAL at 21:09

## 2022-03-20 RX ADMIN — PANTOPRAZOLE SODIUM 40 MG: 40 TABLET, DELAYED RELEASE ORAL at 08:43

## 2022-03-20 RX ADMIN — INSULIN ASPART 3 UNITS: 100 INJECTION, SOLUTION INTRAVENOUS; SUBCUTANEOUS at 16:37

## 2022-03-20 RX ADMIN — MAGNESIUM SULFATE IN WATER 2 G: 40 INJECTION, SOLUTION INTRAVENOUS at 08:43

## 2022-03-20 RX ADMIN — INSULIN ASPART 2 UNITS: 100 INJECTION, SOLUTION INTRAVENOUS; SUBCUTANEOUS at 12:04

## 2022-03-20 RX ADMIN — AMLODIPINE BESYLATE 10 MG: 10 TABLET ORAL at 08:43

## 2022-03-20 RX ADMIN — INSULIN ASPART 2 UNITS: 100 INJECTION, SOLUTION INTRAVENOUS; SUBCUTANEOUS at 12:05

## 2022-03-20 RX ADMIN — ASPIRIN 81 MG CHEWABLE TABLET 81 MG: 81 TABLET CHEWABLE at 08:43

## 2022-03-20 RX ADMIN — TACROLIMUS 0.5 MG: 0.5 CAPSULE ORAL at 18:45

## 2022-03-20 RX ADMIN — SODIUM CHLORIDE: 9 INJECTION, SOLUTION INTRAVENOUS at 21:09

## 2022-03-20 ASSESSMENT — ACTIVITIES OF DAILY LIVING (ADL)
ADLS_ACUITY_SCORE: 7
ADLS_ACUITY_SCORE: 7
ADLS_ACUITY_SCORE: 13
ADLS_ACUITY_SCORE: 13
ADLS_ACUITY_SCORE: 5
ADLS_ACUITY_SCORE: 7
ADLS_ACUITY_SCORE: 13
ADLS_ACUITY_SCORE: 7
ADLS_ACUITY_SCORE: 13
ADLS_ACUITY_SCORE: 7
ADLS_ACUITY_SCORE: 11
ADLS_ACUITY_SCORE: 11
ADLS_ACUITY_SCORE: 7

## 2022-03-20 NOTE — PLAN OF CARE
PT had psych consult today to discuss plan of care. PT does not voluntarily choose to treatment so pt is being held for 72hr. NP prescribed mertazapine.

## 2022-03-20 NOTE — PROGRESS NOTES
Overnight patient more alert than yesterday, oriented x4. Was able to ambulate to the bathroom with stand by assist. No episodes of incontinence. Remains with flat affect, mother called and offered patient to speak with her which he declined.    On 1:1 watch for suicidal precaution. Sitter in arms reach at bedside.

## 2022-03-20 NOTE — PROGRESS NOTES
[facility]    Medicine Progress Note - Hospitalist Service, GOLD TEAM 8    Date of Admission:  3/17/2022    Assessment & Plan        Abhishek Downey is a 46 year old male admitted on 3/17/2022. He has past medical history of liver transplant (2017) for PSC, diabetes type 2 on insulin, ulcerative colitis, chronic pouchitis, GERD, and hypertension.  Per ED report, patient ingested 19-25 mg Benadryl tablets on the morning of 3/17.  EMS was called, and was brought to the emergency room.  He is admitted to the hospital medicine service for evaluation and management of anticholinergic toxicity.     poisoning by antihistamine, self harm intent, initial encounter, present on admission  Suicide attempt, present on admission  -Per ED report, patient ingested 19 benadryl tablets around 0930 3/17, denied taking anything else   - showing anticholinergic CNS toxicity - confused and disoriented, visual hallucinations, initially   Tachycardic hypertensive . No airway compromise. EKG sinus tachy w/ incomplete RBBB.  - Half-life of p.o. Benadryl in an adult is 9 to 12 hours.  on admission Discussed case with Poison Control and recommended:      -symptom management: labetalol IV for tachycardia/hypertension, benzodiazepine for agitated delirium.  -Has received physostigmine 1 mg on 3/17 at 1740, again 3/18 at 0009 and 0446 AM    -straight cath q6h if patient unable to void due to anticholinergic toxicity  - repeat EKG to check for qTC ( 479--459 )  and QRS ( 96-90)  prolongation, for QRS prolongation poison control recommended 1-2 amps of bicarb and if improves can give a in drip , for qTC prolongation recommended K and Mg replacement which will do   -ok to stop telemetry  -transfer orders to Med Surg floor  -1:1 sitter, safety tray, suicide precautions  -psychiatry consult today     Hypertension   -Takes amlodipine 10 mg every day, continue    lactic acidosis - resolved     Primary  sclerosing cholangitis diagnosed in 2010 , status post  liver transplant (2017)  Follows at [facility].  Annual visits, last 2021.   -LFT normal   - Hepatology consulted for immunosuppression     liver lesion  - noted on CT in 9/2021 , defer  back to [facility] for further management     elevated creatinine, mild  Cr was 1.4 back in 9/2021 so  suspect underlying CKD .    -monitor BMP    -Fluid resuscitation      T2DM  - unknown how much insulin he takes PTA as confused, found mention of NPH  40 units q AM , asked pharmacy to help out , for now  Start NPH 10 Units subcutaneous and continue accu-checks insulin sliding scale , he has not been eating much , per pharmacy ha snot filled NPH since last May   -HgA1c 8.9 3/17      ulcerative colitis diagnosed in 1998  Status post colectomy and iloanal anastomosis in 2009 for medically refractory disease   chronic pouchitis, antibiotics dependent  Plan was for 4 weeks of flagyl 500 mg bid , 4 weeks  off then 4 weeks of cipro bid , if fails consider vedolizumab per GI , unknown where he is in his cycle so will need to check , plus he has not filled flagyl per pharmacy   - follows with [facility].  last visit in Sept 2021    -resume ciprofloxacin 500mg BID from his medication list     major depressive disorder  Hx of suicidal ideation  Patient recently started counseling and PTA on sertraline. One psychiatric hospital stay many years ago for suicidal thoughts.     -psychiatry consult when patient's delirium resolves    -continue PTA sertraline      GERD    -continue PTA pantoprazole             Diet: Combination Diet Regular Diet Adult; Safe Tray - NO utensils    DVT Prophylaxis: Low Risk/Ambulatory with no VTE prophylaxis indicated  Baeza Catheter: Not present  Central Lines: None  Cardiac Monitoring: None  Code Status: Full Code      Disposition Plan   Expected Discharge: 03/21/2022     Anticipated discharge location:  Awaiting care  coordination huddle  Delays:            The patient's care was discussed with the Bedside Nurse and Patient.    Melo Wilhelm MD  Hospitalist Service, GOLD TEAM [address] [place] [address]  Securely message with the Vocera Web Console (learn more here)  Text page via [facility] Paging/Directory   Please see signed in provider for up to date coverage information      Clinically Significant Risk Factors Present on Admission             # Diabetes, type II: last A1C 8.6 % (Ref range: 0.0 - 5.6 %)        Total Visit Time: 75min  o For Outpatient, 'Total Visit Time' = Face-to-Face time only.  o For Inpatient, 'Total Visit Time' = Unit Floor + Face-to-Face time.    Total Face-to-Face Prolonged Service Time: 75min    Content of the Prolonged Time:   o Discussion with patient regarding the circumstances of benadryl overdose  o Discussion of the symptoms of benadryl overdose and effects on the body  o Discussion of the treatment he has received thus far  o Discussion of safety precautions  o Discussion of psychiatry consultation  o Time on the phone attempting to contact psychiatry consultant  ______________________________________________________________________    Interval History   Overnight confusion improved. Patient able to talk and discuss questions today. I asked for permission to discuss benadryl overdose which he agreed. He stated that he recently found out his wife was cheating on him and received his divorce papers in the mail. He took the benadryl to end his emotional pain. He doesn't want to be in pain anymore. He is still in pain and would like to try and end the pain if he could. +SI.    Otherwise 4pt ROS was reviewed and is negative     Data reviewed today: I reviewed all medications, new labs and imaging results over the last 24 hours. I personally reviewed no images or EKG's today.    Physical Exam   Vital Signs: Temp: 97.9  F (36.6  C) Temp src: Axillary BP: (!) 158/111 Pulse:  84   Resp: 16 SpO2: 97 % O2 Device: None (Room air)    Weight: 241 lbs 6.46 oz  Gen: NAD, sitting comfortably in bed  Eyes: PERRLA, EOMI, conjuctiva clear  Mouth: OP clear, no lesions  CV: RRR, no murmurs, 2+ radial pulses  RESP: CTA bilaterally, no w/r/c  Abd: soft, nontender, nondistended  Ext: no edema bilaterally     Data   Recent Labs   Lab 03/20/22  1143 03/20/22  0812 03/20/22  0559 03/19/22  0835 03/19/22  0546 03/18/22  1302 03/18/22  1133 03/18/22  0831 03/17/22  2248 03/17/22  2222 03/17/22  1717 03/17/22  1505   WBC  --   --   --   --   --   --   --  4.3  --   --   --  7.2   HGB  --   --   --   --   --   --   --  13.6  --   --   --  16.9   MCV  --   --   --   --   --   --   --  87  --   --   --  86   PLT  --   --   --   --   --   --   --  94*  --   --   --  122*   INR  --   --   --   --   --   --   --   --   --   --   --  1.06   NA  --   --  140  --   --   --   --  144  --  142   < > 136   POTASSIUM  --   --  3.9  --  3.8  --  3.7 3.5  --  3.5   < > 3.7   CHLORIDE  --   --  112*  --   --   --   --  114*  --  113*   < > 103   CO2  --   --  23  --   --   --   --  23  --  28   < > 23   BUN  --   --  17  --   --   --   --  16  --  19   < > 17   CR  --   --  1.06  --   --   --   --  1.17  --  1.11   < > 1.28*   ANIONGAP  --   --  5  --   --   --   --  7  --  1*   < > 10   BREN  --   --  8.4*  --   --   --   --  9.1  --  8.6   < > 9.8   * 192* 222*   < >  --    < >  --  192*  192*   < > 212*   < > 428*   ALBUMIN  --   --  3.5  --   --   --   --  3.8  --  3.8  --  4.6   PROTTOTAL  --   --  6.5*  --   --   --   --  6.5*  --  6.6*  --  8.1   BILITOTAL  --   --  1.3  --   --   --   --  1.4*  --  1.1  --  1.4*   ALKPHOS  --   --  108  --   --   --   --  107  --  111  --  148   ALT  --   --  31  --   --   --   --  43  --  39  --  53   AST  --   --  21  --   --   --   --  26  --  20  --  29    < > = values in this interval not displayed.     No results found for this or any previous visit (from the past 24  hour(s)).

## 2022-03-20 NOTE — CONSULTS
"      Initial Psychiatric Consult   Consult date: March 20, 2022         Reason for Consult, requesting source:    Intentional Benadryl Overdose    Requesting source: Melo Wilhelm MD    Labs and imaging reviewed. Patient seen and evaluated by ARMA Patricio CNP          HPI:   Abhishek Downey is a 46 year old male admitted on 3/17/2022. He has past medical history of liver transplant (2017) for PSC, diabetes type 2 on insulin, ulcerative colitis, chronic pouchitis, GERD, and hypertension.  Per ED report, patient ingested 19-25 mg Benadryl tablets on the morning of 3/17.  EMS was called, and was brought to the emergency room.  He is admitted to the hospital medicine service for evaluation and management of anticholinergic toxicity.    Upon assessment, patient states his mood is \"not good.\" States his wife recently served him divorce papers which led him to attempt suicide via benadryl overdose. States he has been \"very depressed\" and lonely since his wife left him last may for her ex after 20 years of marriage. Denies anxiety, denies drug/alcohol use, denies psychosis/adelaida/AVH. Continues to endorse suicidal ideation and strong desire to die. Appetite poor, sleep impaired.           Past Psychiatric History:   Previous diagnoses: Depression, denies pervious adelaida/psychosis, denies anxiety although PTA medication listed Atarax PRN for anxiety.   Psychotropic trials: Patient was most recently on Vistaril 25-50mg PRN for anxiety and Zoloft 150mg.   Past suicide attempts: This is patient's second suicide attempt, both have been benadryl overdose. Last attempt was 5 years ago after liver transplant stressor.   Support: Denies any friend/family support. States he only saw his psychiatric provider once. States psychotherapy \"does not do anything.\"        Substance Use and History:   Denies all illicit drug use, tobacco, and alcohol.         Past Medical History:   PAST MEDICAL HISTORY:   Past Medical History: "   Diagnosis Date     Cirrhosis of liver (H)     Due to PSC     DU (duodenal ulcer) 5/2015    treated nonsurgically      PSC (primary sclerosing cholangitis)      Ulcerative colitis        PAST SURGICAL HISTORY:   Past Surgical History:   Procedure Laterality Date     COLECTOMY  12/27/07     FLEXIBLE SIGMOIDOSCOPY  10/4/10      ERCP W/W/O NONA OF SPEC-BRUSH/WASH  02/19/10     HC ERCP W/W/O NONA OF SPEC-BRUSH/WASH  03/05/10      UGI ENDOSCOPY W BANDING ESOPH/GASTRIC VARICES  since 2/2015    monthly at [facility]; last one in August      UPPER GI ENDOSCOPY  11/13/13    [facility]     UPPER GI ENDOSCOPY  2/16/15    [facility]             Family History:   FAMILY HISTORY:   Family History   Problem Relation Age of Onset     Heart Failure Mother            Social History:   SOCIAL HISTORY:   Social History     Tobacco Use     Smoking status: Never Smoker     Smokeless tobacco: Never Used   Substance Use Topics     Alcohol use: No     Alcohol/week: 0.0 standard drinks     Comment: 2-3x per year       Patient is in the process of divorce with wife of 20 years. Has two children 18 and 11. Currently unemployed, on disability.          Physical ROS:   The 10 point Review of Systems is negative other than noted in the HPI or here.           Medications:       amLODIPine  10 mg Oral Daily     aspirin  81 mg Oral Daily     ciprofloxacin  500 mg Oral Q12H IRINA     insulin aspart  1-7 Units Subcutaneous TID AC     insulin aspart  1-5 Units Subcutaneous At Bedtime     pantoprazole  40 mg Oral QAM AC     sodium chloride (PF)  3 mL Intracatheter Q8H     tacrolimus  0.5 mg Oral BID IS              Allergies:     Allergies   Allergen Reactions     No Known Allergies           Labs:     Recent Results (from the past 48 hour(s))   Glucose by meter    Collection Time: 03/18/22  6:29 PM   Result Value Ref Range    GLUCOSE BY METER POCT 173 (H) 70 - 99 mg/dL   Glucose by meter    Collection Time: 03/18/22  9:36 PM   Result Value  Ref Range    GLUCOSE BY METER POCT 196 (H) 70 - 99 mg/dL   Potassium    Collection Time: 03/19/22  5:46 AM   Result Value Ref Range    Potassium 3.8 3.4 - 5.3 mmol/L   Magnesium    Collection Time: 03/19/22  5:46 AM   Result Value Ref Range    Magnesium 2.2 1.6 - 2.3 mg/dL   Glucose by meter    Collection Time: 03/19/22  8:35 AM   Result Value Ref Range    GLUCOSE BY METER POCT 176 (H) 70 - 99 mg/dL   Glucose by meter    Collection Time: 03/19/22 11:58 AM   Result Value Ref Range    GLUCOSE BY METER POCT 204 (H) 70 - 99 mg/dL   Glucose by meter    Collection Time: 03/19/22  4:35 PM   Result Value Ref Range    GLUCOSE BY METER POCT 282 (H) 70 - 99 mg/dL   EKG 12-lead, complete    Collection Time: 03/19/22  7:06 PM   Result Value Ref Range    Systolic Blood Pressure  mmHg    Diastolic Blood Pressure  mmHg    Ventricular Rate 100 BPM    Atrial Rate 100 BPM    AL Interval 128 ms    QRS Duration 92 ms     ms    QTc 466 ms    P Axis 50 degrees    R AXIS 60 degrees    T Axis 42 degrees    Interpretation ECG       Sinus rhythm  Possible Left atrial enlargement  Incomplete right bundle branch block  Septal infarct , age undetermined  Abnormal ECG  When compared with ECG of 18-MAR-2022 11:00,  Incomplete right bundle branch block is now Present  Septal infarct is now Present     Glucose by meter    Collection Time: 03/19/22  9:31 PM   Result Value Ref Range    GLUCOSE BY METER POCT 270 (H) 70 - 99 mg/dL   Tacrolimus by Tandem Mass Spectrometry    Collection Time: 03/20/22  5:59 AM   Result Value Ref Range    Tacrolimus by Tandem Mass Spectrometry 3.5 (L) 5.0 - 15.0 ug/L    Tacrolimus Last Dose Date      Tacrolimus Last Dose Time     Basic metabolic panel    Collection Time: 03/20/22  5:59 AM   Result Value Ref Range    Sodium 140 133 - 144 mmol/L    Potassium 3.9 3.4 - 5.3 mmol/L    Chloride 112 (H) 94 - 109 mmol/L    Carbon Dioxide (CO2) 23 20 - 32 mmol/L    Anion Gap 5 3 - 14 mmol/L    Urea Nitrogen 17 7 - 30 mg/dL     Creatinine 1.06 0.66 - 1.25 mg/dL    Calcium 8.4 (L) 8.5 - 10.1 mg/dL    Glucose 222 (H) 70 - 99 mg/dL    GFR Estimate 89 >60 mL/min/1.73m2   Magnesium    Collection Time: 03/20/22  5:59 AM   Result Value Ref Range    Magnesium 1.9 1.6 - 2.3 mg/dL   Hepatic panel    Collection Time: 03/20/22  5:59 AM   Result Value Ref Range    Bilirubin Total 1.3 0.2 - 1.3 mg/dL    Bilirubin Direct 0.4 (H) 0.0 - 0.2 mg/dL    Protein Total 6.5 (L) 6.8 - 8.8 g/dL    Albumin 3.5 3.4 - 5.0 g/dL    Alkaline Phosphatase 108 40 - 150 U/L    AST 21 0 - 45 U/L    ALT 31 0 - 70 U/L   Phosphorus    Collection Time: 03/20/22  5:59 AM   Result Value Ref Range    Phosphorus 2.4 (L) 2.5 - 4.5 mg/dL   Glucose by meter    Collection Time: 03/20/22  8:12 AM   Result Value Ref Range    GLUCOSE BY METER POCT 192 (H) 70 - 99 mg/dL   Glucose by meter    Collection Time: 03/20/22 11:43 AM   Result Value Ref Range    GLUCOSE BY METER POCT 200 (H) 70 - 99 mg/dL          Physical and Psychiatric Examination:     BP (!) 158/111 (BP Location: Right arm)   Pulse 75   Temp 97.9  F (36.6  C) (Axillary)   Resp 19   Wt 109.5 kg (241 lb 6.5 oz)   SpO2 97%   BMI 32.74 kg/m    Weight is 241 lbs 6.46 oz  Body mass index is 32.74 kg/m .    Physical Exam:  I have reviewed the physical exam as documented by by the medical team and agree with findings and assessment and have no additional findings to add at this time.    Mental Status Exam:    Appearance: awake, alert, adequately groomed and dressed in hospital scrubs  Attitude:  guarded  Eye Contact:  poor   Mood:  depressed  Affect:  appropriate and in normal range and intensity is blunted  Speech:  clear, coherent  Psychomotor Behavior:  no evidence of tardive dyskinesia, dystonia, or tics  Thought Process:  logical, linear and goal oriented  Associations:  no loose associations  Thought Content:  no evidence of psychotic thought and active suicidal ideation present  Insight:  limited  Judgement:  poor  Oriented  "to:  time, person, and place, situation   Attention Span and Concentration:  fair  Recent and Remote Memory:  fair  Gait and Station: Normal                DSM-5 Diagnosis:   1. Major depressive disorder, severe, recurrent, without psychotic features  2. Suicidal Ideation           Assessment:   Patient is severely depressed with continied suicidal ideation. Patient appears apathetic, stating \"I don't care what you do to my medication, doubt it will help.\" Patient's affect was blunted, tearful at times, and guarded at times. Patient is NOT agreeable to transferring to psychiatry voluntarily, due to recent suicide attempt and continued suicidal ideation patient is not safe to leave hospital, will place 72 hour hold and plan to file for commitment. Sleep, appetite, mood impaired. Will start remeron 15mg, this medication has virtually no anticholinergic profile.           Summary of Recommendations:     1. Ordered Remeron 15mg at bedtime.     2. Initiated 72 Hour hold, placed IP Psych consult tomorrow to follow up.     3. Patient will need to be transferred to IP psych once medically stable.       Sydnee Cordova, PMSTACYP-BC  Consult/Liaison Psychiatry   [facility]           "

## 2022-03-20 NOTE — PROGRESS NOTES
BRIEF GI NOTE    Chart reviewed, patient more alert and oriented today.  Liver chemistries remain normal.  Tacrolimus level returned at 3.5.    Tacrolimus level is reasonable for what we would typically want in our patients this far out from liver transplant.  While previously his goal was higher than this per review of [facility] notes from last year, it is unclear what the current goal is given recent reported considerable decrease in his dosing.    Therefore, would continue with current 0.5 mg twice daily dosing, and have the patient reach out to his hepatology team at [facility] to further clarify based on this most recent trough level of 3.5.    Elliot Rubio MD  GI Fellow

## 2022-03-21 ENCOUNTER — TELEPHONE (OUTPATIENT)
Dept: BEHAVIORAL HEALTH | Facility: CLINIC | Age: 47
End: 2022-03-21
Payer: COMMERCIAL

## 2022-03-21 LAB
ALBUMIN SERPL-MCNC: 3.4 G/DL (ref 3.4–5)
ALP SERPL-CCNC: 111 U/L (ref 40–150)
ALT SERPL W P-5'-P-CCNC: 35 U/L (ref 0–70)
ANION GAP SERPL CALCULATED.3IONS-SCNC: 6 MMOL/L (ref 3–14)
AST SERPL W P-5'-P-CCNC: 31 U/L (ref 0–45)
ATRIAL RATE - MUSE: 100 BPM
BILIRUB DIRECT SERPL-MCNC: 0.5 MG/DL (ref 0–0.2)
BILIRUB SERPL-MCNC: 2 MG/DL (ref 0.2–1.3)
BUN SERPL-MCNC: 13 MG/DL (ref 7–30)
C DIFF TOX B STL QL: NEGATIVE
CALCIUM SERPL-MCNC: 8.9 MG/DL (ref 8.5–10.1)
CHLORIDE BLD-SCNC: 112 MMOL/L (ref 94–109)
CO2 SERPL-SCNC: 21 MMOL/L (ref 20–32)
CREAT SERPL-MCNC: 0.89 MG/DL (ref 0.66–1.25)
DIASTOLIC BLOOD PRESSURE - MUSE: NORMAL MMHG
GFR SERPL CREATININE-BSD FRML MDRD: >90 ML/MIN/1.73M2
GLUCOSE BLD-MCNC: 200 MG/DL (ref 70–99)
GLUCOSE BLDC GLUCOMTR-MCNC: 192 MG/DL (ref 70–99)
GLUCOSE BLDC GLUCOMTR-MCNC: 208 MG/DL (ref 70–99)
GLUCOSE BLDC GLUCOMTR-MCNC: 249 MG/DL (ref 70–99)
GLUCOSE BLDC GLUCOMTR-MCNC: 278 MG/DL (ref 70–99)
HOLD SPECIMEN: NORMAL
INTERPRETATION ECG - MUSE: NORMAL
MAGNESIUM SERPL-MCNC: 2 MG/DL (ref 1.6–2.3)
P AXIS - MUSE: 50 DEGREES
POTASSIUM BLD-SCNC: 3.6 MMOL/L (ref 3.4–5.3)
PR INTERVAL - MUSE: 128 MS
PROT SERPL-MCNC: 6.4 G/DL (ref 6.8–8.8)
QRS DURATION - MUSE: 92 MS
QT - MUSE: 362 MS
QTC - MUSE: 466 MS
R AXIS - MUSE: 60 DEGREES
SODIUM SERPL-SCNC: 139 MMOL/L (ref 133–144)
SYSTOLIC BLOOD PRESSURE - MUSE: NORMAL MMHG
T AXIS - MUSE: 42 DEGREES
VENTRICULAR RATE- MUSE: 100 BPM

## 2022-03-21 PROCEDURE — 82248 BILIRUBIN DIRECT: CPT | Performed by: INTERNAL MEDICINE

## 2022-03-21 PROCEDURE — 258N000003 HC RX IP 258 OP 636: Performed by: NURSE PRACTITIONER

## 2022-03-21 PROCEDURE — 250N000013 HC RX MED GY IP 250 OP 250 PS 637: Performed by: PSYCHIATRY & NEUROLOGY

## 2022-03-21 PROCEDURE — 250N000011 HC RX IP 250 OP 636: Performed by: STUDENT IN AN ORGANIZED HEALTH CARE EDUCATION/TRAINING PROGRAM

## 2022-03-21 PROCEDURE — 87493 C DIFF AMPLIFIED PROBE: CPT | Performed by: INTERNAL MEDICINE

## 2022-03-21 PROCEDURE — 99233 SBSQ HOSP IP/OBS HIGH 50: CPT | Performed by: INTERNAL MEDICINE

## 2022-03-21 PROCEDURE — 80197 ASSAY OF TACROLIMUS: CPT | Performed by: STUDENT IN AN ORGANIZED HEALTH CARE EDUCATION/TRAINING PROGRAM

## 2022-03-21 PROCEDURE — 99207 PR CDG-MDM COMPONENT: MEETS HIGH - UP CODED: CPT | Performed by: INTERNAL MEDICINE

## 2022-03-21 PROCEDURE — 36415 COLL VENOUS BLD VENIPUNCTURE: CPT | Performed by: INTERNAL MEDICINE

## 2022-03-21 PROCEDURE — 999N000128 HC STATISTIC PERIPHERAL IV START W/O US GUIDANCE

## 2022-03-21 PROCEDURE — 250N000013 HC RX MED GY IP 250 OP 250 PS 637: Performed by: NURSE PRACTITIONER

## 2022-03-21 PROCEDURE — 99232 SBSQ HOSP IP/OBS MODERATE 35: CPT | Performed by: PSYCHIATRY & NEUROLOGY

## 2022-03-21 PROCEDURE — 250N000012 HC RX MED GY IP 250 OP 636 PS 637: Performed by: INTERNAL MEDICINE

## 2022-03-21 PROCEDURE — 83735 ASSAY OF MAGNESIUM: CPT | Performed by: INTERNAL MEDICINE

## 2022-03-21 PROCEDURE — 250N000013 HC RX MED GY IP 250 OP 250 PS 637

## 2022-03-21 PROCEDURE — 99233 SBSQ HOSP IP/OBS HIGH 50: CPT | Mod: GC | Performed by: INTERNAL MEDICINE

## 2022-03-21 PROCEDURE — 120N000002 HC R&B MED SURG/OB UMMC

## 2022-03-21 PROCEDURE — 250N000013 HC RX MED GY IP 250 OP 250 PS 637: Performed by: INTERNAL MEDICINE

## 2022-03-21 PROCEDURE — 80053 COMPREHEN METABOLIC PANEL: CPT | Performed by: INTERNAL MEDICINE

## 2022-03-21 PROCEDURE — 250N000011 HC RX IP 250 OP 636: Performed by: INTERNAL MEDICINE

## 2022-03-21 RX ORDER — MAGNESIUM OXIDE 400 MG/1
400 TABLET ORAL 2 TIMES DAILY
Status: DISCONTINUED | OUTPATIENT
Start: 2022-03-21 | End: 2022-03-21 | Stop reason: ALTCHOICE

## 2022-03-21 RX ORDER — MAGNESIUM SULFATE HEPTAHYDRATE 40 MG/ML
2 INJECTION, SOLUTION INTRAVENOUS ONCE
Status: COMPLETED | OUTPATIENT
Start: 2022-03-21 | End: 2022-03-21

## 2022-03-21 RX ORDER — POTASSIUM CHLORIDE 1.5 G/1.58G
20 POWDER, FOR SOLUTION ORAL ONCE
Status: DISCONTINUED | OUTPATIENT
Start: 2022-03-21 | End: 2022-03-21

## 2022-03-21 RX ORDER — POTASSIUM CHLORIDE 750 MG/1
20 TABLET, EXTENDED RELEASE ORAL ONCE
Status: COMPLETED | OUTPATIENT
Start: 2022-03-21 | End: 2022-03-21

## 2022-03-21 RX ADMIN — AMLODIPINE BESYLATE 10 MG: 10 TABLET ORAL at 09:19

## 2022-03-21 RX ADMIN — CIPROFLOXACIN HYDROCHLORIDE 500 MG: 250 TABLET, FILM COATED ORAL at 19:32

## 2022-03-21 RX ADMIN — SERTRALINE HYDROCHLORIDE 100 MG: 50 TABLET ORAL at 12:13

## 2022-03-21 RX ADMIN — SODIUM CHLORIDE: 9 INJECTION, SOLUTION INTRAVENOUS at 07:22

## 2022-03-21 RX ADMIN — PANTOPRAZOLE SODIUM 40 MG: 40 TABLET, DELAYED RELEASE ORAL at 09:19

## 2022-03-21 RX ADMIN — MAGNESIUM SULFATE IN WATER 2 G: 40 INJECTION, SOLUTION INTRAVENOUS at 09:18

## 2022-03-21 RX ADMIN — CIPROFLOXACIN HYDROCHLORIDE 500 MG: 250 TABLET, FILM COATED ORAL at 09:19

## 2022-03-21 RX ADMIN — TACROLIMUS 0.5 MG: 0.5 CAPSULE ORAL at 09:19

## 2022-03-21 RX ADMIN — INSULIN ASPART 2 UNITS: 100 INJECTION, SOLUTION INTRAVENOUS; SUBCUTANEOUS at 12:16

## 2022-03-21 RX ADMIN — ASPIRIN 81 MG CHEWABLE TABLET 81 MG: 81 TABLET CHEWABLE at 09:19

## 2022-03-21 RX ADMIN — MIRTAZAPINE 15 MG: 15 TABLET, FILM COATED ORAL at 21:48

## 2022-03-21 RX ADMIN — TACROLIMUS 1.5 MG: 1 CAPSULE ORAL at 17:19

## 2022-03-21 RX ADMIN — LABETALOL HYDROCHLORIDE 10 MG: 5 INJECTION, SOLUTION INTRAVENOUS at 22:46

## 2022-03-21 RX ADMIN — POTASSIUM CHLORIDE 20 MEQ: 750 TABLET, EXTENDED RELEASE ORAL at 09:19

## 2022-03-21 RX ADMIN — INSULIN ASPART 1 UNITS: 100 INJECTION, SOLUTION INTRAVENOUS; SUBCUTANEOUS at 09:22

## 2022-03-21 ASSESSMENT — ACTIVITIES OF DAILY LIVING (ADL)
ADLS_ACUITY_SCORE: 3
ADLS_ACUITY_SCORE: 5
ADLS_ACUITY_SCORE: 5
ADLS_ACUITY_SCORE: 3
ADLS_ACUITY_SCORE: 3
ADLS_ACUITY_SCORE: 5
ADLS_ACUITY_SCORE: 3
ADLS_ACUITY_SCORE: 5
ADLS_ACUITY_SCORE: 3
ADLS_ACUITY_SCORE: 5
ADLS_ACUITY_SCORE: 3
ADLS_ACUITY_SCORE: 3
ADLS_ACUITY_SCORE: 5
ADLS_ACUITY_SCORE: 3
ADLS_ACUITY_SCORE: 5

## 2022-03-21 NOTE — PROGRESS NOTES
No acute events overnight. GCS 15. Ambulate with standby assist. No incontinence. Still remains withdrawn, responds appropriately but minimally.    On 1:1 watch for suicidal precaution. Sitter in arms reach and bedside.

## 2022-03-21 NOTE — CONSULTS
"          Psychiatry Consultation; Follow up              Reason for Consult, requesting source:    Check on overdose with suicide attempt. Initially seen by Sydnee ONTIVEROS from our service yesterday.   Requesting source: Abran Howe    Labs and imaging reviewed, discussed with nursing and Dr Wilhelm                Interim history:    46-year-old man was admitted after having significant Benadryl overdose which she says was a suicide attempt.  He is wife has recently served papers for divorce and she has an order for protection related to when the police were involved in one of their altercations.  He has been very depressed since he learned of his wife's intention to dissolve the marriage last May.  He has been living with his mother during the day and sleeps at his sister's house.  He continues to be very depressed but without current active suicidal thoughts, but \" I wish I were not here\".  He is also discouraged by his chronic health problems; he had a liver transplant in 2017 for PSC and has had a colectomy for REMI, has very frequent water bowel movements related to this. He is current on r/o C diff status. He had hallucinations and AMS on admission, now cleared.   Yesterday he was put on a 72-hour hold because he would not agree to voluntary admission and transfer to inpatient psychiatry.  However, today he agrees that he does need to stay in inpatient psychiatry for a while and he would go voluntarily.  Although he still has passive suicidal thoughts he said that he would feel safe in the hospital without a bedside attendant.  Importance of contacting nursing if he did not feel safe was reviewed with him.        Current Medications:       amLODIPine  10 mg Oral Daily     aspirin  81 mg Oral Daily     ciprofloxacin  500 mg Oral Q12H IRINA     insulin aspart  1-7 Units Subcutaneous TID AC     insulin aspart  1-5 Units Subcutaneous At Bedtime     mirtazapine  15 mg Oral At Bedtime     pantoprazole  40 mg " "Oral QAM AC     sodium chloride (PF)  3 mL Intracatheter Q8H     tacrolimus  0.5 mg Oral BID IS            MSE:   Lying quietly in the hospital bed, is well groomed, pleasant, cooperative. Speech fluent. Moves upper extremities without difficulty. Associations tight. Mood is \"depressed.\"  Affect quite restricted. Thought process logical, linear. Thought content negative for delusions, but with passive SI. Oriented x3.  Recent and remote memory, attention span and concentration are intact. Fund of knowledge, use of language appropriate. Insight and judgment fair to poor.     Vital signs:  Temp: 98.3  F (36.8  C) Temp src: Oral BP: 122/64 Pulse: 77   Resp: 18 SpO2: 98 % O2 Device: None (Room air)     Weight: 109.5 kg (241 lb 6.5 oz)  Estimated body mass index is 32.74 kg/m  as calculated from the following:    Height as of 10/20/21: 1.829 m (6').    Weight as of this encounter: 109.5 kg (241 lb 6.5 oz).            DSM-5 Diagnosis:   296.32 (F33.1) Major Depressive Disorder, Recurrent Episode, Moderate With anxious distress          Assessment:   I think that it is safe to discontinue his suicide precautions and sitter and discontinue the 72-hour hold.  However, he does need to go to inpatient psychiatry when he is medically stable.  He now agrees to go voluntarily but is not safe to leave the hospital.  Sydnee Art had suggested adding Remeron 15 mg HS, and he did apparently sleep better last night.   He was taking 150 mg of Zoloft prior to admission, but this was not continued for some reason.           Summary of Recommendations:   I have discontinued his suicide precautions and 72-hour hold with the understanding that he will go to inpatient psychiatry voluntarily.  If he changes his mind a hold will need to be reinstituted.  I would continue the Remeron 15 mg at bedtime, and I have ordered his Zoloft to be restarted at 100 mg today, going to 150 mg tomorrow. It may be worth going to 200 mg, but I will defer to IP " "psych team,   When he is medically stable in the C. difficile situation clarified he may be transferred to inpatient psychiatry.  Call mental health intake at [phone] to get him on the admission waiting list.  Page me or re-consult psychiatry as needed.     Dion Mcclure M.D.   [facility]   Contact information available via [facility] Paging/Directory.  If I am not available, then [facility] intake ([phone]) should know who   Is on call  .  Will add Veluvali monitor toothlike exam    \"Much or all of the text in this note was generated through the use of Dragon Dictate voice to text software. Errors in spelling or words which appear to be out of contact are unintentional, may be present due having escaped editing\"           "

## 2022-03-21 NOTE — PROGRESS NOTES
HEPATOLOGY PROGRESS NOTE    Date: 03/21/2022     ASSESSMENT:  46 year old male with a history of PSC status post liver transplant in 2017 complicated by an episode of rejection 2020, UC status post colectomy with IPAA complicated by recurrent pouchitis on rotating antibiotics, depression, admitted after intentional Benadryl overdose with anticholinergic toxicity.  Hepatology consulted to help manage immunosuppression.    #. Status post liver transplant 2017  #. Chronic immunosuppression  Was able to clarify with patient today regarding his immunosuppression dosing.  He takes 1.5 mg daily of tacrolimus, not 0.5 mg as noted on the medication reconciliation on admission.  I have a call out to [facility] transplant team to clarify trough level, as this remains unclear.  Potentially 8-10 based on most recent note from 1 year ago,  Which would make most recent trough level subtherapeutic.  Liver chemistries remain normal.     # Ulcerative colitis s/p colectomy w/ IPAA  # Recurrent pouchitis  Would continue his outpatient regimen of antibiotics for pouchitis.  He can follow-up with IBD specialists at [facility] for additional care after discharge.     #Status post Benadryl overdose with anticholinergic toxicity  Encephalopathy has resolved.      RECOMMENDATIONS:  - Would treat with 1.5 mg BID tacrolimus as patient reliably reports this as his dose now that encephalopathy has resolved  - Will update re: trough goal once I hear back from [facility].  - will continue to follow    Gastroenterology follow up recommendations: With [facility] team.     Thank you for involving us in this patient's care. Please do not hesitate to contact the GI service with any questions or concerns.      Pt care plan discussed with Dr. Mendez, GI staff physician.    Elliot Rubio MD  Gastroenterology Fellow  Division of Gastroenterology, Hepatology and Nutrition  [facility]  Pager:  [phone]  _______________________________________________________________      Subjective:  Patient fully alert and orient today. Denies any abdominal pain, diarrhea, tenesmus. Reports his home tacro dose is 1.5 mg BID. Unsure what his trough is.       Objective:  Blood pressure 122/64, pulse 77, temperature 98.3  F (36.8  C), temperature source Oral, resp. rate 18, weight 109.5 kg (241 lb 6.5 oz), SpO2 98 %.    Gen: A&Ox4, NAD  HEENT: sclera anicteric  Lungs: breathing comfortably on room air  Abd: soft, nontender, nondistended, bowel sounds present  Skin: no jaundice, no stigmata of chronic liver disease  MS: appropriate muscle mass for age  Neuro: non focal       LABS:  BMP  Recent Labs   Lab 03/21/22  0921 03/21/22  0620 03/20/22  1956 03/20/22  1605 03/20/22  0812 03/20/22  0559 03/19/22  0835 03/19/22  0546 03/18/22  1302 03/18/22  1133 03/18/22  0831 03/17/22  2248 03/17/22  2222   NA  --  139  --   --   --  140  --   --   --   --  144  --  142   POTASSIUM  --  3.6  --   --   --  3.9  --  3.8  --  3.7 3.5  --  3.5   CHLORIDE  --  112*  --   --   --  112*  --   --   --   --  114*  --  113*   BREN  --  8.9  --   --   --  8.4*  --   --   --   --  9.1  --  8.6   CO2  --  21  --   --   --  23  --   --   --   --  23  --  28   BUN  --  13  --   --   --  17  --   --   --   --  16  --  19   CR  --  0.89  --   --   --  1.06  --   --   --   --  1.17  --  1.11   * 200* 266* 273*   < > 222*   < >  --    < >  --  192*  192*   < > 212*    < > = values in this interval not displayed.     CBC  Recent Labs   Lab 03/18/22  0831 03/17/22  1505   WBC 4.3 7.2   RBC 4.39* 5.48   HGB 13.6 16.9   HCT 38.0* 47.1   MCV 87 86   MCH 31.0 30.8   MCHC 35.8 35.9   RDW 14.6 14.0   PLT 94* 122*     INR  Recent Labs   Lab 03/17/22  1505   INR 1.06     LFTs  Recent Labs   Lab 03/21/22  0620 03/20/22  0559 03/18/22  0831 03/17/22  2222   ALKPHOS 111 108 107 111   AST 31 21 26 20   ALT 35 31 43 39   BILITOTAL 2.0* 1.3 1.4* 1.1   PROTTOTAL 6.4*  6.5* 6.5* 6.6*   ALBUMIN 3.4 3.5 3.8 3.8      PANCNo lab results found in last 7 days.    IMAGING:    Reviewed

## 2022-03-21 NOTE — PROGRESS NOTES
[facility]    Medicine Progress Note - Hospitalist Service, GOLD TEAM 8    Date of Admission:  3/17/2022    Assessment & Plan          Abhishek Downey is a 46 year old male admitted on 3/17/2022. He has past medical history of liver transplant (2017) for PSC, diabetes type 2 on insulin, ulcerative colitis, chronic pouchitis, GERD, and hypertension.  Per ED report, patient ingested 19-25 mg Benadryl tablets on the morning of 3/17.  EMS was called, and was brought to the emergency room.  He is admitted to the hospital medicine service for evaluation and management of anticholinergic toxicity. This is resolved and patient is medically stable for discharge.      poisoning by antihistamine, self harm intent, initial encounter, present on admission  Suicide attempt, present on admission  Toxic Metabolic Encephalopathy due to Benadryl Ingestion  -Per ED report, patient ingested 19 benadryl tablets around 0930 3/17, denied taking anything else   - showing anticholinergic CNS toxicity - confused and disoriented, visual hallucinations, initially   Tachycardic hypertensive . No airway compromise. EKG sinus tachy w/ incomplete RBBB.  - Half-life of p.o. Benadryl in an adult is 9 to 12 hours.  on admission Discussed case with Poison Control and recommended:      -symptom management: labetalol IV for tachycardia/hypertension, benzodiazepine for agitated delirium.  -Has received physostigmine 1 mg on 3/17 at 1740, again 3/18 at 0009 and 0446 AM    -straight cath q6h if patient unable to void due to anticholinergic toxicity  - repeat EKG to check for qTC ( 479--459 )  and QRS ( 96-90)  prolongation, for QRS prolongation poison control recommended 1-2 amps of bicarb and if improves can give a in drip , for qTC prolongation recommended K and Mg replacement which will do   -ok to stop telemetry  -transfer orders to Med Surg floor  -1:1 sitter, safety tray, suicide  precautions  -psychiatry consulted. Hold placed. Inpatient psychiatry to follow     Hypertension   -Takes amlodipine 10 mg every day, continue    lactic acidosis - resolved     Primary sclerosing cholangitis diagnosed in 2010 , status post  liver transplant (2017)  Follows at [facility].  Annual visits, last 2021.   -LFT normal   - Hepatology consulted for immunosuppression     liver lesion  - noted on CT in 9/2021 , defer  back to [facility] for further management     elevated creatinine, mild  Cr was 1.4 back in 9/2021 so  suspect underlying CKD .    -monitor BMP    -Fluid resuscitation      T2DM  - unknown how much insulin he takes PTA as confused, found mention of NPH  40 units q AM , asked pharmacy to help out , for now  Start NPH 10 Units subcutaneous and continue accu-checks insulin sliding scale , he has not been eating much , per pharmacy ha snot filled NPH since last May   -HgA1c 8.9 3/17      ulcerative colitis diagnosed in 1998  Status post colectomy and iloanal anastomosis in 2009 for medically refractory disease   chronic pouchitis, antibiotics dependent  Plan was for 4 weeks of flagyl 500 mg bid , 4 weeks  off then 4 weeks of cipro bid , if fails consider vedolizumab per GI , unknown where he is in his cycle so will need to check , plus he has not filled flagyl per pharmacy   - follows with [facility].  last visit in Sept 2021    -resume ciprofloxacin 500mg BID from his medication list     major depressive disorder  Hx of suicidal ideation  Patient recently started counseling and PTA on sertraline. One psychiatric hospital stay many years ago for suicidal thoughts.     -psychiatry consult when patient's delirium resolves    -continue PTA sertraline      GERD    -continue PTA pantoprazole    Diarrhea  - likely chronic from patient, likely not c diff but testing is in process, if negative will be medically stable for discharge    Possible IVÁN  - unclear baseline  creatinine, but has improved since admission. On admission 1.2, today 0.9       Diet: Combination Diet Regular Diet Adult; Safe Tray - NO utensils    DVT Prophylaxis: Pneumatic Compression Devices  Baeza Catheter: Not present  Central Lines: None  Cardiac Monitoring: None  Code Status: Full Code      Disposition Plan   Expected Discharge: 03/21/2022     Anticipated discharge location:  Awaiting care coordination huddle  Delays:     inpatient mental health placement, c diff testing        The patient's care was discussed with the Bedside Nurse, Care Coordinator/ and Patient.    Melo Wilhelm MD  Hospitalist Service, GOLD TEAM [address] [place] [address]  Securely message with the Vocera Web Console (learn more here)  Text page via [facility] Paging/Directory   Please see signed in provider for up to date coverage information      Clinically Significant Risk Factors Present on Admission             # Diabetes, type II: last A1C 8.6 % (Ref range: 0.0 - 5.6 %)      ______________________________________________________________________    Interval History   No acute events overnight. Reviewed safety protocols    Otherwise 4pt ROS was reviewed and is negative     Data reviewed today: I reviewed all medications, new labs and imaging results over the last 24 hours. I personally reviewed no images or EKG's today.    Physical Exam   Vital Signs: Temp: 98.2  F (36.8  C) Temp src: Oral BP: (!) 166/98 Pulse: 79   Resp: 18 SpO2: 98 % O2 Device: None (Room air)    Weight: 241 lbs 6.46 oz  Gen: NAD, sitting comfortably in bed, flat affect  Eyes: PERRLA, EOMI, conjuctiva clear  Mouth: OP clear, no lesions  CV: RRR, no murmurs, 2+ radial pulses  RESP: CTA bilaterally, no w/r/c  Abd: soft, nontender, nondistended  Ext: no edema bilaterally  Neuro: CNII-CNXII intact     Data   Recent Labs   Lab 03/21/22  0921 03/21/22  0620 03/20/22  1956 03/20/22  0812 03/20/22  0559 03/19/22  0835  03/19/22  0546 03/18/22  1133 03/18/22  0831 03/17/22  1717 03/17/22  1505   WBC  --   --   --   --   --   --   --   --  4.3  --  7.2   HGB  --   --   --   --   --   --   --   --  13.6  --  16.9   MCV  --   --   --   --   --   --   --   --  87  --  86   PLT  --   --   --   --   --   --   --   --  94*  --  122*   INR  --   --   --   --   --   --   --   --   --   --  1.06   NA  --  139  --   --  140  --   --   --  144   < > 136   POTASSIUM  --  3.6  --   --  3.9  --  3.8   < > 3.5   < > 3.7   CHLORIDE  --  112*  --   --  112*  --   --   --  114*   < > 103   CO2  --  21  --   --  23  --   --   --  23   < > 23   BUN  --  13  --   --  17  --   --   --  16   < > 17   CR  --  0.89  --   --  1.06  --   --   --  1.17   < > 1.28*   ANIONGAP  --  6  --   --  5  --   --   --  7   < > 10   BREN  --  8.9  --   --  8.4*  --   --   --  9.1   < > 9.8   * 200* 266*   < > 222*   < >  --    < > 192*  192*   < > 428*   ALBUMIN  --  3.4  --   --  3.5  --   --   --  3.8   < > 4.6   PROTTOTAL  --  6.4*  --   --  6.5*  --   --   --  6.5*   < > 8.1   BILITOTAL  --  2.0*  --   --  1.3  --   --   --  1.4*   < > 1.4*   ALKPHOS  --  111  --   --  108  --   --   --  107   < > 148   ALT  --  35  --   --  31  --   --   --  43   < > 53   AST  --  31  --   --  21  --   --   --  26   < > 29    < > = values in this interval not displayed.     No results found for this or any previous visit (from the past 24 hour(s)).
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
## 2022-03-21 NOTE — PROGRESS NOTES
[facility] [place]  MATERNAL CHILD HEALTH   SOCIAL WORK PROGRESS NOTE      DATA:     Pt is a 46 year old male admitted for intentional overdose/suicide attempt. Social work is following for discharge planning.    INTERVENTION:       Chart review    Communication with interdisciplinary team, primarily  o Maribeth/Dodie in inpatient psych admissions ([phone])  o 6D Charge RN  o MD Wilhelm (attending)  o Bedside RN, Shanell    Provided intake information to Gi in inpatient psych admissions, transferred her to bedside RN for medical information that writer was unable to answer.    ASSESSMENT:     Per conversation with MD Wilhelm, pt is medically ready to discharge. Pt is voluntarily agreeing to inpatient psych admission. Referral has been made; awaiting acceptance.    PLAN:     SW will continue to follow for discharge planning. Once pt is accepted to inpatient psych facility, SW/REBECCA will set up transportation.    LAURA Harper, [facility]  Clinical   [facility]  [phone] (Unit 6D Social Work Phone)  [phone] (Unit 6D Social Work Pager)    ADD 2:53PM - Behavioral Health Intake line requested information regarding pt's willingness to travel to [facility] inpatient facilities. GELY met with pt to assess his preferences and he states he prefers facilities within 30 miles of [facility] or [facility]. GELY relayed this information to intake.

## 2022-03-21 NOTE — PHARMACY-ADMISSION MEDICATION HISTORY
"  Admission Medication History Completed by Pharmacy    See [facility] Admission Navigator for allergy information, preferred outpatient pharmacy, prior to admission medications and immunization status.     Medication History Sources:     Patient, pharmacy fill records    Changes made to PTA medication list (reason):    Added: None    Deleted: hydroxyzine    Changed: insulin NPH (pt buys OTC so does not have fill record), tacrolimus (see note below)    Additional Information:    Tacrolimus: pt reports he takes a 1 mg and 0.5 mg capsule to equal 1.5 mg dose twice daily. Fill records suggest he would be taking 0.5 mg twice daily. Pt reports there have not been any recent changes in dosing. Unclear what dose he is supposed to be taking. [facility] note states 2 mg BID.     Prior to Admission medications    Medication Sig Last Dose Taking? Auth Provider   amLODIPine (NORVASC) 10 MG tablet Take 1 tablet (10 mg) by mouth daily 3/17/2022 at Unknown time Yes Sukh Robertson PA-C   aspirin 81 MG tablet Take 81 mg by mouth daily  Past Week at Unknown time Yes Reported, Patient   BD VEO INSULIN SYRINGE U/F 31G X 15/64\" 0.5 ML  Past Week at Unknown time Yes Reported, Patient   ciprofloxacin (CIPRO) 500 MG tablet Take 500 mg by mouth 2 times daily Past Week at Unknown time Yes Reported, Patient   insulin  UNIT/ML injection 40 Units QAM and 10 U QPM. 3/17/2022 at Unknown time Yes Reported, Patient   sertraline (ZOLOFT) 100 MG tablet Take 1.5 tablets (150 mg) by mouth daily Past Week at Unknown time Yes Sukh Robertson PA-C   tacrolimus (GENERIC EQUIVALENT) 0.5 MG capsule Take 1.5 mg by mouth 2 times daily  Past Week at Unknown time Yes Reported, Patient     Date completed: 03/21/22    Medication history completed by: Manasa Aaron [facility]            "

## 2022-03-22 LAB
GLUCOSE BLDC GLUCOMTR-MCNC: 204 MG/DL (ref 70–99)
GLUCOSE BLDC GLUCOMTR-MCNC: 219 MG/DL (ref 70–99)
GLUCOSE BLDC GLUCOMTR-MCNC: 243 MG/DL (ref 70–99)
GLUCOSE BLDC GLUCOMTR-MCNC: 262 MG/DL (ref 70–99)

## 2022-03-22 PROCEDURE — 250N000013 HC RX MED GY IP 250 OP 250 PS 637: Performed by: NURSE PRACTITIONER

## 2022-03-22 PROCEDURE — 99233 SBSQ HOSP IP/OBS HIGH 50: CPT | Performed by: STUDENT IN AN ORGANIZED HEALTH CARE EDUCATION/TRAINING PROGRAM

## 2022-03-22 PROCEDURE — 99207 PR CDG-CUT & PASTE-POTENTIAL IMPACT ON LEVEL: CPT | Performed by: STUDENT IN AN ORGANIZED HEALTH CARE EDUCATION/TRAINING PROGRAM

## 2022-03-22 PROCEDURE — 250N000013 HC RX MED GY IP 250 OP 250 PS 637

## 2022-03-22 PROCEDURE — 120N000002 HC R&B MED SURG/OB UMMC

## 2022-03-22 PROCEDURE — 250N000012 HC RX MED GY IP 250 OP 636 PS 637: Performed by: STUDENT IN AN ORGANIZED HEALTH CARE EDUCATION/TRAINING PROGRAM

## 2022-03-22 PROCEDURE — 250N000012 HC RX MED GY IP 250 OP 636 PS 637: Performed by: INTERNAL MEDICINE

## 2022-03-22 PROCEDURE — 250N000013 HC RX MED GY IP 250 OP 250 PS 637: Performed by: PSYCHIATRY & NEUROLOGY

## 2022-03-22 RX ADMIN — CIPROFLOXACIN HYDROCHLORIDE 500 MG: 250 TABLET, FILM COATED ORAL at 19:57

## 2022-03-22 RX ADMIN — TACROLIMUS 1.5 MG: 1 CAPSULE ORAL at 18:17

## 2022-03-22 RX ADMIN — MIRTAZAPINE 15 MG: 15 TABLET, FILM COATED ORAL at 22:13

## 2022-03-22 RX ADMIN — AMLODIPINE BESYLATE 10 MG: 10 TABLET ORAL at 08:39

## 2022-03-22 RX ADMIN — ASPIRIN 81 MG CHEWABLE TABLET 81 MG: 81 TABLET CHEWABLE at 08:39

## 2022-03-22 RX ADMIN — PANTOPRAZOLE SODIUM 40 MG: 40 TABLET, DELAYED RELEASE ORAL at 08:39

## 2022-03-22 RX ADMIN — INSULIN HUMAN 40 UNITS: 100 INJECTION, SUSPENSION SUBCUTANEOUS at 10:34

## 2022-03-22 RX ADMIN — SERTRALINE HYDROCHLORIDE 150 MG: 50 TABLET ORAL at 08:39

## 2022-03-22 RX ADMIN — TACROLIMUS 1.5 MG: 1 CAPSULE ORAL at 08:39

## 2022-03-22 RX ADMIN — CIPROFLOXACIN HYDROCHLORIDE 500 MG: 250 TABLET, FILM COATED ORAL at 08:39

## 2022-03-22 ASSESSMENT — ACTIVITIES OF DAILY LIVING (ADL)
ADLS_ACUITY_SCORE: 3

## 2022-03-22 NOTE — TELEPHONE ENCOUNTER
Inpatient Bed Call Log 3/22/2022 done at 8am    Adults:    [facility] is posting 0 beds.     [facility] is posting 0 beds.    [facility] is posting 0 beds.    [facility] is posting 0 beds.    [facility] is posting 0 beds.    [facility] is posting 0 beds.    [facility] is posting 0 beds.    [facility] is posting 0 beds.    [facility] is posting 1 beds. Mixed unit 12+. Low acuity only.     [facility] is positing 0 beds. No aggression.     [facility] is posting 0 beds.     [facility] is posting 2 beds. Low acuity only.    [facility] is posting 0 beds.    [facility] is posting 2 beds. Low acuity.     [facility] is posting 1 beds. 72 hr hold required.     [facility] is posting 1 beds.     [facility] is posting 0 beds. Vol only, No Hx of aggression, violence or assault. No sexual offenders. No 72 hr holds.    [facility] is posting 4 beds. (Must have the cognitive ability to do programming. No aggressive or violent behavior or recent HX in the last 2 yrs. MH must be primary.)    [facility] is posting 0 beds. Low acuity only. Violence and aggression capped.     [facility] is posting 0 beds. Low acuity, Neg Covid.     [facility] is posting 1 beds. Covid neg. Vol only. Combined adolescent and adult unit. No aggressive or violent behavior. No registered sex offenders.     [facility] is posting 0 beds. Call for details. D/C coming up    [facility] is posting 0 beds.    8:13am- No appropriate bed placement

## 2022-03-22 NOTE — PROGRESS NOTES
[facility]    Medicine Progress Note - Hospitalist Service, GOLD TEAM 8    Date of Admission:  3/17/2022    Assessment & Plan        Abhishek Downey is a 46 year old male admitted on 3/17/2022. He has past medical history of liver transplant (2017) for PSC, DM II on insulin, ulcerative colitis, chronic pouchitis, GERD, and hypertension. Per ED report, patient ingested 19-25 mg Benadryl tablets on the morning of 3/17.  EMS was called, and was brought to the emergency room.  He is admitted to the hospital medicine service for evaluation and management of anticholinergic toxicity. This is resolved and patient is medically stable for discharge.      Poisoning by antihistamine, self harm intent, initial encounter, present on admission  Suicide attempt, present on admission  Toxic metabolic encephalopathy due to Benadryl ingestion  Major depressive disorder  Hx of suicidal ideation  Patient recently started counseling and PTA on sertraline. One psychiatric hospital stay many years ago for suicidal thoughts. Per ED report, patient ingested 19 benadryl tablets around 0930 3/17, denied taking anything else. Showed anticholinergic CNS toxicity - confused and disoriented, visual hallucinations, initially tachycardic and hypertensive. No airway compromise. EKG sinus tachy w/ incomplete RBBB.  - Poison control consulted, half-life of p.o. Benadryl in an adult is 9 to 12 hours. Treated symptomatically with Labetalol IV for tachycardia/hypertension, benzodiazepine for agitated delirium, received physostigmine total 4 mg. Initialy required stragiht cath for urinary retention, now resolved.   - now stable, off telemetry  - continue PTA sertraline, increase to home dose 150 mg 3/22  - Psychiatry consulted, Ok to be off hold and suicide precautions as he is voluntary to go to inpt psychiatry. If he changes his mind, will need to reinstitute a hold.   Call mental health intake at  [phone] to get him on the admission waiting list.    Hypertension   -Takes amlodipine 10 mg every day, continue     Lactic acidosis - resolved     Primary sclerosing cholangitis diagnosed in 2010 , status post  liver transplant (2017)  Liver lesion  Follows at [facility].  Annual visits, last 2021.   -LFT normal   - Hepatology consulted for immunosuppression  - tacrolimus 1.5 mg BID, GI was clarifying with his [place] team his trough level, will f/up. Potentially 8-10 but GI to determine this.   - liver lesion noted on CT in 9/2021 , defer to [place] for further management      Elevated creatinine, mild  Cr was 1.4 back in 9/2021 so suspect underlying CKD .    -monitor BMP    -Fluid resuscitation     T2DM  -HgA1c 8.9 3/17   - Restart PTA NPH 40 unit(s) in AM and 10 units qHS     Ulcerative colitis, dx 1998, s/p colectomy and iloanal anastomosis in 2009 for medically refractory disease   Chronic pouchitis, antibiotics dependent    Plan was for 4 weeks of flagyl 500 mg bid , 4 weeks  off then 4 weeks of cipro bid , if fails consider vedolizumab per GI ,unknown where he is in his cycle.  - follows with [facility].  Last visit in Sept 2021.  - per GI note, resume OP abx regimen. Resume ciprofloxacin 500mg BID from his medication list.     GERD    -continue PTA pantoprazole     Diarrhea  - Likely chronic from patient. C.diff negative        Diet: Regular Diet Adult    DVT Prophylaxis: Low Risk/Ambulatory with no VTE prophylaxis indicated  Baeza Catheter: Not present  Central Lines: None  Cardiac Monitoring: None  Code Status: Full Code      Disposition Plan   Expected Discharge: 03/22/2022     Anticipated discharge location:  Awaiting care coordination huddle  Delays:  awaiting inpt psych, medically ready        The patient's care was discussed with the Bedside Nurse, Care Coordinator/ and Patient.    Shobha Perez MD (Sally)  Internal  Medicine/Pediatrics  Hospitalist    Hospitalist Service, GOLD TEAM 8  [facility]  Securely message with the [employer] Web Console (learn more here)  Text page via [facility] Paging/Directory   Please see signed in provider for up to date coverage information      ______________________________________________________________________    Interval History   No acute events. Feeling tired today. Slept ok. Mood is fair. No pain anywhere.     Data reviewed today: I reviewed all medications, new labs and imaging results over the last 24 hours. I personally reviewed no images or EKG's today.    Physical Exam   Vital Signs: Temp: 98  F (36.7  C) Temp src: Oral BP: (!) 154/98 Pulse: 114   Resp: 16 SpO2: 95 % O2 Device: None (Room air)    Weight: 245 lbs 6.45 oz    General: awake, alert, in no acute distress, flat affect  HEENT: NCAT, sclera anicteric, no nasal discharge, MMM  CV: RRR, no murmurs noted  Resp: CTAB, no increased WOB  Abd: Soft, nontender, nondistended, +BS  MSK: No peripheral edema, extremities warm and well perfused  Skin: warm, dry  Neuro: Alert and oriented        Data   Results for orders placed or performed during the hospital encounter of 03/17/22 (from the past 24 hour(s))   Glucose by meter   Result Value Ref Range    GLUCOSE BY METER POCT 278 (H) 70 - 99 mg/dL   Glucose by meter   Result Value Ref Range    GLUCOSE BY METER POCT 243 (H) 70 - 99 mg/dL   Glucose by meter   Result Value Ref Range    GLUCOSE BY METER POCT 204 (H) 70 - 99 mg/dL   Glucose by meter   Result Value Ref Range    GLUCOSE BY METER POCT 219 (H) 70 - 99 mg/dL

## 2022-03-22 NOTE — PROGRESS NOTES
"Care Management Follow Up    Length of Stay (days): 5    Expected Discharge Date: 03/22/2022     Concerns to be Addressed: Placement IP Psych          Additional Information:  RN[facility] called [facility] Psych to check on bed status. Intake said there is \"quite a few in front of patient, hopefully tomorrow may work.\"    Haim Mata, SHANA Mata RN, BSN  5B RN Care Coordinator  [phone] phone  [phone] pager    Weekend or Holiday Care Coordinator [phone] pager  Job Code [id number]  "

## 2022-03-22 NOTE — PLAN OF CARE
Pt Aox4. No complaints of pain or discomfort. Suicide sitter discontinued during the day. Pt had IV replaced. Given PRN Labetalol for HTN. VSS. No HA noted. Pt resting comfortably. Awaiting placement at a mental health facility. Continue plan of care.

## 2022-03-22 NOTE — PROGRESS NOTES
NURSING PROGRESS NOTE  Patient Transfer Out    Report called to 6B at 10:30    Patient stable for transfer.     Patient and their support system are aware of plans for transfer.     Belongings packed and transported with patient.  Paper chart and patient-specific medications sent to receiving unit.        Shanell Ballard RN .................................................... March 22, 2022   11:07 AM  [facility] ([facility]): [place]  Stepdown ICU (Unit 6D)

## 2022-03-22 NOTE — PLAN OF CARE
Neuro: A&Ox4.   Cardiac: SR. VSS.   Respiratory: Sating 100 on RA.  GI/: Adequate urine output. BM X2  Diet/appetite: Tolerating regular diet. Eating well.  Activity: Independent in a room  Pain: At acceptable level on current regimen.   Skin: No new deficits noted.  LDA's: PIV x2 , nothings infusing.         Plan: Patient is awaiting placement in mental health. He is out of suicide precautions and 72 h hold.  Continue with POC. Notify primary team with changes.         Problem: Plan of Care - These are the overarching goals to be used throughout the patient stay.    Goal: Plan of Care Review/Shift Note    Outcome: Ongoing, Progressing  Flowsheets (Taken 3/21/2022 1902)  Plan of Care Reviewed With: patient  Outcome Evaluation: pt off suacide precautions and 72 h hold  Overall Patient Progress: improving

## 2022-03-23 ENCOUNTER — HOSPITAL ENCOUNTER (INPATIENT)
Facility: CLINIC | Age: 47
LOS: 7 days | Discharge: HOME | DRG: 885 | End: 2022-03-30
Attending: PSYCHIATRY & NEUROLOGY | Admitting: PSYCHIATRY & NEUROLOGY
Payer: MEDICARE

## 2022-03-23 ENCOUNTER — TELEPHONE (OUTPATIENT)
Dept: BEHAVIORAL HEALTH | Facility: CLINIC | Age: 47
End: 2022-03-23
Payer: COMMERCIAL

## 2022-03-23 VITALS
DIASTOLIC BLOOD PRESSURE: 93 MMHG | RESPIRATION RATE: 18 BRPM | HEART RATE: 96 BPM | BODY MASS INDEX: 32.89 KG/M2 | WEIGHT: 242.5 LBS | SYSTOLIC BLOOD PRESSURE: 141 MMHG | OXYGEN SATURATION: 98 % | TEMPERATURE: 97.1 F

## 2022-03-23 DIAGNOSIS — Z79.4 TYPE 2 DIABETES MELLITUS WITH COMPLICATION, WITH LONG-TERM CURRENT USE OF INSULIN (H): Chronic | ICD-10-CM

## 2022-03-23 DIAGNOSIS — E11.8 TYPE 2 DIABETES MELLITUS WITH COMPLICATION, WITH LONG-TERM CURRENT USE OF INSULIN (H): Chronic | ICD-10-CM

## 2022-03-23 DIAGNOSIS — F33.2 SEVERE RECURRENT MAJOR DEPRESSION WITHOUT PSYCHOTIC FEATURES (H): Primary | ICD-10-CM

## 2022-03-23 PROBLEM — R45.851 DEPRESSION WITH SUICIDAL IDEATION: Status: ACTIVE | Noted: 2022-03-23

## 2022-03-23 PROBLEM — F32.A DEPRESSION WITH SUICIDAL IDEATION: Status: ACTIVE | Noted: 2022-03-23

## 2022-03-23 LAB
ALBUMIN SERPL-MCNC: 3.6 G/DL (ref 3.4–5)
ALP SERPL-CCNC: 141 U/L (ref 40–150)
ALT SERPL W P-5'-P-CCNC: 53 U/L (ref 0–70)
ANION GAP SERPL CALCULATED.3IONS-SCNC: 7 MMOL/L (ref 3–14)
AST SERPL W P-5'-P-CCNC: 45 U/L (ref 0–45)
BILIRUB SERPL-MCNC: 1.2 MG/DL (ref 0.2–1.3)
BUN SERPL-MCNC: 12 MG/DL (ref 7–30)
CALCIUM SERPL-MCNC: 9.2 MG/DL (ref 8.5–10.1)
CHLORIDE BLD-SCNC: 109 MMOL/L (ref 94–109)
CO2 SERPL-SCNC: 24 MMOL/L (ref 20–32)
CREAT SERPL-MCNC: 0.86 MG/DL (ref 0.66–1.25)
ERYTHROCYTE [DISTWIDTH] IN BLOOD BY AUTOMATED COUNT: 13.9 % (ref 10–15)
GFR SERPL CREATININE-BSD FRML MDRD: >90 ML/MIN/1.73M2
GLUCOSE BLD-MCNC: 218 MG/DL (ref 70–99)
GLUCOSE BLDC GLUCOMTR-MCNC: 171 MG/DL (ref 70–99)
GLUCOSE BLDC GLUCOMTR-MCNC: 190 MG/DL (ref 70–99)
GLUCOSE BLDC GLUCOMTR-MCNC: 191 MG/DL (ref 70–99)
HCT VFR BLD AUTO: 41.1 % (ref 40–53)
HGB BLD-MCNC: 14.2 G/DL (ref 13.3–17.7)
MCH RBC QN AUTO: 30.2 PG (ref 26.5–33)
MCHC RBC AUTO-ENTMCNC: 34.5 G/DL (ref 31.5–36.5)
MCV RBC AUTO: 87 FL (ref 78–100)
PLATELET # BLD AUTO: 97 10E3/UL (ref 150–450)
POTASSIUM BLD-SCNC: 3.7 MMOL/L (ref 3.4–5.3)
PROT SERPL-MCNC: 6.8 G/DL (ref 6.8–8.8)
RBC # BLD AUTO: 4.7 10E6/UL (ref 4.4–5.9)
SODIUM SERPL-SCNC: 140 MMOL/L (ref 133–144)
TACROLIMUS BLD-MCNC: 2.9 UG/L (ref 5–15)
TME LAST DOSE: ABNORMAL H
TME LAST DOSE: ABNORMAL H
WBC # BLD AUTO: 2.5 10E3/UL (ref 4–11)

## 2022-03-23 PROCEDURE — 128N000001 HC R&B CD/MH ADULT

## 2022-03-23 PROCEDURE — 250N000013 HC RX MED GY IP 250 OP 250 PS 637: Performed by: NURSE PRACTITIONER

## 2022-03-23 PROCEDURE — 250N000012 HC RX MED GY IP 250 OP 636 PS 637: Performed by: INTERNAL MEDICINE

## 2022-03-23 PROCEDURE — 80053 COMPREHEN METABOLIC PANEL: CPT | Performed by: STUDENT IN AN ORGANIZED HEALTH CARE EDUCATION/TRAINING PROGRAM

## 2022-03-23 PROCEDURE — 85027 COMPLETE CBC AUTOMATED: CPT | Performed by: STUDENT IN AN ORGANIZED HEALTH CARE EDUCATION/TRAINING PROGRAM

## 2022-03-23 PROCEDURE — 99239 HOSP IP/OBS DSCHRG MGMT >30: CPT | Performed by: STUDENT IN AN ORGANIZED HEALTH CARE EDUCATION/TRAINING PROGRAM

## 2022-03-23 PROCEDURE — 250N000013 HC RX MED GY IP 250 OP 250 PS 637: Performed by: PSYCHIATRY & NEUROLOGY

## 2022-03-23 PROCEDURE — 36415 COLL VENOUS BLD VENIPUNCTURE: CPT | Performed by: STUDENT IN AN ORGANIZED HEALTH CARE EDUCATION/TRAINING PROGRAM

## 2022-03-23 RX ORDER — HYDROXYZINE HYDROCHLORIDE 25 MG/1
25 TABLET, FILM COATED ORAL EVERY 4 HOURS PRN
Status: DISCONTINUED | OUTPATIENT
Start: 2022-03-23 | End: 2022-03-30 | Stop reason: HOSPADM

## 2022-03-23 RX ORDER — OLANZAPINE 10 MG/2ML
10 INJECTION, POWDER, FOR SOLUTION INTRAMUSCULAR 3 TIMES DAILY PRN
Status: DISCONTINUED | OUTPATIENT
Start: 2022-03-23 | End: 2022-03-30 | Stop reason: HOSPADM

## 2022-03-23 RX ORDER — ACETAMINOPHEN 325 MG/1
650 TABLET ORAL EVERY 4 HOURS PRN
Status: DISCONTINUED | OUTPATIENT
Start: 2022-03-23 | End: 2022-03-30 | Stop reason: HOSPADM

## 2022-03-23 RX ORDER — AMOXICILLIN 250 MG
1 CAPSULE ORAL 2 TIMES DAILY PRN
Status: DISCONTINUED | OUTPATIENT
Start: 2022-03-23 | End: 2022-03-30 | Stop reason: HOSPADM

## 2022-03-23 RX ORDER — MIRTAZAPINE 15 MG/1
15 TABLET, FILM COATED ORAL AT BEDTIME
Status: ON HOLD | DISCHARGE
Start: 2022-03-23 | End: 2022-03-30

## 2022-03-23 RX ORDER — AMLODIPINE BESYLATE 5 MG/1
10 TABLET ORAL DAILY
Status: DISCONTINUED | OUTPATIENT
Start: 2022-03-24 | End: 2022-03-30 | Stop reason: HOSPADM

## 2022-03-23 RX ORDER — TRAZODONE HYDROCHLORIDE 50 MG/1
50 TABLET, FILM COATED ORAL
Status: DISCONTINUED | OUTPATIENT
Start: 2022-03-23 | End: 2022-03-30 | Stop reason: HOSPADM

## 2022-03-23 RX ORDER — MIRTAZAPINE 15 MG/1
15 TABLET, FILM COATED ORAL AT BEDTIME
Status: DISCONTINUED | OUTPATIENT
Start: 2022-03-23 | End: 2022-03-28

## 2022-03-23 RX ORDER — DEXTROSE MONOHYDRATE 25 G/50ML
25-50 INJECTION, SOLUTION INTRAVENOUS
Status: DISCONTINUED | OUTPATIENT
Start: 2022-03-23 | End: 2022-03-24

## 2022-03-23 RX ORDER — NICOTINE POLACRILEX 4 MG
15-30 LOZENGE BUCCAL
Status: DISCONTINUED | OUTPATIENT
Start: 2022-03-23 | End: 2022-03-24

## 2022-03-23 RX ORDER — MAGNESIUM HYDROXIDE/ALUMINUM HYDROXICE/SIMETHICONE 120; 1200; 1200 MG/30ML; MG/30ML; MG/30ML
30 SUSPENSION ORAL EVERY 4 HOURS PRN
Status: DISCONTINUED | OUTPATIENT
Start: 2022-03-23 | End: 2022-03-24

## 2022-03-23 RX ORDER — OLANZAPINE 10 MG/1
10 TABLET ORAL 3 TIMES DAILY PRN
Status: DISCONTINUED | OUTPATIENT
Start: 2022-03-23 | End: 2022-03-30 | Stop reason: HOSPADM

## 2022-03-23 RX ADMIN — AMLODIPINE BESYLATE 10 MG: 10 TABLET ORAL at 09:34

## 2022-03-23 RX ADMIN — CIPROFLOXACIN HYDROCHLORIDE 500 MG: 250 TABLET, FILM COATED ORAL at 09:34

## 2022-03-23 RX ADMIN — TACROLIMUS 1.5 MG: 1 CAPSULE ORAL at 17:30

## 2022-03-23 RX ADMIN — MIRTAZAPINE 15 MG: 15 TABLET, FILM COATED ORAL at 21:48

## 2022-03-23 RX ADMIN — PANTOPRAZOLE SODIUM 40 MG: 40 TABLET, DELAYED RELEASE ORAL at 09:34

## 2022-03-23 RX ADMIN — TACROLIMUS 1.5 MG: 1 CAPSULE ORAL at 09:34

## 2022-03-23 RX ADMIN — INSULIN HUMAN 40 UNITS: 100 INJECTION, SUSPENSION SUBCUTANEOUS at 09:35

## 2022-03-23 RX ADMIN — ASPIRIN 81 MG CHEWABLE TABLET 81 MG: 81 TABLET CHEWABLE at 09:34

## 2022-03-23 RX ADMIN — SERTRALINE HYDROCHLORIDE 150 MG: 50 TABLET ORAL at 09:34

## 2022-03-23 ASSESSMENT — ACTIVITIES OF DAILY LIVING (ADL)
ADLS_ACUITY_SCORE: 3
DOING_ERRANDS_INDEPENDENTLY_DIFFICULTY: NO
ADLS_ACUITY_SCORE: 3
WEAR_GLASSES_OR_BLIND: YES
ADLS_ACUITY_SCORE: 3
ADLS_ACUITY_SCORE: 3
DRESSING/BATHING_DIFFICULTY: NO
WALKING_OR_CLIMBING_STAIRS_DIFFICULTY: NO
ADLS_ACUITY_SCORE: 3
FALL_HISTORY_WITHIN_LAST_SIX_MONTHS: NO
ADLS_ACUITY_SCORE: 3
TOILETING_ISSUES: NO
ADLS_ACUITY_SCORE: 3
ADLS_ACUITY_SCORE: 3
DIFFICULTY_EATING/SWALLOWING: NO
CHANGE_IN_FUNCTIONAL_STATUS_SINCE_ONSET_OF_CURRENT_ILLNESS/INJURY: NO
CONCENTRATING,_REMEMBERING_OR_MAKING_DECISIONS_DIFFICULTY: NO

## 2022-03-23 NOTE — PHARMACY-ADMISSION MEDICATION HISTORY
Admission medication history completed at [facility]. Please see Pharmacy - Admission Medication History note from 03/21/2022.    Jomar Kidd RPH on 3/23/2022 at 6:47 PM       Statement Selected

## 2022-03-23 NOTE — TELEPHONE ENCOUNTER
R:  [facility]    3:16 PM Paged [facility] Provider    3:20 PM Jeison approved for [facility]  -didn't request doc to doc    Updated Worklist and added to Admit Board    3:50 PM Updated [facility]    3:55 PM Updated [facility] #[phone].  Reminded them that the Pt needs to be done by 5pm since he is a medical admit.

## 2022-03-23 NOTE — PROGRESS NOTES
RN Decompensation Assessment   (Additional guidance for RRT)      Mentation:  Exam is notable for normal (baseline) mental status    Respiratory mechanics and oxygenation:  Exam is notable for normal/unchanged breathing pattern and stable oxygen requirements    Cardiovascular/perfusion:   Exam is notable for normal/unchanged cardiovascular/perfusion assessment    Overall estimation of the patient s condition/stability (1 = stable patient, 7 = appears very sick)? 1 - Patient is stable without signs of deterioration         Radha Fernandez RN

## 2022-03-23 NOTE — PLAN OF CARE
A&Ox4, flat affect. VSS on RA. Denies pain. . Declined breakfast but ate a big lunch. L PIV SL. Up independently. Discharge pending open psych bed. Continue POC.     Goal Outcome Evaluation:    Plan of Care Reviewed With: patient     Overall Patient Progress: improving    Outcome Evaluation: Medically ready for discharge, awaiting inpatient psych placement

## 2022-03-23 NOTE — PLAN OF CARE
Goal Outcome Evaluation: A+Ox4. AVSS. No reports of Cardiac, respiratory, GI or neuro issues. Mood calm and appropriate.    Plan of Care Reviewed With: patient     BP (!) 147/91 (BP Location: Left arm)   Pulse 81   Temp 97.6  F (36.4  C) (Oral)   Resp 16   Wt 111.3 kg (245 lb 6.5 oz)   SpO2 98%   BMI 33.28 kg/m    Neuro: A&Ox4.neuro intact.   Cardiac: Afebrile, VSS.   Respiratory: RA   GI/: Voiding spontaneously. No BM this shift.   Diet/appetite: Tolerating diet. Denies nausea   Activity: Up independent   Pain: . Denies   Skin: No new deficits noted.  Lines: piv sl'd    Rested btwn cares. Able to make needs known. Continue to monitor. Notify MD of changes/concerns.     Problem: Plan of Care - These are the overarching goals to be used throughout the patient stay.    Goal: Readiness for Transition of Care  Outcome: Ongoing, Progressing     Problem: Suicidal Behavior  Goal: Suicidal Behavior is Absent or Managed  Outcome: Ongoing, Progressing     Problem: Nausea and Vomiting  Goal: Fluid and Electrolyte Balance  Outcome: Ongoing, Progressing     Problem: Suicide Risk  Goal: Absence of Self-Harm  Outcome: Ongoing, Progressing

## 2022-03-23 NOTE — PROGRESS NOTES
BRIEF HEPATOLOGY SIGN OFF NOTE    46 year old male with a history of PSC status post liver transplant in 2017 complicated by an episode of rejection 2020, UC status post colectomy with IPAA complicated by recurrent pouchitis on rotating antibiotics, depression, admitted after intentional Benadryl overdose with anticholinergic toxicity.  Hepatology consulted to help manage immunosuppression.    After clarification with patient, he is now back on his home dose of 1.5 mg BID tacrolimus. Liver chemistries remain w/in normal range. Have not heard back from [facility] team re: trough goal.    RECOMMENDATIONS:  - continue tacrolimus 1.5 mg BID  - he can follow up with [facility] team after discharge  - we will sign off, please contact us with questions    Elliot Rubio MD  GI Fellow

## 2022-03-23 NOTE — PROGRESS NOTES
Care Management Follow Up    Length of Stay (days): 6    Expected Discharge Date: 03/23/2022     Concerns to be Addressed:     Discharge      Additional Information:  RNCC received call at 1600 that there is a bed available at [facility]. [facility].     Caller said that they want patient by 1700.     RNCC was not able to get a [facility] non-emergent ride until [id number].     Patients bedside nurse communicated this information in the nurse to nurse report. ([phone])    RNCC completed PCS form and placed in patients chart.    Provider paged and working on discharge orders.       Haim Mata RN     Haim Mata RN, BSN  5B RN Care Coordinator  [phone] phone  [phone] pager    Weekend or Holiday Care Coordinator [phone] pager  Job Code [phone]

## 2022-03-23 NOTE — PLAN OF CARE
Patient denies pain. Quiet, withdrawn with flat affect. Has been watching TV and resting in bed.  and 262. Good appetite with dinner. Up independently. Discharge pending open psych bed. Will continue to monitor and follow plan of care.      Goal Outcome Evaluation:    Plan of Care Reviewed With: patient     Overall Patient Progress: improving    Outcome Evaluation: Denies pain and nausea. Waiting for pysch placement

## 2022-03-23 NOTE — PROGRESS NOTES
Discharged to: [facility]  Transportation:  health stretcher   Time: 1800  Prescriptions: none  Belongings: none  PIV/Access: Left PIV removed with no complications  Care Plan and Education discontinued: yes  Paperwork: Report given to Estephania on [facility] and packet given to [facility] transporters to give to [facility] staff.

## 2022-03-24 PROBLEM — F33.2 SEVERE RECURRENT MAJOR DEPRESSION WITHOUT PSYCHOTIC FEATURES (H): Status: ACTIVE | Noted: 2022-03-24

## 2022-03-24 LAB
GLUCOSE BLDC GLUCOMTR-MCNC: 220 MG/DL (ref 70–99)
GLUCOSE BLDC GLUCOMTR-MCNC: 274 MG/DL (ref 70–99)
GLUCOSE BLDC GLUCOMTR-MCNC: 291 MG/DL (ref 70–99)
GLUCOSE BLDC GLUCOMTR-MCNC: 296 MG/DL (ref 70–99)

## 2022-03-24 PROCEDURE — 99221 1ST HOSP IP/OBS SF/LOW 40: CPT

## 2022-03-24 PROCEDURE — 250N000013 HC RX MED GY IP 250 OP 250 PS 637: Performed by: INTERNAL MEDICINE

## 2022-03-24 PROCEDURE — 250N000013 HC RX MED GY IP 250 OP 250 PS 637: Performed by: NURSE PRACTITIONER

## 2022-03-24 PROCEDURE — 99222 1ST HOSP IP/OBS MODERATE 55: CPT | Performed by: PSYCHIATRY & NEUROLOGY

## 2022-03-24 PROCEDURE — 250N000013 HC RX MED GY IP 250 OP 250 PS 637

## 2022-03-24 PROCEDURE — 128N000001 HC R&B CD/MH ADULT

## 2022-03-24 PROCEDURE — 250N000012 HC RX MED GY IP 250 OP 636 PS 637: Performed by: INTERNAL MEDICINE

## 2022-03-24 RX ORDER — CIPROFLOXACIN 500 MG/1
500 TABLET, FILM COATED ORAL 2 TIMES DAILY
Status: DISCONTINUED | OUTPATIENT
Start: 2022-03-24 | End: 2022-03-30 | Stop reason: HOSPADM

## 2022-03-24 RX ORDER — NICOTINE POLACRILEX 4 MG
15-30 LOZENGE BUCCAL
Status: DISCONTINUED | OUTPATIENT
Start: 2022-03-24 | End: 2022-03-30 | Stop reason: HOSPADM

## 2022-03-24 RX ORDER — DEXTROSE MONOHYDRATE 25 G/50ML
25-50 INJECTION, SOLUTION INTRAVENOUS
Status: DISCONTINUED | OUTPATIENT
Start: 2022-03-24 | End: 2022-03-30 | Stop reason: HOSPADM

## 2022-03-24 RX ADMIN — TACROLIMUS 1.5 MG: 1 CAPSULE ORAL at 20:47

## 2022-03-24 RX ADMIN — AMLODIPINE BESYLATE 10 MG: 5 TABLET ORAL at 08:24

## 2022-03-24 RX ADMIN — INSULIN ASPART 1 UNITS: 100 INJECTION, SOLUTION INTRAVENOUS; SUBCUTANEOUS at 08:50

## 2022-03-24 RX ADMIN — INSULIN HUMAN 10 UNITS: 100 INJECTION, SUSPENSION SUBCUTANEOUS at 08:49

## 2022-03-24 RX ADMIN — SERTRALINE HYDROCHLORIDE 150 MG: 50 TABLET ORAL at 08:25

## 2022-03-24 RX ADMIN — MIRTAZAPINE 15 MG: 15 TABLET, FILM COATED ORAL at 20:47

## 2022-03-24 RX ADMIN — TACROLIMUS 1.5 MG: 1 CAPSULE ORAL at 08:28

## 2022-03-24 RX ADMIN — CIPROFLOXACIN 500 MG: 500 TABLET, FILM COATED ORAL at 20:47

## 2022-03-24 RX ADMIN — CIPROFLOXACIN 500 MG: 500 TABLET, FILM COATED ORAL at 08:45

## 2022-03-24 ASSESSMENT — ACTIVITIES OF DAILY LIVING (ADL)
LAUNDRY: WITH SUPERVISION
DRESS: INDEPENDENT
LAUNDRY: WITH SUPERVISION
ORAL_HYGIENE: INDEPENDENT
DRESS: SCRUBS (BEHAVIORAL HEALTH)
HYGIENE/GROOMING: INDEPENDENT
HYGIENE/GROOMING: INDEPENDENT
ORAL_HYGIENE: INDEPENDENT

## 2022-03-24 NOTE — PLAN OF CARE
Problem: Behavioral Health Plan of Care  Goal: Patient-Specific Goal (Individualization)  Outcome: Ongoing, Progressing  Goal: Adheres to Safety Considerations for Self and Others  Outcome: Ongoing, Progressing    No c/o pain or discomfort this shift. Pt. Slept  6 hours. Pt. Refused 0200 blood sugar. Safety checks completed every 15 minutes per policy. Self Injury and  Suicide  Precautions continued. No self injury observed or reported this shift. No suicidal ideation reported this shift.

## 2022-03-24 NOTE — CONSULTS
"DIABETES CARE  Situation:  Consulted by Provider for Diabetes Education  46 year old male with type 2 Diabetes. Patient was admitted for attempted suicide.     Background:  Related Co-morbidities include:post liver transplant, Type 2 diabetes, HTN  PCP: No Ref-Primary, Physician  Social: Lives with parents it seems    Diabetes History:   Long term history of type 2 diabetes with insulin since 2018. Mainly NPH and sometimes Novolog for sliding scale. Fast acting insulins expensive on current insurance plan.       Meds for BG Management PTA:  NPH 40 units in am and 10 units at pm      Current Inpatient Meds for BG Management:  Novolog medium correction 1 drops 50mg/dl  NPH 40 units in am and 10 units in pm    Labs:  Hemoglobin A1C:8.6             Hgb: 16.9    SCr: 0.86   GFR: >90     Blood Glucose POC:   Results for CHRIS FRY (MRN [id number]) as of 3/24/2022 13:02   Ref. Range 3/23/2022 11:43 3/23/2022 16:59 3/23/2022 21:42 3/24/2022 07:59 3/24/2022 12:03   GLUCOSE BY METER POCT Latest Ref Range: 70 - 99 mg/dL 171 (H) 190 (H) 191 (H) 220 (H) 291 (H)     Diet Order:  Regular Diet  Intake:NA   Weight: 109.6kg    BMI: 32.76    DM EDUCATION/COUNSELING:  Barriers to Learning and/or DM Self-Management: None  Previous DM Education: Yes  Assessment:  Discussed with NP that should first try his home plan with medium to high correction scale.  If not in good control, could increase NPH or add meal insulin.  Cost of fast acting is high for him but could use regular and [facility] as he probably gets his NPH there as well.       Recommendations:  1. Watch blood glucose and make changes as necessary to home plan.  2. IF needs meal insulin, he would need to get regular due to costs.   3. Check to see if he has good meter. He can get accu-chek meter and supplies covered.   Refer to \"Guidelines for Insulin Initiation and Care in Hospitalized Adults\"  link in Diabetes Management Order set for dosing " guidelines    Hospital BG goals for blood glucose levels are < 180 for improved health outcomes.      DISCHARGE NEEDS:   Not known yet.     Thank you,   Jenni Quezada RN, [facility]  VM: [phone]  Pager: [phone]

## 2022-03-24 NOTE — PLAN OF CARE
Problem: Suicidal Behavior  Goal: Suicidal Behavior is Absent or Managed  Outcome: Ongoing, Progressing     Problem: Behavioral Health Plan of Care  Goal: Adheres to Safety Considerations for Self and Others  Outcome: Ongoing, Progressing   Patient is pleasant  and cooperative. Patient denies suicidal ideations , anxiety,depression, and other psych symptoms and contracts for safety. Patient is intermittently visible on the unit and attended community meeting.  Blood sugar at breakfast was 191 and 291 at noon He received insulin coverage as ordered. He medication compliant and no concerns noted.

## 2022-03-24 NOTE — PROGRESS NOTES
Patient arrived on the unit from [facility] . Pt denied any pain or discomfort. Patient was cooperative with admission assessment, which was completed. Patient reported he has had a hx of suicidal ideation and had a past hospitalization, approximately 10 years ago at [facility].  Pt reported that he decided to receive in patient tx for depression and a recent suicide attempt. Pt reported he took a number of Benadryl  Tablets and his sister found him and called 911. Pt reported he and has wife is in the process of a divorce and he is unable to see his children due to an order of protection. Patient denied committing violence towards his wife. Pt reported he felt hopeless about the situation . Patient reported that he stays with his mother and sleeps at his sister's home. He does not feel his family is supportive as his siblings have addiction issues .Patient denies having any chemical dependency issues. Pt reported he continues to have intermittent thoughts of SI but denies feeling suicidal currently. Pt agreed to contract for safety. Patient's down to gown completed and skin is intact. Patient has occasional incontinence due to chronic condition. Will continue to monitor.

## 2022-03-24 NOTE — PROVIDER NOTIFICATION
"   03/24/22 1140   Individualization/Patient Specific Goals   Patient Personal Strengths expressive of emotions   Patient Vulnerabilities family/relationship conflict   Anxieties, Fears or Concerns Pt's wife filing for divorce and pt has not seen his children since May 2021   Patient-Specific Goals (Include Timeframe) \"Reset myself and figure out what to do next\".   Interprofessional Rounds   Summary Treatment plan was discussed with treatment team.   Participants OT;psychiatrist;psychologist;nursing;social work   Team Discussion   Participants Psychiatry: DP, Nursing: EO, Social work: RD, OT: AF, PharmD: MK   Anticipated length of stay 5-7 days   Continued Stay Criteria/Rationale symptom stabilization, care-coordination, medication-management   Medical/Physical hx of liver transplant, Diabetes mellitius   Plan Pscyhaitrically stabilize and discharge back to mom's house.     "

## 2022-03-24 NOTE — CONSULTS
[facility]  Consult Note - Hospitalist Service  Date of Admission:  3/23/2022  Consult Requested by:Maldonado Farmer APRN CNP  Reason for Consult: patient with type 2 diabetes is insulin dependent.   Assessment & Plan   Abhishek Downey is a 46 year old male admitted on 3/23/2022 for depression, SI, and overdose from 19-25 Bendadryl tablets on 3/17. He was admitted to the [facility] from 3/17/220-3/23/22. He was admitted to the hospital medicine service for evaluation and management of anticholinergic toxicity. He has PMH of history of liver transplant (2017) for PSC, DM II on insulin, GERD,  ulcerative colitis, chronic pouchitis, HTN, and depression.   .  # Poisoning by antihistamine, self harm intent, initial encounter-resolved  # Toxic metabolic encephalopathy due to Benadryl ingestion -resolved  # Lactic acidosis, resolved   # Elevated creatinine, mild, resolved  Per ED report, took 19-25 Benadryl pills on 3/17. Had anticholinergic CNS toxicity /s-confused, disoriented, tachycardic, hypertensive, and had visual hallucination. . EKG sinus tachy w/ incomplete RBBB. Poison control contracted and reported half-life of 9-12 hrs. [place] elvated Cr and LA, now resolved. Treated with IV Labetalol for HTN/tachycardia, benzos for agitation, delirium, and received 4 mg of physostigmine.  1x straight cath for urinary retention. Upon exam, denies dry mouth, tachycardia, and urinary retention. Oriented x4.  - continue to monitor     #Suicide attempt, present on admission  # Major depressive disorder  # Hx of suicidal ideation  - Management per psychiatry      # Lymphopenia  # Thrombocytopenia  Could be related to immunosuppression. Plats lowest on 3/18 at 94, 97 now. WBC as low as 2.5. Have been as low as 2.1 in 2020. Hgb WNL at 14.2. GI unable to get a hold of [facility] where pt got a transplant. Pt reports taking to GI last summer. Follow-up missed per pt.   -trend CBC + plasts in  "several days        # Primary sclerosing cholangitis diagnosed in 2010 s/p  liver transplant (2017)  # lesion  Follows at [facility] where pt received transplant. [place] visits, last 2021 in summer pt.. Hepatology at the [facility] was consulted for immunosuppression. Unclear what his trough goal is, relatively low for liver transplant at 2.9-3.5. [facility] was not able to get into contact with his [place] team. Patient reports he has not changed his tacrolimus dose in a long time and that the [facility] team reported it was \"fine and doses not need to be changes.\" No s/s of infection at this time.   - Continue PTA tacrolimus 1.5 mg BID  -  follow up with [facility] team at discharge, discussed that we can help him make an appointment  - liver lesion noted on CT in 9/2021, defer to [facility] team for further management      # T2DM  BG ranged in 100-200s during [facility] admission. Was restarted on PTA NPH 2 days ago and was on high sliding scale insulin for two days. BG in 200s today, will watch trends. Last HgbA1c 8.6, Cr stable   - PTA NPH 40 unit(s) in AM and 10 units at bedtime resumed  - start medium sliding scale insulin and likely icnrease to high sliding scale insulin  - appreciate recs from Diabetes educator: may need insulin coverage with meals, qiley need regular insulin vs. fast acting insulin due to cost, and to assess weather he has a good BG meter.      # Ulcerative colitis, dx 1998, s/p colectomy and iloanal anastomosis (2009) for medically refractory disease   # Chronic pouchitis, antibiotics dependent    Last summer pt was hospitalized for greater than his usual 10 BM per day. He states he was \"unable to get off the toliet.\" Plan per [facility] was for 4 weeks of flagyl 500 mg bid , 4 weeks off then 4 weeks of cipro BID, if fails consider vedolizumab per GI. Follows with [facility].  Last visit in Sept 2021. Did not follow-up as directed.  - Resume ciprofloxacin 500mg BID from his " "medication list  - Will need follow up with [facility], dicussed that we can help him make the appointment.     #GERD: continue PTA PPI  # Essential hypertension: continue PTA amlodipine 10 mg daily  # Diarrhea, chronic: Likely secondary to tacrolimus. Normal is around 10 BM per day. C.diff negative this admission.       The patient's care was discussed with the Bedside Nurse and Patient.    Total time spent on the unit 20 minutes in reviewing the record, examination of patient, lab results and completing documentation. 10 minutes was spent in discussion and counseling with patient concerning diagnosis, medications, lab results and treatment plan. Care discussed and coordinated with patient and nurse. Follows at [facility] where pt received liver transplant. [place] visits, last 2021 in summer pt.. Hepatology at the [facility] was consulted for immunosuppression. Unclear what his trough goal is, relatively low for liver transplant at 2.9-3.5. [facility] was not able to get into contact with his [place] team. Patient reports he has not changed his tacrolimus dose in a long time and that the [facility] team reported it was \"fine and doses not need to be changes.\" No s/s of infection at this time including N/V, fevers, malaise, mouth pain.             [facility]  Securely message with the Vocera Web Console (learn more here)  Text page via [facility] Paging/Directory       Hospitalist Service    Clinically Significant Risk Factors Present on Admission                # Platelet Defect: home medication list includes an antiplatelet medication   # Diabetes, type II: last A1C 8.6 % (Ref range: 0.0 - 5.6 %)  # Obesity: Estimated body mass index is 32.76 kg/m  as calculated from the following:    Height as of this encounter: 1.829 m (6').    Weight as of this encounter: 109.6 kg (241 lb 9 oz).      ______________________________________________________________________    Chief " "Complaint   patient with type 2 diabetes is insulin dependent. PTA medication review     History is obtained from the patient and chart review     History of Present Illness   Abhishek Downey is a 46 year old male who presents from a hospital stay for anticholinergic toxicity. [facility] consulted to review T2DM meds and other PtA meds. BG ranged in 100-200s during [facility] admission. Was restarted on PTA NPH 2 days ago and was on high sliding scale insulin for two days. BG in 200s today, will watch trends. Last HgbA1c 8.6, Cr stable ast summer pt was hospitalized for greater than his usual 10 BM per day. He states he was \"unable to get off the toliet.\" Plan per [facility] was for 4 weeks of flagyl 500 mg bid , 4 weeks oLff then 4 weeks of cipro BID, if fails consider vedolizumab per GI. Follows with [facility].  Last visit in Sept 2021.Also follows at [facility] where pt received transplant. [place] visits, last 2021 in summer pt.. Hepatology at the [facility] was consulted for immunosuppression. Unclear what his trough goal is, relatively low for liver transplant at 2.9-3.5. [facility] was not able to get into contact with his [place] team. Patient reports he has not changed his tacrolimus dose in a long time and that the [facility] team reported it was \"fine and doses not need to be changes.\" No s/s of infection at this time.       Review of Systems   The 10 point Review of Systems is negative other than noted in the HPI or here.     Past Medical History    I have reviewed this patient's medical history and updated it with pertinent information if needed.   Past Medical History:   Diagnosis Date     Cirrhosis of liver (H)     Due to PSC     DU (duodenal ulcer) 5/2015    treated nonsurgically      PSC (primary sclerosing cholangitis)      Ulcerative colitis        Past Surgical History   I have reviewed this patient's surgical history and updated it with pertinent information if needed.  Past " "Surgical History:   Procedure Laterality Date     COLECTOMY  12/27/07     FLEXIBLE SIGMOIDOSCOPY  10/4/10      ERCP W/W/O NONA OF SPEC-BRUSH/WASH  02/19/10      ERCP W/W/O NONA OF SPEC-BRUSH/WASH  03/05/10      UGI ENDOSCOPY W BANDING ESOPH/GASTRIC VARICES  since 2/2015    monthly at [facility]; last one in August      UPPER GI ENDOSCOPY  11/13/13    [facility]     UPPER GI ENDOSCOPY  2/16/15    [facility]       Social History   I have reviewed this patient's social history and updated it with pertinent information if needed.  Social History     Tobacco Use     Smoking status: Never Smoker     Smokeless tobacco: Never Used   Substance Use Topics     Alcohol use: No     Alcohol/week: 0.0 standard drinks     Comment: 2-3x per year     Drug use: No       Family History   I have reviewed this patient's family history and updated it with pertinent information if needed.  Family History   Problem Relation Age of Onset     Heart Failure Mother        Medications   I have reviewed this patient's current medications  Medications Prior to Admission   Medication Sig Dispense Refill Last Dose     amLODIPine (NORVASC) 10 MG tablet Take 1 tablet (10 mg) by mouth daily 90 tablet 3      aspirin 81 MG tablet Take 81 mg by mouth daily         BD VEO INSULIN SYRINGE U/F 31G X 15/64\" 0.5 ML         ciprofloxacin (CIPRO) 500 MG tablet Take 500 mg by mouth 2 times daily        insulin  UNIT/ML injection 40 Units QAM and 10 U QPM.        mirtazapine (REMERON) 15 MG tablet Take 1 tablet (15 mg) by mouth At Bedtime        sertraline (ZOLOFT) 100 MG tablet Take 1.5 tablets (150 mg) by mouth daily 135 tablet 3      tacrolimus (GENERIC EQUIVALENT) 0.5 MG capsule Take 1.5 mg by mouth 2 times daily           Allergies   Allergies   Allergen Reactions     No Known Allergies        Physical Exam   Vital Signs: Temp: 100  F (37.8  C) Temp src: Oral BP: (!) 165/100 Pulse: 104     SpO2: 97 % O2 Device: None (Room air)    Weight: " 241 lbs 9 oz    Constitutional: awake, alert, cooperative, no apparent distress, and appears stated age  Eyes: Lids and lashes normal, pupils equal, round and reactive to light, extra ocular muscles intact, sclera clear, conjunctiva normal  ENT: Normocephalic, without obvious abnormality, atraumatic, sinuses nontender on palpation, external ears without lesions, oral pharynx with moist mucous membranes, tonsils without erythema or exudates, gums normal and good dentition.  Hematologic / Lymphatic: no cervical lymphadenopathy and no supraclavicular lymphadenopathy  Respiratory: No increased work of breathing, good air exchange, clear to auscultation bilaterally, no crackles or wheezing  Cardiovascular: Normal apical impulse, regular rate and rhythm, normal S1 and S2, no S3 or S4, and no murmur noted  GI: No scars, normal bowel sounds, soft, non-distended, non-tender, no masses palpated, no hepatosplenomegally  Genitounirinary: Deferred by pt   Skin: no bruising or bleeding, normal skin color, texture, turgor, no redness, warmth, or swelling, no rashes, no lesions, no abnormal moles, nails normal without discoloration or clubbing and no jaundice  Musculoskeletal: There is no redness, warmth, or swelling of the joints.  Full range of motion noted.  Motor strength is 5 out of 5 all extremities bilaterally.  Tone is normal.  Neurologic: Awake, alert, oriented to name, place and time.  Cranial nerves II-XII are grossly intact.  Motor is 5 out of 5 bilaterally.  Cerebellar finger to nose, heel to shin intact.  Sensory is intact., and gait is normal.  Neuropsychiatric: General: normal, calm and normal eye contact  Level of consciousness: alert / normal  Affect: normal  Orientation: oriented to self, place, time and situation  Memory and insight: normal, memory for past and recent events intact and thought process normal    Data   Results for orders placed or performed during the hospital encounter of 03/23/22 (from the past  24 hour(s))   Glucose by meter   Result Value Ref Range    GLUCOSE BY METER POCT 191 (H) 70 - 99 mg/dL   Glucose by meter   Result Value Ref Range    GLUCOSE BY METER POCT 220 (H) 70 - 99 mg/dL   Glucose by meter   Result Value Ref Range    GLUCOSE BY METER POCT 291 (H) 70 - 99 mg/dL   Glucose by meter   Result Value Ref Range    GLUCOSE BY METER POCT 274 (H) 70 - 99 mg/dL

## 2022-03-24 NOTE — DISCHARGE SUMMARY
[facility]  Hospitalist Discharge Summary      Date of Admission:  3/17/2022  Date of Discharge:  3/23/2022  6:00 PM  Discharging Provider: Shobha Perez  Discharge Service: Hospitalist Service, GOLD TEAM 8    Discharge Diagnoses   Poisoning by antihistamine, self harm intent, initial encounter, present on admission  Suicide attempt, present on admission  Toxic metabolic encephalopathy due to Benadryl ingestion  Major depressive disorder  Hx of suicidal ideation  Lactic acidosis, resolved  Elevated creatinine, mild  Lymphopenia  Thrombocytopenia    Chronic:  Hypertension   Primary sclerosing cholangitis diagnosed in 2010 , status post  liver transplant (2017)  Liver lesion  T2DM  Ulcerative colitis, dx 1998, s/p colectomy and iloanal anastomosis in 2009 for medically refractory disease   Chronic pouchitis, antibiotics dependent    GERD  Diarrhea    Follow-ups Needed After Discharge   Follow-up Appointments     Follow Up and recommended labs and tests      Follow up with specialist, liver transplant team, at [place] at next   available appointment.               Discharge Disposition   Discharged to inpatient psychiatry.   Condition at discharge: Stable    Hospital Course    Abhishek Downey is a 46 year old male admitted on 3/17/2022. He has past medical history of liver transplant (2017) for PSC, DM II on insulin, ulcerative colitis, chronic pouchitis, GERD, and hypertension. Per ED report, patient ingested 19-25 mg Benadryl tablets on the morning of 3/17.  EMS was called, and was brought to the emergency room.  He is admitted to the hospital medicine service for evaluation and management of anticholinergic toxicity.    Poisoning by antihistamine, self harm intent, initial encounter, present on admission  Suicide attempt, present on admission  Toxic metabolic encephalopathy due to Benadryl ingestion  Major depressive disorder  Hx of suicidal ideation  Patient recently  started counseling and PTA on sertraline. One psychiatric hospital stay many years ago for suicidal thoughts. Per ED report, patient ingested 19 benadryl tablets around 0930 3/17, denied taking anything else. Showed anticholinergic CNS toxicity - confused and disoriented, visual hallucinations, initially tachycardic and hypertensive. No airway compromise. EKG sinus tachy w/ incomplete RBBB. Poison control consulted, half-life of p.o. Benadryl in an adult is 9 to 12 hours. Treated symptomatically with Labetalol IV for tachycardia/hypertension, benzodiazepine for agitated delirium, received physostigmine total 4 mg. Initially required stragiht cath for urinary retention, now resolved.   - Psychiatry consulted, recommended inpatient psychiatry. He is voluntary.   - Resume PTA sertraline 150 mg  - continue mirtazapine    Lactic acidosis, resolved     Elevated creatinine, mild, resolved    Lymphopenia  Thrombocytopenia  - likely related to immunosuppression, f/u outpatient    Chronic:  Hypertension   - PTA amlodipine 10 mg daily    Primary sclerosing cholangitis diagnosed in 2010 , status post  liver transplant (2017)  Liver lesion  Follows at [facility].  Annual visits, last 2021. Hepatology was consulted for immunosuppression. Unclear what his trough goal is. GI was not able to get into contact with his [place] physicians. His trough levels have been 2-3s here. Patient reports he has not changed his tacrolimus dose in a long time.   - Continue tacrolimus 1.5 mg BID, follow up with [place] team  - liver lesion noted on CT in 9/2021, defer to [place] for further management     T2DM  - PTA NPH 40 unit(s) in AM and 10 units qHS    Ulcerative colitis, dx 1998, s/p colectomy and iloanal anastomosis in 2009 for medically refractory disease   Chronic pouchitis, antibiotics dependent    Plan was for 4 weeks of flagyl 500 mg bid , 4 weeks off then 4 weeks of cipro BID, if fails consider vedolizumab per GI. Follows with [facility].  Last visit in Sept 2021.  - Resume ciprofloxacin 500mg BID from his medication list. Will need follow up with [facility].     GERD  - PTA PPI    Diarrhea  - Chronic. C.diff negative this admission.     Consultations This Hospital Stay   GI HEPATOLOGY ADULT IP CONSULT  PSYCHIATRY IP CONSULT  PSYCHIATRY IP CONSULT  VASCULAR ACCESS CARE ADULT IP CONSULT    Code Status   Full Code    Time Spent on this Encounter   I, Shobha Perez, personally saw the patient today and spent greater than 30 minutes discharging this patient.       Shobha Perez MD (Sally)  Internal Medicine/Pediatrics  Hospitalist    [facility] [address] [address]  Phone: [phone]  ______________________________________________________________________    Physical Exam   Vital Signs: Temp: 97.1  F (36.2  C) Temp src: Oral BP: (!) 141/93 Pulse: 96   Resp: 18 SpO2: 98 % O2 Device: None (Room air)    Weight: 242 lbs 8 oz    General: awake, alert, in no acute distress, flat affect  HEENT: NCAT, sclera anicteric, no nasal discharge, MMM  CV: RRR, no murmurs noted  Resp: CTAB, no increased WOB  Abd: Soft, nontender, nondistended, +BS  MSK: No peripheral edema, extremities warm and well perfused  Skin: warm, dry  Neuro: Alert and oriented       Primary Care Physician   Physician No Ref-Primary    Discharge Orders      Reason for your hospital stay    Admitted to the hospital for intentional overdose of Benadryl.     Glucose monitor nursing POCT    Before meals and at bedtime     Follow Up and recommended labs and tests    Follow up with specialist, liver transplant team, at [place] at next available appointment.     Activity - Up ad tena     Diet    Follow this diet upon discharge: Orders Placed This Encounter      Regular Diet Adult       Significant Results and Procedures   Most Recent 3 CBC's:Recent Labs   Lab Test 03/23/22  0613 03/18/22  0831 03/17/22  1505   WBC 2.5* 4.3 7.2  "  HGB 14.2 13.6 16.9   MCV 87 87 86   PLT 97* 94* 122*     Most Recent 3 BMP's:Recent Labs   Lab Test 03/23/22  1659 03/23/22  1143 03/23/22  0613 03/21/22  0921 03/21/22  0620 03/20/22  0812 03/20/22  0559   NA  --   --  140  --  139  --  140   POTASSIUM  --   --  3.7  --  3.6  --  3.9   CHLORIDE  --   --  109  --  112*  --  112*   CO2  --   --  24  --  21  --  23   BUN  --   --  12  --  13  --  17   CR  --   --  0.86  --  0.89  --  1.06   ANIONGAP  --   --  7  --  6  --  5   BREN  --   --  9.2  --  8.9  --  8.4*   * 171* 218*   < > 200*   < > 222*    < > = values in this interval not displayed.   ,   Results for orders placed or performed during the hospital encounter of 03/17/22   XR Chest Port 1 View    Narrative    XR CHEST PORT 1 VIEW 3/17/2022 3:54 PM    HISTORY: overdose    COMPARISON: 9/22/18      Impression    IMPRESSION: No focal infiltrate, pleural effusion or pneumothorax. The  cardiac and mediastinal silhouettes are normal.    WENDY MARIO MD         SYSTEM ID:  [id number]       Discharge Medications   Discharge Medication List as of 3/23/2022  5:02 PM      START taking these medications    Details   mirtazapine (REMERON) 15 MG tablet Take 1 tablet (15 mg) by mouth At Bedtime, Transitional         CONTINUE these medications which have NOT CHANGED    Details   amLODIPine (NORVASC) 10 MG tablet Take 1 tablet (10 mg) by mouth daily, Disp-90 tablet, R-3, E-Prescribe      aspirin 81 MG tablet Take 81 mg by mouth daily , Historical      BD VEO INSULIN SYRINGE U/F 31G X 15/64\" 0.5 ML EVAR[place]O, Historical      ciprofloxacin (CIPRO) 500 MG tablet Take 500 mg by mouth 2 times daily, Historical      insulin  UNIT/ML injection 40 Units QAM and 10 U QPM., Historical      sertraline (ZOLOFT) 100 MG tablet Take 1.5 tablets (150 mg) by mouth daily, Disp-135 tablet, R-3, E-Prescribe      tacrolimus (GENERIC EQUIVALENT) 0.5 MG capsule Take 1.5 mg by mouth 2 times daily , Historical           Allergies   Allergies "   Allergen Reactions     No Known Allergies

## 2022-03-24 NOTE — H&P
Chief Complaint:   Patient was admitted due to suicide attempt by overdose        HPI:     46 male with a history of Major Depression    Patient states that he has had problems with depression dating back to teen years. He was first treated over 10 years and has been on medications intermittently since then. He has used Zoloft in the past with some benefit. He had one prior admission 10 years ago for suicidal thoughts. He currently follows with a therapist online who is at [facility]. Recently he has been on Zoloft for the past 6 months which is prescribed by his PMD. He believes that he has had some benefit from Zoloft but still has depression.    Patient reports that his current episode of depression started about one year ago which were precipitated by marital issues and separation from his wife. He had resumed Zoloft 6 months ago with some benefit. Two weeks ago he learned that his wife wants to divorce him and is in another relationship. She also has an order of protection which has prevented him from seeing her or his kids. He states that he developed suicidal thoughts, and 5 days ago he overdosed on Benedryl taking 200 capsules in a suicide attempt. He was staying at his mother's house at the time and his sister found him and realized what happened and she activated EMS. He was stabilized at [facility] and transfer was arranged to this unit. Patient is now voluntary. Zoloft was resumed at [facility]. Remeron was added this week as well.  Patient denies alcohol or drug use.    Today patient reports that he is not having suicidal thoughts although he still has some ambivalence about his attempt and has some passive suicidal thoughts. He has depressed mood. He has no evidence of psychosis or adelaida. He reports feelings of hopelessness and low energy. Patient denies history of adelaida or manic symptoms and has no current manic symptoms        Past Psychiatric History:     Patient states that he has had problems  with depression dating back to teen years. He was first treated over 10 years and has been on medications intermittently since then. He has used Zoloft in the past with some benefit. He had one prior admission 10 years ago for suicidal thoughts. He currently follows with a therapist online who is at [facility]. Recently he has been on Zoloft for the past 6 months which is prescribed by his PMD. He believes that he has had some benefit from Zoloft but still has depression.          Substance Use and History:     Patient denies alcohol or drug use.        Past Medical History:   PAST MEDICAL HISTORY:   Past Medical History:   Diagnosis Date     Cirrhosis of liver (H)     Due to PSC     DU (duodenal ulcer) 5/2015    treated nonsurgically      PSC (primary sclerosing cholangitis)      Ulcerative colitis      Patient is status post liver transplant  Patient also has diabetes    PAST SURGICAL HISTORY:   Past Surgical History:   Procedure Laterality Date     COLECTOMY  12/27/07     FLEXIBLE SIGMOIDOSCOPY  10/4/10      ERCP W/W/O NONA OF SPEC-BRUSH/WASH  02/19/10      ERCP W/W/O NONA OF SPEC-BRUSH/WASH  03/05/10      UGI ENDOSCOPY W BANDING ESOPH/GASTRIC VARICES  since 2/2015    monthly at [facility]; last one in August      UPPER GI ENDOSCOPY  11/13/13    [facility]     UPPER GI ENDOSCOPY  2/16/15    [facility]             Family History:   FAMILY HISTORY:   Family History   Problem Relation Age of Onset     Heart Failure Mother       Patient reports that siblings and mother have problems with depression.  His siblings also suffer from alcohol and drug addiction.        Social History:   Please see the full psychosocial profile from the clinical treatment coordinator.   SOCIAL HISTORY:   Social History     Tobacco Use     Smoking status: Never Smoker     Smokeless tobacco: Never Used   Substance Use Topics     Alcohol use: No     Alcohol/week: 0.0 standard drinks     Comment: 2-3x per year  "    Patient stopped school in 10th grade.   Patient is on disability since 2010 related to ulcerative colitis and liver failure. He worked in the past driving a truck.         PTA Medications:     Medications Prior to Admission   Medication Sig Dispense Refill Last Dose     amLODIPine (NORVASC) 10 MG tablet Take 1 tablet (10 mg) by mouth daily 90 tablet 3      aspirin 81 MG tablet Take 81 mg by mouth daily         BD VEO INSULIN SYRINGE U/F 31G X 15/64\" 0.5 ML         ciprofloxacin (CIPRO) 500 MG tablet Take 500 mg by mouth 2 times daily        insulin  UNIT/ML injection 40 Units QAM and 10 U QPM.        mirtazapine (REMERON) 15 MG tablet Take 1 tablet (15 mg) by mouth At Bedtime        sertraline (ZOLOFT) 100 MG tablet Take 1.5 tablets (150 mg) by mouth daily 135 tablet 3      tacrolimus (GENERIC EQUIVALENT) 0.5 MG capsule Take 1.5 mg by mouth 2 times daily                Current Medications:       amLODIPine  10 mg Oral Daily     ciprofloxacin  500 mg Oral BID     insulin aspart  1-3 Units Subcutaneous TID AC     insulin aspart  1-3 Units Subcutaneous At Bedtime     insulin NPH  10 Units Subcutaneous BID AC     mirtazapine  15 mg Oral At Bedtime     sertraline  150 mg Oral Daily     tacrolimus  1.5 mg Oral BID     acetaminophen, alum & mag hydroxide-simethicone, glucose **OR** dextrose **OR** glucagon, hydrOXYzine, OLANZapine **OR** OLANZapine, senna-docusate, traZODone         Allergies:     Allergies   Allergen Reactions     No Known Allergies           Labs:     Recent Results (from the past 48 hour(s))   Glucose by meter    Collection Time: 03/22/22  9:08 AM   Result Value Ref Range    GLUCOSE BY METER POCT 243 (H) 70 - 99 mg/dL   Glucose by meter    Collection Time: 03/22/22 11:52 AM   Result Value Ref Range    GLUCOSE BY METER POCT 204 (H) 70 - 99 mg/dL   Glucose by meter    Collection Time: 03/22/22  5:14 PM   Result Value Ref Range    GLUCOSE BY METER POCT 219 (H) 70 - 99 mg/dL   Glucose by meter    " Collection Time: 03/22/22  9:22 PM   Result Value Ref Range    GLUCOSE BY METER POCT 262 (H) 70 - 99 mg/dL   Comprehensive metabolic panel    Collection Time: 03/23/22  6:13 AM   Result Value Ref Range    Sodium 140 133 - 144 mmol/L    Potassium 3.7 3.4 - 5.3 mmol/L    Chloride 109 94 - 109 mmol/L    Carbon Dioxide (CO2) 24 20 - 32 mmol/L    Anion Gap 7 3 - 14 mmol/L    Urea Nitrogen 12 7 - 30 mg/dL    Creatinine 0.86 0.66 - 1.25 mg/dL    Calcium 9.2 8.5 - 10.1 mg/dL    Glucose 218 (H) 70 - 99 mg/dL    Alkaline Phosphatase 141 40 - 150 U/L    AST 45 0 - 45 U/L    ALT 53 0 - 70 U/L    Protein Total 6.8 6.8 - 8.8 g/dL    Albumin 3.6 3.4 - 5.0 g/dL    Bilirubin Total 1.2 0.2 - 1.3 mg/dL    GFR Estimate >90 >60 mL/min/1.73m2   CBC with platelets    Collection Time: 03/23/22  6:13 AM   Result Value Ref Range    WBC Count 2.5 (L) 4.0 - 11.0 10e3/uL    RBC Count 4.70 4.40 - 5.90 10e6/uL    Hemoglobin 14.2 13.3 - 17.7 g/dL    Hematocrit 41.1 40.0 - 53.0 %    MCV 87 78 - 100 fL    MCH 30.2 26.5 - 33.0 pg    MCHC 34.5 31.5 - 36.5 g/dL    RDW 13.9 10.0 - 15.0 %    Platelet Count 97 (L) 150 - 450 10e3/uL   Glucose by meter    Collection Time: 03/23/22 11:43 AM   Result Value Ref Range    GLUCOSE BY METER POCT 171 (H) 70 - 99 mg/dL   Glucose by meter    Collection Time: 03/23/22  4:59 PM   Result Value Ref Range    GLUCOSE BY METER POCT 190 (H) 70 - 99 mg/dL   Glucose by meter    Collection Time: 03/23/22  9:42 PM   Result Value Ref Range    GLUCOSE BY METER POCT 191 (H) 70 - 99 mg/dL          Physical Exam:     BP (!) 165/100 (BP Location: Right arm, Patient Position: Sitting)   Pulse 104   Temp 100  F (37.8  C) (Oral)   Ht 1.829 m (6')   Wt 109.6 kg (241 lb 9 oz)   SpO2 97%   BMI 32.76 kg/m    Weight is 241 lbs 9 oz  Body mass index is 32.76 kg/m .    Physical Exam:  Gen: No acute distress  HEENT: EOMI, no nystagmus or scleral icterus, moist mucous membranes  Skin: No diaphoresis or rash  Resp: Clear to auscultation  bilaterally   CV: Regular rate and rhythm, no murmur   Abd: No pain  Ext: No cyanosis, clubbing or edema  Neuro: No abnormal movements, no focal deficits         Physical ROS:     Review of Systems is otherwise negative including HEENT, CV, Respiratory, GI, , Musculoskeletal, Neurologic, Dermatologic, Endocrine, Immunological, Constitutional systems           Mental Status Exam:     Mental Status  Patient is casually dressed  Hygiene good  Speech fluent  Thought Process logical  Thought Content:  No suicidal ideation,    No homicidal ideation,   No ideas of reference,    No loose associations,    No auditory hallucinations,     No visual hallucinations   No delusions  Psychomotor: No agitation or slowing  Cognition:  Alert and oriented to time place and person  Attention good  Concentration fair  Memory normal including recent and remote memory  Mood:  depressed  Affect: mood congruent  Judgement: limited  Eye contact good  Cooperation good  Language normal  Fund of knowledge normal  Musculoskeletal normal gait with no abnormal movements         Admission Diagnoses:   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)    Patient Active Problem List    Diagnosis Date Noted     Depression with suicidal ideation 03/23/2022     Priority: Medium     Poisoning by antihistamine, undetermined intent, initial encounter 03/17/2022     Priority: Medium     Lactic acidosis 03/17/2022     Priority: Medium     SIRS (systemic inflammatory response syndrome) (H) 03/17/2022     Priority: Medium     Transplanted liver (H) 09/11/2018     Priority: Medium     CMV (cytomegalovirus infection) (H) 02/07/2018     Priority: Medium     Type 2 diabetes mellitus with complication, with long-term current use of insulin (H) 08/07/2017     Priority: Medium     C. difficile enteritis 06/27/2017     Priority: Medium     Anemia due to blood loss, acute 06/26/2017     Priority: Medium     C. difficile pouchitis 06/26/2017      Priority: Medium     History of esophageal varices 06/26/2017     Priority: Medium     Thrombocytopenia (H) 06/26/2017     Priority: Medium     History of duodenal ulcer 06/26/2017     Priority: Medium     Mass of left upper extremity 03/30/2016     Priority: Medium     Anemia 09/05/2015     Priority: Medium     Upper GI bleed 09/05/2015     Priority: Medium     Acute pancreatitis 09/05/2015     Priority: Medium     Primary sclerosing cholangitis 09/05/2015     Priority: Medium     Cirrhosis of liver (H) 09/05/2015     Priority: Medium     S/P total colectomy 09/05/2015     Priority: Medium     Esophageal varices (H) 09/05/2015     Priority: Medium     Mild major depression (H) 03/18/2011     Priority: Medium     CARDIOVASCULAR SCREENING; LDL GOAL LESS THAN 160 10/31/2010     Priority: Medium     H/O ulcerative colitis - s/p total colectomy 10/31/2008     Priority: Medium              Assessment:     Patient presents following a serious suicide attempt by overdose. He now has improved somewhat but will need a period of observation and will need more intensive outpatient services         Plan:     Legal: voluntary      Medication:Continue Zoloft and will titrate Remeron as tolerated      Consults: Hospitalist will be consulted if medical issues arise, maria esther Diabetes and anti-rejection medications. Currently he is stable and does not need attention      Multidisciplinary Interventions:  to gather collateral information, coordinate care with outpatient providers and begin follow up planning      Disposition: home with follow up        More than 60 minutes spent on this visit with more than 50% time spent on coordination of care with staff, reviewing medical record, psychoeducation, providing supportive therapy regarding coping with chronic mental illness, entering orders and preparing documentation for the visit    Dariusz Baird MD    Initial Certification I certify that the inpatient psychiatric  facility admission was medically necessary for treatment which could reasonably be expected to improve the patient s condition.        I estimate 4 days of hospitalization is necessary for proper treatment of the patient. My plans for post-hospital care this patient are home with follow up     Dariusz Baird MD     -     3/24/2022     -

## 2022-03-24 NOTE — PLAN OF CARE
"        Initial Psychosocial Assessment    Type of CM visit: Initial Assessment, Clinical Treatment Coordinator Role Introduction, Offer Discharge Planning    Information obtained from: [x]Patient   [x]Chart review  []Collateral Contacts  []Court Website    Hospitalization information:   Abhishek Downey is a 46 year old who was admitted to unit [facility] on 3/23/2022 due to suicide attempt via overdose on Benadryl.     Patient Self-Assessment  Patient reported reason for admission: Suicide attempt via overdose      Patient reported symptoms of concern: [x]sadness    [x]anxiety     []anger    [x]poor sleep     []medications not working    []racing thoughts     []substance use     []agitation     []hearing voices     [x]hopelessness   [x]Eating concerns    []Self-injury      [] Other   Comments:    Current suicidal ideation:  [x]No    []Yes, no plan     []Yes, with plan (describe):          Comments:   Current homicidal ideation:  [x]No   [] Yes       Comments:     Legal Status at Admission: Voluntary/Patient has signed consent for treatment  No Contact Order Description: yes ( addition to an order for protection)  Provisionally discharged from (name): None  Power of  (name): None  Rep Payee (name): No      History of Mental Health:  Describe current and past mental health symptoms present? Pt reports a hx diagnosis of MDD. Pt stats that he has experienced \"on and off\" episodes of depression since adolescence. Pt endorses currently feeling low energy and a depressed mood. Pt reports oversleeping and overeating. Pt also endorses feelings of low energy as well. Pt reports that his primary stressor is relationship discord with his wife. Pt reports last May, his wife called the police after \"I smacked a phone out of her hand\". Pt endorses that as a result he went to long-term and an OFP and DANCO were put in place. Pt reports that he is now currently on probation in [facility] [place] [facility]. Pt reports that he has not seen his " "children (18 and 11) since then and his wife is filing for divorce. Pt was recently served with the divorce papers per report.   This is pt's first suicide attempt per report. Pt states that he regrets the attempt. Pt recently started seeing a therapist virtually through the [facility]. Pt is her voluntarily. No hx of civil commitment.     Do you understand your mental health diagnosis? YES [x]   NO []    History of psychiatric hospitalizations?  YES [x]     NO  []  Details:  x1 @ [facility] approximately 10 years ago for suicidal ideation.    If YES, within the last 30 days? YES []     NO  [x]    History of commitment?  YES []     NO  [x]    Details:   History of ECT?  YES []     NO  [x]    Details:     History of Substance Use Disorder:  Have you used alcohol or substances in the past 12 months? YES []/ NO [x]              If Yes, n/a     Would you like a substance use disorder evaluation? YES [] / NO [x]    Previous Treatment? YES []/ NO [x]  Details: n/a     Significant Life Events  (Illness, Death, Loss): Pt reports last May, his wife called the police after \"I smacked a phone out of her hand\". Pt endorses that as a result he went to [facility] and an OFP and DANCO were put in place. Pt reports that he is now currently on probation in [facility] [place]. Pt reports that he has not seen his children (18 and 11) since then and his wife is filing for divorce. Pt was recently served with the divorce papers per report.     Is there a history of abuse or psychological trauma:    []Denies       []Yes, present (type):         []Yes, past (type):        [x]Patient declined to answer    Identify current stressors:    []financial,    [x]legal issues,    []homelessness,    []housing,     []recent loss,    [x]relationships,    []substance use concerns,    []medical     []unemployment     []employment  concerns    []isolation,    []lack of resources,     []out of home placements,     []parenting issues    "  []domestic violence     []other:  Comments:       Living Situation:     []House/apt    []Group Home    []IRTS     []Homeless     []Assisted Living     []Nursing home    []Lives alone    [x]Lives with :  Mother and brother in law                      []Other:          Family Composition: Pt states that he currently spends his days with his mother in [place] and sleeps on his brother-in-law couch at night.  Pt has 2 children ages 18 adm 11. Pt does not currently have contact with them per his report.     Children, ages and current location if minor: 18 and 11. Mother is caring for children.      Relationship status  []Single     [x]     []     []       []Significant Other   [x]Other: Pt's wife recently filed for divorce per his report.      Educational Background:  [x]Less Than High School     []High School     []GED     []College       Cognitive/learning concerns: Pt denies     Financial Status: [] Employed, status and location:  []Unemployment    []County Assistance     [x]SSI/SSDI      []Waivered services    []Other:    Legal status(present):   [x]Voluntary, []72-hour hold, []Commitment, []Guardianship, []Revocation, []Stay of commitment,    Details:    Other legal issues identified:  []None, [x]Arrest,  [x]Probation/[place],  []Driving under influence,  []Incarceration,  []Sexual offense (level):   []Child Protective Services,      []Other:      Details: Pt reports that he is currently on probation in [employer]. Pt asked for contact information for his - Oral. Pt states he needs to make contact with her prior to April 7th.     Ethnic/Cultural considerations:  None reported     Spiritual considerations: None reported      Service History:  [x]No     []Yes: details:    Social Functioning (organization, interests) and strengths: None reported during initial assessment.     Current Treatment Providers Are:     NO Name, Agency, and phone   Psychiatrist  [x]   "  Psychotherapist  [] KATHRYN Mcdonald @ [facility]    ARM worker  [x]      [x]    Waivered Services  [x]    [facility]  [x]    Day Treatment/PHP/SANDRA trtmt  [x]    Group Home/AFC/AL  [x]      [] Jaden @ [facility]    Other:  []            Social Service Assessment of identified patient needs and plan to meet those needs: Abhishek, who goes by Mateo, is a 46 year old male admitted to [facility] after spending approximately 7 days at [facility] on a medical floor. Pt was admitted after suicide attempt via ingestion of Benadryl tablets. Pt was admitted to medical unit for evaluation and management of anticholinergic toxicity. Pt was cooperative with initial assessment. Pt presents as calm, flat in affect. Pt endorses hx of depression, however pt reports that this was his first suicide attempt. Pt endorses that recent stressors include his wife filing for divorce and pt not being able to see his children since May of 2021 as a result of a DANCO. Pt currently is connected to a therapist in the community, however pt is also interested in referral to outpatient psychiatry. Pt is currently hospitalized on a voluntary basis. Pt's goal of admission is to \"reset myself and figure out what to do next\". Social work will continue to collaborate with interdisciplinary team and make referrals as indicated.     Possible discharge plan: Psychiatrically stabilize and discharge home with outpatient mental health supports.     Barriers: Medication Management, Symptom Stabilization, Coordination of Care                  "

## 2022-03-25 LAB
GLUCOSE BLDC GLUCOMTR-MCNC: 232 MG/DL (ref 70–99)
GLUCOSE BLDC GLUCOMTR-MCNC: 237 MG/DL (ref 70–99)
GLUCOSE BLDC GLUCOMTR-MCNC: 251 MG/DL (ref 70–99)
GLUCOSE BLDC GLUCOMTR-MCNC: 299 MG/DL (ref 70–99)
GLUCOSE BLDC GLUCOMTR-MCNC: 351 MG/DL (ref 70–99)

## 2022-03-25 PROCEDURE — 250N000012 HC RX MED GY IP 250 OP 636 PS 637: Performed by: INTERNAL MEDICINE

## 2022-03-25 PROCEDURE — 99232 SBSQ HOSP IP/OBS MODERATE 35: CPT

## 2022-03-25 PROCEDURE — 250N000013 HC RX MED GY IP 250 OP 250 PS 637: Performed by: NURSE PRACTITIONER

## 2022-03-25 PROCEDURE — 99231 SBSQ HOSP IP/OBS SF/LOW 25: CPT | Performed by: PSYCHIATRY & NEUROLOGY

## 2022-03-25 PROCEDURE — 250N000013 HC RX MED GY IP 250 OP 250 PS 637

## 2022-03-25 PROCEDURE — 128N000001 HC R&B CD/MH ADULT

## 2022-03-25 PROCEDURE — 250N000013 HC RX MED GY IP 250 OP 250 PS 637: Performed by: INTERNAL MEDICINE

## 2022-03-25 RX ADMIN — CIPROFLOXACIN 500 MG: 500 TABLET, FILM COATED ORAL at 08:24

## 2022-03-25 RX ADMIN — CIPROFLOXACIN 500 MG: 500 TABLET, FILM COATED ORAL at 20:57

## 2022-03-25 RX ADMIN — MIRTAZAPINE 15 MG: 15 TABLET, FILM COATED ORAL at 20:56

## 2022-03-25 RX ADMIN — SERTRALINE HYDROCHLORIDE 150 MG: 50 TABLET ORAL at 08:23

## 2022-03-25 RX ADMIN — TRAZODONE HYDROCHLORIDE 50 MG: 50 TABLET ORAL at 20:56

## 2022-03-25 RX ADMIN — TACROLIMUS 1.5 MG: 1 CAPSULE ORAL at 08:24

## 2022-03-25 RX ADMIN — AMLODIPINE BESYLATE 10 MG: 5 TABLET ORAL at 08:25

## 2022-03-25 RX ADMIN — INSULIN HUMAN 40 UNITS: 100 INJECTION, SUSPENSION SUBCUTANEOUS at 08:19

## 2022-03-25 RX ADMIN — TACROLIMUS 1.5 MG: 1 CAPSULE ORAL at 20:55

## 2022-03-25 ASSESSMENT — ACTIVITIES OF DAILY LIVING (ADL)
HYGIENE/GROOMING: INDEPENDENT
HYGIENE/GROOMING: INDEPENDENT
ORAL_HYGIENE: INDEPENDENT
ORAL_HYGIENE: INDEPENDENT

## 2022-03-25 NOTE — PROGRESS NOTES
03/25/22 0900   Occupational Therapy Psychosocial Assessment   Assessment Type Chart Review   Therapy Assessment   Patient Presentation Patient has not been interviewed by occupational therapist secondary to understaffing. Chart review completed to initiate care plan. Patient may benefit from independent exploration of coping skills and leisure interests for symptom and stress management, exploration of and practice using relapse prevention strategies, and opportunities for: Self-care skills, social interaction skills, self-management skills and ADL/work/leisure/processing skills training.  Patient will continue to be invited to groups and team will encourage individual coping skill exploration.    Clinical Impression   Beh OT Plan for Next Session Patient will attend and engage in at least 1 OT group daily this review period.

## 2022-03-25 NOTE — PLAN OF CARE
Problem: Suicidal Behavior  Goal: Suicidal Behavior is Absent or Managed  Outcome: Ongoing, Progressing  Flowsheets (Taken 3/25/2022 1113)  Mutually Determined Action Steps (Suicidal Behavior Absent/Managed): verbalizes safety check rationale   Goal Outcome Evaluation:    Plan of Care Reviewed With: patient

## 2022-03-25 NOTE — PLAN OF CARE
Problem: Suicidal Behavior  Goal: Suicidal Behavior is Absent or Managed  Outcome: Ongoing, Progressing     Problem: Behavioral Health Plan of Care  Goal: Plan of Care Review  Outcome: Ongoing, Progressing  Flowsheets (Taken 3/24/2022 1700)  Plan of Care Reviewed With: patient  Patient Agreement with Plan of Care: agrees   Goal Outcome Evaluation:    Plan of Care Reviewed With: patient      Patient is isolative in room all shift, but comes out for meals. He denies all psych symptoms and contracts for safety. Pt is pleasant with a flat affect. He is calm, cooperative and medication compliant. Blood sugar check =274 mg/dl and 296 mg/dl and covered per sliding scale.  No concerns noted or verbalized. Will continue with same plan of care.

## 2022-03-25 NOTE — PROGRESS NOTES
[facility]    Medicine Progress Note - Hospitalist Service    Date of Admission:  3/23/2022    Assessment & Plan            Abhishek Downey is a 46 year old male admitted on 3/23/2022 for depression, SI, and overdose from 19-25 Bendadryl tablets on 3/17. He was admitted to the [facility] from 3/17/220-3/23/22. He was admitted to the hospital medicine service for evaluation and management of anticholinergic toxicity. He has PMH of history of liver transplant (2017) for PSC, DM II on insulin, GERD,  ulcerative colitis, chronic pouchitis, HTN, and depression. Face to face in pt's room, pleasant, cooperative, reading a book.     # Poisoning by antihistamine, self harm intent, initial encounter-resolved  # Toxic metabolic encephalopathy due to Benadryl ingestion -resolved  # Lactic acidosis, resolved   # Elevated creatinine, mild, resolved  Per ED report, took 19-25 Benadryl pills on 3/17. Had anticholinergic CNS toxicity /s-confused, disoriented, tachycardic, hypertensive, and had visual hallucination. . EKG sinus tachy w/ incomplete RBBB. Poison control contracted and reported half-life of 9-12 hrs. [place] elvated Cr and LA, now resolved. Treated with IV Labetalol for HTN/tachycardia, benzos for agitation, delirium, and received 4 mg of physostigmine.  1x straight cath for urinary retention. Contines to deny dry mouth, tachycardia, urinary retention, oriented x4.  - continue to monitor     #Suicide attempt, present on admission  # Major depressive disorder  # Hx of suicidal ideation  - Management per psychiatry      # Lymphopenia  # Thrombocytopenia  Could be related to immunosuppression. Plats lowest on 3/18 at 94, 97 now. WBC as low as 2.5. Have been as low as 2.1 in 2020. Hgb WNL at 14.2. GI unable to get a hold of [facility] where pt got a transplant. Pt reports taking to GI last summer. Follow-up missed per pt. No s/s of infection.  - trend CBC + plasts on Sunday  - continue  "ASA 81 mg for now, has been on long-term, no s/s of bleeding, bruising        # Primary sclerosing cholangitis diagnosed in 2010 s/p  liver transplant (2017)  # lesion  Follows at [facility] where pt received transplant. [place] visits, last 2021 in summer pt.. Hepatology at the [facility] was consulted for immunosuppression. Unclear what his trough goal is, relatively low for liver transplant at 2.9-3.5. [facility] was not able to get into contact with his [facility] team. Patient reports he has not changed his tacrolimus dose in a long time and that the [facility] team reported it was \"fine and doses not need to be changes.\" No s/s of infection at this time.   - Continue PTA tacrolimus 1.5 mg BID  -  follow up with [facility] team at discharge, discussed that we can help him make an appointment  - liver lesion noted on CT in 9/2021, defer to [facility] team for further management      # T2DM  Per discussion today, pt said he has a glucometer at home and his BG has gotten as high as the 300-400s. He then decreases his intake and it goes down.  Had been following with PCP for management, but last HC visit last year. BG ranged in 100-200s during [facility] admission. Was restarted on PTA NPH 2 days ago and was on high sliding scale insulin for two days. BG in 200s today max at 299, will watch trends. Last HgbA1c 8.6, Cr stable. Pt had been on Metformin 2,000 mg from 7615-4623, but stopped with BG in the low 100s. Discussed restarting again, pt amendable. Discussed decreasing carbs per meal and pt amendable.   - Pt would like to continue taking NPH vs long-term insulin as it is affordable for him at [facility].   - is willing to set-up a PCP visit for management near [place] upon discharge to manage  - moderate consistent carb diet started  - PTA NPH 40 unit(s) in AM and 10 units at bedtime   - start high sliding scale insulin, stop, stop medium  - appreciate recs from Diabetes educator: may need insulin coverage with meals, would keep " "on NPH, and would di     # Ulcerative colitis, dx 1998, s/p colectomy and iloanal anastomosis (2009) for medically refractory disease   # Chronic pouchitis, antibiotics dependent    Last summer pt was hospitalized for greater than his usual 10 BM per day. He states he was \"unable to get off the toliet.\" Plan per [facility] was for 4 weeks of flagyl 500 mg bid , 4 weeks off then 4 weeks of cipro BID, if fails consider vedolizumab per GI. Follows with [facility].  Last visit in Sept 2021. Did not follow-up as directed.  - Resume ciprofloxacin 500mg BID from his medication list  - Will need follow up with [facility], dicussed that we can help him make the appointment.     #GERD: continue PTA PPI  # Essential hypertension: continue PTA amlodipine 10 mg daily, slightly elevated today  # Diarrhea, chronic: Likely secondary to tacrolimus. Normal is around 10 BM per day. C.diff negative this admission.       The patient's care was discussed with the Bedside Nurse and Patient.    Total time spent on the unit 20 minutes in reviewing the record, examination of patient, lab results and completing documentation. 10 minutes was spent in discussion and counseling with patient concerning diagnosis, medications, lab results and treatment plan. Care discussed and coordinated with patient and nurse.          RAMA Weaver [facility]  Securely message with the Vocera Web Console (learn more here)  Text page via [facility] Paging/Directory       Hospitalist Service         Diet: Regular Diet Adult    DVT Prophylaxis: Low Risk/Ambulatory with no VTE prophylaxis indicated  Baeza Catheter: Not present  Central Lines: None  Cardiac Monitoring: None  Code Status: Full Code      Disposition Plan   Expected Discharge: TBD  Anticipated discharge location:  Awaiting care coordination huddle   Delays:None per HMS        The patient's care was discussed with the Bedside Nurse, Patient " and Primary team.    RAMA Weaver [facility]  Hospitalist Service  [facility]  Securely message with the [employer] Web Console (learn more here)  Text page via [employer] Paging/Directory         Clinically Significant Risk Factors Present on Admission             # Diabetes, type II: last A1C 8.6 % (Ref range: 0.0 - 5.6 %)  # Obesity: Estimated body mass index is 32.76 kg/m  as calculated from the following:    Height as of this encounter: 1.829 m (6').    Weight as of this encounter: 109.6 kg (241 lb 9 oz).      ______________________________________________________________________    Interval History   Nursing notes reviewed. No acute events overnight. ROS: 12 point ROS neg other than the symptoms noted above in the HPI. Discussed previous history of diabetes management. Said his BG can get as high as 300-400 at home over the last several months.     Data reviewed today: I reviewed all medications, new labs and imaging results over the last 24 hours. I personally reviewed no images or EKG's today.    Physical Exam   Vital Signs: Temp: 98  F (36.7  C) Temp src: Oral BP: 130/88 Pulse: 88   Resp: 18 SpO2: 99 % O2 Device: None (Room air)    Weight: 241 lbs 9 oz  Hematologic / Lymphatic: no cervical lymphadenopathy and no supraclavicular lymphadenopathy  Respiratory: No increased work of breathing, good air exchange, clear to auscultation bilaterally, no crackles or wheezing  Cardiovascular: Normal apical impulse, regular rate and rhythm, normal S1 and S2, no S3 or S4, and no murmur noted  Skin: Normal skin tone, turgor, no evidence of bruising or petechiae   Neuropsychiatric: General: normal, calm and normal eye contact  Level of consciousness: alert / normal  Affect: normal  Orientation: oriented to self, place, time and situation  Memory and insight: normal, memory for past and recent events intact and thought process normal    Data   Recent Labs   Lab 03/25/22  1705 03/25/22  1208  03/25/22  0812 03/23/22  1143 03/23/22  0613 03/21/22  0921 03/21/22  0620 03/20/22  0812 03/20/22  0559   WBC  --   --   --   --  2.5*  --   --   --   --    HGB  --   --   --   --  14.2  --   --   --   --    MCV  --   --   --   --  87  --   --   --   --    PLT  --   --   --   --  97*  --   --   --   --    NA  --   --   --   --  140  --  139  --  140   POTASSIUM  --   --   --   --  3.7  --  3.6  --  3.9   CHLORIDE  --   --   --   --  109  --  112*  --  112*   CO2  --   --   --   --  24  --  21  --  23   BUN  --   --   --   --  12  --  13  --  17   CR  --   --   --   --  0.86  --  0.89  --  1.06   ANIONGAP  --   --   --   --  7  --  6  --  5   BREN  --   --   --   --  9.2  --  8.9  --  8.4*   * 299* 237*   < > 218*   < > 200*   < > 222*   ALBUMIN  --   --   --   --  3.6  --  3.4  --  3.5   PROTTOTAL  --   --   --   --  6.8  --  6.4*  --  6.5*   BILITOTAL  --   --   --   --  1.2  --  2.0*  --  1.3   ALKPHOS  --   --   --   --  141  --  111  --  108   ALT  --   --   --   --  53  --  35  --  31   AST  --   --   --   --  45  --  31  --  21    < > = values in this interval not displayed.     No results found for this or any previous visit (from the past 24 hour(s)).  Medications       amLODIPine  10 mg Oral Daily     ciprofloxacin  500 mg Oral BID     insulin aspart  1-10 Units Subcutaneous TID AC     insulin aspart  1-7 Units Subcutaneous At Bedtime     insulin NPH  10 Units Subcutaneous QPM     insulin NPH  40 Units Subcutaneous QAM AC     mirtazapine  15 mg Oral At Bedtime     sertraline  150 mg Oral Daily     tacrolimus  1.5 mg Oral BID

## 2022-03-25 NOTE — PROGRESS NOTES
Pt quiet and isolative to self, encouraged to  Attend groups and socialize more, states his main stressors are  Divorce and living situation, GBS this AM, 237, S/S insulin administered and scheduled as ordered, ate 100% meals and BP high @ 172/93 on Norvasc q AM, rates anxiety depression 5, contracts for safety.

## 2022-03-25 NOTE — PLAN OF CARE
Problem: Suicidal Behavior  Goal: Suicidal Behavior is Absent or Managed  Outcome: Ongoing, Progressing     Problem: Behavioral Health Plan of Care  Goal: Plan of Care Review  Outcome: Ongoing, Progressing  Patient slept through the night with no issue. Safety check completed every 15 minutes. No respiratory distress noted or observed. Patient is still in bed sleeping. Will continue to monitor pt.

## 2022-03-25 NOTE — PLAN OF CARE
Problem: Suicidal Behavior  Goal: Suicidal Behavior is Absent or Managed  3/25/2022 1652 by Lisa Baird, RN  Outcome: Ongoing, Progressing  3/25/2022 1113 by Lisa Baird, RN  Outcome: Ongoing, Progressing  Flowsheets (Taken 3/25/2022 1113)  Mutually Determined Action Steps (Suicidal Behavior Absent/Managed): verbalizes safety check rationale   Goal Outcome Evaluation:    Plan of Care Reviewed With: patient

## 2022-03-25 NOTE — PROGRESS NOTES
Chief Complaint:   Patient was admitted due to suicide attempt by overdose        Subjective/Objective:     Patient doing well on unit so far. He now denies suicidal thoughts. He regrets overdose and states that he is glad he is still alive. He denies psychosis and is showing no agitation. He is tolerating his current medication regimen. He has no physical complaints today.          HPI:     46 male with a history of Major Depression    Patient states that he has had problems with depression dating back to teen years. He was first treated over 10 years and has been on medications intermittently since then. He has used Zoloft in the past with some benefit. He had one prior admission 10 years ago for suicidal thoughts. He currently follows with a therapist online who is at [facility]. Recently he has been on Zoloft for the past 6 months which is prescribed by his PMD. He believes that he has had some benefit from Zoloft but still has depression.    Patient reports that his current episode of depression started about one year ago which were precipitated by marital issues and separation from his wife. He had resumed Zoloft 6 months ago with some benefit. Two weeks ago he learned that his wife wants to divorce him and is in another relationship. She also has an order of protection which has prevented him from seeing her or his kids. He states that he developed suicidal thoughts, and 5 days ago he overdosed on Benedryl taking 200 capsules in a suicide attempt. He was staying at his mother's house at the time and his sister found him and realized what happened and she activated EMS. He was stabilized at [facility] and transfer was arranged to this unit. Patient is now voluntary. Zoloft was resumed at [facility]. Remeron was added this week as well.  Patient denies alcohol or drug use.    Today patient reports that he is not having suicidal thoughts although he still has some ambivalence about his attempt and has some  passive suicidal thoughts. He has depressed mood. He has no evidence of psychosis or adelaida. He reports feelings of hopelessness and low energy. Patient denies history of adelaida or manic symptoms and has no current manic symptoms        Past Psychiatric History:     Patient states that he has had problems with depression dating back to teen years. He was first treated over 10 years and has been on medications intermittently since then. He has used Zoloft in the past with some benefit. He had one prior admission 10 years ago for suicidal thoughts. He currently follows with a therapist online who is at [facility]. Recently he has been on Zoloft for the past 6 months which is prescribed by his PMD. He believes that he has had some benefit from Zoloft but still has depression.          Substance Use and History:     Patient denies alcohol or drug use.        Past Medical History:   PAST MEDICAL HISTORY:   Past Medical History:   Diagnosis Date     Cirrhosis of liver (H)     Due to PSC     DU (duodenal ulcer) 5/2015    treated nonsurgically      PSC (primary sclerosing cholangitis)      Ulcerative colitis      Patient is status post liver transplant  Patient also has diabetes    PAST SURGICAL HISTORY:   Past Surgical History:   Procedure Laterality Date     COLECTOMY  12/27/07     FLEXIBLE SIGMOIDOSCOPY  10/4/10      ERCP W/W/O NONA OF SPEC-BRUSH/WASH  02/19/10      ERCP W/W/O NONA OF SPEC-BRUSH/WASH  03/05/10      UGI ENDOSCOPY W BANDING ESOPH/GASTRIC VARICES  since 2/2015    monthly at [facility]; last one in August      UPPER GI ENDOSCOPY  11/13/13    [facility]     UPPER GI ENDOSCOPY  2/16/15    [facility]             Family History:   FAMILY HISTORY:   Family History   Problem Relation Age of Onset     Heart Failure Mother       Patient reports that siblings and mother have problems with depression.  His siblings also suffer from alcohol and drug addiction.        Social History:   Please see  "the full psychosocial profile from the clinical treatment coordinator.   SOCIAL HISTORY:   Social History     Tobacco Use     Smoking status: Never Smoker     Smokeless tobacco: Never Used   Substance Use Topics     Alcohol use: No     Alcohol/week: 0.0 standard drinks     Comment: 2-3x per year     Patient stopped school in 10th grade.   Patient is on disability since 2010 related to ulcerative colitis and liver failure. He worked in the past driving a truck.         PTA Medications:     Medications Prior to Admission   Medication Sig Dispense Refill Last Dose     amLODIPine (NORVASC) 10 MG tablet Take 1 tablet (10 mg) by mouth daily 90 tablet 3      aspirin 81 MG tablet Take 81 mg by mouth daily         BD VEO INSULIN SYRINGE U/F 31G X 15/64\" 0.5 ML         ciprofloxacin (CIPRO) 500 MG tablet Take 500 mg by mouth 2 times daily        insulin  UNIT/ML injection 40 Units QAM and 10 U QPM.        mirtazapine (REMERON) 15 MG tablet Take 1 tablet (15 mg) by mouth At Bedtime        sertraline (ZOLOFT) 100 MG tablet Take 1.5 tablets (150 mg) by mouth daily 135 tablet 3      tacrolimus (GENERIC EQUIVALENT) 0.5 MG capsule Take 1.5 mg by mouth 2 times daily                Current Medications:       amLODIPine  10 mg Oral Daily     ciprofloxacin  500 mg Oral BID     insulin aspart  1-10 Units Subcutaneous TID AC     insulin aspart  1-7 Units Subcutaneous At Bedtime     insulin aspart  1-5 Units Subcutaneous At Bedtime     insulin NPH  10 Units Subcutaneous QPM     insulin NPH  40 Units Subcutaneous QAM AC     mirtazapine  15 mg Oral At Bedtime     sertraline  150 mg Oral Daily     tacrolimus  1.5 mg Oral BID     acetaminophen, glucose **OR** dextrose **OR** glucagon, hydrOXYzine, OLANZapine **OR** OLANZapine, senna-docusate, traZODone         Allergies:     Allergies   Allergen Reactions     No Known Allergies           Labs:     Recent Results (from the past 48 hour(s))   Glucose by meter    Collection Time: 03/23/22 " 11:43 AM   Result Value Ref Range    GLUCOSE BY METER POCT 171 (H) 70 - 99 mg/dL   Glucose by meter    Collection Time: 03/23/22  4:59 PM   Result Value Ref Range    GLUCOSE BY METER POCT 190 (H) 70 - 99 mg/dL   Glucose by meter    Collection Time: 03/23/22  9:42 PM   Result Value Ref Range    GLUCOSE BY METER POCT 191 (H) 70 - 99 mg/dL   Glucose by meter    Collection Time: 03/24/22  7:59 AM   Result Value Ref Range    GLUCOSE BY METER POCT 220 (H) 70 - 99 mg/dL   Glucose by meter    Collection Time: 03/24/22 12:03 PM   Result Value Ref Range    GLUCOSE BY METER POCT 291 (H) 70 - 99 mg/dL   Glucose by meter    Collection Time: 03/24/22  5:05 PM   Result Value Ref Range    GLUCOSE BY METER POCT 274 (H) 70 - 99 mg/dL   Glucose by meter    Collection Time: 03/24/22  8:17 PM   Result Value Ref Range    GLUCOSE BY METER POCT 296 (H) 70 - 99 mg/dL          Physical Exam:     /89 (BP Location: Right arm, Patient Position: Sitting, Cuff Size: Adult Regular)   Pulse 98   Temp 97.8  F (36.6  C) (Oral)   Resp 18   Ht 1.829 m (6')   Wt 109.6 kg (241 lb 9 oz)   SpO2 98%   BMI 32.76 kg/m    Weight is 241 lbs 9 oz  Body mass index is 32.76 kg/m .             Physical ROS:     Review of Systems is otherwise negative including HEENT, CV, Respiratory, GI, , Musculoskeletal, Neurologic, Dermatologic, Endocrine, Immunological, Constitutional systems           Mental Status Exam:     Mental Status  Patient is casually dressed  Hygiene good  Speech fluent  Thought Process logical  Thought Content:  No suicidal ideation,    No homicidal ideation,   No ideas of reference,    No loose associations,    No auditory hallucinations,     No visual hallucinations   No delusions  Psychomotor: No agitation or slowing  Cognition:  Alert and oriented to time place and person  Attention good  Concentration fair  Memory normal including recent and remote memory  Mood: less depressed  Affect: mood congruent  Judgement: limited  Eye  contact good  Cooperation good  Language normal  Fund of knowledge normal  Musculoskeletal normal gait with no abnormal movements  Minimal change in mental status in the past 24 hours         Admission Diagnoses:   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)    Patient Active Problem List    Diagnosis Date Noted     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) 03/24/2022     Priority: Medium     Depression with suicidal ideation 03/23/2022     Priority: Medium     Poisoning by antihistamine, undetermined intent, initial encounter 03/17/2022     Priority: Medium     Lactic acidosis 03/17/2022     Priority: Medium     SIRS (systemic inflammatory response syndrome) (H) 03/17/2022     Priority: Medium     Transplanted liver (H) 09/11/2018     Priority: Medium     CMV (cytomegalovirus infection) (H) 02/07/2018     Priority: Medium     Type 2 diabetes mellitus with complication, with long-term current use of insulin (H) 08/07/2017     Priority: Medium     C. difficile enteritis 06/27/2017     Priority: Medium     Anemia due to blood loss, acute 06/26/2017     Priority: Medium     C. difficile pouchitis 06/26/2017     Priority: Medium     History of esophageal varices 06/26/2017     Priority: Medium     Thrombocytopenia (H) 06/26/2017     Priority: Medium     History of duodenal ulcer 06/26/2017     Priority: Medium     Mass of left upper extremity 03/30/2016     Priority: Medium     Anemia 09/05/2015     Priority: Medium     Upper GI bleed 09/05/2015     Priority: Medium     Acute pancreatitis 09/05/2015     Priority: Medium     Primary sclerosing cholangitis 09/05/2015     Priority: Medium     Cirrhosis of liver (H) 09/05/2015     Priority: Medium     S/P total colectomy 09/05/2015     Priority: Medium     Esophageal varices (H) 09/05/2015     Priority: Medium     Mild major depression (H) 03/18/2011     Priority: Medium     CARDIOVASCULAR SCREENING; LDL GOAL LESS THAN 160  10/31/2010     Priority: Medium     H/O ulcerative colitis - s/p total colectomy 10/31/2008     Priority: Medium              Assessment:     Patient presents following a serious suicide attempt by overdose. He now has improved somewhat but will need a period of observation and will need more intensive outpatient services         Plan:     Legal: voluntary      Medication:Continue Zoloft and will titrate Remeron as tolerated.      Consults: Hospitalist will be consulted if medical issues arise, maria esther Diabetes and anti-rejection medications. Currently he is stable and does not need attention      Multidisciplinary Interventions:  to gather collateral information, coordinate care with outpatient providers and begin follow up planning      Disposition: home with follow up    No further change in treatment plan        Patient seen, chart reviewed, case reviewed with  and with nursing.     Dariusz Baird MD      Re-Certification I certify that the inpatient psychiatric facility services furnished since the previous certification were, and continue to be, medically necessary for, either, treatment which could reasonably be expected to improve the patient s condition or diagnostic study and that the hospital records indicate that the services furnished were, either, intensive treatment services, admission and related services necessary for diagnostic study, or equivalent services.     I certify that the patient continues to need, on a daily basis, active treatment furnished directly by or requiring the supervision of inpatient psychiatric facility personnel.   I estimate 5 days of hospitalization is necessary for proper treatment of the patient. My plans for post-hospital care for this patient are home with follow up     Dariusz Baird MD

## 2022-03-25 NOTE — PROGRESS NOTES
Pt calm, blunted and isolated to self, visible for meals and to make phone calls to family, denies pain or discomfort, rates depression 5, denies anxiety, on sliding scale insulin and scheduled insulin QAM and HS, GBS in the 200's today, coverage given, eating 100%, does not socialize with peers, states he mises his kids.

## 2022-03-25 NOTE — PLAN OF CARE
Assessment/Intervention/Current Symtoms and Care Coordination: Writer met with pt on this date and consulted with psychiatrist. Pt was observed laying in bed, in the dark. Writer encouraged pt to spend time out of his room and out of bed. Writer encouraged pt to attend groups and socialize with staff/peers. Pt presents with a flat affect and low energy. Writer provided pt with contact information for [facility] . Writer asked if pt would be willing to sign MARCIAL for mother or sister and pt declined. Pt continues to be agreeable to psychiatry. Requesting that writer inquire about psychiatry in the [facility] area. Pt denies SI/HI/SIB. Pt denies even experiencing passive SI.     Discharge Plan or Goal: Psychiatrically stabilize and discharge home.       Barriers to Discharge: care-coordination, medication-management, symptom stabilization.       Referral Status: TBD- Pt is currently established with individual therapy with KATHRYN Mcdonald @ [facility]. Pt states that he is agreeable to a psychiatry referral.       Legal Status: Voluntary       LAURA Perkins [facility], 3/25/2022, 10:22 AM

## 2022-03-26 LAB
ANION GAP SERPL CALCULATED.3IONS-SCNC: 12 MMOL/L (ref 5–18)
BUN SERPL-MCNC: 16 MG/DL (ref 8–22)
CALCIUM SERPL-MCNC: 10.5 MG/DL (ref 8.5–10.5)
CHLORIDE BLD-SCNC: 103 MMOL/L (ref 98–107)
CO2 SERPL-SCNC: 25 MMOL/L (ref 22–31)
CREAT SERPL-MCNC: 1.2 MG/DL (ref 0.7–1.3)
ERYTHROCYTE [DISTWIDTH] IN BLOOD BY AUTOMATED COUNT: 13.8 % (ref 10–15)
GFR SERPL CREATININE-BSD FRML MDRD: 76 ML/MIN/1.73M2
GLUCOSE BLD-MCNC: 263 MG/DL (ref 70–125)
GLUCOSE BLDC GLUCOMTR-MCNC: 128 MG/DL (ref 70–99)
GLUCOSE BLDC GLUCOMTR-MCNC: 193 MG/DL (ref 70–99)
GLUCOSE BLDC GLUCOMTR-MCNC: 197 MG/DL (ref 70–99)
GLUCOSE BLDC GLUCOMTR-MCNC: 235 MG/DL (ref 70–99)
HCT VFR BLD AUTO: 48.2 % (ref 40–53)
HGB BLD-MCNC: 17.2 G/DL (ref 13.3–17.7)
MCH RBC QN AUTO: 31 PG (ref 26.5–33)
MCHC RBC AUTO-ENTMCNC: 35.7 G/DL (ref 31.5–36.5)
MCV RBC AUTO: 87 FL (ref 78–100)
PLATELET # BLD AUTO: 128 10E3/UL (ref 150–450)
POTASSIUM BLD-SCNC: 4.1 MMOL/L (ref 3.5–5)
RBC # BLD AUTO: 5.55 10E6/UL (ref 4.4–5.9)
SODIUM SERPL-SCNC: 140 MMOL/L (ref 136–145)
WBC # BLD AUTO: 3.4 10E3/UL (ref 4–11)

## 2022-03-26 PROCEDURE — 250N000013 HC RX MED GY IP 250 OP 250 PS 637: Performed by: INTERNAL MEDICINE

## 2022-03-26 PROCEDURE — 999N000157 HC STATISTIC RCP TIME EA 10 MIN

## 2022-03-26 PROCEDURE — 250N000012 HC RX MED GY IP 250 OP 636 PS 637: Performed by: INTERNAL MEDICINE

## 2022-03-26 PROCEDURE — 250N000013 HC RX MED GY IP 250 OP 250 PS 637

## 2022-03-26 PROCEDURE — 128N000001 HC R&B CD/MH ADULT

## 2022-03-26 PROCEDURE — 99232 SBSQ HOSP IP/OBS MODERATE 35: CPT

## 2022-03-26 PROCEDURE — 250N000013 HC RX MED GY IP 250 OP 250 PS 637: Performed by: NURSE PRACTITIONER

## 2022-03-26 PROCEDURE — 93010 ELECTROCARDIOGRAM REPORT: CPT | Performed by: INTERNAL MEDICINE

## 2022-03-26 PROCEDURE — 93005 ELECTROCARDIOGRAM TRACING: CPT

## 2022-03-26 PROCEDURE — 82310 ASSAY OF CALCIUM: CPT

## 2022-03-26 PROCEDURE — 85027 COMPLETE CBC AUTOMATED: CPT

## 2022-03-26 PROCEDURE — 36415 COLL VENOUS BLD VENIPUNCTURE: CPT

## 2022-03-26 PROCEDURE — 999N000250 HC STATISTIC ECG ASSIST

## 2022-03-26 RX ORDER — ASPIRIN 81 MG/1
81 TABLET, CHEWABLE ORAL DAILY
Status: DISCONTINUED | OUTPATIENT
Start: 2022-03-26 | End: 2022-03-30 | Stop reason: HOSPADM

## 2022-03-26 RX ORDER — METFORMIN HCL 500 MG
500 TABLET, EXTENDED RELEASE 24 HR ORAL
Status: DISCONTINUED | OUTPATIENT
Start: 2022-03-26 | End: 2022-03-30 | Stop reason: HOSPADM

## 2022-03-26 RX ORDER — HYDRALAZINE HYDROCHLORIDE 25 MG/1
25 TABLET, FILM COATED ORAL EVERY 6 HOURS PRN
Status: DISCONTINUED | OUTPATIENT
Start: 2022-03-26 | End: 2022-03-30 | Stop reason: HOSPADM

## 2022-03-26 RX ADMIN — TRAZODONE HYDROCHLORIDE 50 MG: 50 TABLET ORAL at 21:28

## 2022-03-26 RX ADMIN — TACROLIMUS 1.5 MG: 1 CAPSULE ORAL at 08:30

## 2022-03-26 RX ADMIN — INSULIN HUMAN 40 UNITS: 100 INJECTION, SUSPENSION SUBCUTANEOUS at 07:53

## 2022-03-26 RX ADMIN — CIPROFLOXACIN 500 MG: 500 TABLET, FILM COATED ORAL at 08:28

## 2022-03-26 RX ADMIN — TACROLIMUS 1.5 MG: 1 CAPSULE ORAL at 20:31

## 2022-03-26 RX ADMIN — AMLODIPINE BESYLATE 10 MG: 5 TABLET ORAL at 08:27

## 2022-03-26 RX ADMIN — CIPROFLOXACIN 500 MG: 500 TABLET, FILM COATED ORAL at 20:31

## 2022-03-26 RX ADMIN — SERTRALINE HYDROCHLORIDE 150 MG: 50 TABLET ORAL at 08:29

## 2022-03-26 RX ADMIN — ASPIRIN 81 MG: 81 TABLET, CHEWABLE ORAL at 17:02

## 2022-03-26 RX ADMIN — METFORMIN HYDROCHLORIDE 500 MG: 500 TABLET, EXTENDED RELEASE ORAL at 17:02

## 2022-03-26 RX ADMIN — MIRTAZAPINE 15 MG: 15 TABLET, FILM COATED ORAL at 20:32

## 2022-03-26 ASSESSMENT — ACTIVITIES OF DAILY LIVING (ADL)
HYGIENE/GROOMING: INDEPENDENT
HYGIENE/GROOMING: INDEPENDENT
LAUNDRY: WITH SUPERVISION
ORAL_HYGIENE: INDEPENDENT
DRESS: INDEPENDENT
DRESS: INDEPENDENT
ORAL_HYGIENE: INDEPENDENT

## 2022-03-26 NOTE — PLAN OF CARE
Problem: Suicidal Behavior  Goal: Suicidal Behavior is Absent or Managed  Outcome: Ongoing, Progressing     Problem: Behavioral Health Plan of Care  Goal: Plan of Care Review  Outcome: Ongoing, Progressing  Flowsheets  Taken 3/26/2022 1202  Plan of Care Reviewed With: patient  Patient Agreement with Plan of Care: agrees  Taken 3/26/2022 1100  Plan of Care Reviewed With: patient  Patient Agreement with Plan of Care: agrees  Goal: Adheres to Safety Considerations for Self and Others  Outcome: Ongoing, Progressing  Intervention: Develop and Maintain Individualized Safety Plan  Recent Flowsheet Documentation  Taken 3/26/2022 1100 by Clara Nelson RN  Safety Measures: safety rounds completed  Goal: Absence of New-Onset Illness or Injury  Outcome: Ongoing, Progressing  Intervention: Identify and Manage Fall Risk  Recent Flowsheet Documentation  Taken 3/26/2022 1100 by Clara Nelson RN  Safety Measures: safety rounds completed  Goal: Optimal Comfort and Wellbeing  Outcome: Ongoing, Progressing  Intervention: Provide Person-Centered Care  Recent Flowsheet Documentation  Taken 3/26/2022 1100 by Clara Nelson RN  Trust Relationship/Rapport: care explained  Goal: Optimized Coping Skills in Response to Life Stressors  Outcome: Ongoing, Progressing     In room except for meals and walking. Glucose at breakfast was 197 . Sliding scale for meals.-3u insulin given. Glucose at lunch was 235 and 4 units given. Lab here to draw blood samples. EKG done,copy in chart.  Medication compliant. Starting Metformin at 1700. Refused groups. Reads in room. Quiet and calm. Cooperative and polite. Contracts for safety. Denies all psych symptoms.

## 2022-03-26 NOTE — PROGRESS NOTES
Hospitalist paged as ordered  in regards to pt's bedtime blood glucose >350.  Hamzah Julian RN on 3/25/2022 at 10:08 PM

## 2022-03-26 NOTE — PROGRESS NOTES
"[facility]    Medicine Progress Note - Hospitalist Service    Date of Admission:  3/23/2022    Assessment & Plan            Abhishek Downey is a 46 year old male admitted on 3/23/2022 for depression, SI, and overdose from 19-25 Bendadryl tablets on 3/17. He was admitted to the [facility] from 3/17/220-3/23/22. He was admitted to the hospital medicine service for evaluation and management of anticholinergic toxicity. He has PMH of history of liver transplant (2017) for PSC, DM II on insulin, GERD,  ulcerative colitis, chronic pouchitis, HTN, and depression. Face to face in pt's room, pleasant, cooperative.     Pt needs to reestablish with a PCP in [place] and see [facility] S[facility] upon discharge.      # Primary sclerosing cholangitis diagnosed in 2010 s/p  liver transplant (2017)  # lesion  Follows at [facility] where pt received transplant. [place] visits, last 2021 in summer pt.. Hepatology at the [facility] was consulted for immunosuppression. Unclear what his trough goal is, relatively low for liver transplant at 2.9-3.5. [facility] was not able to get into contact with his [facility] team. Patient reports he has not changed his tacrolimus dose in a long time and that the [facility] team reported it was \"fine and doses not need to be changes.\" No s/s of infection at this time.   - Continue PTA tacrolimus 1.5 mg BID  -  follow up with [facility] team at discharge, discussed that we can help him make an appointment  - liver lesion noted on CT in 9/2021, defer to [facility] team for further management     # Ulcerative colitis, dx 1998, s/p colectomy and iloanal anastomosis (2009) for medically refractory disease   # Chronic pouchitis, antibiotics dependent    Last summer pt was hospitalized for greater than his usual 10 BM per day. He states he was \"unable to get off the toliet.\" Plan per [facility] was for 4 weeks of flagyl 500 mg bid , 4 weeks off then 4 weeks of cipro BID, if fails consider " vedolizumab per GI. Follows with [facility].  Last visit in Sept 2021. Did not follow-up as directed.  - Resume ciprofloxacin 500mg BID from his medication list  - Will need follow up with [facility], dicussed that we can help him make the appointment.     # T2DM  Per discussion, pt said he has a glucometer at home and his BG has gotten as high as the 300-400s. He then decreases his intake and it goes down.  Had been following with PCP for management, but last HC visit last year. BG ranged in 100-200s during [facility] admission. Was restarted on PTA NPH 2 days ago and was on high sliding scale insulin for two days. BG in 200s today max at 299, will watch trends. Last HgbA1c 8.6, Cr stable. Pt had been on Metformin 2,000 mg from 4256-8970, but stopped with BG in the low 100s. Discussed restarting again, pt amendable.   -BG ranging from 235-351  - Pt would like to continue taking NPH vs long-term insulin as it is affordable for him at [facility].   - is willing to set-up a PCP visit for management near [place] upon discharge to manage  - moderate consistent carb diet started  - PTA NPH 40 units in AM and 10 units at bedtime   - high sliding scale insulin, stop, stop medium  - start Metformin 500 mg XR daily to better baseline BG control, as pt will likely not have rapid acting insulin coverage on discharge   - appreciate recs from Diabetes educator: may need insulin coverage with meals, would keep on NPH.  - hypoglycemic protocol     # Lymphopenia  # Thrombocytopenia  Could be related to immunosuppression. Plats lowest on 3/18 at 94, 97 now. WBC as low as 2.5. Have been as low as 2.1 in 2020. Hgb WNL at 14.2. GI unable to get a hold of [facility] where pt got a transplant. Pt reports taking to GI last summer. Follow-up missed per pt. No s/s of infection.  - WBC improving  - Plats improving  - Continue ASA 81 mg for now, has been on long-term, no s/s of bleeding, bruising. Pt reports being  "on it for his \"heart issues\" and was placed on it \"at [place].\" Platelet  trends over the last several years in 80-low 100s. Platelet 128 today. Was on ASA 81 mg at  [facility] with normal platelet trends for pt. Will place back on and can discuss with PCP and [place] teams.     # Elevated Cr  1.20, GFR 76 today, BUN 16. Some mild Cr elevations on admission up to 1.28 (BUN-17, GFR-71). Cr down to 0.86 on 3/23 on discharge with GFR >90. Likely some dehydration noted, hematocrit up to 48.   - encourage fluids  - trend again on Monday    # Essential hypertension  Trending in 130-160s/60-90s since admission. Trends also showing HTN in evening, but mainly in morning before PtA amlodipine 10 mg. Pt states no anxiety and feels that he is sleeping well. Discussed need for additional agent if BP remains elevated. Pt agreeable. BMP with slightly elevated Cr, see above. Lytes WNL. 12-lead with Sinus rhythm, nonspecific T wave abnormality, unchanged from previous ECG on 3/19/22. BP today 127/91.  - continue PTA amlodipine 10 mg daily  - PRN hydralazine 25 mg for SBP >170 started   - consider starting on lisinopril 10 mg with DMT2 hx after trending Cr on Monday.   - educated on lifestyle changes including: salt restriction: weight reduction; increased consumption of fresh fruits, vegetables, and low-fat dairy products; increased exercise; and avoidance of smoking and excess alcohol ingestion.     # Poisoning by antihistamine, self harm intent, initial encounter-resolved  # Toxic metabolic encephalopathy due to Benadryl ingestion -resolved  # Lactic acidosis, resolved   # Elevated creatinine, mild, resolved  Per ED report, took 19-25 Benadryl pills on 3/17. Had anticholinergic CNS toxicity /s-confused, disoriented, tachycardic, hypertensive, and had visual hallucination. . EKG sinus tachy w/ incomplete RBBB. Poison control contracted and reported half-life of 9-12 hrs. [place] elvated Cr and LA, now resolved. Treated with " IV Labetalol for HTN/tachycardia, benzos for agitation, delirium, and received 4 mg of physostigmine.  1x straight cath for urinary retention. Contines to deny dry mouth, tachycardia, urinary retention, oriented x4.  - continue to monitor     # Suicide attempt, present on admission  # Major depressive disorder  # Hx of suicidal ideation  - Management per psychiatry      #GERD: continue PTA PPI  # Diarrhea, chronic: Likely secondary to tacrolimus. Normal is around 10 BM per day. C.diff negative this admission.     The patient's care was discussed with the Bedside Nurse and Patient.    Total time spent on the unit 10 minutes in reviewing the record, examination of patient, lab results and completing documentation. 20 minutes was spent in discussion and counseling with patient concerning diagnosis, medications, lab results and treatment plan. Care discussed and coordinated with patient and nurse.      RAMA Weaver CNP  [facility]  Securely message with the Vocera Web Console (learn more here)  Text page via plista/Press About [employer]       Hospitalist Service         Diet: Moderate Consistent Carb (60 g CHO per Meal) Diet    DVT Prophylaxis: Low Risk/Ambulatory with no VTE prophylaxis indicated  Baeza Catheter: Not present  Central Lines: None  Cardiac Monitoring: None  Code Status: Full Code      Disposition Plan   Expected Discharge: TBD  Anticipated discharge location:  Awaiting care coordination huddle   Delays:None per [facility]        The patient's care was discussed with the Bedside Nurse, Patient and Primary team.    RAMA Weaver CNP  Hospitalist Service  [facility]  Securely message with the Vocera Web Console (learn more here)  Text page via plista/[employer]         Clinically Significant Risk Factors Present on Admission             # Diabetes, type II: last A1C 8.6 % (Ref range: 0.0 - 5.6 %)  # Obesity: Estimated body mass index is 32.76  kg/m  as calculated from the following:    Height as of this encounter: 1.829 m (6').    Weight as of this encounter: 109.6 kg (241 lb 9 oz).      ______________________________________________________________________    Interval History   Nursing notes reviewed. No acute events overnight. BG up to 351 last night before correction. Slept well. Dicussed starting Metformin for better control of BG at home. Pt agreeable. Discussed starting another BP medication and needing follow-up with a new PCP near [place] for DM2 and HTN. Also discussed following up with [facility] and possible SOT.   ROS: 12 point ROS neg other than the symptoms noted above in the HPI.   Data reviewed today: I reviewed all medications, new labs and imaging results over the last 24 hours. I personally reviewed no images or EKG's today.    Physical Exam   Vital Signs: Temp: 97  F (36.1  C) Temp src: Oral BP: (!) 157/93 Pulse: 117   Resp: 18 SpO2: 98 % O2 Device: None (Room air)    Weight: 241 lbs 9 oz  Hematologic / Lymphatic: no cervical lymphadenopathy or supraclavicular lymphadenopathy  Respiratory: No increased work of breathing, good air exchange, clear to auscultation bilaterally, no crackles or wheezing  Cardiovascular: Normal apical impulse, regular rate and rhythm, normal S1 and S2, no S3 or S4, and no murmur noted  Skin: Normal skin tone, turgor, no evidence of bruising or petechiae   Neuropsychiatric: General: normal, calm and normal eye contact  Level of consciousness: alert / normal  Affect: normal  Orientation: oriented to self, place, time and situation  Memory and insight: normal, memory for past and recent events intact and thought process normal    Data   Recent Labs   Lab 03/26/22  0644 03/25/22  2202 03/25/22  2037 03/23/22  1143 03/23/22  0613 03/21/22  0921 03/21/22  0620 03/20/22  0812 03/20/22  0559   WBC  --   --   --   --  2.5*  --   --   --   --    HGB  --   --   --   --  14.2  --   --   --   --    MCV  --   --   --    --  87  --   --   --   --    PLT  --   --   --   --  97*  --   --   --   --    NA  --   --   --   --  140  --  139  --  140   POTASSIUM  --   --   --   --  3.7  --  3.6  --  3.9   CHLORIDE  --   --   --   --  109  --  112*  --  112*   CO2  --   --   --   --  24  --  21  --  23   BUN  --   --   --   --  12  --  13  --  17   CR  --   --   --   --  0.86  --  0.89  --  1.06   ANIONGAP  --   --   --   --  7  --  6  --  5   BREN  --   --   --   --  9.2  --  8.9  --  8.4*   * 232* 351*   < > 218*   < > 200*   < > 222*   ALBUMIN  --   --   --   --  3.6  --  3.4  --  3.5   PROTTOTAL  --   --   --   --  6.8  --  6.4*  --  6.5*   BILITOTAL  --   --   --   --  1.2  --  2.0*  --  1.3   ALKPHOS  --   --   --   --  141  --  111  --  108   ALT  --   --   --   --  53  --  35  --  31   AST  --   --   --   --  45  --  31  --  21    < > = values in this interval not displayed.     No results found for this or any previous visit (from the past 24 hour(s)).  Medications       amLODIPine  10 mg Oral Daily     ciprofloxacin  500 mg Oral BID     insulin aspart  1-10 Units Subcutaneous TID AC     insulin aspart  1-7 Units Subcutaneous At Bedtime     insulin NPH  10 Units Subcutaneous QPM     insulin NPH  40 Units Subcutaneous QAM AC     metFORMIN  500 mg Oral Daily with supper     mirtazapine  15 mg Oral At Bedtime     sertraline  150 mg Oral Daily     tacrolimus  1.5 mg Oral BID

## 2022-03-26 NOTE — PLAN OF CARE
Goal Outcome Evaluation:  Patient was calm and was seen sleeping all night without any distress. Safety checks were completed and no concerns were observed. Patient denied pain, anxiety, depression and hallucination. No PRN was given. BS  was 197 mg/dl, 3 units of Novolog per sliding scale given.

## 2022-03-26 NOTE — PROGRESS NOTES
03/26/22 1043   Engagement   Intervention Group   Topic Detail Super Scratch Art for creative expression, concentration, attention to detail, follow through, coping, symptom managment, healthy leisure, improving self-esteem and socialization   Attendance Did not attend

## 2022-03-27 LAB
GLUCOSE BLDC GLUCOMTR-MCNC: 139 MG/DL (ref 70–99)
GLUCOSE BLDC GLUCOMTR-MCNC: 180 MG/DL (ref 70–99)
GLUCOSE BLDC GLUCOMTR-MCNC: 184 MG/DL (ref 70–99)
GLUCOSE BLDC GLUCOMTR-MCNC: 189 MG/DL (ref 70–99)

## 2022-03-27 PROCEDURE — 250N000012 HC RX MED GY IP 250 OP 636 PS 637: Performed by: INTERNAL MEDICINE

## 2022-03-27 PROCEDURE — 99232 SBSQ HOSP IP/OBS MODERATE 35: CPT

## 2022-03-27 PROCEDURE — 250N000013 HC RX MED GY IP 250 OP 250 PS 637: Performed by: NURSE PRACTITIONER

## 2022-03-27 PROCEDURE — 128N000001 HC R&B CD/MH ADULT

## 2022-03-27 PROCEDURE — 250N000013 HC RX MED GY IP 250 OP 250 PS 637

## 2022-03-27 PROCEDURE — 250N000013 HC RX MED GY IP 250 OP 250 PS 637: Performed by: INTERNAL MEDICINE

## 2022-03-27 RX ADMIN — INSULIN HUMAN 40 UNITS: 100 INJECTION, SUSPENSION SUBCUTANEOUS at 07:49

## 2022-03-27 RX ADMIN — TACROLIMUS 1.5 MG: 1 CAPSULE ORAL at 08:22

## 2022-03-27 RX ADMIN — SERTRALINE HYDROCHLORIDE 150 MG: 50 TABLET ORAL at 08:22

## 2022-03-27 RX ADMIN — ASPIRIN 81 MG: 81 TABLET, CHEWABLE ORAL at 08:20

## 2022-03-27 RX ADMIN — MIRTAZAPINE 15 MG: 15 TABLET, FILM COATED ORAL at 21:08

## 2022-03-27 RX ADMIN — TACROLIMUS 1.5 MG: 1 CAPSULE ORAL at 21:07

## 2022-03-27 RX ADMIN — AMLODIPINE BESYLATE 10 MG: 5 TABLET ORAL at 08:19

## 2022-03-27 RX ADMIN — CIPROFLOXACIN 500 MG: 500 TABLET, FILM COATED ORAL at 08:21

## 2022-03-27 RX ADMIN — METFORMIN HYDROCHLORIDE 500 MG: 500 TABLET, EXTENDED RELEASE ORAL at 17:10

## 2022-03-27 RX ADMIN — CIPROFLOXACIN 500 MG: 500 TABLET, FILM COATED ORAL at 21:08

## 2022-03-27 ASSESSMENT — ACTIVITIES OF DAILY LIVING (ADL)
HYGIENE/GROOMING: INDEPENDENT
LAUNDRY: WITH SUPERVISION
ORAL_HYGIENE: INDEPENDENT
DRESS: INDEPENDENT

## 2022-03-27 NOTE — PROGRESS NOTES
"[facility]    Medicine Progress Note - Hospitalist Service    Date of Admission:  3/23/2022    Assessment & Plan            Abhishek Downey is a 46 year old male admitted on 3/23/2022 for depression, SI, and overdose from 19-25 Bendadryl tablets on 3/17. He was admitted to the [facility] from 3/17/220-3/23/22. He was admitted to the hospital medicine service for evaluation and management of anticholinergic toxicity. He has PMH of history of liver transplant (2017) for PSC, DM II on insulin, GERD,  ulcerative colitis, chronic pouchitis, HTN, and depression. Face to face in pt's room, pleasant, cooperative.     Pt needs to reestablish with a PCP in [place] and see [facility] upon discharge.      # Primary sclerosing cholangitis diagnosed in 2010 s/p  liver transplant (2017)  # lesion  Follows at [facility] where pt received transplant. [place] visits, last 2021 in summer pt.. Hepatology at the [facility] was consulted for immunosuppression. Unclear what his trough goal is, relatively low for liver transplant at 2.9-3.5. [facility] was not able to get into contact with his [facility] team. Patient reports he has not changed his tacrolimus dose in a long time and that the [facility] team reported it was \"fine and doses not need to be changes.\" No s/s of infection at this time.   - Continue PTA tacrolimus 1.5 mg BID  -  follow up with [place] team at discharge, discussed that we can help him make an appointment  - liver lesion noted on CT in 9/2021, defer to [place] team for further management     # Ulcerative colitis, dx 1998, s/p colectomy and iloanal anastomosis (2009) for medically refractory disease   # Chronic pouchitis, antibiotics dependent    Last summer pt was hospitalized for greater than his usual 10 BM per day. He states he was \"unable to get off the toliet.\" Plan per [facility] was for 4 weeks of flagyl 500 mg bid , 4 weeks off then 4 weeks of cipro BID, if fails consider " "vedolizumab per GI. Follows with [facility].  Last visit in Sept 2021. Did not follow-up as directed.  - Resume ciprofloxacin 500mg BID from his medication list  - Will need follow up with [facility], dicussed that we can help him make the appointment.     # T2DM  Per discussion, pt said he has a glucometer at home and his BG has gotten as high as the 300-400s. He then decreases his intake and it goes down.  Had been following with PCP for management, but last HC visit last year. BG ranged in 100-200s during [facility] admission. Was restarted on PTA NPH 2 days ago and was on high sliding scale insulin for two days. BG in 200s today max at 299, will watch trends. Last HgbA1c 8.6, Cr stable. Pt had been on Metformin 2,000 mg from 4942-3022, but stopped with BG in the low 100s. Discussed restarting again, pt amendable.   - BG ranging from 128-299, 139 this morning after starting metformin  - moderate consistent carb diet started  - PTA NPH 40 units in AM and 10 units at bedtime   - high sliding scale insulin  - Metformin 500 mg XR daily  - appreciate recs from Diabetes educator: may need insulin coverage with meals, would keep on NPH.  - Pt would like to continue taking NPH vs long-term insulin as it is affordable for him at [facility].   - willing to set-up a PCP visit for management near [place] upon discharge to manage T2DM  - hypoglycemic protocol     # Lymphopenia  # Thrombocytopenia  Could be related to immunosuppression. Plats lowest on 3/18 at 94, 97 now. WBC as low as 2.5. Have been as low as 2.1 in 2020. Hgb WNL at 14.2. GI unable to get a hold of [facility] where pt got a transplant. Pt reports taking to GI last summer. Follow-up missed per pt. No s/s of infection.  - WBC improving  - Plats improving  - Continue ASA 81 mg for now, has been on long-term, no s/s of bleeding, bruising. Pt reports being on it for his \"heart issues\" and was placed on it \"at [facility].\" Platelet  trends over " the last several years in 80-low 100s. Platelet 128 today. Was on ASA 81 mg at  [facility] with normal platelet trends for pt. Will place back on and can discuss with PCP and [facility] teams.     # Elevated Cr  1.20, GFR 76 today, BUN 16. Some mild Cr elevations on admission up to 1.28 (BUN-17, GFR-71). Cr down to 0.86 on 3/23 on discharge with GFR >90. Likely some dehydration noted, hematocrit up to 48.   - encourage fluids  - Check Cr Monday    # Essential hypertension  # Tachycardia, mild  Trending in 130-160s/60-90s since admission. Trends also showing HTN in evening, but mainly in morning before PtA amlodipine 10 mg. Pt states no anxiety and feels that he is sleeping well. Discussed need for additional agent if BP remains elevated. Pt agreeable. BMP with slightly elevated Cr, see above. Lytes WNL. 12-lead with Sinus rhythm, nonspecific T wave abnormality, unchanged from previous ECG on 3/19/22. BP today 127/91. Slightly tachy yesterday in 100-110s, trends show HR in 80-110s over the last week.   - continue PTA amlodipine 10 mg daily  - PRN hydralazine 25 mg for SBP >170 started   - consider starting on lisinopril 2.5-5 mg with DMT2 hx after trending Cr on Monday.   - educated on lifestyle changes including: salt restriction: weight reduction; increased consumption of fresh fruits, vegetables, and low-fat dairy products; increased exercise; and avoidance of smoking and excess alcohol ingestion.   - continue to monitor HR/BP    # Poisoning by antihistamine, self harm intent, initial encounter-resolved  # Toxic metabolic encephalopathy due to Benadryl ingestion -resolved  # Lactic acidosis, resolved   # Elevated creatinine, mild, resolved  Per ED report, took 19-25 Benadryl pills on 3/17. Had anticholinergic CNS toxicity /s-confused, disoriented, tachycardic, hypertensive, and had visual hallucination. . EKG sinus tachy w/ incomplete RBBB. Poison control contracted and reported half-life of 9-12 hrs.  [place] elvated Cr and LA, now resolved. Treated with IV Labetalol for HTN/tachycardia, benzos for agitation, delirium, and received 4 mg of physostigmine.  1x straight cath for urinary retention. Contines to deny dry mouth, tachycardia, urinary retention, oriented x4.  - continue to monitor     # Suicide attempt, present on admission  # Major depressive disorder  # Hx of suicidal ideation  - Management per psychiatry      #GERD: continue PTA PPI  # Diarrhea, chronic: Likely secondary to tacrolimus. Normal is around 10 BM per day. C.diff negative this admission.     The patient's care was discussed with the Bedside Nurse and Patient.    Total time spent on the unit 10 minutes in reviewing the record, examination of patient, lab results and completing documentation. 20 minutes was spent in discussion and counseling with patient concerning diagnosis, medications, lab results and treatment plan. Care discussed and coordinated with patient and nurse.      RAMA Weaver CNP  [facility]  Securely message with the Vocera Web Console (learn more here)  Text page via GeneAssess/RUN       Hospitalist Service         Diet: Moderate Consistent Carb (60 g CHO per Meal) Diet    DVT Prophylaxis: Low Risk/Ambulatory with no VTE prophylaxis indicated  Baeza Catheter: Not present  Central Lines: None  Cardiac Monitoring: None  Code Status: Full Code      Disposition Plan   Expected Discharge: TBD  Anticipated discharge location:  Awaiting care coordination huddle   Delays:None per [facility]        The patient's care was discussed with the Bedside Nurse, Patient and Primary team.    RAMA Weaver CNP  Hospitalist Service  [facility]  Securely message with the Vocera Web Console (learn more here)  Text page via GeneAssess/RUN         Clinically Significant Risk Factors Present on Admission             # Diabetes, type II: last A1C 8.6 % (Ref range: 0.0 - 5.6  %)  # Obesity: Estimated body mass index is 32.76 kg/m  as calculated from the following:    Height as of this encounter: 1.829 m (6').    Weight as of this encounter: 109.6 kg (241 lb 9 oz).      ______________________________________________________________________    Interval History   Nursing notes reviewed. No acute events overnight. BG decreasing into 100s. Feeling good overall. ROS: 12 point ROS neg other than the symptoms noted above in the HPI.     Data reviewed today: I reviewed all medications, new labs and imaging results over the last 24 hours. I personally reviewed no images or EKG's today.    Physical Exam   Vital Signs: Temp: 98.2  F (36.8  C) Temp src: Oral BP: (!) 141/89 Pulse: 113   Resp: 18 SpO2: 96 % O2 Device: None (Room air)    Weight: 241 lbs 9 oz  Hematologic / Lymphatic: no cervical lymphadenopathy or supraclavicular lymphadenopathy  Respiratory: No increased work of breathing, good air exchange, clear to auscultation bilaterally, no crackles or wheezing  Cardiovascular: Normal apical impulse, regular rate and rhythm, normal S1 and S2, no S3 or S4, and no murmur noted  Skin: Normal skin tone, turgor, no evidence of bruising or petechiae   Neuropsychiatric: General: normal, calm and normal eye contact  Level of consciousness: alert / normal  Affect: normal  Orientation: oriented to self, place, time and situation  Memory and insight: normal, memory for past and recent events intact and thought process normal    Data   Recent Labs   Lab 03/27/22  0701 03/26/22  2026 03/26/22  1654 03/26/22  1130 03/26/22  1120 03/23/22  1143 03/23/22  0613 03/21/22  0921 03/21/22  0620   WBC  --   --   --   --  3.4*  --  2.5*  --   --    HGB  --   --   --   --  17.2  --  14.2  --   --    MCV  --   --   --   --  87  --  87  --   --    PLT  --   --   --   --  128*  --  97*  --   --    NA  --   --   --   --  140  --  140  --  139   POTASSIUM  --   --   --   --  4.1  --  3.7  --  3.6   CHLORIDE  --   --   --   --   103  --  109  --  112*   CO2  --   --   --   --  25  --  24  --  21   BUN  --   --   --   --  16  --  12  --  13   CR  --   --   --   --  1.20  --  0.86  --  0.89   ANIONGAP  --   --   --   --  12  --  7  --  6   BREN  --   --   --   --  10.5  --  9.2  --  8.9   * 193* 128*   < > 263*   < > 218*   < > 200*   ALBUMIN  --   --   --   --   --   --  3.6  --  3.4   PROTTOTAL  --   --   --   --   --   --  6.8  --  6.4*   BILITOTAL  --   --   --   --   --   --  1.2  --  2.0*   ALKPHOS  --   --   --   --   --   --  141  --  111   ALT  --   --   --   --   --   --  53  --  35   AST  --   --   --   --   --   --  45  --  31    < > = values in this interval not displayed.     No results found for this or any previous visit (from the past 24 hour(s)).  Medications       amLODIPine  10 mg Oral Daily     aspirin  81 mg Oral Daily     ciprofloxacin  500 mg Oral BID     insulin aspart  1-10 Units Subcutaneous TID AC     insulin aspart  1-7 Units Subcutaneous At Bedtime     insulin NPH  10 Units Subcutaneous QPM     insulin NPH  40 Units Subcutaneous QAM AC     metFORMIN  500 mg Oral Daily with supper     mirtazapine  15 mg Oral At Bedtime     sertraline  150 mg Oral Daily     tacrolimus  1.5 mg Oral BID

## 2022-03-27 NOTE — PLAN OF CARE
Problem: Suicidal Behavior  Goal: Suicidal Behavior is Absent or Managed  Outcome: Ongoing, Progressing     Problem: Behavioral Health Plan of Care  Goal: Optimal Comfort and Wellbeing  Outcome: Ongoing, Progressing  Intervention: Provide Person-Centered Care  Recent Flowsheet Documentation  Taken 3/26/2022 1700 by Lila Ferris RN  Trust Relationship/Rapport:    care explained    emotional support provided    thoughts/feelings acknowledged     Problem: Behavioral Health Plan of Care  Goal: Adheres to Safety Considerations for Self and Others  Outcome: Ongoing, Progressing  Intervention: Develop and Maintain Individualized Safety Plan  Recent Flowsheet Documentation  Taken 3/26/2022 1700 by Lila Ferris RN  Safety Measures:    safety rounds completed    suicide assessment completed  Patient endorses anxiety and depression 3/10, denies SI, HI, AVH. Denies pain. Patient is calm and quiet, isolative in room and to himself. Comes out for meals. Spends majority of his time reading. Patient's thought process and speech are clear and logical.   Blood sugar 128 and 193, no coverage given per sliding scale.   Patient is compliant with medication, contracts for safety. No behaviors noted.      2128 Received Trazodone 50 mg per request.

## 2022-03-27 NOTE — PLAN OF CARE
"  Problem: Suicidal Behavior  Goal: Suicidal Behavior is Absent or Managed  Outcome: Ongoing, Progressing     Problem: Behavioral Health Plan of Care  Goal: Adheres to Safety Considerations for Self and Others  Outcome: Ongoing, Progressing  Intervention: Develop and Maintain Individualized Safety Plan  Recent Flowsheet Documentation  Taken 3/27/2022 1328 by Clara Nelson RN  Safety Measures: environmental rounds completed  Goal: Absence of New-Onset Illness or Injury  Outcome: Ongoing, Progressing  Intervention: Identify and Manage Fall Risk  Recent Flowsheet Documentation  Taken 3/27/2022 1328 by Clara Nelson RN  Safety Measures: environmental rounds completed  Goal: Optimal Comfort and Wellbeing  Outcome: Ongoing, Progressing  Intervention: Provide Person-Centered Care  Recent Flowsheet Documentation  Taken 3/27/2022 1328 by Clara Nelson RN  Trust Relationship/Rapport: care explained  Goal: Optimized Coping Skills in Response to Life Stressors  Outcome: Ongoing, Progressing     Problem: Sleep Disturbance  Goal: Adequate Sleep/Rest  Outcome: Ongoing, Progressing   Goal Outcome Evaluation:      Pt in room most of the day. Out for meals and walks. Not engaged with peers. Calm and cooperative. Brief conversations with me were very  polite. Denies SI,HI,SIB,AH,VH. No anxiety and states depression is \"low\". Glucose before breakfast was 139 ( no sliding scale needed). Ate well - 62 carbs. Before lunch glucose was 184 - given 2 units from sliding scale. Ate 66 carbs for lunch.. Re-checked vitals: sitting 137/80, HR 88, RR 15. Contracts for safety.                "

## 2022-03-27 NOTE — PLAN OF CARE
Problem: Suicidal Behavior  Goal: Suicidal Behavior is Absent or Managed  Outcome: Ongoing, Progressing     Problem: Behavioral Health Plan of Care  Goal: Adheres to Safety Considerations for Self and Others  Outcome: Ongoing, Progressing  Intervention: Develop and Maintain Individualized Safety Plan  Recent Flowsheet Documentation  Taken 3/27/2022 1328 by Clara Nelson, RN  Safety Measures: environmental rounds completed  Goal: Absence of New-Onset Illness or Injury  Outcome: Ongoing, Progressing  Intervention: Identify and Manage Fall Risk  Recent Flowsheet Documentation  Taken 3/27/2022 1328 by Clara Nelson, RN  Safety Measures: environmental rounds completed  Goal: Optimal Comfort and Wellbeing  Outcome: Ongoing, Progressing  Intervention: Provide Person-Centered Care  Recent Flowsheet Documentation  Taken 3/27/2022 1328 by Clara Nelson RN  Trust Relationship/Rapport: care explained  Goal: Optimized Coping Skills in Response to Life Stressors  Outcome: Ongoing, Progressing     Problem: Sleep Disturbance  Goal: Adequate Sleep/Rest  Outcome: Ongoing,

## 2022-03-27 NOTE — PLAN OF CARE
Problem: Suicidal Behavior  Goal: Suicidal Behavior is Absent or Managed  Outcome: Ongoing, Progressing     Problem: Behavioral Health Plan of Care  Goal: Adheres to Safety Considerations for Self and Others  Intervention: Develop and Maintain Individualized Safety Plan  Recent Flowsheet Documentation  Taken 3/27/2022 0100 by Wilda Jordan, RN  Safety Measures:    environmental rounds completed    safety rounds completed     Problem: Sleep Disturbance  Goal: Adequate Sleep/Rest  Outcome: Ongoing, Progressing   Goal Outcome Evaluation:        Patient was calm and slept through the night. Safety checks completed per unit protocol. No suicide /SIB behaviors noted. He had greater than 6 hs of sleep.

## 2022-03-28 LAB
ANION GAP SERPL CALCULATED.3IONS-SCNC: 10 MMOL/L (ref 5–18)
BUN SERPL-MCNC: 15 MG/DL (ref 8–22)
CALCIUM SERPL-MCNC: 10 MG/DL (ref 8.5–10.5)
CHLORIDE BLD-SCNC: 102 MMOL/L (ref 98–107)
CO2 SERPL-SCNC: 27 MMOL/L (ref 22–31)
CREAT SERPL-MCNC: 1.17 MG/DL (ref 0.7–1.3)
GFR SERPL CREATININE-BSD FRML MDRD: 79 ML/MIN/1.73M2
GLUCOSE BLD-MCNC: 314 MG/DL (ref 70–125)
GLUCOSE BLDC GLUCOMTR-MCNC: 133 MG/DL (ref 70–99)
GLUCOSE BLDC GLUCOMTR-MCNC: 166 MG/DL (ref 70–99)
GLUCOSE BLDC GLUCOMTR-MCNC: 199 MG/DL (ref 70–99)
GLUCOSE BLDC GLUCOMTR-MCNC: 242 MG/DL (ref 70–99)
POTASSIUM BLD-SCNC: 4.5 MMOL/L (ref 3.5–5)
SODIUM SERPL-SCNC: 139 MMOL/L (ref 136–145)

## 2022-03-28 PROCEDURE — 250N000013 HC RX MED GY IP 250 OP 250 PS 637: Performed by: INTERNAL MEDICINE

## 2022-03-28 PROCEDURE — 99232 SBSQ HOSP IP/OBS MODERATE 35: CPT

## 2022-03-28 PROCEDURE — 128N000001 HC R&B CD/MH ADULT

## 2022-03-28 PROCEDURE — 36415 COLL VENOUS BLD VENIPUNCTURE: CPT

## 2022-03-28 PROCEDURE — 250N000012 HC RX MED GY IP 250 OP 636 PS 637: Performed by: INTERNAL MEDICINE

## 2022-03-28 PROCEDURE — 250N000013 HC RX MED GY IP 250 OP 250 PS 637: Performed by: NURSE PRACTITIONER

## 2022-03-28 PROCEDURE — 99231 SBSQ HOSP IP/OBS SF/LOW 25: CPT | Performed by: PSYCHIATRY & NEUROLOGY

## 2022-03-28 PROCEDURE — 250N000013 HC RX MED GY IP 250 OP 250 PS 637: Performed by: PSYCHIATRY & NEUROLOGY

## 2022-03-28 PROCEDURE — 250N000013 HC RX MED GY IP 250 OP 250 PS 637

## 2022-03-28 PROCEDURE — 80048 BASIC METABOLIC PNL TOTAL CA: CPT

## 2022-03-28 RX ORDER — MIRTAZAPINE 15 MG/1
30 TABLET, FILM COATED ORAL AT BEDTIME
Status: DISCONTINUED | OUTPATIENT
Start: 2022-03-28 | End: 2022-03-30 | Stop reason: HOSPADM

## 2022-03-28 RX ORDER — LISINOPRIL 5 MG/1
5 TABLET ORAL DAILY
Status: DISCONTINUED | OUTPATIENT
Start: 2022-03-28 | End: 2022-03-30 | Stop reason: HOSPADM

## 2022-03-28 RX ADMIN — TACROLIMUS 1.5 MG: 1 CAPSULE ORAL at 20:36

## 2022-03-28 RX ADMIN — METFORMIN HYDROCHLORIDE 500 MG: 500 TABLET, EXTENDED RELEASE ORAL at 16:57

## 2022-03-28 RX ADMIN — SERTRALINE HYDROCHLORIDE 150 MG: 50 TABLET ORAL at 08:57

## 2022-03-28 RX ADMIN — CIPROFLOXACIN 500 MG: 500 TABLET, FILM COATED ORAL at 20:36

## 2022-03-28 RX ADMIN — ASPIRIN 81 MG: 81 TABLET, CHEWABLE ORAL at 08:56

## 2022-03-28 RX ADMIN — TACROLIMUS 1.5 MG: 1 CAPSULE ORAL at 08:57

## 2022-03-28 RX ADMIN — AMLODIPINE BESYLATE 10 MG: 5 TABLET ORAL at 08:55

## 2022-03-28 RX ADMIN — MIRTAZAPINE 30 MG: 15 TABLET, FILM COATED ORAL at 20:36

## 2022-03-28 RX ADMIN — CIPROFLOXACIN 500 MG: 500 TABLET, FILM COATED ORAL at 08:56

## 2022-03-28 RX ADMIN — INSULIN HUMAN 40 UNITS: 100 INJECTION, SUSPENSION SUBCUTANEOUS at 07:53

## 2022-03-28 RX ADMIN — LISINOPRIL 5 MG: 5 TABLET ORAL at 16:57

## 2022-03-28 ASSESSMENT — ACTIVITIES OF DAILY LIVING (ADL)
LAUNDRY: WITH SUPERVISION
DRESS: INDEPENDENT
HYGIENE/GROOMING: INDEPENDENT
DRESS: INDEPENDENT
LAUNDRY: WITH SUPERVISION
HYGIENE/GROOMING: INDEPENDENT
ORAL_HYGIENE: INDEPENDENT
ORAL_HYGIENE: INDEPENDENT

## 2022-03-28 NOTE — PROGRESS NOTES
"[facility]    Medicine Progress Note - Hospitalist Service    Date of Admission:  3/23/2022    Assessment & Plan          Abhishek Downey is a 46 year old male admitted on 3/23/2022 for depression, SI, and overdose from 19-25 Bendadryl tablets on 3/17. He was admitted to the [facility] ER from 3/17/220-3/23/22. He was admitted to the hospital medicine service for evaluation and management of anticholinergic toxicity. He has PMH of history of liver transplant (2017) for PSC, DM II on insulin, GERD,  ulcerative colitis, chronic pouchitis, HTN, and depression. Face to face in pt's room, pleasant, cooperative, reading a book.      Pt needs to reestablish with a PCP in [place] and see [facility] upon discharge.      # Primary sclerosing cholangitis diagnosed in 2010 s/p  liver transplant (2017)  # lesion  Follows at [facility] where pt received transplant. [place] visits, last 2021 in summer pt.. Hepatology at the [facility] was consulted for immunosuppression. Unclear what his trough goal is, relatively low for liver transplant at 2.9-3.5. [facility] was not able to get into contact with his [facility] team. Patient reports he has not changed his tacrolimus dose in a long time and that the [facility] team reported it was \"fine and doses not need to be changes.\" No s/s of infection at this time.   - Continue PTA tacrolimus 1.5 mg BID  -  follow up with [facility] team at discharge, discussed that we can help him make an appointment  - liver lesion noted on CT in 9/2021, defer to [facility] team for further management.     # Ulcerative colitis, dx 1998, s/p colectomy and iloanal anastomosis (2009) for medically refractory disease   # Chronic pouchitis, antibiotics dependent    Last summer pt was hospitalized for greater than his usual 10 BM per day. He states he was \"unable to get off the toliet.\" Plan per [facility] was for 4 weeks of flagyl 500 mg bid , 4 weeks off then 4 weeks of cipro BID, if " "fails consider vedolizumab per GI. Follows with [facility].  Last visit in Sept 2021. Did not follow-up as directed. Ciprofloxacin continued at the [facility] before transfer.   - ciprofloxacin 500mg BID PTA  - Will need follow up with [facility], dicussed that we can help him make the appointment.     # T2DM  Per discussion, pt said he has a glucometer at home and his BG has gotten as high as the 300-400s. He then decreases his intake and it goes down.  Had been following with PCP for management, but last HC visit last year. BG ranged in 100-200s during [facility] admission. Was restarted on PTA NPH 2 days ago and was on high sliding scale insulin for two days. BG in 200s today max at 299, will watch trends. Last HgbA1c 8.6, Cr stable. Pt had been on Metformin 2,000 mg from 8518-1932, but stopped with BG in the low 100s.   - BG ranging from 139-314 after starting Metformin  - appreciate recs from Diabetes educator: see consult note   - PTA NPH 40 units in AM and 10 units at bedtime   - high sliding scale insulin, needs have greatly decreased with start of Metformin  - Metformin 500 mg XR daily, will increase to 750 mg tomorrow if loose stools stay at baseline   - Pt would like to continue taking NPH vs long-term insulin as it is affordable for him at [facility].   - hypoglycemic protocol   - moderate consistent carb diet   - PCP visit set up in [place], [place]  in 1.5 weeks. In discharge papers.    # Lymphopenia  # Thrombocytopenia  Could be related to immunosuppression. Plats lowest on 3/18 at 94, 97 now. WBC as low as 2.5. Have been as low as 2.1 in 2020. Hgb WNL at 14.2. GI unable to get a hold of [facility] where pt got a transplant. Pt reports taking to GI last summer. Follow-up missed per pt. No s/s of infection.  - WBC improving  - Plats improving  - Continue ASA 81 mg for now, has been on long-term, no s/s of bleeding, bruising. Pt reports being on it for his \"heart issues\" " "and was placed on it \"at [place].\" Platelet  trends over the last several years in 80-low 100s. Platelet 128 today. Was on ASA 81 mg at  [facility] with normal platelet trends for pt. Will place back on and can discuss with PCP and [place] teams.     # Elevated Cr vs. CKD at baseline?  1.20, GFR 76 today, BUN 16. Some mild Cr elevations on admission up to 1.28 (BUN-17, GFR-71). Cr down to 0.86 on 3/23 on discharge with GFR >90. Likely some dehydration noted, hematocrit up to 48.  Pt reported drinking only 3 glasses of water per day. Recheck at 1.17 GFR at 79, both improving.    - encourage fluids  - should trend with PCP     # Essential hypertension  # Tachycardia, mild  Trending in 130-160s/60-90s since admission. Trends also showing HTN in evening, but mainly in morning before PTA amlodipine 10 mg. Pt states no anxiety and feels that he is sleeping well. Discussed need for additional agent if BP remains elevated. Pt agreeable. BMP with slightly elevated Cr, see above. Lytes WNL. 12-lead with Sinus rhythm, nonspecific T wave abnormality, unchanged from previous ECG on 3/19/22. BP trending in 130-160/90-100s. Slightly tachy yesterday in 90s-100s, trends show HR in 80-110s over the last week.   - continue PTA amlodipine 10 mg daily  - PRN hydralazine 25 mg for SBP >170 started   - Cr trending down, will start lisinopril 5 mg for persistently elevated BPs  - recheck Cr on 3/31/22, ordered   - Educated on lifestyle changes including: salt restriction: weight reduction; increased consumption of fresh fruits, vegetables, and low-fat dairy products; increased exercise; and avoidance of smoking and excess alcohol ingestion.   - continue to monitor HR/BP    # Poisoning by antihistamine, self harm intent, initial encounter-resolved  # Toxic metabolic encephalopathy due to Benadryl ingestion -resolved  # Lactic acidosis, resolved   # Elevated creatinine, mild, resolved  Per ED report, took 19-25 Benadryl pills on 3/17. " Had anticholinergic CNS toxicity /s-confused, disoriented, tachycardic, hypertensive, and had visual hallucination. . EKG sinus tachy w/ incomplete RBBB. Poison control contracted and reported half-life of 9-12 hrs. [place]ley elvated Cr and LA, now resolved. Treated with IV Labetalol for HTN/tachycardia, benzos for agitation, delirium, and received 4 mg of physostigmine.  1x straight cath for urinary retention. Contines to deny dry mouth, tachycardia, urinary retention, oriented x4.  - continue to monitor     # Suicide attempt, present on admission  # Major depressive disorder  # Hx of suicidal ideation  - Management per psychiatry      #GERD: continue PTA PPI  # Diarrhea, chronic: Likely secondary to tacrolimus. Normal is around 10 BM per day. C.diff negative this admission.    The patient's care was discussed with the Bedside Nurse and Patient.    Total time spent on the unit 10 minutes in reviewing the record, examination of patient, lab results and completing documentation. 20 minutes was spent in discussion and counseling with patient concerning diagnosis, medications, lab results and treatment plan. Care discussed and coordinated with patient and nurse.      RAMA Weaver CNP  [facility]  Securely message with the Vocera Web Console (learn more here)  Text page via [email or web address] [email or web address]       Hospitalist Service         Diet: Moderate Consistent Carb (60 g CHO per Meal) Diet    DVT Prophylaxis: Low Risk/Ambulatory with no VTE prophylaxis indicated  Baeza Catheter: Not present  Central Lines: None  Cardiac Monitoring: None  Code Status: Full Code      Disposition Plan   Expected Discharge: TBD  Anticipated discharge location:  Awaiting care coordination huddle   [place] [facility]        The patient's care was discussed with the Bedside Nurse, Patient and Primary team.    RAMA Weaver CNP  Hospitalist Service  [facility]  Securely message  with the [employer] Web Console (learn more here)  Text page via [facility] Paging/Directory         Clinically Significant Risk Factors Present on Admission                    ______________________________________________________________________    Interval History   Nursing notes reviewed. No acute events overnight. BG remaining in 100-300s. Discussed drinking more water today, only drinking 3 glasses. Made appointment for a new PCP in [place], [place].Feeling good overall. ROS: 12 point ROS neg other than the symptoms noted above in the HPI.     Data reviewed today: I reviewed all medications, new labs and imaging results over the last 24 hours. I personally reviewed no images or EKG's today.    Physical Exam   Vital Signs: Temp: 97.7  F (36.5  C) Temp src: Oral BP: 137/80 Pulse: 90   Resp: 16 SpO2: 98 % O2 Device: None (Room air)    Weight: 241 lbs 9 oz    Respiratory: No increased work of breathing, good air exchange, clear to auscultation bilaterally, no crackles or wheezing  Cardiovascular: Normal apical impulse, regular rate and rhythm, normal S1 and S2, no S3 or S4, and no murmur noted  Skin: Normal skin tone, turgor, no evidence of bruising or petechiae   GI: hyperactive BS, abdomen non-tender upon palpation   Neuropsychiatric: General: normal, calm and normal eye contact  Level of consciousness: alert / normal  Affect: normal  Orientation: oriented to self, place, time and situation  Memory and insight: normal, memory for past and recent events intact and thought process normal    Data   Recent Labs   Lab 03/28/22  0652 03/27/22 2015 03/27/22  1646 03/26/22  1130 03/26/22  1120 03/23/22  1143 03/23/22  0613   WBC  --   --   --   --  3.4*  --  2.5*   HGB  --   --   --   --  17.2  --  14.2   MCV  --   --   --   --  87  --  87   PLT  --   --   --   --  128*  --  97*   NA  --   --   --   --  140  --  140   POTASSIUM  --   --   --   --  4.1  --  3.7   CHLORIDE  --   --   --   --  103  --  109   CO2  --   --   --   --  25   --  24   BUN  --   --   --   --  16  --  12   CR  --   --   --   --  1.20  --  0.86   ANIONGAP  --   --   --   --  12  --  7   BREN  --   --   --   --  10.5  --  9.2   * 189* 180*   < > 263*   < > 218*   ALBUMIN  --   --   --   --   --   --  3.6   PROTTOTAL  --   --   --   --   --   --  6.8   BILITOTAL  --   --   --   --   --   --  1.2   ALKPHOS  --   --   --   --   --   --  141   ALT  --   --   --   --   --   --  53   AST  --   --   --   --   --   --  45    < > = values in this interval not displayed.     No results found for this or any previous visit (from the past 24 hour(s)).  Medications       amLODIPine  10 mg Oral Daily     aspirin  81 mg Oral Daily     ciprofloxacin  500 mg Oral BID     insulin aspart  1-10 Units Subcutaneous TID AC     insulin aspart  1-7 Units Subcutaneous At Bedtime     insulin NPH  10 Units Subcutaneous QPM     insulin NPH  40 Units Subcutaneous QAM AC     metFORMIN  500 mg Oral Daily with supper     mirtazapine  15 mg Oral At Bedtime     sertraline  150 mg Oral Daily     tacrolimus  1.5 mg Oral BID

## 2022-03-28 NOTE — PROVIDER NOTIFICATION
"   03/28/22 1005   Individualization/Patient Specific Goals   Patient Personal Strengths expressive of emotions   Patient Vulnerabilities family/relationship conflict   Anxieties, Fears or Concerns Pt's wife filing for divorce and pt has not seen his children sin May 2021.   Patient-Specific Goals (Include Timeframe) \"Reset myself and figure out what to do next\".   Interprofessional Rounds   Summary Treatment plan was discussed with treatment team.   Participants OT;psychiatrist;psychologist;nursing;social work   Team Discussion   Participants Psychiatry: DP, Nursing: EO, Social work: RD, OT: , PharmD: MK   Anticipated length of stay 5-7 days   Continued Stay Criteria/Rationale symptom stabilization, care-coordination, medication-management   Medical/Physical hx of liver transplant, Diabetes mellitius   Plan Pscyhaitrically stabilize and discharge back to mom's house.   Anticipated Discharge Disposition home or self-care     "

## 2022-03-28 NOTE — PROGRESS NOTES
03/28/22 0915   Engagement   Intervention Group   Topic Detail OT: Education on physical and social wellness with physical movement (Theraband) and interactive social activity (Chat Pack) to increase concentration, attention to task, symptom management, coping with stress, sharing information about self, listening to others, physical wellness, social wellness, and overall well-being   Attendance Did not attend   Reason for Not Attending Refused   Cognition Restless     Did not attend after face to face invite. Pt was observed pacing through the lounge area.

## 2022-03-28 NOTE — PROGRESS NOTES
03/28/22 1315   Engagement   Intervention Group   Topic Detail OT: Education on symptom management and creative hands-on endeavor (Super Scratch Art) for creative expression, concentration, attention to detail, follow through, coping, symptom management, healthy leisure, improving self-esteem, and socialization   Attendance Did not attend   Reason for Not Attending Refused     Did not attend after face to face invite.

## 2022-03-28 NOTE — PLAN OF CARE
Problem: Behavioral Health Plan of Care  Goal: Adheres to Safety Considerations for Self and Others  Outcome: Ongoing, Progressing  Goal: Absence of New-Onset Illness or Injury  Outcome: Ongoing, Progressing   Goal Outcome Evaluation:    Pt denied all psych symptoms.  Spent most of the shift in his room however he came out for dinner and snack, keeps to himself when out in the milieu.  Medications complaint.   and 189  Hamzah Julian RN on 3/27/2022 at 9:34 PM

## 2022-03-28 NOTE — PLAN OF CARE
Problem: Suicidal Behavior  Goal: Suicidal Behavior is Absent or Managed  Outcome: Ongoing, Progressing     Problem: Behavioral Health Plan of Care  Goal: Plan of Care Review  Outcome: Ongoing, Progressing  Flowsheets  Taken 3/28/2022 1405  Plan of Care Reviewed With: patient  Overall Patient Progress: improving  Patient Agreement with Plan of Care: agrees  Taken 3/28/2022 1300  Plan of Care Reviewed With: patient  Patient Agreement with Plan of Care: agrees  Goal: Adheres to Safety Considerations for Self and Others  Outcome: Ongoing, Progressing  Intervention: Develop and Maintain Individualized Safety Plan  Recent Flowsheet Documentation  Taken 3/28/2022 1300 by Clara Nelson RN  Safety Measures: environmental rounds completed  Goal: Absence of New-Onset Illness or Injury  Outcome: Ongoing, Progressing  Intervention: Identify and Manage Fall Risk  Recent Flowsheet Documentation  Taken 3/28/2022 1300 by Clara Nelson RN  Safety Measures: environmental rounds completed  Goal: Optimal Comfort and Wellbeing  Outcome: Ongoing, Progressing  Intervention: Provide Person-Centered Care  Recent Flowsheet Documentation  Taken 3/28/2022 1300 by Clara Nelson RN  Trust Relationship/Rapport: care explained  Goal: Optimized Coping Skills in Response to Life Stressors  Outcome: Ongoing, Progressing  Intervention: Promote Effective Coping Strategies  Recent Flowsheet Documentation  Taken 3/28/2022 1300 by Clara Nelson RN  Supportive Measures: positive reinforcement provided     Problem: Diabetes Comorbidity  Goal: Blood Glucose Level Within Targeted Range  Outcome: Ongoing, Progressing  Intervention: Monitor and Manage Glycemia  Recent Flowsheet Documentation  Taken 3/28/2022 1300 by Clara Nelson, RN  Glycemic Management: blood glucose monitored     Spends most of day in room. Out for meals and to eat. Glucose at breakfast was 166 (2 units sliding scale). 44 carbs eaten. Glucose at lunch was`199 (3 units  sliding scale). 62 carbs eaten. Withdrawn to peers. Denies all psych symptoms. Calm and cooperative.  Denies pain. Contracts for safety.

## 2022-03-28 NOTE — PLAN OF CARE
Problem: Sleep Disturbance  Goal: Adequate Sleep/Rest  Outcome: Ongoing, Progressing   Goal Outcome Evaluation:        Pt had an uneventful night. No signs or symptoms of pain or discomfort noted during 15 minutes safety checks. Pt had a total of 7.5 hours of sleep. Pt has BMP lab check this morning. Blood sugar this morning is 166 @ 0652. No other concerns or behavior noted this shift. Will continue to monitor and will assist if need arise.

## 2022-03-28 NOTE — PROGRESS NOTES
Chief Complaint:   Patient was admitted due to suicide attempt by overdose        Subjective/Objective:     Patient doing well on unit so far. He now denies suicidal thoughts. He regrets overdose and states that he is glad he is still alive. He denies psychosis and is showing no agitation. He is tolerating his current medication regimen. He has no physical complaints today. He has had minimal change in the past few days. Staff notes that he is isolative. He reports some depression.          HPI:     46 male with a history of Major Depression    Patient states that he has had problems with depression dating back to teen years. He was first treated over 10 years and has been on medications intermittently since then. He has used Zoloft in the past with some benefit. He had one prior admission 10 years ago for suicidal thoughts. He currently follows with a therapist online who is at [facility]. Recently he has been on Zoloft for the past 6 months which is prescribed by his PMD. He believes that he has had some benefit from Zoloft but still has depression.    Patient reports that his current episode of depression started about one year ago which were precipitated by marital issues and separation from his wife. He had resumed Zoloft 6 months ago with some benefit. Two weeks ago he learned that his wife wants to divorce him and is in another relationship. She also has an order of protection which has prevented him from seeing her or his kids. He states that he developed suicidal thoughts, and 5 days ago he overdosed on Benedryl taking 200 capsules in a suicide attempt. He was staying at his mother's house at the time and his sister found him and realized what happened and she activated EMS. He was stabilized at [facility] and transfer was arranged to this unit. Patient is now voluntary. Zoloft was resumed at [facility]. Remeron was added this week as well.  Patient denies alcohol or drug use.    Today patient reports  that he is not having suicidal thoughts although he still has some ambivalence about his attempt and has some passive suicidal thoughts. He has depressed mood. He has no evidence of psychosis or adelaida. He reports feelings of hopelessness and low energy. Patient denies history of adelaida or manic symptoms and has no current manic symptoms        Past Psychiatric History:     Patient states that he has had problems with depression dating back to teen years. He was first treated over 10 years and has been on medications intermittently since then. He has used Zoloft in the past with some benefit. He had one prior admission 10 years ago for suicidal thoughts. He currently follows with a therapist online who is at [facility]. Recently he has been on Zoloft for the past 6 months which is prescribed by his PMD. He believes that he has had some benefit from Zoloft but still has depression.          Substance Use and History:     Patient denies alcohol or drug use.        Past Medical History:   PAST MEDICAL HISTORY:   Past Medical History:   Diagnosis Date     Cirrhosis of liver (H)     Due to PSC     DU (duodenal ulcer) 5/2015    treated nonsurgically      PSC (primary sclerosing cholangitis)      Ulcerative colitis      Patient is status post liver transplant  Patient also has diabetes    PAST SURGICAL HISTORY:   Past Surgical History:   Procedure Laterality Date     COLECTOMY  12/27/07     FLEXIBLE SIGMOIDOSCOPY  10/4/10      ERCP W/W/O NONA OF SPEC-BRUSH/WASH  02/19/10      ERCP W/W/O NONA OF SPEC-BRUSH/WASH  03/05/10      UGI ENDOSCOPY W BANDING ESOPH/GASTRIC VARICES  since 2/2015    monthly at [facility]; last one in August      UPPER GI ENDOSCOPY  11/13/13    [facility]     UPPER GI ENDOSCOPY  2/16/15    [facility]             Family History:   FAMILY HISTORY:   Family History   Problem Relation Age of Onset     Heart Failure Mother       Patient reports that siblings and mother have problems  "with depression.  His siblings also suffer from alcohol and drug addiction.        Social History:   Please see the full psychosocial profile from the clinical treatment coordinator.   SOCIAL HISTORY:   Social History     Tobacco Use     Smoking status: Never Smoker     Smokeless tobacco: Never Used   Substance Use Topics     Alcohol use: No     Alcohol/week: 0.0 standard drinks     Comment: 2-3x per year     Patient stopped school in 10th grade.   Patient is on disability since 2010 related to ulcerative colitis and liver failure. He worked in the past driving a truck.         PTA Medications:     Medications Prior to Admission   Medication Sig Dispense Refill Last Dose     amLODIPine (NORVASC) 10 MG tablet Take 1 tablet (10 mg) by mouth daily 90 tablet 3      aspirin 81 MG tablet Take 81 mg by mouth daily         BD VEO INSULIN SYRINGE U/F 31G X 15/64\" 0.5 ML         ciprofloxacin (CIPRO) 500 MG tablet Take 500 mg by mouth 2 times daily        insulin  UNIT/ML injection 40 Units QAM and 10 U QPM.        mirtazapine (REMERON) 15 MG tablet Take 1 tablet (15 mg) by mouth At Bedtime        sertraline (ZOLOFT) 100 MG tablet Take 1.5 tablets (150 mg) by mouth daily 135 tablet 3      tacrolimus (GENERIC EQUIVALENT) 0.5 MG capsule Take 1.5 mg by mouth 2 times daily                Current Medications:       amLODIPine  10 mg Oral Daily     aspirin  81 mg Oral Daily     ciprofloxacin  500 mg Oral BID     insulin aspart  1-10 Units Subcutaneous TID AC     insulin aspart  1-7 Units Subcutaneous At Bedtime     insulin NPH  10 Units Subcutaneous QPM     insulin NPH  40 Units Subcutaneous QAM AC     metFORMIN  500 mg Oral Daily with supper     mirtazapine  15 mg Oral At Bedtime     sertraline  150 mg Oral Daily     tacrolimus  1.5 mg Oral BID     acetaminophen, glucose **OR** dextrose **OR** glucagon, hydrALAZINE, hydrOXYzine, OLANZapine **OR** OLANZapine, senna-docusate, traZODone         Allergies:     Allergies "   Allergen Reactions     No Known Allergies           Labs:     Recent Results (from the past 48 hour(s))   CBC with platelets    Collection Time: 03/26/22 11:20 AM   Result Value Ref Range    WBC Count 3.4 (L) 4.0 - 11.0 10e3/uL    RBC Count 5.55 4.40 - 5.90 10e6/uL    Hemoglobin 17.2 13.3 - 17.7 g/dL    Hematocrit 48.2 40.0 - 53.0 %    MCV 87 78 - 100 fL    MCH 31.0 26.5 - 33.0 pg    MCHC 35.7 31.5 - 36.5 g/dL    RDW 13.8 10.0 - 15.0 %    Platelet Count 128 (L) 150 - 450 10e3/uL   Basic metabolic panel    Collection Time: 03/26/22 11:20 AM   Result Value Ref Range    Sodium 140 136 - 145 mmol/L    Potassium 4.1 3.5 - 5.0 mmol/L    Chloride 103 98 - 107 mmol/L    Carbon Dioxide (CO2) 25 22 - 31 mmol/L    Anion Gap 12 5 - 18 mmol/L    Urea Nitrogen 16 8 - 22 mg/dL    Creatinine 1.20 0.70 - 1.30 mg/dL    Calcium 10.5 8.5 - 10.5 mg/dL    Glucose 263 (H) 70 - 125 mg/dL    GFR Estimate 76 >60 mL/min/1.73m2   Glucose by meter    Collection Time: 03/26/22 11:30 AM   Result Value Ref Range    GLUCOSE BY METER POCT 235 (H) 70 - 99 mg/dL   ECG 12-LEAD WITH MUSE (LHE)    Collection Time: 03/26/22 12:40 PM   Result Value Ref Range    Systolic Blood Pressure  mmHg    Diastolic Blood Pressure  mmHg    Ventricular Rate 95 BPM    Atrial Rate 95 BPM    TX Interval 134 ms    QRS Duration 90 ms     ms    QTc 454 ms    P Axis 58 degrees    R AXIS 65 degrees    T Axis 66 degrees    Interpretation ECG       Sinus rhythm  Nonspecific T wave abnormality  Abnormal ECG  When compared with ECG of 19-MAR-2022 19:06,  No significant change was found     Glucose by meter    Collection Time: 03/26/22  4:54 PM   Result Value Ref Range    GLUCOSE BY METER POCT 128 (H) 70 - 99 mg/dL   Glucose by meter    Collection Time: 03/26/22  8:26 PM   Result Value Ref Range    GLUCOSE BY METER POCT 193 (H) 70 - 99 mg/dL   Glucose by meter    Collection Time: 03/27/22  7:01 AM   Result Value Ref Range    GLUCOSE BY METER POCT 139 (H) 70 - 99 mg/dL    Glucose by meter    Collection Time: 03/27/22 11:54 AM   Result Value Ref Range    GLUCOSE BY METER POCT 184 (H) 70 - 99 mg/dL   Glucose by meter    Collection Time: 03/27/22  4:46 PM   Result Value Ref Range    GLUCOSE BY METER POCT 180 (H) 70 - 99 mg/dL   Glucose by meter    Collection Time: 03/27/22  8:15 PM   Result Value Ref Range    GLUCOSE BY METER POCT 189 (H) 70 - 99 mg/dL   Glucose by meter    Collection Time: 03/28/22  6:52 AM   Result Value Ref Range    GLUCOSE BY METER POCT 166 (H) 70 - 99 mg/dL          Physical Exam:     BP (!) 148/87   Pulse 98   Temp 98.4  F (36.9  C) (Oral)   Resp 17   Ht 1.829 m (6')   Wt 109.6 kg (241 lb 9 oz)   SpO2 99%   BMI 32.76 kg/m    Weight is 241 lbs 9 oz  Body mass index is 32.76 kg/m .             Physical ROS:     Review of Systems is otherwise negative including HEENT, CV, Respiratory, GI, , Musculoskeletal, Neurologic, Dermatologic, Endocrine, Immunological, Constitutional systems           Mental Status Exam:     Mental Status  Patient is casually dressed  Hygiene good  Speech fluent  Thought Process logical  Thought Content:  No suicidal ideation,    No homicidal ideation,   No ideas of reference,    No loose associations,    No auditory hallucinations,     No visual hallucinations   No delusions  Psychomotor: No agitation or slowing  Cognition:  Alert and oriented to time place and person  Attention good  Concentration fair  Memory normal including recent and remote memory  Mood: less depressed  Affect: mood congruent  Judgement: limited  Eye contact good  Cooperation good  Language normal  Fund of knowledge normal  Musculoskeletal normal gait with no abnormal movements    Minimal change in mental status in the past 24 hours           Admission Diagnoses:   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)    Patient Active Problem List    Diagnosis Date Noted     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) 03/24/2022      Priority: Medium     Depression with suicidal ideation 03/23/2022     Priority: Medium     Poisoning by antihistamine, undetermined intent, initial encounter 03/17/2022     Priority: Medium     Lactic acidosis 03/17/2022     Priority: Medium     SIRS (systemic inflammatory response syndrome) (H) 03/17/2022     Priority: Medium     Transplanted liver (H) 09/11/2018     Priority: Medium     CMV (cytomegalovirus infection) (H) 02/07/2018     Priority: Medium     Type 2 diabetes mellitus with complication, with long-term current use of insulin (H) 08/07/2017     Priority: Medium     C. difficile enteritis 06/27/2017     Priority: Medium     Anemia due to blood loss, acute 06/26/2017     Priority: Medium     C. difficile pouchitis 06/26/2017     Priority: Medium     History of esophageal varices 06/26/2017     Priority: Medium     Thrombocytopenia (H) 06/26/2017     Priority: Medium     History of duodenal ulcer 06/26/2017     Priority: Medium     Mass of left upper extremity 03/30/2016     Priority: Medium     Anemia 09/05/2015     Priority: Medium     Upper GI bleed 09/05/2015     Priority: Medium     Acute pancreatitis 09/05/2015     Priority: Medium     Primary sclerosing cholangitis 09/05/2015     Priority: Medium     Cirrhosis of liver (H) 09/05/2015     Priority: Medium     S/P total colectomy 09/05/2015     Priority: Medium     Esophageal varices (H) 09/05/2015     Priority: Medium     Mild major depression (H) 03/18/2011     Priority: Medium     CARDIOVASCULAR SCREENING; LDL GOAL LESS THAN 160 10/31/2010     Priority: Medium     H/O ulcerative colitis - s/p total colectomy 10/31/2008     Priority: Medium              Assessment:     Patient presents following a serious suicide attempt by overdose. He now has improved somewhat but will need a period of observation and will need more intensive outpatient services         Plan:     Legal: voluntary      Medication:Continue Zoloft . Remeron increased to 30 mg a  day    Consults: Hospitalist will be consulted if medical issues arise, maria esther Diabetes and anti-rejection medications. Currently he is stable and does not need attention      Multidisciplinary Interventions:  to gather collateral information, coordinate care with outpatient providers and begin follow up planning      Disposition: home with follow up    No further change in treatment plan  Patient seen, chart reviewed, case reviewed with  and with nursing.   Case reviewed in multi-disciplinary treatment team.      Dariusz Baird MD      Re-Certification I certify that the inpatient psychiatric facility services furnished since the previous certification were, and continue to be, medically necessary for, either, treatment which could reasonably be expected to improve the patient s condition or diagnostic study and that the hospital records indicate that the services furnished were, either, intensive treatment services, admission and related services necessary for diagnostic study, or equivalent services.     I certify that the patient continues to need, on a daily basis, active treatment furnished directly by or requiring the supervision of inpatient psychiatric facility personnel.   I estimate 5 days of hospitalization is necessary for proper treatment of the patient. My plans for post-hospital care for this patient are home with follow up     Dariusz Baird MD

## 2022-03-28 NOTE — PLAN OF CARE
Assessment/Intervention/Current Symtoms and Care Coordination: Writer met with pt on this date and consulted with psychiatrist. Pt was pleasant on approach and readily engaged. Pt continues to isolate in room outside of coming out for meals. Writer again encouraged pt to attend groups and socialize. Pt states that he has made contact with his family. Pt denies SI/HI/SIB. Pt did sign MARCIAL for [facility]. Pt is agreeable to follow-up outpatient psychiatry. Writer was able to schedule outpatient follow-up psychiatry appointments for pt on this date.     Discharge Plan or Goal: Psychiatrically stabilize and discharge home.       Barriers to Discharge: care-coordination, medication-management, symptom stabilization.      Referral Status: TBD- Pt is currently established with individual therapy with KATHRYN Mcdonald @ [facility]. Pt states that he is agreeable to a psychiatry referral.     Appointment Type: Outpatient psychiatry appointment   Provider: RAMA Owens   Date & Time: Wednesday, April 13th, 2022 @ 7:05 AM   Clinic: [facility]   Address: [address] [place] [address]  Phone: [phone]  Additional Notes: This is a 75 minute appointment. Intake paperwork has been emailed to you. Please complete and submit prior to first appointment. A link will be texted to you for virtual appointment. Please verify that this provider is in-network with your insurance company.     Appointment Type: Outpatient psychiatry appointment follow-up   Provider: RAMA Owens   Date & Time: Friday, May 13th, 2022 @ 10:50 AM   Clinic: [facility]   Address: [address] [place] [address]  Phone: [phone]  Additional Notes: This is a virtual appointment. This is a 25 minute follow-up appointment.      Legal Status: Voluntary     LAURA [facility], 3/28/2022, 10:58 AM

## 2022-03-29 LAB
GLUCOSE BLDC GLUCOMTR-MCNC: 109 MG/DL (ref 70–99)
GLUCOSE BLDC GLUCOMTR-MCNC: 129 MG/DL (ref 70–99)
GLUCOSE BLDC GLUCOMTR-MCNC: 130 MG/DL (ref 70–99)
GLUCOSE BLDC GLUCOMTR-MCNC: 133 MG/DL (ref 70–99)
GLUCOSE BLDC GLUCOMTR-MCNC: 137 MG/DL (ref 70–99)

## 2022-03-29 PROCEDURE — 128N000001 HC R&B CD/MH ADULT

## 2022-03-29 PROCEDURE — 250N000013 HC RX MED GY IP 250 OP 250 PS 637

## 2022-03-29 PROCEDURE — 250N000013 HC RX MED GY IP 250 OP 250 PS 637: Performed by: INTERNAL MEDICINE

## 2022-03-29 PROCEDURE — 250N000012 HC RX MED GY IP 250 OP 636 PS 637: Performed by: INTERNAL MEDICINE

## 2022-03-29 PROCEDURE — 99207 PR NO BILLABLE SERVICE THIS VISIT: CPT

## 2022-03-29 PROCEDURE — 250N000013 HC RX MED GY IP 250 OP 250 PS 637: Performed by: PSYCHIATRY & NEUROLOGY

## 2022-03-29 PROCEDURE — 99231 SBSQ HOSP IP/OBS SF/LOW 25: CPT | Performed by: PSYCHIATRY & NEUROLOGY

## 2022-03-29 PROCEDURE — 250N000013 HC RX MED GY IP 250 OP 250 PS 637: Performed by: NURSE PRACTITIONER

## 2022-03-29 RX ADMIN — SERTRALINE HYDROCHLORIDE 150 MG: 50 TABLET ORAL at 08:08

## 2022-03-29 RX ADMIN — ASPIRIN 81 MG: 81 TABLET, CHEWABLE ORAL at 08:09

## 2022-03-29 RX ADMIN — TACROLIMUS 1.5 MG: 1 CAPSULE ORAL at 08:10

## 2022-03-29 RX ADMIN — LISINOPRIL 5 MG: 5 TABLET ORAL at 08:16

## 2022-03-29 RX ADMIN — TACROLIMUS 1.5 MG: 1 CAPSULE ORAL at 20:52

## 2022-03-29 RX ADMIN — CIPROFLOXACIN 500 MG: 500 TABLET, FILM COATED ORAL at 20:52

## 2022-03-29 RX ADMIN — CIPROFLOXACIN 500 MG: 500 TABLET, FILM COATED ORAL at 08:08

## 2022-03-29 RX ADMIN — INSULIN HUMAN 40 UNITS: 100 INJECTION, SUSPENSION SUBCUTANEOUS at 08:04

## 2022-03-29 RX ADMIN — METFORMIN HYDROCHLORIDE 500 MG: 500 TABLET, EXTENDED RELEASE ORAL at 17:04

## 2022-03-29 RX ADMIN — AMLODIPINE BESYLATE 10 MG: 5 TABLET ORAL at 08:16

## 2022-03-29 RX ADMIN — MIRTAZAPINE 30 MG: 15 TABLET, FILM COATED ORAL at 20:52

## 2022-03-29 ASSESSMENT — ACTIVITIES OF DAILY LIVING (ADL)
DRESS: INDEPENDENT
ORAL_HYGIENE: INDEPENDENT
DRESS: INDEPENDENT;STREET CLOTHES
HYGIENE/GROOMING: INDEPENDENT
ORAL_HYGIENE: INDEPENDENT
HYGIENE/GROOMING: INDEPENDENT
LAUNDRY: UNABLE TO COMPLETE

## 2022-03-29 NOTE — PLAN OF CARE
Problem: Suicidal Behavior  Goal: Suicidal Behavior is Absent or Managed  Outcome: Ongoing, Progressing     Problem: Behavioral Health Plan of Care  Goal: Plan of Care Review  Outcome: Ongoing, Progressing  Goal: Adheres to Safety Considerations for Self and Others  Intervention: Develop and Maintain Individualized Safety Plan  Recent Flowsheet Documentation  Taken 3/29/2022 0224 by Yolanda Coyle RN  Safety Measures: safety rounds completed  Goal: Absence of New-Onset Illness or Injury  Intervention: Identify and Manage Fall Risk  Recent Flowsheet Documentation  Taken 3/29/2022 0224 by Yolanda Coyle RN  Safety Measures: safety rounds completed     Problem: Sleep Disturbance  Goal: Adequate Sleep/Rest  Outcome: Ongoing, Progressing   Goal Outcome Evaluation:    Pt appeared sleeping  for about 6 hours this shift    Respirations are even and unlabored.    Safety checks completed throughout the night per protocol.     No discomfort or pain verbalized this shift    Pt is on suicide and SIB precautions.     No harm to self and others noted or reported this shift.     No SI/HI or hallucination noted or reported this shift    Will continue to monitor and assist as needed.

## 2022-03-29 NOTE — PROGRESS NOTES
Chief Complaint:   Patient was admitted due to suicide attempt by overdose        Subjective/Objective:     Patient doing well on unit so far. He now denies suicidal thoughts. He regrets overdose and states that he is glad he is still alive. He denies psychosis and is showing no agitation. He is tolerating his current medication regimen. He has no physical complaints today. He has had minimal change in the past few days. Staff notes that he is isolative. He reports some depression but states that mood is improving. He feels that he will be safe to discharge soon. Otherwise minimal change in mental status in past day          HPI:     46 male with a history of Major Depression    Patient states that he has had problems with depression dating back to teen years. He was first treated over 10 years and has been on medications intermittently since then. He has used Zoloft in the past with some benefit. He had one prior admission 10 years ago for suicidal thoughts. He currently follows with a therapist online who is at [facility]. Recently he has been on Zoloft for the past 6 months which is prescribed by his PMD. He believes that he has had some benefit from Zoloft but still has depression.    Patient reports that his current episode of depression started about one year ago which were precipitated by marital issues and separation from his wife. He had resumed Zoloft 6 months ago with some benefit. Two weeks ago he learned that his wife wants to divorce him and is in another relationship. She also has an order of protection which has prevented him from seeing her or his kids. He states that he developed suicidal thoughts, and 5 days ago he overdosed on Benedryl taking 200 capsules in a suicide attempt. He was staying at his mother's house at the time and his sister found him and realized what happened and she activated EMS. He was stabilized at [facility] and transfer was arranged to this unit. Patient is now  voluntary. Zoloft was resumed at [facility]. Remeron was added this week as well.  Patient denies alcohol or drug use.    Today patient reports that he is not having suicidal thoughts although he still has some ambivalence about his attempt and has some passive suicidal thoughts. He has depressed mood. He has no evidence of psychosis or adelaida. He reports feelings of hopelessness and low energy. Patient denies history of adelaida or manic symptoms and has no current manic symptoms        Past Psychiatric History:     Patient states that he has had problems with depression dating back to teen years. He was first treated over 10 years and has been on medications intermittently since then. He has used Zoloft in the past with some benefit. He had one prior admission 10 years ago for suicidal thoughts. He currently follows with a therapist online who is at [facility]. Recently he has been on Zoloft for the past 6 months which is prescribed by his PMD. He believes that he has had some benefit from Zoloft but still has depression.          Substance Use and History:     Patient denies alcohol or drug use.        Past Medical History:   PAST MEDICAL HISTORY:   Past Medical History:   Diagnosis Date     Cirrhosis of liver (H)     Due to PSC     DU (duodenal ulcer) 5/2015    treated nonsurgically      PSC (primary sclerosing cholangitis)      Ulcerative colitis      Patient is status post liver transplant  Patient also has diabetes    PAST SURGICAL HISTORY:   Past Surgical History:   Procedure Laterality Date     COLECTOMY  12/27/07     FLEXIBLE SIGMOIDOSCOPY  10/4/10      ERCP W/W/O NONA OF SPEC-BRUSH/WASH  02/19/10      ERCP W/W/O NONA OF SPEC-BRUSH/WASH  03/05/10      UGI ENDOSCOPY W BANDING ESOPH/GASTRIC VARICES  since 2/2015    monthly at [facility]; last one in August      UPPER GI ENDOSCOPY  11/13/13    [facility]     UPPER GI ENDOSCOPY  2/16/15    [facility]             Family History:   FAMILY  "HISTORY:   Family History   Problem Relation Age of Onset     Heart Failure Mother       Patient reports that siblings and mother have problems with depression.  His siblings also suffer from alcohol and drug addiction.        Social History:   Please see the full psychosocial profile from the clinical treatment coordinator.   SOCIAL HISTORY:   Social History     Tobacco Use     Smoking status: Never Smoker     Smokeless tobacco: Never Used   Substance Use Topics     Alcohol use: No     Alcohol/week: 0.0 standard drinks     Comment: 2-3x per year     Patient stopped school in 10th grade.   Patient is on disability since 2010 related to ulcerative colitis and liver failure. He worked in the past driving a truck.         PTA Medications:     Medications Prior to Admission   Medication Sig Dispense Refill Last Dose     amLODIPine (NORVASC) 10 MG tablet Take 1 tablet (10 mg) by mouth daily 90 tablet 3      aspirin 81 MG tablet Take 81 mg by mouth daily         BD VEO INSULIN SYRINGE U/F 31G X 15/64\" 0.5 ML         ciprofloxacin (CIPRO) 500 MG tablet Take 500 mg by mouth 2 times daily        insulin  UNIT/ML injection 40 Units QAM and 10 U QPM.        mirtazapine (REMERON) 15 MG tablet Take 1 tablet (15 mg) by mouth At Bedtime        sertraline (ZOLOFT) 100 MG tablet Take 1.5 tablets (150 mg) by mouth daily 135 tablet 3      tacrolimus (GENERIC EQUIVALENT) 0.5 MG capsule Take 1.5 mg by mouth 2 times daily                Current Medications:       amLODIPine  10 mg Oral Daily     aspirin  81 mg Oral Daily     ciprofloxacin  500 mg Oral BID     insulin aspart  1-10 Units Subcutaneous TID AC     insulin aspart  1-7 Units Subcutaneous At Bedtime     insulin NPH  10 Units Subcutaneous QPM     insulin NPH  40 Units Subcutaneous QAM AC     lisinopril  5 mg Oral Daily     metFORMIN  500 mg Oral Daily with supper     mirtazapine  30 mg Oral At Bedtime     sertraline  150 mg Oral Daily     tacrolimus  1.5 mg Oral BID "     acetaminophen, glucose **OR** dextrose **OR** glucagon, hydrALAZINE, hydrOXYzine, OLANZapine **OR** OLANZapine, senna-docusate, traZODone         Allergies:     Allergies   Allergen Reactions     No Known Allergies           Labs:     Recent Results (from the past 48 hour(s))   Glucose by meter    Collection Time: 03/27/22 11:54 AM   Result Value Ref Range    GLUCOSE BY METER POCT 184 (H) 70 - 99 mg/dL   Glucose by meter    Collection Time: 03/27/22  4:46 PM   Result Value Ref Range    GLUCOSE BY METER POCT 180 (H) 70 - 99 mg/dL   Glucose by meter    Collection Time: 03/27/22  8:15 PM   Result Value Ref Range    GLUCOSE BY METER POCT 189 (H) 70 - 99 mg/dL   Glucose by meter    Collection Time: 03/28/22  6:52 AM   Result Value Ref Range    GLUCOSE BY METER POCT 166 (H) 70 - 99 mg/dL   Basic metabolic panel    Collection Time: 03/28/22  9:10 AM   Result Value Ref Range    Sodium 139 136 - 145 mmol/L    Potassium 4.5 3.5 - 5.0 mmol/L    Chloride 102 98 - 107 mmol/L    Carbon Dioxide (CO2) 27 22 - 31 mmol/L    Anion Gap 10 5 - 18 mmol/L    Urea Nitrogen 15 8 - 22 mg/dL    Creatinine 1.17 0.70 - 1.30 mg/dL    Calcium 10.0 8.5 - 10.5 mg/dL    Glucose 314 (H) 70 - 125 mg/dL    GFR Estimate 79 >60 mL/min/1.73m2   Glucose by meter    Collection Time: 03/28/22 11:07 AM   Result Value Ref Range    GLUCOSE BY METER POCT 199 (H) 70 - 99 mg/dL   Glucose by meter    Collection Time: 03/28/22  4:56 PM   Result Value Ref Range    GLUCOSE BY METER POCT 133 (H) 70 - 99 mg/dL   Glucose by meter    Collection Time: 03/28/22  7:59 PM   Result Value Ref Range    GLUCOSE BY METER POCT 242 (H) 70 - 99 mg/dL   Glucose by meter    Collection Time: 03/29/22  6:54 AM   Result Value Ref Range    GLUCOSE BY METER POCT 130 (H) 70 - 99 mg/dL          Physical Exam:     BP (!) 136/90 (BP Location: Left arm, Patient Position: Sitting, Cuff Size: Adult Regular)   Pulse 88   Temp 98.4  F (36.9  C) (Oral)   Resp 16   Ht 1.829 m (6')   Wt 109.6  kg (241 lb 9 oz)   SpO2 98%   BMI 32.76 kg/m    Weight is 241 lbs 9 oz  Body mass index is 32.76 kg/m .             Physical ROS:     Review of Systems is otherwise negative including HEENT, CV, Respiratory, GI, , Musculoskeletal, Neurologic, Dermatologic, Endocrine, Immunological, Constitutional systems           Mental Status Exam:     Mental Status  Patient is casually dressed  Hygiene good  Speech fluent  Thought Process logical  Thought Content:  No suicidal ideation,    No homicidal ideation,   No ideas of reference,    No loose associations,    No auditory hallucinations,     No visual hallucinations   No delusions  Psychomotor: No agitation or slowing  Cognition:  Alert and oriented to time place and person  Attention good  Concentration fair  Memory normal including recent and remote memory  Mood: less depressed  Affect: mood congruent  Judgement: limited  Eye contact good  Cooperation good  Language normal  Fund of knowledge normal  Musculoskeletal normal gait with no abnormal movements    Mild improvement since admission         Admission Diagnoses:   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)    Patient Active Problem List    Diagnosis Date Noted     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) 03/24/2022     Priority: Medium     Depression with suicidal ideation 03/23/2022     Priority: Medium     Poisoning by antihistamine, undetermined intent, initial encounter 03/17/2022     Priority: Medium     Lactic acidosis 03/17/2022     Priority: Medium     SIRS (systemic inflammatory response syndrome) (H) 03/17/2022     Priority: Medium     Transplanted liver (H) 09/11/2018     Priority: Medium     CMV (cytomegalovirus infection) (H) 02/07/2018     Priority: Medium     Type 2 diabetes mellitus with complication, with long-term current use of insulin (H) 08/07/2017     Priority: Medium     C. difficile enteritis 06/27/2017     Priority: Medium     Anemia due to  blood loss, acute 06/26/2017     Priority: Medium     C. difficile pouchitis 06/26/2017     Priority: Medium     History of esophageal varices 06/26/2017     Priority: Medium     Thrombocytopenia (H) 06/26/2017     Priority: Medium     History of duodenal ulcer 06/26/2017     Priority: Medium     Mass of left upper extremity 03/30/2016     Priority: Medium     Anemia 09/05/2015     Priority: Medium     Upper GI bleed 09/05/2015     Priority: Medium     Acute pancreatitis 09/05/2015     Priority: Medium     Primary sclerosing cholangitis 09/05/2015     Priority: Medium     Cirrhosis of liver (H) 09/05/2015     Priority: Medium     S/P total colectomy 09/05/2015     Priority: Medium     Esophageal varices (H) 09/05/2015     Priority: Medium     Mild major depression (H) 03/18/2011     Priority: Medium     CARDIOVASCULAR SCREENING; LDL GOAL LESS THAN 160 10/31/2010     Priority: Medium     H/O ulcerative colitis - s/p total colectomy 10/31/2008     Priority: Medium              Assessment:     Patient presents following a serious suicide attempt by overdose. He now has improved somewhat but will need a period of observation and will need more intensive outpatient services         Plan:     Legal: voluntary      Medication:Continue Zoloft 150 mg a day. Remeron increased to 30 mg a day    Consults: Hospitalist will be consulted if medical issues arise, maria esther Diabetes and anti-rejection medications. Currently he is stable and does not need attention      Multidisciplinary Interventions:  to gather collateral information, coordinate care with outpatient providers and begin follow up planning      Disposition: home with follow up    No further change in treatment plan  .Patient seen, chart reviewed, case reviewed with  and with nursing.       Dariusz Baird MD      Re-Certification I certify that the inpatient psychiatric facility services furnished since the previous certification were,  and continue to be, medically necessary for, either, treatment which could reasonably be expected to improve the patient s condition or diagnostic study and that the hospital records indicate that the services furnished were, either, intensive treatment services, admission and related services necessary for diagnostic study, or equivalent services.     I certify that the patient continues to need, on a daily basis, active treatment furnished directly by or requiring the supervision of inpatient psychiatric facility personnel.   I estimate 5 days of hospitalization is necessary for proper treatment of the patient. My plans for post-hospital care for this patient are home with follow up     Dariusz Baird MD

## 2022-03-29 NOTE — PROVIDER NOTIFICATION
03/29/22 1500   Engagement   Intervention Group   Topic Detail SW Process   Attendance Attended   Patient Response Demonstrated understanding of materials provided   Concentrated on Task duration of group   Cognition Goal-directed   Mood/Affect Content;Pleasant   Social/Behavioral Engaged   Thought Content Reality oriented     GROUP LENGTH (MINS):   35   RELEVANT PRIMARY DIAGNOSIS: Patient Active Problem List   Diagnosis     H/O ulcerative colitis - s/p total colectomy     CARDIOVASCULAR SCREENING; LDL GOAL LESS THAN 160     Mild major depression (H)     Anemia     Upper GI bleed     Acute pancreatitis     Primary sclerosing cholangitis     Cirrhosis of liver (H)     S/P total colectomy     Esophageal varices (H)     Mass of left upper extremity     Anemia due to blood loss, acute     C. difficile pouchitis     History of esophageal varices     Thrombocytopenia (H)     History of duodenal ulcer     C. difficile enteritis     Type 2 diabetes mellitus with complication, with long-term current use of insulin (H)     CMV (cytomegalovirus infection) (H)     Transplanted liver (H)     Poisoning by antihistamine, undetermined intent, initial encounter     Lactic acidosis     SIRS (systemic inflammatory response syndrome) (H)     Depression with suicidal ideation     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)        TREATMENT MODALITY:      []CBT       []DBT       []ACT       []Interpersonal psychotherapy                                 [x]Psychoeducational therapy       []Skill development       []Other:        ATTENDANCE:        [x]Stayed for entire group     []Arrived late    [] Left early       # OF PATIENTS IN GROUP: 2   BILLABLE:     []Yes            [x]No   Notes:

## 2022-03-29 NOTE — PLAN OF CARE
Problem: Behavioral Health Plan of Care  Goal: Plan of Care Review  Recent Flowsheet Documentation  Taken 3/28/2022 1640 by Gauri Espino, RN  Plan of Care Reviewed With: patient  Patient Agreement with Plan of Care: agrees     Problem: Behavioral Health Plan of Care  Goal: Adheres to Safety Considerations for Self and Others  Outcome: Ongoing, Progressing     Problem: Behavioral Health Plan of Care  Goal: Optimized Coping Skills in Response to Life Stressors  Intervention: Promote Effective Coping Strategies  Recent Flowsheet Documentation  Taken 3/28/2022 1640 by Gauri Espino RN  Supportive Measures: active listening utilized   Goal Outcome Evaluation:    Plan of Care Reviewed With: patient     Pt spent the most part of his evening out in the lounge. Pt interacted well with others.  He was cooperative with cares.  He denies all psych symptoms and contracts for safety.  There was no S.I behavior observed.  Pt expressed concerns about his divorce.  Staff spent some time with pt to encourage him.  He was med compliant and food and fluid intake was good.

## 2022-03-29 NOTE — PROGRESS NOTES
Pt was invested in the activity and helpful to peers.  Pt was willing to share some of the painful feelings he has about his divorce and his sadness of his marriage ending.  Pt remained in group for the full session and was helpful with clean up.       03/29/22 5097   Engagement   Intervention Group   Topic Detail Bridge to Self Confidence processing activity for building self awareness and insight, coping, sharing thoughts/feelings, symptom management, reality based activity, healthy leisure, and expanding verbal communication skills   Attendance Attended   Patient Response Demonstrated understanding of materials provided;Accepted feedback;Was respectful   Concentrated on Task duration of group   Cognition Goal-directed   Mood/Affect Pleasant   Social/Behavioral Engaged

## 2022-03-29 NOTE — PROGRESS NOTES
Pt was polite, pleasant and engaged in group.  Pt demonstrated good visual processing and computation skills.  Pt was considerate and helpful towards peers.  Pt shared during the check in question that he enjoys basketball and frisbee golf.       03/29/22 0422   Engagement   Intervention Group   Topic Detail Day #3 of detailed scratch art and cognitive games for creative expression, visual processing, logic/problem solving, coping, symptom management, reality based activity, healthy leisure, frustration tolerance, follow through and socialization   Attendance Attended   Patient Response Demonstrated understanding of materials provided;Accepted feedback;Was respectful   Concentrated on Task duration of group   Cognition Goal-directed   Mood/Affect Pleasant;Congruent;Content   Social/Behavioral Engaged

## 2022-03-29 NOTE — PLAN OF CARE
Problem: Behavioral Health Plan of Care  Goal: Optimized Coping Skills in Response to Life Stressors  Outcome: Ongoing, Progressing  Goal: Develops/Participates in Therapeutic Allison to Support Successful Transition  Outcome: Ongoing, Progressing  Intervention: Foster Therapeutic Allison  Recent Flowsheet Documentation  Taken 3/29/2022 1000 by Pablo Christy RN  Trust Relationship/Rapport:   emotional support provided   empathic listening provided     Problem: Diabetes Comorbidity  Goal: Blood Glucose Level Within Targeted Range  Outcome: Ongoing, Progressing   Goal Outcome Evaluation:    Plan of Care Reviewed With: patient      Blood sugar levels are stable: 133 mg/dl @ breakfast and 137 mg/dl@ lunch. No intervention necessary per sliding scale. Patient remains in room most of the shift, but out for meals and groups. Patient freely verbalizes needs  and is compliant with medications and ADLs. Patient is overall calm, cooperative, and pleasant on approach. Denies SI, HI, SIB, PAIN , and other psych related symptoms. Patient  is alert and oriented x4 and has no difficulty yang for safety. No self injury behavior,  SI, or psychosis noted. Will continue to monitor  with cares.

## 2022-03-30 VITALS
HEART RATE: 107 BPM | DIASTOLIC BLOOD PRESSURE: 90 MMHG | OXYGEN SATURATION: 98 % | RESPIRATION RATE: 16 BRPM | BODY MASS INDEX: 32.72 KG/M2 | TEMPERATURE: 97.6 F | WEIGHT: 241.56 LBS | SYSTOLIC BLOOD PRESSURE: 130 MMHG | HEIGHT: 72 IN

## 2022-03-30 LAB
GLUCOSE BLDC GLUCOMTR-MCNC: 105 MG/DL (ref 70–99)
GLUCOSE BLDC GLUCOMTR-MCNC: 110 MG/DL (ref 70–99)

## 2022-03-30 PROCEDURE — 250N000013 HC RX MED GY IP 250 OP 250 PS 637

## 2022-03-30 PROCEDURE — 99239 HOSP IP/OBS DSCHRG MGMT >30: CPT | Performed by: PSYCHIATRY & NEUROLOGY

## 2022-03-30 PROCEDURE — 250N000013 HC RX MED GY IP 250 OP 250 PS 637: Performed by: NURSE PRACTITIONER

## 2022-03-30 PROCEDURE — 250N000013 HC RX MED GY IP 250 OP 250 PS 637: Performed by: INTERNAL MEDICINE

## 2022-03-30 PROCEDURE — 99232 SBSQ HOSP IP/OBS MODERATE 35: CPT

## 2022-03-30 PROCEDURE — 250N000012 HC RX MED GY IP 250 OP 636 PS 637: Performed by: INTERNAL MEDICINE

## 2022-03-30 PROCEDURE — 250N000012 HC RX MED GY IP 250 OP 636 PS 637

## 2022-03-30 RX ORDER — LISINOPRIL 5 MG/1
5 TABLET ORAL DAILY
Qty: 30 TABLET | Refills: 0 | Status: SHIPPED | OUTPATIENT
Start: 2022-03-31 | End: 2022-04-25

## 2022-03-30 RX ORDER — METFORMIN HCL 500 MG
500 TABLET, EXTENDED RELEASE 24 HR ORAL
Qty: 30 TABLET | Refills: 0 | Status: SHIPPED | OUTPATIENT
Start: 2022-03-30 | End: 2022-04-25

## 2022-03-30 RX ORDER — MIRTAZAPINE 30 MG/1
30 TABLET, FILM COATED ORAL AT BEDTIME
Qty: 30 TABLET | Refills: 0 | Status: SHIPPED | OUTPATIENT
Start: 2022-03-30 | End: 2022-04-25

## 2022-03-30 RX ADMIN — AMLODIPINE BESYLATE 10 MG: 5 TABLET ORAL at 08:25

## 2022-03-30 RX ADMIN — LISINOPRIL 5 MG: 5 TABLET ORAL at 08:26

## 2022-03-30 RX ADMIN — TACROLIMUS 1.5 MG: 1 CAPSULE ORAL at 08:25

## 2022-03-30 RX ADMIN — ASPIRIN 81 MG: 81 TABLET, CHEWABLE ORAL at 08:26

## 2022-03-30 RX ADMIN — INSULIN HUMAN 40 UNITS: 100 INJECTION, SUSPENSION SUBCUTANEOUS at 09:08

## 2022-03-30 RX ADMIN — SERTRALINE HYDROCHLORIDE 150 MG: 50 TABLET ORAL at 08:26

## 2022-03-30 RX ADMIN — CIPROFLOXACIN 500 MG: 500 TABLET, FILM COATED ORAL at 08:27

## 2022-03-30 NOTE — PLAN OF CARE
Discharge plan or goal: Discharge home with outpatient mental health supports.     Today's Plan: Writer met with pt on this date and consulted with psychiatrist. Pt presents with brighter affect on this date. Pt denies SI/HI/SIB. Pt states that he is agreeable to discharge plan. Pt denies access to weapons. Pt has been cooperative and medication compliant. Pt reports that his mood continues to improve. Writer reviewed discharge plan with pt including follow-up with outpatient psychiatry, individual therapy, and primary-care. Crisis resources and county resources included in AVS. Pt will be cabbed to his mother's home today. Medications to be filled in [facility] pharmacy.     Pt has the following outpatient appointment(s) scheduled:     Appointment Type: Outpatient individual therapy appointment   Provider: KATHRYN Mcdonald  Date & Time: Monday, April 4th, 2022 @ 3:30 PM  Clinic: [facility]   Phone: [phone]  Additional Notes: This is a virtual appointment       Medical Follow-up appointment: Dr.Russ Sewell  on Wednesday April 6th at 11:15 a.m..   Address: [address] [place] [address]   Phone: [phone]  Bring the following to your appointment: all medications, photo id, insurance card, co-pay (if applicable) and discharge paper work from hospital to appt. If you need to change or cancel appt, please call your clinic.    Appointment Type: Outpatient psychiatry appointment   Provider: RAMA Owens   Date & Time: Wednesday, April 13th, 2022 @ 7:05 AM   Clinic: [facility]   Address: [address] [place] [address]  Phone: [phone]  Additional Notes: This is a 75 minute appointment. Intake paperwork has been emailed to you. Please complete and submit prior to first appointment. A link will be texted to you for virtual appointment. Please verify that this provider is in-network with your insurance  company.      Appointment Type: Outpatient psychiatry appointment follow-up   Provider: RAMA Owens   Date & Time: Friday, May 13th, 2022 @ 10:50 AM   Clinic: [facility]   Address: [address] [place] [address], [address] [place], [place] [id number]  Phone: [phone]  Additional Notes: This is a virtual appointment. This is a 25 minute follow-up appointment.     Pt has the following professional community support(s):     Legal Status: Voluntary     Diagnosis/Procedure:   Patient Active Problem List   Diagnosis     H/O ulcerative colitis - s/p total colectomy     CARDIOVASCULAR SCREENING; LDL GOAL LESS THAN 160     Mild major depression (H)     Anemia     Upper GI bleed     Acute pancreatitis     Primary sclerosing cholangitis     Cirrhosis of liver (H)     S/P total colectomy     Esophageal varices (H)     Mass of left upper extremity     Anemia due to blood loss, acute     C. difficile pouchitis     History of esophageal varices     Thrombocytopenia (H)     History of duodenal ulcer     C. difficile enteritis     Type 2 diabetes mellitus with complication, with long-term current use of insulin (H)     CMV (cytomegalovirus infection) (H)     Transplanted liver (H)     Poisoning by antihistamine, undetermined intent, initial encounter     Lactic acidosis     SIRS (systemic inflammatory response syndrome) (H)     Depression with suicidal ideation     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)       Care Rounds Attendance:   CM    Charge RN   OT/TR  MD/LAURA Lopez [facility], 3/30/2022, 8:48 AM

## 2022-03-30 NOTE — PLAN OF CARE
"  Problem: Suicidal Behavior  Goal: Suicidal Behavior is Absent or Managed  Outcome: Ongoing, Progressing     Problem: Behavioral Health Plan of Care  Goal: Optimized Coping Skills in Response to Life Stressors  Outcome: Ongoing, Progressing  Intervention: Promote Effective Coping Strategies  Recent Flowsheet Documentation  Taken 3/29/2022 1634 by Lila Ferris RN  Supportive Measures:    active listening utilized    positive reinforcement provided     Problem: Diabetes Comorbidity  Goal: Blood Glucose Level Within Targeted Range  Outcome: Ongoing, Progressing   Patient denies SI, SIB, HI, A/VH, anxiety and depression. Denies pain or discomfort. Patient is out in the lounge watching television majority of the shift.  Pleasant, calm and cooperative with cares. Medication compliant.   Education on Diabetic care and medication compliance given. Patient verbalized understanding and agreement stating \"I really have to take care of my health.\" BG 1129 and 109.  Reports good sleep and good appetite. No new concerns noted.        "

## 2022-03-30 NOTE — DISCHARGE INSTRUCTIONS
DIABETES REMINDERS:  1) Check your blood sugar before meals where you take insulin.   Always bring your blood sugar log and meter to your diabetes-related appointments.  2) Your blood sugar goals:  80-130mg/dL before eating  and  mg/dL 2 hours after eating (or per your doctor).  3) Always be prepared to treat a low blood sugar should it happen. Keep a sugar-containing beverage or food nearby.    4) When to call your clinic:     Blood sugar over 400 mg/dL.     If you have 2 to 3 low blood sugars (under 70mg/dL) in a row,     Low reading the same time of day several days in a row,    Blood sugars elevated and you can not get them down with your usual diabetes regimen,    You are ill and can't keep blood sugars controlled.   5) When to call 911:  If your blood glucose does not get better with treatment, or if you/someone else is unable to give you treatment.  6) Talk to your primary care doctor about an appointment with a Certified Diabetes Educator to assist you with your BG management  7)Follow insulin regimen on discharge orders, if ordered,  until able to see provider where doses may be adjusted based on blood sugar patterns.        Behavioral Discharge Planning and Instructions    Summary: You were admitted on 3/23/2022  due to Depression.  You were treated by Dr. Baird and discharged [facility]/30/[facility] from [facility]    Main Diagnosis: Severe recurrent major 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 features     Health Care Follow-up:     Appointment Type: Outpatient individual therapy appointment   Provider: KATHRYN Mcdonald  Date & Time: Monday, April 4th, 2022 @ 3:30 PM  Clinic: [facility]   Phone: [phone]  Additional Notes: This is a virtual appointment     Medical Follow-up appointment: Dr.Russ Sewell  on Wednesday April 6th at 11:15 a.m..   Address: [address] [place] [address]   Phone: [phone]  Bring the following to your appointment: all  medications, photo id, insurance card, co-pay (if applicable) and discharge paper work from hospital to appt. If you need to change or cancel appt, please call your clinic.    Appointment Type: Outpatient psychiatry appointment   Provider: RAMA Owens   Date & Time: Wednesday, April 13th, 2022 @ 7:05 AM   [facility]   Address: [address] [place] [address]  Phone: [phone]  Additional Notes: This is a 75 minute appointment. Intake paperwork has been emailed to you. Please complete and submit prior to first appointment. A link will be texted to you for virtual appointment. Please verify that this provider is in-network with your insurance company.      Appointment Type: Outpatient psychiatry appointment follow-up   Provider: RAMA Owens   Date & Time: Friday, May 13th, 2022 @ 10:50 AM   [facility]   Address: [address] [place] [address]  Phone: [phone]  Additional Notes: This is a virtual appointment. This is a 25 minute follow-up appointment.     Attend all scheduled appointments with your outpatient providers. Call at least 24 hours in advance if you need to reschedule an appointment to ensure continued access to your outpatient providers.     Major Treatments, Procedures and Findings:  You were provided with: a psychiatric assessment, assessed for medical stability, medication evaluation and/or management, group therapy, individual therapy and medical interventions    Symptoms to Report: feeling more aggressive, increased confusion, losing more sleep, mood getting worse or thoughts of suicide    Early warning signs can include: increased depression or anxiety sleep disturbances increased thoughts or behaviors of suicide or self-harm  increased unusual thinking, such as paranoia or hearing voices    Safety and Wellness:  Take all medicines as directed.  Make no changes unless  "your doctor suggests them.      Follow treatment recommendations.  Refrain from alcohol and non-prescribed drugs.  If there is a concern for safety, call 911.    Resources:   Crisis Intervention: 856.900.6254 or 906-836-0025 (TTY: 696.919.3362).  Call anytime for help.  National [place] on Mental Illness (www.mn.jaclyn.org): 339.649.5216 or 076-155-2407.  [place] Association for Children's Mental Health (www.macmh.org): 864.406.2488.  Suicide Awareness Voices of Education (SAVE) (www.save.org): 259-514-NQZZ (7199)  National Suicide Prevention Line (www.mentalhealthmn.org): 341-246-AEAP (8037)  Crisis text line: Text \"[place]\" to 384246. Free, confidential, 24/7.  Crisis Intervention: 974.283.4999 or 106-364-7452. Call anytime for help.   [facility] Crisis: 9-191-340-7830     [facility] OFFICES  [address] [place] [address]  PHONE: [phone] FAX: [phone]  TOLL FREE: [phone]    [facility]  [facility]  Physical Address View Map  [address] [place] [address]  Phone: [phone]  Fax: [phone]  Toll Free Phone Number: [phone]  Hours  8 a.m. to 4:30 p.m        General Medication Instructions:   See your medication sheet(s) for instructions.   Take all medicines as directed.  Make no changes unless your doctor suggests them.   Go to all your doctor visits.  Be sure to have all your required lab tests. This way, your medicines can be refilled on time.  Do not use any drugs not prescribed by your doctor.  Avoid alcohol.    The Treatment team has appreciated the opportunity to work with you. If you have any questions or concerns about your recent admission, you can contact the unit which can receive your call 24 hours a day, 7 days a week. They will be able to get in touch with a Provider if needed. The unit number is [phone] .    "

## 2022-03-30 NOTE — PLAN OF CARE
Problem: Suicidal Behavior  Goal: Suicidal Behavior is Absent or Managed  Outcome: Met     Problem: Behavioral Health Plan of Care  Goal: Plan of Care Review  Outcome: Met  Goal: Patient-Specific Goal (Individualization)  Outcome: Met  Goal: Adheres to Safety Considerations for Self and Others  Outcome: Met  Goal: Absence of New-Onset Illness or Injury  Outcome: Met  Goal: Optimal Comfort and Wellbeing  Outcome: Met  Goal: Optimized Coping Skills in Response to Life Stressors  Outcome: Met  Goal: Develops/Participates in Therapeutic [place] to Support Successful Transition  Outcome: Met  Goal: Team Discussion  Outcome: Met     Problem: Sleep Disturbance  Goal: Adequate Sleep/Rest  Outcome: Met     Problem: Diabetes Comorbidity  Goal: Blood Glucose Level Within Targeted Range  Outcome: Met   Goal Outcome Evaluation:     Patient is discharging to his mothers home in [place]. He met with psychiatric provider and medical provider to review his medications. OP services are completed for discharge. Discharge medication ordered and  obtained from [facility] pharmacy and on the unit  to discharge. Writer reviewed his discharge AVS and medications both medical and psychiatric. He was given clothes for where he is discharging to his mothers. He states he has a few clothes at his mothers home but his ex wife is angry at him so he cant get his clothing from his home at this time.  He denies SI, HI, SIB, medication compliant. Cooperative and pleasant. Talked about his past situation and what brought him to [facility] for mental health treatment. He states he feels good and does continue to have some depression due to living situation and family separation but denies feeling suicidal.. contracts for safety and will continue to monitor until discharge. Leaving by cab to [place] but when cab service called all cabs are cancelled due to weather.. SW contacted and patient informed. He called his sister and can get a ride  tonight at 11:20 pm tonight and approved. Care plan and education are complete

## 2022-03-30 NOTE — PROGRESS NOTES
[facility]    Medicine Progress Note - Hospitalist Service    Date of Admission:  3/23/2022    Assessment & Plan        Abhishek Downey is a 46 year old male admitted on 3/23/2022 for depression, SI, and overdose from 19-25 Bendadryl tablets on 3/17. He was admitted to the [facility] from 3/17/220-3/23/22. He was admitted to the hospital medicine service for evaluation and management of anticholinergic toxicity. He has PMH of history of liver transplant (2017) for PSC, DM II on insulin, GERD,  ulcerative colitis, chronic pouchitis, HTN, and depression. Face to face exam and interview completed on unit in patient room, cooperative and calm during assessment.    # Diabetes Mellitus, Type 2  Pt has a glucometer at home and has had blood glucose results in the 300-400s. Last hgb A1C 8.6, Cr stable.  - BG range 105-130s  - continue PTA NPH 40 units in AM and 10 units at bedtime  - Metformin  mg at dinnertime, consider adding additional dose at breakfast  - hypoglycemic protocol  - moderate consistent carb diet    # Essential hypertension  # Tachycardia, mild  - continue PTA amlodipine 10 mg daily  - added lisinopril 5 mg daily  - EKG - NSR without signs of left ventricular hypertrophy, ectopy, ischemia, ST or T waves changes, or prolonged QT  - denies headache, blurry vision, chest pain, palpitations  - follow a low salt or DASH diet, increase fruits, vegetables, whole grains, maintain a healthy weight, exercise at least 30 minutes per day most days of the week    # Ulcerative colitis, dx 1998, s/p colectomy and iloanal anastomosis (2009) for medically refractory disease   # Chronic pouchitis, antibiotics dependent    Plan per [facility] was for 4 weeks of flagyl 500 mg bid , 4 weeks off then 4 weeks of cipro BID, if fails consider vedolizumab per GI. Follows with [facility].  Last visit in Sept 2021. Did not follow-up as directed.  "Ciprofloxacin continued at the [facility] before transfer.   - ciprofloxacin 500mg BID PTA  - Will need follow up with [facility], dicussed that we can help him make the appointment.    # Primary sclerosing cholangitis diagnosed in 2010 s/p  liver transplant (2017)  Follows at [facility] where pt received transplant. [place] visits, last 2021 in summer pt.. Hepatology at the [facility] was consulted for immunosuppression. Patient reports he has not changed his tacrolimus dose in a long time and that the [facility] team reported it was \"fine and doses not need to be changes.\" No s/s of infection at this time.   - Continue PTA tacrolimus 1.5 mg BID  -  follow up with [facility] team at discharge  - liver lesion noted on CT in 9/2021, defer to [facility] team for further management    # Thrombocytopenia  # Lymphopenia  - WBC count 3.4, up from 2.5  - , up from 97  - ok to continue ASA 81 mg although could easily be discontinued, not found to be clinically relevant for heart health   - continue to monitor as outpatient    # Suicide attempt, present on admission  # Major depressive disorder  # Hx of suicidal ideation  Had anticholinergic CNS toxicity after overdose of Benadryl and was reported to be confused, disoriented, tachycardic, hypertensive, and was having visual hallucination. . EKG sinus tachy w/ incomplete RBBB. Poison control contracted and reported half-life of 9-12 hrs. Resolved.   - Management per psychiatry     # Elevated Cr- resolved  #GERD: continue PTA PPI  # Diarrhea, chronic: Likely secondary to tacrolimus. Normal is around 10 BM per day. C.diff negative this admission    Discharge home today. Reviewed disharge medication rec. Follow up with primary scheduled in 1.5 weeks.        Diet: Moderate Consistent Carb (60 g CHO per Meal) Diet    DVT Prophylaxis: Low Risk/Ambulatory with no VTE prophylaxis indicated  Baeza Catheter: Not present  Central Lines: None  Cardiac Monitoring: None  Code Status: " Full Code      Disposition Plan   Expected Discharge: 03/30/2022     Anticipated discharge location:  Awaiting care coordination huddle  Delays:none     The patient's care was discussed with the Patient.    RAMA [facility]  Hospitalist Service  [facility]  Securely message with the Vocera Web Console (learn more here)  Text page via [employer] Paging/Directory         Clinically Significant Risk Factors Present on Admission                    ______________________________________________________________________    Interval History   12 point review of systems negative except for pertinent positives mentioned in the HPI and Assessment and Plan.    Data reviewed today: I reviewed all medications, new labs and imaging results over the last 24 hours. I personally reviewed no images or EKG's today.    Physical Exam   Vital Signs: Temp: 98.5  F (36.9  C) Temp src: Oral BP: (!) 130/90 Pulse: 107   Resp: 18 SpO2: 96 % O2 Device: None (Room air)    Weight: 241 lbs 9 oz  Constitutional: awake, alert, cooperative, no apparent distress, and appears stated age  Respiratory: No increased work of breathing, good air exchange, clear to auscultation bilaterally, no crackles or wheezing  Cardiovascular: Normal apical impulse, regular rate and rhythm, normal S1 and S2, no S3 or S4, and no murmur noted  Neuropsychiatric: General: normal, calm and normal eye contact    Data   Recent Labs   Lab 03/30/22  0649 03/29/22  2051 03/29/22  1657 03/28/22  1107 03/28/22  0910 03/26/22  1130 03/26/22  1120   WBC  --   --   --   --   --   --  3.4*   HGB  --   --   --   --   --   --  17.2   MCV  --   --   --   --   --   --  87   PLT  --   --   --   --   --   --  128*   NA  --   --   --   --  139  --  140   POTASSIUM  --   --   --   --  4.5  --  4.1   CHLORIDE  --   --   --   --  102  --  103   CO2  --   --   --   --  27  --  25   BUN  --   --   --   --  15  --  16   CR  --   --   --   --  1.17  --  1.20   ANIONGAP  --    --   --   --  10  --  12   BREN  --   --   --   --  10.0  --  10.5   * 109* 129*   < > 314*   < > 263*    < > = values in this interval not displayed.

## 2022-03-30 NOTE — PROGRESS NOTES
03/30/22 1100   Engagement   Intervention Group   Topic Detail OT: Liesure/wellness (scratch art, coloring, cognitive activities) to promote fund of knowledge/reality orientation, healthy coping, and socialization.   Attendance Attended   Patient Response Demonstrated understanding of materials provided;Was respectful;Positive attitude;Contributes to conversation   Concentrated on Task duration of group   Cognition Goal-directed;Follows through with task   Mood/Affect Content;Pleasant   Social/Behavioral Engaged;Motivated   Goals addressed in session today Pt appeared to enjoy cognitive activity, good fund of knowledge throughout and reports that he enjoys trivia. Encouraging of peers. Looking forward to upcoming discharge.

## 2022-03-30 NOTE — PLAN OF CARE
Problem: Suicidal Behavior  Goal: Suicidal Behavior is Absent or Managed  Outcome: Ongoing, Progressing     Problem: Behavioral Health Plan of Care  Goal: Plan of Care Review  Outcome: Ongoing, Progressing  Goal: Adheres to Safety Considerations for Self and Others  Intervention: Develop and Maintain Individualized Safety Plan  Recent Flowsheet Documentation  Taken 3/30/2022 0625 by Yolanda Coyle RN  Safety Measures: safety rounds completed  Goal: Absence of New-Onset Illness or Injury  Intervention: Identify and Manage Fall Risk  Recent Flowsheet Documentation  Taken 3/30/2022 0625 by Yolanda Coyle RN  Safety Measures: safety rounds completed     Problem: Sleep Disturbance  Goal: Adequate Sleep/Rest  Outcome: Ongoing, Progressing   Goal Outcome Evaluation:    Pt appeared sleeping for about 7 hours  Safety checks completed per protocol  No pain or discomfort noted or verbalized  No precautionary behaviors observed or reported

## 2022-03-30 NOTE — DISCHARGE SUMMARY
Chief Complaint:   Patient was admitted due to suicide attempt by overdose        Hospital Course:     Patient was admitted and observed. His medications were adjusted as follows:    amLODIPine  10 mg Oral Daily     aspirin  81 mg Oral Daily     ciprofloxacin  500 mg Oral BID     insulin aspart  1-10 Units Subcutaneous TID AC     insulin aspart  1-7 Units Subcutaneous At Bedtime     insulin NPH  10 Units Subcutaneous QPM     insulin NPH  40 Units Subcutaneous QAM AC     lisinopril  5 mg Oral Daily     metFORMIN  500 mg Oral Daily with supper     mirtazapine  30 mg Oral At Bedtime     sertraline  150 mg Oral Daily     tacrolimus  1.5 mg Oral BID     His Remeron was increased from 15 to 30 mg a day and he was continued on Zoloft. The patient responded very well to this and to the structure of the milieu. He had resolution of his suicidal thoughts. By day of discharge he was having minimal depression and clearly denied suicidal or homicidal thoughts. He regretted overdose and was glad it did not cause him serious harm. He cooperated with treatment planning and outpatient services were clarified by .              HPI:     46 male with a history of Major Depression    Patient states that he has had problems with depression dating back to teen years. He was first treated over 10 years and has been on medications intermittently since then. He has used Zoloft in the past with some benefit. He had one prior admission 10 years ago for suicidal thoughts. He currently follows with a therapist online who is at [facility]. Recently he has been on Zoloft for the past 6 months which is prescribed by his PMD. He believes that he has had some benefit from Zoloft but still has depression.    Patient reports that his current episode of depression started about one year ago which were precipitated by marital issues and separation from his wife. He had resumed Zoloft 6 months ago with some benefit. Two weeks  ago he learned that his wife wants to divorce him and is in another relationship. She also has an order of protection which has prevented him from seeing her or his kids. He states that he developed suicidal thoughts, and 5 days ago he overdosed on Benedryl taking 200 capsules in a suicide attempt. He was staying at his mother's house at the time and his sister found him and realized what happened and she activated EMS. He was stabilized at [facility] and transfer was arranged to this unit. Patient is now voluntary. Zoloft was resumed at [facility]. Remeron was added this week as well.  Patient denies alcohol or drug use.    Today patient reports that he is not having suicidal thoughts although he still has some ambivalence about his attempt and has some passive suicidal thoughts. He has depressed mood. He has no evidence of psychosis or adelaida. He reports feelings of hopelessness and low energy. Patient denies history of adelaida or manic symptoms and has no current manic symptoms        Past Psychiatric History:     Patient states that he has had problems with depression dating back to teen years. He was first treated over 10 years and has been on medications intermittently since then. He has used Zoloft in the past with some benefit. He had one prior admission 10 years ago for suicidal thoughts. He currently follows with a therapist online who is at [facility]. Recently he has been on Zoloft for the past 6 months which is prescribed by his PMD. He believes that he has had some benefit from Zoloft but still has depression.          Substance Use and History:     Patient denies alcohol or drug use.        Past Medical History:   PAST MEDICAL HISTORY:   Past Medical History:   Diagnosis Date     Cirrhosis of liver (H)     Due to PSC     DU (duodenal ulcer) 5/2015    treated nonsurgically      PSC (primary sclerosing cholangitis)      Ulcerative colitis      Patient is status post liver transplant  Patient also has  "diabetes    PAST SURGICAL HISTORY:   Past Surgical History:   Procedure Laterality Date     COLECTOMY  12/27/07     FLEXIBLE SIGMOIDOSCOPY  10/4/10      ERCP W/W/O NONA OF SPEC-BRUSH/WASH  02/19/10      ERCP W/W/O NONA OF SPEC-BRUSH/WASH  03/05/10      UGI ENDOSCOPY W BANDING ESOPH/GASTRIC VARICES  since 2/2015    monthly at [facility]; last one in August      UPPER GI ENDOSCOPY  11/13/13    [facility]     UPPER GI ENDOSCOPY  2/16/15    [facility]             Family History:   FAMILY HISTORY:   Family History   Problem Relation Age of Onset     Heart Failure Mother       Patient reports that siblings and mother have problems with depression.  His siblings also suffer from alcohol and drug addiction.        Social History:   Please see the full psychosocial profile from the clinical treatment coordinator.   SOCIAL HISTORY:   Social History     Tobacco Use     Smoking status: Never Smoker     Smokeless tobacco: Never Used   Substance Use Topics     Alcohol use: No     Alcohol/week: 0.0 standard drinks     Comment: 2-3x per year     Patient stopped school in 10th grade.   Patient is on disability since 2010 related to ulcerative colitis and liver failure. He worked in the past driving a truck.         PTA Medications:     Medications Prior to Admission   Medication Sig Dispense Refill Last Dose     amLODIPine (NORVASC) 10 MG tablet Take 1 tablet (10 mg) by mouth daily 90 tablet 3      aspirin 81 MG tablet Take 81 mg by mouth daily         BD VEO INSULIN SYRINGE U/F 31G X 15/64\" 0.5 ML         ciprofloxacin (CIPRO) 500 MG tablet Take 500 mg by mouth 2 times daily        insulin  UNIT/ML injection 40 Units QAM and 10 U QPM.        mirtazapine (REMERON) 15 MG tablet Take 1 tablet (15 mg) by mouth At Bedtime        sertraline (ZOLOFT) 100 MG tablet Take 1.5 tablets (150 mg) by mouth daily 135 tablet 3      tacrolimus (GENERIC EQUIVALENT) 0.5 MG capsule Take 1.5 mg by mouth 2 times daily                " Current Medications:       amLODIPine  10 mg Oral Daily     aspirin  81 mg Oral Daily     ciprofloxacin  500 mg Oral BID     insulin aspart  1-10 Units Subcutaneous TID AC     insulin aspart  1-7 Units Subcutaneous At Bedtime     insulin NPH  10 Units Subcutaneous QPM     insulin NPH  40 Units Subcutaneous QAM AC     lisinopril  5 mg Oral Daily     metFORMIN  500 mg Oral Daily with supper     mirtazapine  30 mg Oral At Bedtime     sertraline  150 mg Oral Daily     tacrolimus  1.5 mg Oral BID     acetaminophen, glucose **OR** dextrose **OR** glucagon, hydrALAZINE, hydrOXYzine, OLANZapine **OR** OLANZapine, senna-docusate, traZODone         Allergies:     Allergies   Allergen Reactions     No Known Allergies           Labs:     Recent Results (from the past 48 hour(s))   Basic metabolic panel    Collection Time: 03/28/22  9:10 AM   Result Value Ref Range    Sodium 139 136 - 145 mmol/L    Potassium 4.5 3.5 - 5.0 mmol/L    Chloride 102 98 - 107 mmol/L    Carbon Dioxide (CO2) 27 22 - 31 mmol/L    Anion Gap 10 5 - 18 mmol/L    Urea Nitrogen 15 8 - 22 mg/dL    Creatinine 1.17 0.70 - 1.30 mg/dL    Calcium 10.0 8.5 - 10.5 mg/dL    Glucose 314 (H) 70 - 125 mg/dL    GFR Estimate 79 >60 mL/min/1.73m2   Glucose by meter    Collection Time: 03/28/22 11:07 AM   Result Value Ref Range    GLUCOSE BY METER POCT 199 (H) 70 - 99 mg/dL   Glucose by meter    Collection Time: 03/28/22  4:56 PM   Result Value Ref Range    GLUCOSE BY METER POCT 133 (H) 70 - 99 mg/dL   Glucose by meter    Collection Time: 03/28/22  7:59 PM   Result Value Ref Range    GLUCOSE BY METER POCT 242 (H) 70 - 99 mg/dL   Glucose by meter    Collection Time: 03/29/22  6:54 AM   Result Value Ref Range    GLUCOSE BY METER POCT 130 (H) 70 - 99 mg/dL   Glucose by meter    Collection Time: 03/29/22  8:00 AM   Result Value Ref Range    GLUCOSE BY METER POCT 133 (H) 70 - 99 mg/dL   Glucose by meter    Collection Time: 03/29/22 11:54 AM   Result Value Ref Range    GLUCOSE BY  METER POCT 137 (H) 70 - 99 mg/dL   Glucose by meter    Collection Time: 03/29/22  4:57 PM   Result Value Ref Range    GLUCOSE BY METER POCT 129 (H) 70 - 99 mg/dL   Glucose by meter    Collection Time: 03/29/22  8:51 PM   Result Value Ref Range    GLUCOSE BY METER POCT 109 (H) 70 - 99 mg/dL   Glucose by meter    Collection Time: 03/30/22  6:49 AM   Result Value Ref Range    GLUCOSE BY METER POCT 105 (H) 70 - 99 mg/dL          Physical Exam:     BP (!) 130/90 (Patient Position: Sitting)   Pulse 107   Temp 97.6  F (36.4  C) (Oral)   Resp 16   Ht 1.829 m (6')   Wt 109.6 kg (241 lb 9 oz)   SpO2 98%   BMI 32.76 kg/m    Weight is 241 lbs 9 oz  Body mass index is 32.76 kg/m .             Physical ROS:     Review of Systems is otherwise negative including HEENT, CV, Respiratory, GI, , Musculoskeletal, Neurologic, Dermatologic, Endocrine, Immunological, Constitutional systems           Mental Status Exam:     Mental Status  Patient is casually dressed  Hygiene good  Speech fluent  Thought Process logical  Thought Content:  No suicidal ideation,    No homicidal ideation,   No ideas of reference,    No loose associations,    No auditory hallucinations,     No visual hallucinations   No delusions  Psychomotor: No agitation or slowing  Cognition:  Alert and oriented to time place and person  Attention good  Concentration fair  Memory normal including recent and remote memory  Mood: less depressed  Affect: mood congruent  Judgement: limited  Eye contact good  Cooperation good  Language normal  Fund of knowledge normal  Musculoskeletal normal gait with no abnormal movements    Improved compared to admission         Admission Diagnoses:   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)    Patient Active Problem List    Diagnosis Date Noted     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) 03/24/2022     Priority: Medium     Depression with suicidal ideation 03/23/2022     Priority: Medium      Poisoning by antihistamine, undetermined intent, initial encounter 03/17/2022     Priority: Medium     Lactic acidosis 03/17/2022     Priority: Medium     SIRS (systemic inflammatory response syndrome) (H) 03/17/2022     Priority: Medium     Transplanted liver (H) 09/11/2018     Priority: Medium     CMV (cytomegalovirus infection) (H) 02/07/2018     Priority: Medium     Type 2 diabetes mellitus with complication, with long-term current use of insulin (H) 08/07/2017     Priority: Medium     C. difficile enteritis 06/27/2017     Priority: Medium     Anemia due to blood loss, acute 06/26/2017     Priority: Medium     C. difficile pouchitis 06/26/2017     Priority: Medium     History of esophageal varices 06/26/2017     Priority: Medium     Thrombocytopenia (H) 06/26/2017     Priority: Medium     History of duodenal ulcer 06/26/2017     Priority: Medium     Mass of left upper extremity 03/30/2016     Priority: Medium     Anemia 09/05/2015     Priority: Medium     Upper GI bleed 09/05/2015     Priority: Medium     Acute pancreatitis 09/05/2015     Priority: Medium     Primary sclerosing cholangitis 09/05/2015     Priority: Medium     Cirrhosis of liver (H) 09/05/2015     Priority: Medium     S/P total colectomy 09/05/2015     Priority: Medium     Esophageal varices (H) 09/05/2015     Priority: Medium     Mild major depression (H) 03/18/2011     Priority: Medium     CARDIOVASCULAR SCREENING; LDL GOAL LESS THAN 160 10/31/2010     Priority: Medium     H/O ulcerative colitis - s/p total colectomy 10/31/2008     Priority: Medium              Assessment:     Patient presents following a serious suicide attempt by overdose. He now has improved somewhat but will need a period of observation and will need more intensive outpatient services         Plan:     Discharge to home. Follow up as arranged by       Medication:Continue Zoloft 150 mg a day. Remeron increased to 30 mg a day        Disposition: home with  follow up  More than 35 minutes spent on this visit with more than 50% time spent on coordination of care with staff, reviewing medical record, psychoeducation, providing supportive therapy regarding coping with chronic mental illness, entering orders and preparing documentation for the visit      Dariusz Baird MD

## 2022-03-31 ENCOUNTER — PATIENT OUTREACH (OUTPATIENT)
Dept: CARE COORDINATION | Facility: CLINIC | Age: 47
End: 2022-03-31
Payer: COMMERCIAL

## 2022-03-31 DIAGNOSIS — Z71.89 OTHER SPECIFIED COUNSELING: ICD-10-CM

## 2022-03-31 NOTE — PROGRESS NOTES
Clinic Care Coordination Contact  [facility]/Voicemail       Clinical Data: Care Coordinator Outreach  Outreach attempted x 1.  Left message on patient's voicemail with call back information and requested return call.  Plan: CC will attempt to reach patient again in 1-2 business days.    LAURO Briggs   Social Work Clinic Care Coordinator   [facility]  PH: [phone]  [email or web address]

## 2022-03-31 NOTE — PLAN OF CARE
ED Provider Note    Scribed for Adi Dawson M.D. by Alessandra Banks. 8/16/2021, 7:20 PM.    Primary care provider: No primary care provider noted.  Means of arrival: Walk in   History obtained from: Patient   History limited by: None    CHIEF COMPLAINT  Chief Complaint   Patient presents with    Coronavirus Screening     pt has had feverx 2 days. pt is not vaccinated for covid. pt states some nausea and has been experiencing weakness as well    Diarrhea    Nausea       HPI  Cornelio Boone is a 33 y.o. male who presents to the Emergency Department for vomiting onset 2 days ago. The patient states that he has not been able to hold down liquids. He says that his AC broke 3 nights ago and he believes his symptoms are secondary to the heat. He reports associated lethargy and diarrhea. Patient states that he has asthma and it has been exacerbated by the smoke lately. He denies abdominal pain, cough, or fever. He also denies known sick contact recently patient describes the nausea vomiting and diarrhea as above denies any overt abdominal pain.  Denies any other symptoms presents for evaluation.    REVIEW OF SYSTEMS  As above otherwise all other systems are negative    PAST MEDICAL HISTORY   has a past medical history of Asthma.    SURGICAL HISTORY  patient denies any surgical history    SOCIAL HISTORY  Social History     Tobacco Use    Smoking status: Never Smoker    Smokeless tobacco: Never Used   Vaping Use    Vaping Use: Never used   Substance Use Topics    Alcohol use: Not Currently    Drug use: Never      Social History     Substance and Sexual Activity   Drug Use Never       FAMILY HISTORY  No family history noted.    CURRENT MEDICATIONS  Home Medications       Reviewed by Judie Pinon R.N. (Registered Nurse) on 08/16/21 at 1339  Med List Status: Complete     Medication Last Dose Status        Patient Justus Taking any Medications                           ALLERGIES  No Known Allergies    PHYSICAL  Occupational Therapy Discharge Note    Patient Name:  Abhishek Downey    D: Refer to daily doc flowsheets for details of progress toward goals.  A: Improvement seen in management of symptoms, improved affect, increased presence in milieu/engagement in unit programming.   P: Patient discharging to home with outpatient MH supports in place. Discontinue OT Care Plan goals and interventions.    Date: 3/31/2022  Time: 7:50 AM     Problem: Relapse Prevention  Goal: Engage in OT Group  Description: Engage in OT group activities that support recovery  Outcome: Met      "EXAM  VITAL SIGNS: /100   Pulse 72   Temp 36.6 °C (97.9 °F) (Temporal)   Resp (!) 22   Ht 1.727 m (5' 8\")   Wt 122 kg (270 lb)   SpO2 98%   BMI 41.05 kg/m²     Constitutional: Well developed, Well nourished, No acute distress, Non-toxic appearance.   HENT: Normocephalic, Atraumatic, Bilateral external ears normal, oropharynx dry, No oral exudates, Nose normal.   Eyes:conjunctiva is normal, there are no signs of exudate.   Neck: Supple, no meningeal signs.  Lymphatic: No lymphadenopathy noted.   Cardiovascular: Regular rate and rhythm without murmurs gallops or rubs.   Thorax & Lungs: No respiratory distress. Breathing comfortably. Lungs are clear to auscultation bilaterally, there are no wheezes no rales. Chest wall is nontender.  Abdomen: Soft, nontender, nondistended. Bowel sounds are present.   Skin: Warm, Dry, No erythema,   Back: No tenderness, No CVA tenderness.  Musculoskeletal: Good range of motion in all major joints. No tenderness to palpation or major deformities noted. Intact distal pulses, no clubbing, no cyanosis, no edema,   Neurologic: Alert & oriented x 3, Moving all extremities. No gross abnormalities.    Psychiatric: Affect normal, Judgment normal, Mood normal.     LABS  Results for orders placed or performed during the hospital encounter of 08/16/21   CBC WITH DIFFERENTIAL   Result Value Ref Range    WBC 13.0 (H) 4.8 - 10.8 K/uL    RBC 5.19 4.70 - 6.10 M/uL    Hemoglobin 16.6 14.0 - 18.0 g/dL    Hematocrit 46.1 42.0 - 52.0 %    MCV 88.8 81.4 - 97.8 fL    MCH 32.0 27.0 - 33.0 pg    MCHC 36.0 (H) 33.7 - 35.3 g/dL    RDW 40.1 35.9 - 50.0 fL    Platelet Count 318 164 - 446 K/uL    MPV 9.1 9.0 - 12.9 fL    Neutrophils-Polys 70.70 44.00 - 72.00 %    Lymphocytes 17.60 (L) 22.00 - 41.00 %    Monocytes 8.20 0.00 - 13.40 %    Eosinophils 2.60 0.00 - 6.90 %    Basophils 0.50 0.00 - 1.80 %    Immature Granulocytes 0.40 0.00 - 0.90 %    Nucleated RBC 0.00 /100 WBC    Neutrophils (Absolute) 9.17 " (H) 1.82 - 7.42 K/uL    Lymphs (Absolute) 2.29 1.00 - 4.80 K/uL    Monos (Absolute) 1.07 (H) 0.00 - 0.85 K/uL    Eos (Absolute) 0.34 0.00 - 0.51 K/uL    Baso (Absolute) 0.06 0.00 - 0.12 K/uL    Immature Granulocytes (abs) 0.05 0.00 - 0.11 K/uL    NRBC (Absolute) 0.00 K/uL   COMP METABOLIC PANEL   Result Value Ref Range    Sodium 141 135 - 145 mmol/L    Potassium 3.7 3.6 - 5.5 mmol/L    Chloride 105 96 - 112 mmol/L    Co2 22 20 - 33 mmol/L    Anion Gap 14.0 7.0 - 16.0    Glucose 88 65 - 99 mg/dL    Bun 10 8 - 22 mg/dL    Creatinine 0.66 0.50 - 1.40 mg/dL    Calcium 9.5 8.5 - 10.5 mg/dL    AST(SGOT) 18 12 - 45 U/L    ALT(SGPT) 26 2 - 50 U/L    Alkaline Phosphatase 56 30 - 99 U/L    Total Bilirubin 0.7 0.1 - 1.5 mg/dL    Albumin 4.4 3.2 - 4.9 g/dL    Total Protein 7.1 6.0 - 8.2 g/dL    Globulin 2.7 1.9 - 3.5 g/dL    A-G Ratio 1.6 g/dL   SARS-CoV-2 PCR (24 hour In-House): Collect NP swab in VTM    Specimen: Respirate   Result Value Ref Range    SARS-CoV-2 Source NP Swab    ESTIMATED GFR   Result Value Ref Range    GFR If African American >60 >60 mL/min/1.73 m 2    GFR If Non African American >60 >60 mL/min/1.73 m 2      All labs reviewed by me.    RADIOLOGY  No orders to display     The radiologist's interpretation of all radiological studies have been reviewed by me.    COURSE & MEDICAL DECISION MAKING  Pertinent Labs & Imaging studies reviewed. (See chart for details)    7:20 PM - Patient seen and examined at bedside. Patient will be treated with Zofran 4 mg and NS infusion 1000 mg. Ordered CBC with diff, CMP, and SARS-CoV-2 to evaluate his symptoms. The differential diagnoses include but are not limited to: gastroenteritis vs heat exhaustion vs viral     Decision Making:  Patient presents for evaluation.  Clinically he is slightly dehydrated and he is describing his continuous vomiting so I started him with 4 mg of Zofran and a liter bolus of normal saline.  Laboratory studies were also drawn there is no signs of  significant abnormalities.  Does have a slightly elevated white blood cell count which is most likely demargination secondary to his vomiting.  Patient was rechecked at 10:00 PM he is feeling significantly improved able to try p.o. fluids.  Again I described to him this could very well be a viral gastroenteritis certainly Covid is a possibility.  Covid test is currently pending and will return in 24 hours.  I will give the patient a prescription of nausea medications.  Recommend return if any symptoms worsen follow the primary care physician as needed.     The patient will return for new or worsening symptoms and is stable at the time of discharge.    The patient is referred to a primary physician for blood pressure management, diabetic screening, and for all other preventative health concerns.        DISPOSITION:  Patient will be discharged home in stable condition.    FOLLOW UP:  [address] [id number] [phone]        [address] [id number] [phone]        [address]  [address] [facility], [address]  [facility] [id number] [phone]          OUTPATIENT MEDICATIONS:  New Prescriptions    PROMETHAZINE (PHENERGAN) 25 MG SUPPOS    Insert 1 Suppository into the rectum every 6 hours as needed for Nausea/Vomiting.    PROMETHAZINE (PHENERGAN) 25 MG TAB    Take 1 Tablet by mouth every 6 hours as needed for Nausea/Vomiting.          FINAL IMPRESSION  1. Non-intractable vomiting with nausea, unspecified vomiting type    2. Diarrhea, unspecified type          I, Alessandra Glass), am scribing for, and in the presence of, Adi Dawson M.D..    Electronically signed by: Alessandra Banks (Janet), 8/16/2021    IAdi M.D. personally performed the services described in this documentation, as scribed by Alessandra Banks in my presence, and it is both accurate and complete.  C    The note accurately reflects work and decisions made by me.  Adi Dawson M.D.  8/16/2021  10:17 PM

## 2022-04-01 ENCOUNTER — TELEPHONE (OUTPATIENT)
Facility: CLINIC | Age: 47
End: 2022-04-01
Payer: COMMERCIAL

## 2022-04-01 NOTE — TELEPHONE ENCOUNTER
MTM referral from: Transitions of Care (recent hospital discharge or ED visit)    MTM referral outreach attempt #2 on April 1, 2022 at 10:15 AM      Outcome: Patient not reachable after several attempts, will route to [facility] Pharmacist/Provider as an FYI.  [facility] scheduling number is [phone].  Thank you for the referral.    Dodie Mckinney [facility]

## 2022-04-11 ENCOUNTER — TELEPHONE (OUTPATIENT)
Dept: FAMILY MEDICINE | Facility: CLINIC | Age: 47
End: 2022-04-11
Payer: COMMERCIAL

## 2022-04-12 ASSESSMENT — PATIENT HEALTH QUESTIONNAIRE - PHQ9
SUM OF ALL RESPONSES TO PHQ QUESTIONS 1-9: 0
SUM OF ALL RESPONSES TO PHQ QUESTIONS 1-9: 0
10. IF YOU CHECKED OFF ANY PROBLEMS, HOW DIFFICULT HAVE THESE PROBLEMS MADE IT FOR YOU TO DO YOUR WORK, TAKE CARE OF THINGS AT HOME, OR GET ALONG WITH OTHER PEOPLE: NOT DIFFICULT AT ALL

## 2022-04-13 ENCOUNTER — VIRTUAL VISIT (OUTPATIENT)
Dept: PSYCHOLOGY | Facility: CLINIC | Age: 47
End: 2022-04-13
Payer: COMMERCIAL

## 2022-04-13 DIAGNOSIS — F33.0 MAJOR DEPRESSIVE DISORDER, RECURRENT EPISODE, MILD WITH ANXIOUS DISTRESS (H): Primary | ICD-10-CM

## 2022-04-13 PROCEDURE — 90834 PSYTX W PT 45 MINUTES: CPT | Mod: 95 | Performed by: STUDENT IN AN ORGANIZED HEALTH CARE EDUCATION/TRAINING PROGRAM

## 2022-04-13 ASSESSMENT — ANXIETY QUESTIONNAIRES
1. FEELING NERVOUS, ANXIOUS, OR ON EDGE: NOT AT ALL
4. TROUBLE RELAXING: NOT AT ALL
5. BEING SO RESTLESS THAT IT IS HARD TO SIT STILL: NOT AT ALL
7. FEELING AFRAID AS IF SOMETHING AWFUL MIGHT HAPPEN: NOT AT ALL
2. NOT BEING ABLE TO STOP OR CONTROL WORRYING: NOT AT ALL
GAD7 TOTAL SCORE: 0
6. BECOMING EASILY ANNOYED OR IRRITABLE: NOT AT ALL
3. WORRYING TOO MUCH ABOUT DIFFERENT THINGS: NOT AT ALL

## 2022-04-13 ASSESSMENT — PATIENT HEALTH QUESTIONNAIRE - PHQ9: SUM OF ALL RESPONSES TO PHQ QUESTIONS 1-9: 0

## 2022-04-13 NOTE — PROGRESS NOTES
[facility]                                     Progress Note    Patient Name: Abhishek Downey  Date: 04/13/2022         Service Type: Individual      Session Start Time: 9.00  Session End Time: 9.56     Session Length: 46 mns    Session #: 07    Attendees: Client attended alone    Service Modality:  Video Visit:      Provider verified identity through the following two step process.  Patient provided:  Patient is known previously to provider    Telemedicine Visit: The patient's condition can be safely assessed and treated via synchronous audio and visual telemedicine encounter.      Reason for Telemedicine Visit: Patient has requested telehealth visit    Originating Site (Patient Location): Patient's home    Distant Site (Provider Location): Provider Remote Setting- Home Office    Consent:  The patient/guardian has verbally consented to: the potential risks and benefits of telemedicine (video visit) versus in person care; bill my insurance or make self-payment for services provided; and responsibility for payment of non-covered services.     Patient would like the video invitation sent by:  My Chart    Mode of Communication:  Video Conference via Amwell    As the provider I attest to compliance with applicable laws and regulations related to telemedicine.    DATA  Interactive Complexity: No  Crisis: No        Progress Since Last Session (Related to Symptoms / Goals / Homework):   Symptoms: Improving as evident by current phq9 and gad7 scores    Homework: Achieved / completed to satisfaction checked with pt about his discussion with partner relating to dropping the order for protection against him and possibility for future family therapy and pt reported that partner is exploring the option and process.      Episode of Care Goals: Minimal progress - PREPARATION (Decided to change - considering how); Intervened by negotiating a change plan and determining options / strategies for behavior  change, identifying triggers, exploring social supports, and working towards setting a date to begin behavior change     Current / Ongoing Stressors and Concerns:   Pt reported feeling stuck emotionally with regards to relationship with partner and is ambivalent about rebuilding the relationship and  moving on with her or moving on alone. Reported that partner apologize to him about past misdeeds and feels it is a well deserved apology and is also questioning her intention. Pt reported he does not know if partner truly loves him and want to work things out genuinely or is just struggling with loneliness and needing a partner for mere company. Pt reported feeling like the relationship has been severely damaged beyond repair.     Treatment Objective(s) Addressed in This Session:   Decrease frequency and intensity of feeling down, depressed, hopeless       Intervention:  - Motivational Interviewing: Utilizing OARS to build insight around situation, observing barriers patient is seeing in his ability to clearly decide on the future of his relationship with partner  and identifying what is needed to mitigate depressive feelings and appropriately cope with ambivalence.  - Pt did not report recent suicide attempt in session and had a score of zero on the PHQ9 assessment for SI. Provider completed SP later and sent to pt.      Assessments completed prior to visit:  The following assessments were completed by patient for this visit:  PHQ9:   PHQ-9 SCORE 11/24/2021 1/25/2022 2/21/2022 3/8/2022 3/8/2022 4/12/2022 4/12/2022   PHQ-9 Total Score - - - - - - -   PHQ-9 Total Score MyChart - - - - 6 (Mild depression) - 0   PHQ-9 Total Score 14 10 15 6 10 0 0     GAD7:   SHARATH-7 SCORE 9/4/2019 10/20/2021 11/14/2021 11/24/2021 2/21/2022 3/8/2022 4/13/2022   Total Score - - 0 (minimal anxiety) - - - -   Total Score 10 5 0 7 10 9 0         ASSESSMENT: Current Emotional / Mental Status (status of significant symptoms):   Risk status (Self  / Other harm or suicidal ideation)   Patient denies current fears or concerns for personal safety.   Patient denies current or recent suicidal ideation or behaviors.   Patient denies current or recent homicidal ideation or behaviors.   Patient denies current or recent self injurious behavior or ideation.   Patient denies other safety concerns.   Patient reports there has been no change in risk factors since their last session.     Patient reports there has been no change in protective factors since their last session.     Recommended that patient call 911 or go to the local ED should there be a change in any of these risk factors.     Appearance:   Appropriate    Eye Contact:   Fair    Psychomotor Behavior: Normal    Attitude:   Cooperative  Interested Evasive   Orientation:   All   Speech    Rate / Production: Normal/ Responsive    Volume:  Normal    Mood:    Ambivalence   Affect:    Blunted    Thought Content:  Clear    Thought Form:  Coherent    Insight:    Fair  and Denial of Disorder     Medication Review:   Changes to psychiatric medications, see updated Medication List in EPIC.      Medication Compliance:   Yes     Changes in Health Issues:   None reported     Chemical Use Review:   Substance Use: Chemical use reviewed, no active concerns identified      Tobacco Use: No current tobacco use.      Diagnosis:  296.31 (F33.0) Major Depressive Disorder, Recurrent Episode, Mild _ and With anxious distress    Collateral Reports Completed:   Not Applicable    PLAN: (Patient Tasks / Therapist Tasks / Other)    Make a list of those aspects of current relationship with partner that pt feels have been severely damaged and process with provider next session        KATHRYN Mcdonald  April 13, 2022                                             ----- Service Performed and Documented by KATHRYN------  This note was reviewed and clinical supervision by LAURA Wild [facility]    4/18/2022    ______________________________________________________________________    Individual Treatment Plan    Patient's Name: Abhishek Downey  YOB: 1975    Date of Creation:  01/25/2022   Date Treatment Plan Last Reviewed/Revised:  01/25/2022     DSM5 Diagnoses: 296.31 (F33.0) Major Depressive Disorder, Recurrent Episode, Mild _ and With anxious distress  Psychosocial / Contextual Factors:   PROMIS (reviewed every 90 days):     Referral / Collaboration:  Referral to another professional/service is not indicated at this time..    Anticipated number of session for this episode of care: 15-25  Anticipation frequency of session: Biweekly  Anticipated Duration of each session: 38-52 minutes  Treatment plan will be reviewed in 90 days or when goals have been changed.        MeasurableTreatment Goal(s) related to diagnosis / functional impairment(s)  Goal 1: Patient will address issues underlying depressive feelings.    I will know I've met my goal when I am at peace with where I am and self.      Objective #A (Patient Action)    Patient will identify specific triggers to feelings of depression 90 % of the time.  Status: Continued - Date(s): 04/25/2022    Intervention(s)  Therapist will provide psychoeducation, behavioral activation, and cognitive restructuring.)    Objective #B  Patient will identify specific areas of cognitive distortion and challenge irrational thoughts with reality   Status: Continued - Date(s): 04/25/2022       Intervention(s)  Therapist will teach patient how negative thoughts can lead to negative feelings and actions and ways to reframe thoughts in a more positive way leading  to more positive feelings and helpful behaviors    Objective #C  Patient will learn relaxation techniques to manage depression.  Status: Continued - Date(s): 04/25/2022    Intervention(s)  Therapist will teach patient  thought stopping, deep breathing exercises, mindful meditation and creative  "visualization.            Patient has reviewed and agreed to the above plan.      Oneil Wongcuate, SW  January 25, 2022        [facility]                                       Abhishek Downey     SAFETY PLAN:  Step 1: Warning signs / cues (Thoughts, images, mood, situation, behavior) that a crisis may be developing:    Thoughts: \"I can't do this anymore\" and \"Nothing makes it better\"    Images: Thoughts of betrayal by partner    Thinking Processes: ruminations (can't stop thinking about my problems): relationship, housing and financial stress.    Mood: worsening depression and hopelessness    Behaviors: isolating/withdrawing     Situations: relationship problems   Step 2: Coping strategies - Things I can do to take my mind off of my problems without contacting another person (relaxation technique, physical activity):    Distress Tolerance Strategies:  relaxation activities: Visit mum and play a card game    Physical Activities: go for a walk and deep breathing    Focus on helpful thoughts:  \"I will get through this\"  Step 3: People and social settings that provide distraction:   Name:       Phone:    Name:             Phone:       park and store   Step 4: Remind myself of people and things that are important to me and worth living for:  My kids and Mum      Step 5: When I am in crisis, I can ask these people to help me use my safety plan:   Name:  Mum Phone:      Step 6: Making the environment safe:     be around others  Step 7: Professionals or agencies I can contact during a crisis:    Suicide Prevention Lifeline: 6-328-656-TALK (7951)    Crisis Text Line Service (available 24 hours a day, 7 days a week): Text [place] to 253198  [facility] Crisis Services: 1-228.532.7675    Call 911 or go to my nearest emergency department.   I helped develop this safety plan and agree to use it when needed.  I have been given a copy of this plan.      Client signature " _________________________________________________________________  Today s date:  4/15/2022  Completed by Provider Name/ Credentials:  KATHRYN Mcdonald  April 15, 2022  Adapted from Safety Plan Template 2008 Lakeisha Hatch and Prince Gamez is reprinted with the express permission of the authors.  No portion of the Safety Plan Template may be reproduced without the express, written permission.  You can contact the authors at [email or web address] or [email or web address].

## 2022-04-14 ASSESSMENT — ANXIETY QUESTIONNAIRES: GAD7 TOTAL SCORE: 0

## 2022-04-19 LAB
ATRIAL RATE - MUSE: 95 BPM
DIASTOLIC BLOOD PRESSURE - MUSE: NORMAL MMHG
INTERPRETATION ECG - MUSE: NORMAL
P AXIS - MUSE: 58 DEGREES
PR INTERVAL - MUSE: 134 MS
QRS DURATION - MUSE: 90 MS
QT - MUSE: 362 MS
QTC - MUSE: 454 MS
R AXIS - MUSE: 65 DEGREES
SYSTOLIC BLOOD PRESSURE - MUSE: NORMAL MMHG
T AXIS - MUSE: 66 DEGREES
VENTRICULAR RATE- MUSE: 95 BPM

## 2022-04-25 ENCOUNTER — OFFICE VISIT (OUTPATIENT)
Dept: FAMILY MEDICINE | Facility: CLINIC | Age: 47
End: 2022-04-25
Payer: COMMERCIAL

## 2022-04-25 VITALS
BODY MASS INDEX: 33.72 KG/M2 | WEIGHT: 249 LBS | SYSTOLIC BLOOD PRESSURE: 138 MMHG | DIASTOLIC BLOOD PRESSURE: 88 MMHG | RESPIRATION RATE: 16 BRPM | HEART RATE: 102 BPM | HEIGHT: 72 IN | TEMPERATURE: 97.6 F | OXYGEN SATURATION: 98 %

## 2022-04-25 DIAGNOSIS — Z79.4 TYPE 2 DIABETES MELLITUS WITH COMPLICATION, WITH LONG-TERM CURRENT USE OF INSULIN (H): Chronic | ICD-10-CM

## 2022-04-25 DIAGNOSIS — Z94.4 TRANSPLANTED LIVER (H): Chronic | ICD-10-CM

## 2022-04-25 DIAGNOSIS — F33.1 MODERATE EPISODE OF RECURRENT MAJOR DEPRESSIVE DISORDER (H): ICD-10-CM

## 2022-04-25 DIAGNOSIS — Z87.19 H/O ULCERATIVE COLITIS: Chronic | ICD-10-CM

## 2022-04-25 DIAGNOSIS — I10 HYPERTENSION, UNSPECIFIED TYPE: ICD-10-CM

## 2022-04-25 DIAGNOSIS — F33.2 SEVERE RECURRENT MAJOR DEPRESSION WITHOUT PSYCHOTIC FEATURES (H): ICD-10-CM

## 2022-04-25 DIAGNOSIS — E11.8 TYPE 2 DIABETES MELLITUS WITH COMPLICATION, WITH LONG-TERM CURRENT USE OF INSULIN (H): Chronic | ICD-10-CM

## 2022-04-25 DIAGNOSIS — Z11.4 SCREENING FOR HIV (HUMAN IMMUNODEFICIENCY VIRUS): Primary | ICD-10-CM

## 2022-04-25 PROBLEM — A04.72 C. DIFFICILE COLITIS: Status: RESOLVED | Noted: 2017-06-26 | Resolved: 2022-04-25

## 2022-04-25 PROBLEM — T45.0X4A: Status: RESOLVED | Noted: 2022-03-17 | Resolved: 2022-04-25

## 2022-04-25 PROBLEM — R65.10 SIRS (SYSTEMIC INFLAMMATORY RESPONSE SYNDROME) (H): Status: RESOLVED | Noted: 2022-03-17 | Resolved: 2022-04-25

## 2022-04-25 PROBLEM — F32.A DEPRESSION WITH SUICIDAL IDEATION: Status: RESOLVED | Noted: 2022-03-23 | Resolved: 2022-04-25

## 2022-04-25 PROBLEM — R45.851 DEPRESSION WITH SUICIDAL IDEATION: Status: RESOLVED | Noted: 2022-03-23 | Resolved: 2022-04-25

## 2022-04-25 PROBLEM — E87.20 LACTIC ACIDOSIS: Status: RESOLVED | Noted: 2022-03-17 | Resolved: 2022-04-25

## 2022-04-25 PROBLEM — A04.72 C. DIFFICILE ENTERITIS: Status: RESOLVED | Noted: 2017-06-27 | Resolved: 2022-04-25

## 2022-04-25 PROBLEM — D62 ANEMIA DUE TO BLOOD LOSS, ACUTE: Status: RESOLVED | Noted: 2017-06-26 | Resolved: 2022-04-25

## 2022-04-25 PROBLEM — B25.9 CMV (CYTOMEGALOVIRUS INFECTION) (H): Status: RESOLVED | Noted: 2018-02-07 | Resolved: 2022-04-25

## 2022-04-25 PROCEDURE — 99215 OFFICE O/P EST HI 40 MIN: CPT | Performed by: FAMILY MEDICINE

## 2022-04-25 RX ORDER — LISINOPRIL 10 MG/1
10 TABLET ORAL DAILY
Qty: 90 TABLET | Refills: 3 | Status: SHIPPED | OUTPATIENT
Start: 2022-04-25

## 2022-04-25 RX ORDER — MIRTAZAPINE 30 MG/1
30 TABLET, FILM COATED ORAL AT BEDTIME
Qty: 90 TABLET | Refills: 3 | Status: SHIPPED | OUTPATIENT
Start: 2022-04-25

## 2022-04-25 RX ORDER — METFORMIN HCL 500 MG
500 TABLET, EXTENDED RELEASE 24 HR ORAL 2 TIMES DAILY WITH MEALS
Qty: 180 TABLET | Refills: 3 | Status: SHIPPED | OUTPATIENT
Start: 2022-04-25

## 2022-04-25 ASSESSMENT — ANXIETY QUESTIONNAIRES
7. FEELING AFRAID AS IF SOMETHING AWFUL MIGHT HAPPEN: NOT AT ALL
5. BEING SO RESTLESS THAT IT IS HARD TO SIT STILL: NOT AT ALL
4. TROUBLE RELAXING: NOT AT ALL
7. FEELING AFRAID AS IF SOMETHING AWFUL MIGHT HAPPEN: NOT AT ALL
GAD7 TOTAL SCORE: 3
6. BECOMING EASILY ANNOYED OR IRRITABLE: NOT AT ALL
2. NOT BEING ABLE TO STOP OR CONTROL WORRYING: SEVERAL DAYS
GAD7 TOTAL SCORE: 3
GAD7 TOTAL SCORE: 3
1. FEELING NERVOUS, ANXIOUS, OR ON EDGE: SEVERAL DAYS
3. WORRYING TOO MUCH ABOUT DIFFERENT THINGS: SEVERAL DAYS

## 2022-04-25 ASSESSMENT — PATIENT HEALTH QUESTIONNAIRE - PHQ9
10. IF YOU CHECKED OFF ANY PROBLEMS, HOW DIFFICULT HAVE THESE PROBLEMS MADE IT FOR YOU TO DO YOUR WORK, TAKE CARE OF THINGS AT HOME, OR GET ALONG WITH OTHER PEOPLE: NOT DIFFICULT AT ALL
SUM OF ALL RESPONSES TO PHQ QUESTIONS 1-9: 4
SUM OF ALL RESPONSES TO PHQ QUESTIONS 1-9: 4

## 2022-04-25 ASSESSMENT — PAIN SCALES - GENERAL: PAINLEVEL: NO PAIN (0)

## 2022-04-25 NOTE — PROGRESS NOTES
S :Abhishek Downey is a 46 year old male with DM2.  8.6 A1C last month.  Willing to go up on metformin, only on 500mg daily now. Insulin too, NPH    Htn: borderline on 5mg ACE.  Willing to incrase to 10    Depression: severe, going through divorce.  Feeling better, in therapy, on remeron and zoloft.  Moving forward with his life, etc.    Liver transplant: tacrliomus for anti rejection med.  Sclerosing cholangitis, associated with UC.  No fever or cough.     Ulcerative colitis, s/p colectomy: has small intestine pouch, chronic diarrhea, 8-10/day on average.  On chronic cipro to prevent infection, has inflammation.  Follows with [facility]    O:/88   Pulse 102   Temp 97.6  F (36.4  C) (Tympanic)   Resp 16   Ht 1.829 m (6')   Wt 112.9 kg (249 lb)   SpO2 98%   BMI 33.77 kg/m    GEN: Alert and oriented, in no acute distress  ABD: nontender.  No distention or mass appreciated.  Borderline tachy, regular.  No murmur  Lungs clear    A: htn, borderline      DM2 with insulin, not controlled       Depression, stable, fills       Liver transplant      Immunocompromised, stable      Ulcerative colitis with colectomy, chronic diarrhea    P: long visit.  Up on metformin.  Up on lisinopril.  Fills on labs.  Went over recent labs, DM2, diet, exercise, weight.  He is motivated to drop weight.      Back in 3 months.    40  min spent on date of encounter reviewing chart, history, physical, documenting and counseling as mentioned in this note

## 2022-04-26 ASSESSMENT — ANXIETY QUESTIONNAIRES: GAD7 TOTAL SCORE: 3

## 2022-04-26 ASSESSMENT — PATIENT HEALTH QUESTIONNAIRE - PHQ9: SUM OF ALL RESPONSES TO PHQ QUESTIONS 1-9: 4

## 2022-04-30 ENCOUNTER — HEALTH MAINTENANCE LETTER (OUTPATIENT)
Age: 47
End: 2022-04-30

## 2022-05-11 ENCOUNTER — VIRTUAL VISIT (OUTPATIENT)
Dept: PSYCHOLOGY | Facility: CLINIC | Age: 47
End: 2022-05-11
Payer: COMMERCIAL

## 2022-05-11 DIAGNOSIS — F33.0 MAJOR DEPRESSIVE DISORDER, RECURRENT EPISODE, MILD WITH ANXIOUS DISTRESS (H): Primary | ICD-10-CM

## 2022-05-11 PROCEDURE — 90834 PSYTX W PT 45 MINUTES: CPT | Mod: 95 | Performed by: STUDENT IN AN ORGANIZED HEALTH CARE EDUCATION/TRAINING PROGRAM

## 2022-05-11 ASSESSMENT — ANXIETY QUESTIONNAIRES
3. WORRYING TOO MUCH ABOUT DIFFERENT THINGS: NOT AT ALL
7. FEELING AFRAID AS IF SOMETHING AWFUL MIGHT HAPPEN: NOT AT ALL
5. BEING SO RESTLESS THAT IT IS HARD TO SIT STILL: NOT AT ALL
6. BECOMING EASILY ANNOYED OR IRRITABLE: NOT AT ALL
2. NOT BEING ABLE TO STOP OR CONTROL WORRYING: NOT AT ALL
4. TROUBLE RELAXING: NOT AT ALL
1. FEELING NERVOUS, ANXIOUS, OR ON EDGE: SEVERAL DAYS
GAD7 TOTAL SCORE: 1

## 2022-05-11 ASSESSMENT — PATIENT HEALTH QUESTIONNAIRE - PHQ9: SUM OF ALL RESPONSES TO PHQ QUESTIONS 1-9: 2

## 2022-05-11 NOTE — PROGRESS NOTES
[facility]                                     Progress Note    Patient Name: Abhishek Downey  Date: 05/11/2022         Service Type: Individual      Session Start Time: 10.00  Session End Time: 10.45     Session Length: 45 mns    Session #: 08    Attendees: Client attended alone    Service Modality:  Video Visit:      Provider verified identity through the following two step process.  Patient provided:  Patient is known previously to provider    Telemedicine Visit: The patient's condition can be safely assessed and treated via synchronous audio and visual telemedicine encounter.      Reason for Telemedicine Visit: Patient has requested telehealth visit    Originating Site (Patient Location): Patient's home    Distant Site (Provider Location): Provider Remote Setting- Home Office    Consent:  The patient/guardian has verbally consented to: the potential risks and benefits of telemedicine (video visit) versus in person care; bill my insurance or make self-payment for services provided; and responsibility for payment of non-covered services.     Patient would like the video invitation sent by:  My Chart    Mode of Communication:  Video Conference via Amwell    As the provider I attest to compliance with applicable laws and regulations related to telemedicine.    DATA  Interactive Complexity: No  Crisis: No        Progress Since Last Session (Related to Symptoms / Goals / Homework):   Symptoms: Improving as evident by current phq9 and gad7 scores    Homework: Achieved / completed to satisfaction Review with pt things that pt felt were damaged in the relationship with partner.        Episode of Care Goals: Satisfactory progress - ACTION (Actively working towards change); Intervened by reinforcing change plan / affirming steps taken     Current / Ongoing Stressors and Concerns:  Pt reported that that part week has been very stressful going through the divorce process and that the  with ex  partner has been finalized and he is committed to moving on with his life but stressed with the current limitations in place preventing him from seeing his 11 year old daughter as he would desire.       Treatment Objective(s) Addressed in This Session:   Increase interest, engagement, and pleasure in doing things       Intervention:                Motivational Interviewing  Target Behavior: emotional codependency    Stage of Change: ACTION (Actively working towards change)    MI Intervention: Expressed Empathy/Understanding, Supported Autonomy, Collaboration, Evocation, Permission to raise concern or advise, Open-ended questions and Reflections: simple and complex     Change Talk Expressed by the Patient: Committment to change Taking steps    Provider Response to Change Talk: A - Affirmed patient's thoughts, decisions, or attempts at behavior change and R - Reflected patient's change talk           Brief Cognitive-Behavioral Therapy for Suicide Prevention (BCBT-SP). Focused today on recent suicidal thoughts and act, patient-specific suicide triggers and psychosocial  factors that triggered suicidal ideation, and collaboratively update  safety  plan and  treatment objectives.    Assessments completed prior to visit:  The following assessments were completed by patient for this visit:  PHQ9:   PHQ-9 SCORE 2/21/2022 3/8/2022 3/8/2022 4/12/2022 4/12/2022 4/25/2022 5/11/2022   PHQ-9 Total Score - - - - - - -   PHQ-9 Total Score MyChart - - 6 (Mild depression) - 0 4 (Minimal depression) -   PHQ-9 Total Score 15 6 10 0 0 4 2     GAD7:   SHARATH-7 SCORE 11/14/2021 11/24/2021 2/21/2022 3/8/2022 4/13/2022 4/25/2022 5/11/2022   Total Score 0 (minimal anxiety) - - - - 3 (minimal anxiety) -   Total Score 0 7 10 9 0 3 1         ASSESSMENT: Current Emotional / Mental Status (status of significant symptoms):   Risk status (Self / Other harm or suicidal ideation)   Patient denies current fears or concerns for personal safety.   Patient  denies current or recent suicidal ideation or behaviors.   Patient denies current or recent homicidal ideation or behaviors.   Patient denies current or recent self injurious behavior or ideation.   Patient denies other safety concerns.   Patient reports there has been no change in risk factors since their last session.     Patient reports there has been no change in protective factors since their last session.     Recommended that patient call 911 or go to the local ED should there be a change in any of these risk factors.     Appearance:   Appropriate    Eye Contact:   Fair    Psychomotor Behavior: Normal    Attitude:   Cooperative  Interested Evasive   Orientation:   All   Speech    Rate / Production: Normal/ Responsive    Volume:  Normal    Mood:    Euthymic   Affect:    Appropriate    Thought Content:  Clear    Thought Form:  Coherent    Insight:    Fair  and Denial of Disorder     Medication Review:   Changes to psychiatric medications, see updated Medication List in EPIC.      Medication Compliance:   Yes     Changes in Health Issues:   None reported     Chemical Use Review:   Substance Use: Chemical use reviewed, no active concerns identified      Tobacco Use: No current tobacco use.      Diagnosis:  296.31 (F33.0) Major Depressive Disorder, Recurrent Episode, Mild _ and With anxious distress    Collateral Reports Completed:   Not Applicable    PLAN: (Patient Tasks / Therapist Tasks / Other)    Reflect on the adjustment to be made following divorce;  physical and psychological and processed with therapist next session        KATHRYN Mcdonald        May 11, 2022                  ----- Service Performed and Documented by KATHRYN------  This note was reviewed and clinical supervision by LAURA Wild    5/12/2022                              ______________________________________________________________    Individual Treatment Plan    Patient's Name: Abhishek CUNHA Shayan  Date Of  Birth: 1975    Date of Creation:  01/25/2022   Date Treatment Plan Last Reviewed/Revised:  01/25/2022     DSM5 Diagnoses: 296.31 (F33.0) Major Depressive Disorder, Recurrent Episode, Mild _ and With anxious distress  Psychosocial / Contextual Factors:   PROMIS (reviewed every 90 days):     Referral / Collaboration:  Referral to another professional/service is not indicated at this time..    Anticipated number of session for this episode of care: 15-25  Anticipation frequency of session: Biweekly  Anticipated Duration of each session: 38-52 minutes  Treatment plan will be reviewed in 90 days or when goals have been changed.        MeasurableTreatment Goal(s) related to diagnosis / functional impairment(s)  Goal 1: Patient will address issues underlying depressive feelings.    I will know I've met my goal when I am at peace with where I am and self.      Objective #A (Patient Action)    Patient will identify specific triggers to feelings of depression 90 % of the time.  Status: Continued - Date(s): 04/25/2022    Intervention(s)  Therapist will provide psychoeducation, behavioral activation, and cognitive restructuring.)    Objective #B  Patient will identify specific areas of cognitive distortion and challenge irrational thoughts with reality   Status: Continued - Date(s): 04/25/2022       Intervention(s)  Therapist will teach patient how negative thoughts can lead to negative feelings and actions and ways to reframe thoughts in a more positive way leading  to more positive feelings and helpful behaviors    Objective #C  Patient will learn relaxation techniques to manage depression.  Status: Continued - Date(s): 04/25/2022    Intervention(s)  Therapist will teach patient  thought stopping, deep breathing exercises, mindful meditation and creative visualization.            Patient has reviewed and agreed to the above plan.      KATHRYN Mcdonald  January 25, 2022        [facility]                     "                   Abhishek Downey     SAFETY PLAN:  Step 1: Warning signs / cues (Thoughts, images, mood, situation, behavior) that a crisis may be developing:    Thoughts: \"I can't do this anymore\" and \"Nothing makes it better\"    Images: Thoughts of betrayal by partner    Thinking Processes: ruminations (can't stop thinking about my problems): relationship, housing and financial stress.    Mood: worsening depression and hopelessness    Behaviors: isolating/withdrawing     Situations: relationship problems   Step 2: Coping strategies - Things I can do to take my mind off of my problems without contacting another person (relaxation technique, physical activity):    Distress Tolerance Strategies:  relaxation activities: Visit mum and play a card game    Physical Activities: go for a walk and deep breathing    Focus on helpful thoughts:  \"I will get through this\"  Step 3: People and social settings that provide distraction:   Name:    Jacob Lopez)   Phone: [phone]   Name:    Smia ( sister)         Phone: [phone]      park and store   Step 4: Remind myself of people and things that are important to me and worth living for:    My kids and Mum and myself       Step 5: When I am in crisis, I can ask these people to help me use my safety plan:   Name:    Jacob Lopez)   Phone: [phone]   Name:    Sima ( sister)         Phone: [phone]     Step 6: Making the environment safe:     be around others  Step 7: Professionals or agencies I can contact during a crisis:    Suicide Prevention Lifeline: 1-472-394-DFQZ (4025)    Crisis Text Line Service (available 24 hours a day, 7 days a week): Text [place] to 581935  [facility] Crisis Services: 1-561.179.5899    Call 911 or go to my nearest emergency department.   I helped develop this safety plan and agree to use it when needed.  I have been given a copy of this plan.      Client signature _________________________________________________________________  Today s " date:  4/15/2022  Completed by Provider Name/ Credentials:  KATHRYN Mcdonald  April 15, 2022  Adapted from Safety Plan Template 2008 Lakeisha Hatch and Prince Gamez is reprinted with the express permission of the authors.  No portion of the Safety Plan Template may be reproduced without the express, written permission.  You can contact the authors at [email or web address] or [email or web address].

## 2022-05-12 ASSESSMENT — ANXIETY QUESTIONNAIRES: GAD7 TOTAL SCORE: 1

## 2022-05-24 ASSESSMENT — PATIENT HEALTH QUESTIONNAIRE - PHQ9
SUM OF ALL RESPONSES TO PHQ QUESTIONS 1-9: 0
SUM OF ALL RESPONSES TO PHQ QUESTIONS 1-9: 0

## 2022-05-25 ENCOUNTER — VIRTUAL VISIT (OUTPATIENT)
Dept: PSYCHOLOGY | Facility: CLINIC | Age: 47
End: 2022-05-25
Payer: COMMERCIAL

## 2022-05-25 DIAGNOSIS — F33.0 MAJOR DEPRESSIVE DISORDER, RECURRENT EPISODE, MILD WITH ANXIOUS DISTRESS (H): Primary | ICD-10-CM

## 2022-05-25 PROCEDURE — 90832 PSYTX W PT 30 MINUTES: CPT | Mod: 95 | Performed by: STUDENT IN AN ORGANIZED HEALTH CARE EDUCATION/TRAINING PROGRAM

## 2022-05-25 ASSESSMENT — ANXIETY QUESTIONNAIRES
3. WORRYING TOO MUCH ABOUT DIFFERENT THINGS: NOT AT ALL
GAD7 TOTAL SCORE: 0
1. FEELING NERVOUS, ANXIOUS, OR ON EDGE: NOT AT ALL
2. NOT BEING ABLE TO STOP OR CONTROL WORRYING: NOT AT ALL
GAD7 TOTAL SCORE: 0
7. FEELING AFRAID AS IF SOMETHING AWFUL MIGHT HAPPEN: NOT AT ALL
5. BEING SO RESTLESS THAT IT IS HARD TO SIT STILL: NOT AT ALL
6. BECOMING EASILY ANNOYED OR IRRITABLE: NOT AT ALL
4. TROUBLE RELAXING: NOT AT ALL

## 2022-05-25 NOTE — PROGRESS NOTES
[facility]                                     Progress Note    Patient Name: Abhishek Downey  Date: 05/25/2022         Service Type: Individual      Session Start Time: 10.00  Session End Time: 10.25     Session Length: 25 mns    Session #: 09    Attendees: Client attended alone    Service Modality:  Video Visit:      Provider verified identity through the following two step process.  Patient provided:  Patient is known previously to provider    Telemedicine Visit: The patient's condition can be safely assessed and treated via synchronous audio and visual telemedicine encounter.      Reason for Telemedicine Visit: Patient has requested telehealth visit    Originating Site (Patient Location): Patient's home    Distant Site (Provider Location): Provider Remote Setting- Home Office    Consent:  The patient/guardian has verbally consented to: the potential risks and benefits of telemedicine (video visit) versus in person care; bill my insurance or make self-payment for services provided; and responsibility for payment of non-covered services.     Patient would like the video invitation sent by:  My Chart    Mode of Communication:  Video Conference via Amwell    As the provider I attest to compliance with applicable laws and regulations related to telemedicine.    DATA  Interactive Complexity: No  Crisis: No        Progress Since Last Session (Related to Symptoms / Goals / Homework):   Symptoms: Improving as evident by current phq9 and gad7 scores    Homework: Achieved / completed to satisfaction Discussed the adjustment to be made following divorce;  physical and psychological, discussed housing options and plans for income based housing resources. Discuss financial obligations and responsibilities for self and the kids.      Episode of Care Goals: Satisfactory progress - ACTION (Actively working towards change); Intervened by reinforcing change plan / affirming steps taken     Current /  Ongoing Stressors and Concerns:  Pt reported that he wants to see his kids regularly,  but unable due to the limitations put in place by the court one of which is for him to par for supervise visitation which he cant afford to pay weekly. Currently feels sad, frustrated and isolated from his kids.      Treatment Objective(s) Addressed in This Session:   Increase interest, engagement, and pleasure in doing things     Intervention:          Discussed cognitive restructuring and behavioral activation.  Explored the connection between thoughts, feelings, and actions by using examples relative to client's presenting concerns.  Explained major domains of symptoms (cognitive, emotional, somatic, behavioral, interpersonal) and importance of targeted and specific interventions to reduce symptoms of anxiety and depression.  Discussed role of symptom monitoring in cognitive behavioral treatment.       Assessments completed prior to visit:  The following assessments were completed by patient for this visit:  PHQ9:   PHQ-9 SCORE 3/8/2022 4/12/2022 4/12/2022 4/25/2022 5/11/2022 5/24/2022 5/25/2022   PHQ-9 Total Score - - - - - - -   PHQ-9 Total Score MyChart 6 (Mild depression) - 0 4 (Minimal depression) - 0 -   PHQ-9 Total Score 10 0 0 4 2 0 4     GAD7:   SHARATH-7 SCORE 11/24/2021 2/21/2022 3/8/2022 4/13/2022 4/25/2022 5/11/2022 5/25/2022   Total Score - - - - 3 (minimal anxiety) - -   Total Score 7 10 9 0 3 1 0         ASSESSMENT: Current Emotional / Mental Status (status of significant symptoms):   Risk status (Self / Other harm or suicidal ideation)   Patient denies current fears or concerns for personal safety.   Patient denies current or recent suicidal ideation or behaviors.   Patient denies current or recent homicidal ideation or behaviors.   Patient denies current or recent self injurious behavior or ideation.   Patient denies other safety concerns.   Patient reports there has been no change in risk factors since their last  session.     Patient reports there has been no change in protective factors since their last session.     Recommended that patient call 911 or go to the local ED should there be a change in any of these risk factors.     Appearance:   Appropriate    Eye Contact:   Fair    Psychomotor Behavior: Normal    Attitude:   Cooperative  Interested   Orientation:   All   Speech    Rate / Production: Normal/ Responsive    Volume:  Normal    Mood:    Euthymic   Affect:    Blunted    Thought Content:  Clear    Thought Form:  Coherent    Insight:    Good      Medication Review:   Changes to psychiatric medications, see updated Medication List in EPIC.      Medication Compliance:   Yes     Changes in Health Issues:   None reported     Chemical Use Review:   Substance Use: Chemical use reviewed, no active concerns identified      Tobacco Use: No current tobacco use.      Diagnosis:  296.31 (F33.0) Major Depressive Disorder, Recurrent Episode, Mild _ and With anxious distress    Collateral Reports Completed:   Not Applicable    PLAN: (Patient Tasks / Therapist Tasks / Other)    Identify and practice 2 daily outdoor activities and 2 weekly social connections with friends and community.         Oneil Yeager [facility]            ----- Service Performed and Documented by [facility]------  This note was reviewed and clinical supervision by LAURA [facility]    5/26/2022                            ______________________________________________________________    Individual Treatment Plan    Patient's Name: Abhishek Downey  YOB: 1975    Date of Creation:  01/25/2022   Date Treatment Plan Last Reviewed/Revised:  05/25/2022     DSM5 Diagnoses: 296.31 (F33.0) Major Depressive Disorder, Recurrent Episode, Mild _ and With anxious distress  Psychosocial / Contextual Factors:   PROMIS (reviewed every 90 days):     Referral / Collaboration:  Referral to another professional/service is not indicated at this time..    Anticipated  number of session for this episode of care: 15-25  Anticipation frequency of session: Biweekly  Anticipated Duration of each session: 38-52 minutes  Treatment plan will be reviewed in 90 days or when goals have been changed.        MeasurableTreatment Goal(s) related to diagnosis / functional impairment(s)  Goal 1: Patient will address issues underlying depressive feelings.    I will know I've met my goal when I am at peace with where I am and self.      Objective #A (Patient Action)    Patient will identify specific triggers to feelings of depression 90 % of the time.  Status: Continued - Date(s): 08/25/2022    Intervention(s)  Therapist will provide psychoeducation, behavioral activation, and cognitive restructuring.)    Objective #B  Patient will identify specific areas of cognitive distortion and challenge irrational thoughts with reality   Status: Continued - Date(s): 08/25/2022       Intervention(s)  Therapist will teach patient how negative thoughts can lead to negative feelings and actions and ways to reframe thoughts in a more positive way leading  to more positive feelings and helpful behaviors    Objective #C  Patient will learn relaxation techniques to manage depression.  Status: Continued - Date(s): 08/25/2022    Intervention(s)  Therapist will teach patient  thought stopping, deep breathing exercises, mindful meditation and creative visualization.    Objective #D (Patient Action)    Develop and utilize 3 effective distress tolerance strategies to address SI.  Status: Continued - Date(s): 08/25/2022      Intervention(s)  Therapist will engage in collaborative brainstorming with pt to identify, select, and practice individualized distress tolerance coping strategies to address SI.        Patient has reviewed and agreed to the above plan.      KATHRYN Mcdonald  January 25, 2022        [facility]                                       Abhishek Downey     SAFETY PLAN:  Step 1: Warning  "signs / cues (Thoughts, images, mood, situation, behavior) that a crisis may be developing:    Thoughts: \"I can't do this anymore\" and \"Nothing makes it better\"    Images: Thoughts of betrayal by partner    Thinking Processes: ruminations (can't stop thinking about my problems): relationship, housing and financial stress.    Mood: worsening depression and hopelessness    Behaviors: isolating/withdrawing     Situations: relationship problems   Step 2: Coping strategies - Things I can do to take my mind off of my problems without contacting another person (relaxation technique, physical activity):    Distress Tolerance Strategies:  relaxation activities: Visit mum and play a card game    Physical Activities: go for a walk and deep breathing    Focus on helpful thoughts:  \"I will get through this\"  Step 3: People and social settings that provide distraction:   Name:    Jacob ( John)   Phone: [phone]   Name:    Sima ( sister)         Phone: [phone]      park and store   Step 4: Remind myself of people and things that are important to me and worth living for:    My kids and Mum and myself       Step 5: When I am in crisis, I can ask these people to help me use my safety plan:   Name:    Jacob ( John)   Phone: [phone]   Name:    Sima ( sister)         Phone: [phone]     Step 6: Making the environment safe:     be around others  Step 7: Professionals or agencies I can contact during a crisis:    Suicide Prevention Lifeline: 1-176-529-PBWY (9281)    Crisis Text Line Service (available 24 hours a day, 7 days a week): Text [place] to 015363  [facility] Crisis Services: 1-727.165.7304    Call 911 or go to my nearest emergency department.   I helped develop this safety plan and agree to use it when needed.  I have been given a copy of this plan.      Client signature _________________________________________________________________  Today s date:  4/15/2022  Completed by Provider Name/ Credentials:  Oneil Yeager" [facility]  April 15, 2022  Adapted from Safety Plan Template 2008 Lakeisha Hatch and Prince Gamez is reprinted with the express permission of the authors.  No portion of the Safety Plan Template may be reproduced without the express, written permission.  You can contact the authors at [email or web address] or [email or web address].

## 2022-06-08 ENCOUNTER — VIRTUAL VISIT (OUTPATIENT)
Dept: PSYCHOLOGY | Facility: CLINIC | Age: 47
End: 2022-06-08
Payer: COMMERCIAL

## 2022-06-08 DIAGNOSIS — F33.0 MAJOR DEPRESSIVE DISORDER, RECURRENT EPISODE, MILD WITH ANXIOUS DISTRESS (H): Primary | ICD-10-CM

## 2022-06-08 PROCEDURE — 90834 PSYTX W PT 45 MINUTES: CPT | Mod: 95 | Performed by: STUDENT IN AN ORGANIZED HEALTH CARE EDUCATION/TRAINING PROGRAM

## 2022-06-08 ASSESSMENT — PATIENT HEALTH QUESTIONNAIRE - PHQ9: SUM OF ALL RESPONSES TO PHQ QUESTIONS 1-9: 0

## 2022-06-08 ASSESSMENT — ANXIETY QUESTIONNAIRES
5. BEING SO RESTLESS THAT IT IS HARD TO SIT STILL: NOT AT ALL
1. FEELING NERVOUS, ANXIOUS, OR ON EDGE: NOT AT ALL
4. TROUBLE RELAXING: NOT AT ALL
GAD7 TOTAL SCORE: 0
2. NOT BEING ABLE TO STOP OR CONTROL WORRYING: NOT AT ALL
7. FEELING AFRAID AS IF SOMETHING AWFUL MIGHT HAPPEN: NOT AT ALL
6. BECOMING EASILY ANNOYED OR IRRITABLE: NOT AT ALL
3. WORRYING TOO MUCH ABOUT DIFFERENT THINGS: NOT AT ALL
GAD7 TOTAL SCORE: 0

## 2022-06-08 NOTE — PROGRESS NOTES
[facility]                                     Progress Note    Patient Name: Abhishek Downey  Date: 06/08/2022         Service Type: Individual      Session Start Time: 10.00  Session End Time: 10.27     Session Length: 27 mns    Session #: 10    Attendees: Client attended alone    Service Modality:  Video Visit:      Provider verified identity through the following two step process.  Patient provided:  Patient is known previously to provider    Telemedicine Visit: The patient's condition can be safely assessed and treated via synchronous audio and visual telemedicine encounter.      Reason for Telemedicine Visit: Patient has requested telehealth visit    Originating Site (Patient Location): Patient's home    Distant Site (Provider Location): Provider Remote Setting- Home Office    Consent:  The patient/guardian has verbally consented to: the potential risks and benefits of telemedicine (video visit) versus in person care; bill my insurance or make self-payment for services provided; and responsibility for payment of non-covered services.     Patient would like the video invitation sent by:  My Chart    Mode of Communication:  Video Conference via Amwell    As the provider I attest to compliance with applicable laws and regulations related to telemedicine.    DATA  Interactive Complexity: No  Crisis: No        Progress Since Last Session (Related to Symptoms / Goals / Homework):   Symptoms: Improving as evident by current phq9 and gad7 scores    Homework: Achieved / completed to satisfaction       Episode of Care Goals: Satisfactory progress - ACTION (Actively working towards change); Intervened by reinforcing change plan / affirming steps taken     Current / Ongoing Stressors and Concerns:  Pt reported feeling scared and uncertain about living space. Pt reported he is currently living with his mother and he is not on the lease at her apartment and is unsure about how long he can live  there. Pt reported he has stopped going to sleep at her sister's basement as her  does not seems to welcome him there again.      Treatment Objective(s) Addressed in This Session:   Increase interest, engagement, and pleasure in doing things   Identify 3 income based housing resources within the next week and connect with housing  in [facility] about availabilities.      Intervention:      MI Intervention: Expressed Empathy/Understanding, Supported Autonomy, Collaboration, Evocation, Permission to raise concern or advise, Open-ended questions and Reflections: simple and complex Explore current housing challenges. Discussed limitations with housing and living space at ziggy's apartment. Explore what pt has as a plan if he is asked to leave ziggy's apartment and encourage pt to explore other temporal living options with close friends as he explores income base housing resources and availabilities.     Assessments completed prior to visit:  The following assessments were completed by patient for this visit:  PHQ9:   PHQ-9 SCORE 4/12/2022 4/12/2022 4/25/2022 5/11/2022 5/24/2022 5/25/2022 6/8/2022   PHQ-9 Total Score - - - - - - -   PHQ-9 Total Score MyChart - 0 4 (Minimal depression) - 0 - -   PHQ-9 Total Score 0 0 4 2 0 4 0     GAD7:   SHARATH-7 SCORE 2/21/2022 3/8/2022 4/13/2022 4/25/2022 5/11/2022 5/25/2022 6/8/2022   Total Score - - - 3 (minimal anxiety) - - -   Total Score 10 9 0 3 1 0 0         ASSESSMENT: Current Emotional / Mental Status (status of significant symptoms):   Risk status (Self / Other harm or suicidal ideation)   Patient denies current fears or concerns for personal safety.   Patient denies current or recent suicidal ideation or behaviors.   Patient denies current or recent homicidal ideation or behaviors.   Patient denies current or recent self injurious behavior or ideation.   Patient denies other safety concerns.   Patient reports there has been no change in risk factors since their last  session.     Patient reports there has been no change in protective factors since their last session.     Recommended that patient call 911 or go to the local ED should there be a change in any of these risk factors.     Appearance:   Appropriate    Eye Contact:   Fair    Psychomotor Behavior: Normal    Attitude:   Cooperative  Interested   Orientation:   All   Speech    Rate / Production: Normal/ Responsive    Volume:  Normal    Mood:    Euthymic   Affect:    Blunted    Thought Content:  Clear    Thought Form:  Coherent    Insight:    Denial of Disorder     Medication Review:   Changes to psychiatric medications, see updated Medication List in EPIC.      Medication Compliance:   Yes     Changes in Health Issues:   None reported     Chemical Use Review:   Substance Use: Chemical use reviewed, no active concerns identified      Tobacco Use: No current tobacco use.      Diagnosis:  296.31 (F33.0) Major Depressive Disorder, Recurrent Episode, Mild _ and With anxious distress    Collateral Reports Completed:   Not Applicable    PLAN: (Patient Tasks / Therapist Tasks / Other)     Talk with friends about possible temporal accomodation and explore income base housing resources.      Oneil Yeager [facility]          ----- Service Performed and Documented by [facility]------  This note was reviewed and clinical supervision by LAURA [facility]    6/10/2022   ___________________________________________________________    Individual Treatment Plan    Patient's Name: Abhishek Downey  YOB: 1975    Date of Creation:  01/25/2022   Date Treatment Plan Last Reviewed/Revised:  05/25/2022     DSM5 Diagnoses: 296.31 (F33.0) Major Depressive Disorder, Recurrent Episode, Mild _ and With anxious distress  Psychosocial / Contextual Factors:   PROMIS (reviewed every 90 days):     Referral / Collaboration:  Referral to another professional/service is not indicated at this time..    Anticipated number of session for this episode  of care: 15-25  Anticipation frequency of session: Biweekly  Anticipated Duration of each session: 38-52 minutes  Treatment plan will be reviewed in 90 days or when goals have been changed.        MeasurableTreatment Goal(s) related to diagnosis / functional impairment(s)  Goal 1: Patient will address issues underlying depressive feelings.    I will know I've met my goal when I am at peace with where I am and self.      Objective #A (Patient Action)    Patient will identify specific triggers to feelings of depression 90 % of the time.  Status: Continued - Date(s): 08/25/2022    Intervention(s)  Therapist will provide psychoeducation, behavioral activation, and cognitive restructuring.)    Objective #B  Patient will identify specific areas of cognitive distortion and challenge irrational thoughts with reality   Status: Continued - Date(s): 08/25/2022       Intervention(s)  Therapist will teach patient how negative thoughts can lead to negative feelings and actions and ways to reframe thoughts in a more positive way leading  to more positive feelings and helpful behaviors    Objective #C  Patient will learn relaxation techniques to manage depression.  Status: Continued - Date(s): 08/25/2022    Intervention(s)  Therapist will teach patient  thought stopping, deep breathing exercises, mindful meditation and creative visualization.    Objective #D (Patient Action)    Develop and utilize 3 effective distress tolerance strategies to address SI.  Status: Continued - Date(s): 08/25/2022      Intervention(s)  Therapist will engage in collaborative brainstorming with pt to identify, select, and practice individualized distress tolerance coping strategies to address SI.        Patient has reviewed and agreed to the above plan.      KATHRYN Mcdonald  January 25, 2022        [facility]                                       Abhishek Downey     SAFETY PLAN:  Step 1: Warning signs / cues (Thoughts, images, mood,  "situation, behavior) that a crisis may be developing:    Thoughts: \"I can't do this anymore\" and \"Nothing makes it better\"    Images: Thoughts of betrayal by partner    Thinking Processes: ruminations (can't stop thinking about my problems): relationship, housing and financial stress.    Mood: worsening depression and hopelessness    Behaviors: isolating/withdrawing     Situations: relationship problems   Step 2: Coping strategies - Things I can do to take my mind off of my problems without contacting another person (relaxation technique, physical activity):    Distress Tolerance Strategies:  relaxation activities: Visit mum and play a card game    Physical Activities: go for a walk and deep breathing    Focus on helpful thoughts:  \"I will get through this\"  Step 3: People and social settings that provide distraction:   Name:    Jacob ( John)   Phone: [phone]   Name:    Sima ( sister)         Phone: [phone]      park and store   Step 4: Remind myself of people and things that are important to me and worth living for:    My kids and Mum and myself       Step 5: When I am in crisis, I can ask these people to help me use my safety plan:   Name:    Jacob Lopez)   Phone: [phone]   Name:    Sima ( sister)         Phone: [phone]     Step 6: Making the environment safe:     be around others  Step 7: Professionals or agencies I can contact during a crisis:    Suicide Prevention Lifeline: 8-844-406-TALK (6745)    Crisis Text Line Service (available 24 hours a day, 7 days a week): Text [place] to 267292  [facility] Crisis Services: 1-119.552.6966    Call 911 or go to my nearest emergency department.   I helped develop this safety plan and agree to use it when needed.  I have been given a copy of this plan.      Client signature _________________________________________________________________  Today s date:  4/15/2022  Completed by Provider Name/ Credentials:  KATHRYN Mcdonald  April 15, 2022  Adapted from Safety " Plan Template 2008 Lakeisha Hatch and Prince Gamez is reprinted with the express permission of the authors.  No portion of the Safety Plan Template may be reproduced without the express, written permission.  You can contact the authors at [email or web address] or [email or web address].

## 2022-06-22 ENCOUNTER — VIRTUAL VISIT (OUTPATIENT)
Dept: PSYCHOLOGY | Facility: CLINIC | Age: 47
End: 2022-06-22
Payer: COMMERCIAL

## 2022-06-22 DIAGNOSIS — F33.41 MAJOR DEPRESSIVE DISORDER, RECURRENT EPISODE, IN PARTIAL REMISSION WITH ANXIOUS DISTRESS (H): Primary | ICD-10-CM

## 2022-06-22 PROCEDURE — 90834 PSYTX W PT 45 MINUTES: CPT | Mod: 95 | Performed by: STUDENT IN AN ORGANIZED HEALTH CARE EDUCATION/TRAINING PROGRAM

## 2022-06-22 ASSESSMENT — ANXIETY QUESTIONNAIRES
GAD7 TOTAL SCORE: 0
5. BEING SO RESTLESS THAT IT IS HARD TO SIT STILL: NOT AT ALL
2. NOT BEING ABLE TO STOP OR CONTROL WORRYING: NOT AT ALL
1. FEELING NERVOUS, ANXIOUS, OR ON EDGE: NOT AT ALL
3. WORRYING TOO MUCH ABOUT DIFFERENT THINGS: NOT AT ALL
GAD7 TOTAL SCORE: 0
6. BECOMING EASILY ANNOYED OR IRRITABLE: NOT AT ALL
4. TROUBLE RELAXING: NOT AT ALL
7. FEELING AFRAID AS IF SOMETHING AWFUL MIGHT HAPPEN: NOT AT ALL

## 2022-06-22 ASSESSMENT — PATIENT HEALTH QUESTIONNAIRE - PHQ9
SUM OF ALL RESPONSES TO PHQ QUESTIONS 1-9: 0
SUM OF ALL RESPONSES TO PHQ QUESTIONS 1-9: 0

## 2022-06-22 NOTE — PROGRESS NOTES
[facility]                                     Progress Note    Patient Name: Abhishek Downey  Date: 06/22/2022         Service Type: Individual      Session Start Time: 10.00  Session End Time: 10.24     Session Length: 24 mns    Session #: 11    Attendees: Client attended alone    Service Modality:  Video Visit:      Provider verified identity through the following two step process.  Patient provided:  Patient is known previously to provider    Telemedicine Visit: The patient's condition can be safely assessed and treated via synchronous audio and visual telemedicine encounter.      Reason for Telemedicine Visit: Patient has requested telehealth visit    Originating Site (Patient Location): Patient's home    Distant Site (Provider Location): Provider Remote Setting- Home Office    Consent:  The patient/guardian has verbally consented to: the potential risks and benefits of telemedicine (video visit) versus in person care; bill my insurance or make self-payment for services provided; and responsibility for payment of non-covered services.     Patient would like the video invitation sent by:  My Chart    Mode of Communication:  Video Conference via Amwell    As the provider I attest to compliance with applicable laws and regulations related to telemedicine.    DATA  Interactive Complexity: No  Crisis: No        Progress Since Last Session (Related to Symptoms / Goals / Homework):   Symptoms: Improving as evident by current phq9 and gad7 scores     Homework: Achieved / completed to satisfaction Review what pt has been finding with  income base housing resources.      Episode of Care Goals: Satisfactory progress - ACTION (Actively working towards change); Intervened by reinforcing change plan / affirming steps taken     Current / Ongoing Stressors and Concerns:  Pt reported he is still not comfortable with current housing situation and is looking for housing but also having mixed feelings  about leaving his 72 year old mum alone who needs daily care with ADLs     Treatment Objective(s) Addressed in This Session:   Increase interest, engagement, and pleasure in doing things   Identify 3 income based housing resources within the next week and connect with housing  in [facility] about availabilities.      Intervention:    MI Intervention: Expressed Empathy/Understanding, Supported Autonomy, Collaboration, Evocation, Permission to raise concern or advise, Open-ended questions and Reflections: simple and complex Explore what pt has done with his search for housing. Discussed environment and interest and reason for choice. Explore what pt can pay for housing. Discussed proximity of current options to mum and kids. Explore what it will mean to have an independent living space.    Assessments completed prior to visit:  The following assessments were completed by patient for this visit:  PHQ9:   PHQ-9 SCORE 4/25/2022 5/11/2022 5/24/2022 5/25/2022 6/8/2022 6/22/2022 6/22/2022   PHQ-9 Total Score - - - - - - -   PHQ-9 Total Score MyChart 4 (Minimal depression) - 0 - - - 0   PHQ-9 Total Score 4 2 0 4 0 0 0     GAD7:   SHARATH-7 SCORE 3/8/2022 4/13/2022 4/25/2022 5/11/2022 5/25/2022 6/8/2022 6/22/2022   Total Score - - 3 (minimal anxiety) - - - -   Total Score 9 0 3 1 0 0 0         ASSESSMENT: Current Emotional / Mental Status (status of significant symptoms):   Risk status (Self / Other harm or suicidal ideation)   Patient denies current fears or concerns for personal safety.   Patient denies current or recent suicidal ideation or behaviors.   Patient denies current or recent homicidal ideation or behaviors.   Patient denies current or recent self injurious behavior or ideation.   Patient denies other safety concerns.   Patient reports there has been no change in risk factors since their last session.     Patient reports there has been no change in protective factors since their last session.     Recommended  that patient call 911 or go to the local ED should there be a change in any of these risk factors.     Appearance:   Appropriate    Eye Contact:   Fair    Psychomotor Behavior: Normal    Attitude:   Cooperative  Interested   Orientation:   All   Speech    Rate / Production: Normal/ Responsive    Volume:  Normal    Mood:    Euthymic   Affect:    Appropriate    Thought Content:  Clear    Thought Form:  Coherent    Insight:    Denial of Disorder     Medication Review:   No current psychiatric medications prescribed     Medication Compliance:   Yes     Changes in Health Issues:   None reported     Chemical Use Review:   Substance Use: Chemical use reviewed, no active concerns identified      Tobacco Use: No current tobacco use.      Diagnosis:  Major depressive disorder, recurrent episode, in partial remission with anxious distress (H) [F33.41, F41.8]    Collateral Reports Completed:   Not Applicable    PLAN: (Patient Tasks / Therapist Tasks / Other)     Plan for last session, think about the following and process next session:   1) What have you gained from therapy in the past 6 months?   2) What are areas of improvement?   3) What are your goals moving forward: short and long term goals?        Oneil Yeager [facility]          ----- Service Performed and Documented by [facility]------  This note was reviewed and clinical supervision by LAURA Wild [facility]    6/24/2022   ___________________________________________________________    Individual Treatment Plan    Patient's Name: Abhishek Downey  YOB: 1975    Date of Creation:  01/25/2022   Date Treatment Plan Last Reviewed/Revised:  05/25/2022     DSM5 Diagnoses: 296.31 (F33.0) Major Depressive Disorder, Recurrent Episode, Mild _ and With anxious distress  Psychosocial / Contextual Factors:   PROMIS (reviewed every 90 days):     Referral / Collaboration:  Referral to another professional/service is not indicated at this time..    Anticipated number of session  for this episode of care: 15-25  Anticipation frequency of session: Biweekly  Anticipated Duration of each session: 38-52 minutes  Treatment plan will be reviewed in 90 days or when goals have been changed.        MeasurableTreatment Goal(s) related to diagnosis / functional impairment(s)  Goal 1: Patient will address issues underlying depressive feelings.    I will know I've met my goal when I am at peace with where I am and self.      Objective #A (Patient Action)    Patient will identify specific triggers to feelings of depression 90 % of the time.  Status: Continued - Date(s): 08/25/2022    Intervention(s)  Therapist will provide psychoeducation, behavioral activation, and cognitive restructuring.)    Objective #B  Patient will identify specific areas of cognitive distortion and challenge irrational thoughts with reality   Status: Continued - Date(s): 08/25/2022       Intervention(s)  Therapist will teach patient how negative thoughts can lead to negative feelings and actions and ways to reframe thoughts in a more positive way leading  to more positive feelings and helpful behaviors    Objective #C  Patient will learn relaxation techniques to manage depression.  Status: Continued - Date(s): 08/25/2022    Intervention(s)  Therapist will teach patient  thought stopping, deep breathing exercises, mindful meditation and creative visualization.    Objective #D (Patient Action)    Develop and utilize 3 effective distress tolerance strategies to address SI.  Status: Continued - Date(s): 08/25/2022      Intervention(s)  Therapist will engage in collaborative brainstorming with pt to identify, select, and practice individualized distress tolerance coping strategies to address SI.        Patient has reviewed and agreed to the above plan.      KATHRYN Mcdonald  January 25, 2022        [facility]                                       Abhishek Downey     SAFETY PLAN:  Step 1: Warning signs / cues  "(Thoughts, images, mood, situation, behavior) that a crisis may be developing:    Thoughts: \"I can't do this anymore\" and \"Nothing makes it better\"    Images: Thoughts of betrayal by partner    Thinking Processes: ruminations (can't stop thinking about my problems): relationship, housing and financial stress.    Mood: worsening depression and hopelessness    Behaviors: isolating/withdrawing     Situations: relationship problems   Step 2: Coping strategies - Things I can do to take my mind off of my problems without contacting another person (relaxation technique, physical activity):    Distress Tolerance Strategies:  relaxation activities: Visit mum and play a card game    Physical Activities: go for a walk and deep breathing    Focus on helpful thoughts:  \"I will get through this\"  Step 3: People and social settings that provide distraction:   Name:    Jacob Lopez)   Phone: [phone]   Name:    Sima ( sister)         Phone: [phone]      park and store   Step 4: Remind myself of people and things that are important to me and worth living for:    My kids and Mum and myself       Step 5: When I am in crisis, I can ask these people to help me use my safety plan:   Name:    Jacob Lopez)   Phone: [phone]   Name:    Sima ( sister)         Phone: [phone]     Step 6: Making the environment safe:     be around others  Step 7: Professionals or agencies I can contact during a crisis:    Suicide Prevention Lifeline: 0-213-023-NLXP (9249)    Crisis Text Line Service (available 24 hours a day, 7 days a week): Text [place] to 708760  [facility] Crisis Services: 1-318.660.2660    Call 911 or go to my nearest emergency department.   I helped develop this safety plan and agree to use it when needed.  I have been given a copy of this plan.      Client signature _________________________________________________________________  Today s date:  4/15/2022  Completed by Provider Name/ Credentials:  KATHRYN Mcdonald  April 15, " 2022  Adapted from Safety Plan Template 2008 Lakeisha Hatch and Prince Gamez is reprinted with the express permission of the authors.  No portion of the Safety Plan Template may be reproduced without the express, written permission.  You can contact the authors at [email or web address] or [email or web address].

## 2022-06-28 ENCOUNTER — HOSPITAL ENCOUNTER (EMERGENCY)
Facility: CLINIC | Age: 47
Discharge: ANOTHER HOSPITAL | End: 2022-06-28
Attending: FAMILY MEDICINE | Admitting: FAMILY MEDICINE
Payer: MEDICARE

## 2022-06-28 ENCOUNTER — APPOINTMENT (OUTPATIENT)
Dept: GENERAL RADIOLOGY | Facility: CLINIC | Age: 47
End: 2022-06-28
Attending: FAMILY MEDICINE
Payer: MEDICARE

## 2022-06-28 VITALS
BODY MASS INDEX: 29.8 KG/M2 | WEIGHT: 220 LBS | DIASTOLIC BLOOD PRESSURE: 77 MMHG | RESPIRATION RATE: 18 BRPM | HEART RATE: 85 BPM | SYSTOLIC BLOOD PRESSURE: 120 MMHG | TEMPERATURE: 97.3 F | HEIGHT: 72 IN | OXYGEN SATURATION: 100 %

## 2022-06-28 DIAGNOSIS — N17.9 ACUTE RENAL FAILURE, UNSPECIFIED ACUTE RENAL FAILURE TYPE (H): ICD-10-CM

## 2022-06-28 DIAGNOSIS — R06.02 SHORTNESS OF BREATH: ICD-10-CM

## 2022-06-28 DIAGNOSIS — R79.9 ELEVATED BUN: ICD-10-CM

## 2022-06-28 LAB
ALBUMIN SERPL-MCNC: 4.7 G/DL (ref 3.4–5)
ALP SERPL-CCNC: 186 U/L (ref 40–150)
ALT SERPL W P-5'-P-CCNC: 73 U/L (ref 0–70)
ANION GAP SERPL CALCULATED.3IONS-SCNC: 5 MMOL/L (ref 3–14)
AST SERPL W P-5'-P-CCNC: 40 U/L (ref 0–45)
BASOPHILS # BLD AUTO: 0 10E3/UL (ref 0–0.2)
BASOPHILS NFR BLD AUTO: 0 %
BILIRUB SERPL-MCNC: 1.1 MG/DL (ref 0.2–1.3)
BUN SERPL-MCNC: 62 MG/DL (ref 7–30)
CALCIUM SERPL-MCNC: 9.6 MG/DL (ref 8.5–10.1)
CHLORIDE BLD-SCNC: 109 MMOL/L (ref 94–109)
CO2 SERPL-SCNC: 21 MMOL/L (ref 20–32)
CREAT SERPL-MCNC: 3.32 MG/DL (ref 0.66–1.25)
CRP SERPL-MCNC: 8.2 MG/L (ref 0–8)
D DIMER PPP FEU-MCNC: <0.27 UG/ML FEU (ref 0–0.5)
EOSINOPHIL # BLD AUTO: 0.2 10E3/UL (ref 0–0.7)
EOSINOPHIL NFR BLD AUTO: 5 %
ERYTHROCYTE [DISTWIDTH] IN BLOOD BY AUTOMATED COUNT: 12.9 % (ref 10–15)
GFR SERPL CREATININE-BSD FRML MDRD: 23 ML/MIN/1.73M2
GLUCOSE BLD-MCNC: 173 MG/DL (ref 70–99)
HCT VFR BLD AUTO: 38.2 % (ref 40–53)
HGB BLD-MCNC: 14.1 G/DL (ref 13.3–17.7)
HOLD SPECIMEN: NORMAL
IMM GRANULOCYTES # BLD: 0 10E3/UL
IMM GRANULOCYTES NFR BLD: 1 %
LYMPHOCYTES # BLD AUTO: 1.1 10E3/UL (ref 0.8–5.3)
LYMPHOCYTES NFR BLD AUTO: 31 %
MCH RBC QN AUTO: 31 PG (ref 26.5–33)
MCHC RBC AUTO-ENTMCNC: 36.9 G/DL (ref 31.5–36.5)
MCV RBC AUTO: 84 FL (ref 78–100)
MONOCYTES # BLD AUTO: 0.3 10E3/UL (ref 0–1.3)
MONOCYTES NFR BLD AUTO: 10 %
NEUTROPHILS # BLD AUTO: 1.8 10E3/UL (ref 1.6–8.3)
NEUTROPHILS NFR BLD AUTO: 53 %
NRBC # BLD AUTO: 0 10E3/UL
NRBC BLD AUTO-RTO: 0 /100
NT-PROBNP SERPL-MCNC: 34 PG/ML (ref 0–450)
PLATELET # BLD AUTO: 103 10E3/UL (ref 150–450)
POTASSIUM BLD-SCNC: 4.7 MMOL/L (ref 3.4–5.3)
PROT SERPL-MCNC: 8.3 G/DL (ref 6.8–8.8)
RBC # BLD AUTO: 4.55 10E6/UL (ref 4.4–5.9)
SODIUM SERPL-SCNC: 135 MMOL/L (ref 133–144)
TROPONIN I SERPL HS-MCNC: 10 NG/L
WBC # BLD AUTO: 3.4 10E3/UL (ref 4–11)

## 2022-06-28 PROCEDURE — 93005 ELECTROCARDIOGRAM TRACING: CPT | Performed by: FAMILY MEDICINE

## 2022-06-28 PROCEDURE — 71046 X-RAY EXAM CHEST 2 VIEWS: CPT

## 2022-06-28 PROCEDURE — 96360 HYDRATION IV INFUSION INIT: CPT | Performed by: FAMILY MEDICINE

## 2022-06-28 PROCEDURE — 82310 ASSAY OF CALCIUM: CPT | Performed by: FAMILY MEDICINE

## 2022-06-28 PROCEDURE — 99285 EMERGENCY DEPT VISIT HI MDM: CPT | Mod: 25 | Performed by: FAMILY MEDICINE

## 2022-06-28 PROCEDURE — 84484 ASSAY OF TROPONIN QUANT: CPT | Performed by: FAMILY MEDICINE

## 2022-06-28 PROCEDURE — 36415 COLL VENOUS BLD VENIPUNCTURE: CPT | Performed by: FAMILY MEDICINE

## 2022-06-28 PROCEDURE — 86140 C-REACTIVE PROTEIN: CPT | Performed by: FAMILY MEDICINE

## 2022-06-28 PROCEDURE — 258N000003 HC RX IP 258 OP 636: Performed by: FAMILY MEDICINE

## 2022-06-28 PROCEDURE — 83880 ASSAY OF NATRIURETIC PEPTIDE: CPT | Performed by: FAMILY MEDICINE

## 2022-06-28 PROCEDURE — 85379 FIBRIN DEGRADATION QUANT: CPT | Performed by: FAMILY MEDICINE

## 2022-06-28 PROCEDURE — 93010 ELECTROCARDIOGRAM REPORT: CPT | Performed by: FAMILY MEDICINE

## 2022-06-28 PROCEDURE — 85025 COMPLETE CBC W/AUTO DIFF WBC: CPT | Performed by: FAMILY MEDICINE

## 2022-06-28 RX ORDER — IOPAMIDOL 755 MG/ML
89 INJECTION, SOLUTION INTRAVASCULAR ONCE
Status: DISCONTINUED | OUTPATIENT
Start: 2022-06-28 | End: 2022-06-28

## 2022-06-28 RX ADMIN — SODIUM CHLORIDE 500 ML: 9 INJECTION, SOLUTION INTRAVENOUS at 18:30

## 2022-06-28 NOTE — ED TRIAGE NOTES
Increased lightheadedness x 3-4 weeks, abdominal discomfort and chest tightness.      Triage Assessment     Row Name 06/28/22 1548       Cardiac WDL    Cardiac WDL X;chest pain

## 2022-06-28 NOTE — ED PROVIDER NOTES
HPI   The patient is a 46-year-old male presenting with concern for fatigue and shortness of breath that have been progressing over the last 3 weeks.  He has a known history of primary sclerosing cholangitis and complications including esophageal varices.  He has had a transplanted liver performed at the [facility].  The PSC is related to ulcerative colitis, status post colectomy.  History of duodenal ulcer.  History of diabetes.  He denies recent medication change.    The patient presents with 3 weeks of progressive fatigue and shortness of breath that are more obvious with any exertion.  Yesterday he tried to play Frisbee golf and had to stop multiple times and sit down and rest.  This is very unusual for him.  Simple things like performing chores around the house now causes him to become short of breath.  He denies associated chest pain.  He does report having a cough but this is dry.  No fever.  No early infectious symptoms starting 3 weeks ago.  No leg pain or swelling.  No palpitations.  He does get lightheaded though when he is short of breath.  Changing position also seems to cause lightheadedness and near syncope.  No fainting reported.  No obvious hematochezia or melena.  No vomiting.  He tells me that he has been nauseous off and on with some epigastric discomfort during this time.  The symptoms are not necessarily worsen though.        Allergies:  Allergies   Allergen Reactions     No Known Allergies      Problem List:    Patient Active Problem List    Diagnosis Date Noted     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) 03/24/2022     Priority: Medium     Transplanted liver (H) 09/11/2018     Priority: Medium     Type 2 diabetes mellitus with complication, with long-term current use of insulin (H) 08/07/2017     Priority: Medium     Thrombocytopenia (H) 06/26/2017     Priority: Medium     History of duodenal ulcer 06/26/2017     Priority: Medium     Mass of left upper extremity  "03/30/2016     Priority: Medium     Primary sclerosing cholangitis 09/05/2015     Priority: Medium     Esophageal varices (H) 09/05/2015     Priority: Medium     CARDIOVASCULAR SCREENING; LDL GOAL LESS THAN 160 10/31/2010     Priority: Medium     H/O ulcerative colitis - s/p total colectomy 10/31/2008     Priority: Medium      Past Medical History:    Past Medical History:   Diagnosis Date     Cirrhosis of liver (H)      DU (duodenal ulcer) 5/2015     PSC (primary sclerosing cholangitis)      Ulcerative colitis      Past Surgical History:    Past Surgical History:   Procedure Laterality Date     COLECTOMY  12/27/07     FLEXIBLE SIGMOIDOSCOPY  10/4/10      ERCP W/W/O NONA OF SPEC-BRUSH/WASH  02/19/10     HC ERCP W/W/O NONA OF SPEC-BRUSH/WASH  03/05/10      UGI ENDOSCOPY W BANDING ESOPH/GASTRIC VARICES  since 2/2015    monthly at [facility]; last one in August      UPPER GI ENDOSCOPY  11/13/13    [facility]     UPPER GI ENDOSCOPY  2/16/15    [facility]     Family History:    Family History   Problem Relation Age of Onset     Heart Failure Mother      Social History:  Marital Status:   [2]  Social History     Tobacco Use     Smoking status: Never Smoker     Smokeless tobacco: Never Used   Substance Use Topics     Alcohol use: No     Alcohol/week: 0.0 standard drinks     Comment: 2-3x per year     Drug use: No      Medications:    amLODIPine (NORVASC) 10 MG tablet  aspirin 81 MG tablet  BD VEO INSULIN SYRINGE U/F 31G X 15/64\" 0.5 ML  ciprofloxacin (CIPRO) 500 MG tablet  insulin  UNIT/ML injection  lisinopril (ZESTRIL) 10 MG tablet  metFORMIN (GLUCOPHAGE-XR) 500 MG 24 hr tablet  mirtazapine (REMERON) 30 MG tablet  sertraline (ZOLOFT) 100 MG tablet  tacrolimus (GENERIC EQUIVALENT) 0.5 MG capsule      Review of Systems   All other systems reviewed and are negative.      PE   BP: 123/86  Pulse: 106  Temp: 97.3  F (36.3  C)  Resp: 18  Height: 182.9 cm (6')  Weight: 99.8 kg (220 lb)  SpO2: 99 " %  Physical Exam  Vitals and nursing note reviewed.   Constitutional:       General: He is not in acute distress.     Comments: Cooperative, sitting up in bed, no apparent distress.   HENT:      Head: Atraumatic.      Right Ear: External ear normal.      Left Ear: External ear normal.      Nose: Nose normal.      Mouth/Throat:      Mouth: Mucous membranes are moist.      Pharynx: Oropharynx is clear.   Eyes:      General: No scleral icterus.     Extraocular Movements: Extraocular movements intact.      Conjunctiva/sclera: Conjunctivae normal.      Pupils: Pupils are equal, round, and reactive to light.   Cardiovascular:      Rate and Rhythm: Normal rate.      Heart sounds: Normal heart sounds.   Pulmonary:      Effort: Pulmonary effort is normal. No respiratory distress.      Breath sounds: Normal breath sounds.   Musculoskeletal:         General: Normal range of motion.      Cervical back: Normal range of motion.   Skin:     General: Skin is warm and dry.   Neurological:      Mental Status: He is alert and oriented to person, place, and time.   Psychiatric:         Behavior: Behavior normal.         ED COURSE and MDM   1816.  The patient presents with progressive shortness of breath and fatigue.  He has lightheadedness.  He has nausea.  Complicated past medical history described above.    1956.  The patient has acute renal failure.  Tacrolimus?  I spoke with Dr. Biggs, nephrology at the [facility], who is recommending admission and a broad work-up.  He is requesting a tacrolimus trough level.  The patient last took his tacrolimus at noon today.  Ultrasound of the kidney recommended.  Urine analysis and urine protein recommended.  Fluid bolus will be given here.  D-dimer added as the patient was not able to get a CT scan because of his kidney function.  He is low risk for PE.  Chest x-ray negative.    2046.  Pt accepted by the [facility] by Dr. Arce, Transplant Surgery.  Pt requesting transport by  private car.  I am agreeable with this plan.    EKG  (1920)   Interpretation performed by me.  Rate: 97     Rhythm: sinus     Axis: nl  Intervals: NV (12-2) 136, QRS (<12) 88, QTc (>5) 386  P wave: down in V1     QRS complex: RSR, nondiagnostic   ST segment / T-wave: T wave inversion in lead V1 only.  Conclusion: T wave inversion in V1, possibly related to left atrial enlargement.    LABS  Labs Ordered and Resulted from Time of ED Arrival to Time of ED Departure   COMPREHENSIVE METABOLIC PANEL - Abnormal       Result Value    Sodium 135      Potassium 4.7      Chloride 109      Carbon Dioxide (CO2) 21      Anion Gap 5      Urea Nitrogen 62 (*)     Creatinine 3.32 (*)     Calcium 9.6      Glucose 173 (*)     Alkaline Phosphatase 186 (*)     AST 40      ALT 73 (*)     Protein Total 8.3      Albumin 4.7      Bilirubin Total 1.1      GFR Estimate 23 (*)    CRP INFLAMMATION - Abnormal    CRP Inflammation 8.2 (*)    CBC WITH PLATELETS AND DIFFERENTIAL - Abnormal    WBC Count 3.4 (*)     RBC Count 4.55      Hemoglobin 14.1      Hematocrit 38.2 (*)     MCV 84      MCH 31.0      MCHC 36.9 (*)     RDW 12.9      Platelet Count 103 (*)     % Neutrophils 53      % Lymphocytes 31      % Monocytes 10      % Eosinophils 5      % Basophils 0      % Immature Granulocytes 1      NRBCs per 100 WBC 0      Absolute Neutrophils 1.8      Absolute Lymphocytes 1.1      Absolute Monocytes 0.3      Absolute Eosinophils 0.2      Absolute Basophils 0.0      Absolute Immature Granulocytes 0.0      Absolute NRBCs 0.0     TROPONIN I - Normal    Troponin I High Sensitivity 10     NT PROBNP INPATIENT - Normal    N terminal Pro BNP Inpatient 34     D DIMER QUANTITATIVE - Normal    D-Dimer Quantitative <0.27         IMAGING  Images reviewed by me.  Radiology report also reviewed.  XR Chest 2 Views   Final Result   IMPRESSION: Old fracture right clavicle. Otherwise negative.          Procedures    Medications   0.9% sodium chloride BOLUS (0 mLs  Intravenous Stopped 6/28/22 1912)         IMPRESSION       ICD-10-CM    1. Acute renal failure, unspecified acute renal failure type (H)  N17.9    2. Elevated BUN  R79.9    3. Shortness of breath  R06.02             Medication List      There are no discharge medications for this visit.                     Elliot Garner MD  06/28/22 204

## 2022-06-28 NOTE — ED NOTES
Dizziness x 3 to 4 weeks. Has liver transplant from [facility]. Also colectomy. States he get dizzy on standing and just not feeling right. Sob while walking.

## 2022-07-06 ENCOUNTER — VIRTUAL VISIT (OUTPATIENT)
Dept: PSYCHOLOGY | Facility: CLINIC | Age: 47
End: 2022-07-06
Payer: COMMERCIAL

## 2022-07-06 DIAGNOSIS — F33.41: Primary | ICD-10-CM

## 2022-07-06 PROCEDURE — 90834 PSYTX W PT 45 MINUTES: CPT | Mod: 95 | Performed by: STUDENT IN AN ORGANIZED HEALTH CARE EDUCATION/TRAINING PROGRAM

## 2022-07-06 ASSESSMENT — ANXIETY QUESTIONNAIRES
7. FEELING AFRAID AS IF SOMETHING AWFUL MIGHT HAPPEN: NOT AT ALL
GAD7 TOTAL SCORE: 0
GAD7 TOTAL SCORE: 0
5. BEING SO RESTLESS THAT IT IS HARD TO SIT STILL: NOT AT ALL
4. TROUBLE RELAXING: NOT AT ALL
6. BECOMING EASILY ANNOYED OR IRRITABLE: NOT AT ALL
1. FEELING NERVOUS, ANXIOUS, OR ON EDGE: NOT AT ALL
2. NOT BEING ABLE TO STOP OR CONTROL WORRYING: NOT AT ALL
3. WORRYING TOO MUCH ABOUT DIFFERENT THINGS: NOT AT ALL

## 2022-07-06 ASSESSMENT — PATIENT HEALTH QUESTIONNAIRE - PHQ9: SUM OF ALL RESPONSES TO PHQ QUESTIONS 1-9: 2

## 2022-07-06 NOTE — PROGRESS NOTES
Discharge Summary  Single Session    Client Name: Abhishek Downey MRN#: [id number] YOB: 1975    Discharge Date:   July 6, 2022    Service Modality: Video Visit:      Provider verified identity through the following two step process.  Patient provided:  Patient is known previously to provider    Telemedicine Visit: The patient's condition can be safely assessed and treated via synchronous audio and visual telemedicine encounter.      Reason for Telemedicine Visit: Patient has requested telehealth visit    Originating Site (Patient Location): Patient's home    Distant Site (Provider Location): Provider Remote Setting- Home Office    Consent:  The patient/guardian has verbally consented to: the potential risks and benefits of telemedicine (video visit) versus in person care; bill my insurance or make self-payment for services provided; and responsibility for payment of non-covered services.     Patient would like the video invitation sent by:  My Chart    Mode of Communication:  Video Conference via Hobzy    As the provider I attest to compliance with applicable laws and regulations related to telemedicine.    Service Type: Individual      Session Start Time: 12.30  Session End Time: 12.55      Session Length: 20 - 30     Session #: 12     Attendees: Client attended alone      Focus of Treatment Objective(s):  Client's presenting concerns included: Depressed Mood - associeted with stressful family relationship with partner  Anger Management - associeted with stressful family relationship with partner (  processed reportedly finalised)  Stage of Change at time of Discharge: ACTION (Actively working towards change)    Medication Adherence:  Yes    Chemical Use:  No    Assessment: Current Emotional / Mental Status (status of significant symptoms):    Risk status (Self / Other harm or suicidal ideation)  Client denies current fears or concerns for personal safety.  Client denies  current or recent suicidal ideation or behaviors.  Client denies current or recent homicidal ideation or behaviors.  Client denies current or recent self injurious behavior or ideation.  Client denies other safety concerns.  A safety and risk management plan has not been developed at this time, however client was given the after-hours number should there be a change in any of these risk factors.    Appearance:   Appropriate   Eye Contact:   Good   Psychomotor Behavior: Normal   Attitude:   Cooperative  Interested Friendly  Orientation:   Person Place Time Situation  Speech   Rate / Production: Normal/ Responsive Normal    Volume:  Normal   Mood:    Normal Euthymic  Affect:    Appropriate   Thought Content:  Clear   Thought Form:  Coherent  Goal Directed   Insight:   Good     DSM5 Diagnoses: (Sustained by DSM5 Criteria Listed Above)  Diagnoses: 296.35 (F33.41)  Major Depressive Disorder, Recurrent Episode, In partial remission _ and With atypical features  Psychosocial & Contextual Factors: Stressful relationship with partly ( reported divorce finalized), housing challenges.  WHODAS 2.0 (12 item) Score:     Reason for Discharge:  Client is satisfied with progress and Goals completed      Aftercare Plan:  Client may resume counseling services at any time in the future by calling the [facility] Intake Office, [phone].      KATHRYN Mcdonald  July 6, 2022    ----- Service Performed and Documented by KATHRYN------  This note was reviewed and clinical supervision by [facility] Wild [facility]    7/6/2022

## 2022-07-15 ENCOUNTER — LAB (OUTPATIENT)
Dept: LAB | Facility: CLINIC | Age: 47
End: 2022-07-15
Payer: COMMERCIAL

## 2022-07-15 DIAGNOSIS — Z94.4 LIVER REPLACED BY TRANSPLANT (H): Primary | ICD-10-CM

## 2022-07-15 DIAGNOSIS — Z79.899 OTHER LONG TERM (CURRENT) DRUG THERAPY: ICD-10-CM

## 2022-07-15 DIAGNOSIS — N17.9 ACUTE KIDNEY FAILURE, UNSPECIFIED (H): ICD-10-CM

## 2022-07-15 DIAGNOSIS — Z94.4 TRANSPLANTED LIVER (H): ICD-10-CM

## 2022-07-15 LAB
ANION GAP SERPL CALCULATED.3IONS-SCNC: 4 MMOL/L (ref 3–14)
BUN SERPL-MCNC: 31 MG/DL (ref 7–30)
CALCIUM SERPL-MCNC: 9.1 MG/DL (ref 8.5–10.1)
CHLORIDE BLD-SCNC: 109 MMOL/L (ref 94–109)
CO2 SERPL-SCNC: 25 MMOL/L (ref 20–32)
CREAT SERPL-MCNC: 2.36 MG/DL (ref 0.66–1.25)
GFR SERPL CREATININE-BSD FRML MDRD: 34 ML/MIN/1.73M2
GLUCOSE BLD-MCNC: 159 MG/DL (ref 70–99)
POTASSIUM BLD-SCNC: 5.6 MMOL/L (ref 3.4–5.3)
SODIUM SERPL-SCNC: 138 MMOL/L (ref 133–144)

## 2022-07-15 PROCEDURE — 36415 COLL VENOUS BLD VENIPUNCTURE: CPT

## 2022-07-15 PROCEDURE — 80048 BASIC METABOLIC PNL TOTAL CA: CPT

## 2022-07-29 ENCOUNTER — LAB (OUTPATIENT)
Dept: LAB | Facility: CLINIC | Age: 47
End: 2022-07-29
Payer: COMMERCIAL

## 2022-07-29 DIAGNOSIS — Z94.4 TRANSPLANTED LIVER (H): ICD-10-CM

## 2022-07-29 DIAGNOSIS — Z79.899 OTHER LONG TERM (CURRENT) DRUG THERAPY: ICD-10-CM

## 2022-07-29 LAB
ALBUMIN SERPL-MCNC: 4 G/DL (ref 3.4–5)
ALP SERPL-CCNC: 137 U/L (ref 40–150)
ALT SERPL W P-5'-P-CCNC: 57 U/L (ref 0–70)
ANION GAP SERPL CALCULATED.3IONS-SCNC: 4 MMOL/L (ref 3–14)
AST SERPL W P-5'-P-CCNC: 47 U/L (ref 0–45)
BILIRUB SERPL-MCNC: 1 MG/DL (ref 0.2–1.3)
BUN SERPL-MCNC: 14 MG/DL (ref 7–30)
CALCIUM SERPL-MCNC: 9.1 MG/DL (ref 8.5–10.1)
CHLORIDE BLD-SCNC: 111 MMOL/L (ref 94–109)
CO2 SERPL-SCNC: 26 MMOL/L (ref 20–32)
CREAT SERPL-MCNC: 1.27 MG/DL (ref 0.66–1.25)
ERYTHROCYTE [DISTWIDTH] IN BLOOD BY AUTOMATED COUNT: 14.9 % (ref 10–15)
GFR SERPL CREATININE-BSD FRML MDRD: 71 ML/MIN/1.73M2
GLUCOSE BLD-MCNC: 117 MG/DL (ref 70–99)
HCT VFR BLD AUTO: 35.3 % (ref 40–53)
HGB BLD-MCNC: 11.8 G/DL (ref 13.3–17.7)
MCH RBC QN AUTO: 31.3 PG (ref 26.5–33)
MCHC RBC AUTO-ENTMCNC: 33.4 G/DL (ref 31.5–36.5)
MCV RBC AUTO: 94 FL (ref 78–100)
PLATELET # BLD AUTO: 92 10E3/UL (ref 150–450)
POTASSIUM BLD-SCNC: 4.1 MMOL/L (ref 3.4–5.3)
PROT SERPL-MCNC: 6.6 G/DL (ref 6.8–8.8)
RBC # BLD AUTO: 3.77 10E6/UL (ref 4.4–5.9)
SODIUM SERPL-SCNC: 141 MMOL/L (ref 133–144)
WBC # BLD AUTO: 1.8 10E3/UL (ref 4–11)

## 2022-07-29 PROCEDURE — 85027 COMPLETE CBC AUTOMATED: CPT

## 2022-07-29 PROCEDURE — 80053 COMPREHEN METABOLIC PANEL: CPT

## 2022-07-29 PROCEDURE — 36415 COLL VENOUS BLD VENIPUNCTURE: CPT

## 2022-10-27 DIAGNOSIS — F33.1 MODERATE EPISODE OF RECURRENT MAJOR DEPRESSIVE DISORDER (H): ICD-10-CM

## 2022-10-31 RX ORDER — SERTRALINE HYDROCHLORIDE 100 MG/1
TABLET, FILM COATED ORAL
Qty: 135 TABLET | Refills: 1 | Status: SHIPPED | OUTPATIENT
Start: 2022-10-31

## 2022-11-20 ENCOUNTER — HEALTH MAINTENANCE LETTER (OUTPATIENT)
Age: 47
End: 2022-11-20

## 2022-12-12 ENCOUNTER — TELEPHONE (OUTPATIENT)
Dept: FAMILY MEDICINE | Facility: CLINIC | Age: 47
End: 2022-12-12

## 2022-12-12 NOTE — TELEPHONE ENCOUNTER
Patient Quality Outreach    Patient is due for the following:   Diabetes -  A1C, Eye Exam, Microalbumin, Statin, Diabetic Follow-Up Visit and Foot Exam  Colon Cancer Screening  Depression  -  PHQ-9 needed  Physical Preventive Adult Physical    Next Steps:   Schedule a Adult Preventative    Type of outreach:    Sent bCommunities message.      Questions for provider review:    None     Bailee Kahler

## 2023-06-01 ENCOUNTER — HEALTH MAINTENANCE LETTER (OUTPATIENT)
Age: 48
End: 2023-06-01

## 2023-06-26 NOTE — PLAN OF CARE
----- Message from Halima Oshea MD sent at 6/26/2023  7:55 AM CDT -----  Palest[place]: Cholesterol panel is normal.   Kidney function is normal, moderate signs of dehydration based on lab results. Electrolytes ok, blood sugar mildly high, with A1c of 7.2% down slightly from six months ago, continue current medication regimen. No significant anemia. Vitamin D insufficiency, take high dose over the counter Vitamin D3,  mcg daily. Alk phos is mildly elevated, this has been this way in the past. Halima Oshea MD    Goal Outcome Evaluation:    Plan of Care Reviewed With: patient     Overall Patient Progress: improving    Outcome Evaluation: Transferred from 6D. Denies pain and nausea. BP elevated, but did not meet parameters for IV labetolol.

## 2024-01-01 ENCOUNTER — HEALTH MAINTENANCE LETTER (OUTPATIENT)
Age: 49
End: 2024-01-01

## 2024-06-16 ENCOUNTER — HEALTH MAINTENANCE LETTER (OUTPATIENT)
Age: 49
End: 2024-06-16

## 2024-11-05 ASSESSMENT — PATIENT HEALTH QUESTIONNAIRE - PHQ9
SUM OF ALL RESPONSES TO PHQ QUESTIONS 1-9: 4
SUM OF ALL RESPONSES TO PHQ QUESTIONS 1-9: 10